# Patient Record
Sex: FEMALE | Race: BLACK OR AFRICAN AMERICAN | NOT HISPANIC OR LATINO | Employment: OTHER | URBAN - METROPOLITAN AREA
[De-identification: names, ages, dates, MRNs, and addresses within clinical notes are randomized per-mention and may not be internally consistent; named-entity substitution may affect disease eponyms.]

---

## 2022-04-10 ENCOUNTER — HISTORICAL (OUTPATIENT)
Dept: ADMINISTRATIVE | Facility: HOSPITAL | Age: 72
End: 2022-04-10
Payer: MEDICARE

## 2022-04-28 VITALS
DIASTOLIC BLOOD PRESSURE: 74 MMHG | SYSTOLIC BLOOD PRESSURE: 118 MMHG | HEIGHT: 63 IN | WEIGHT: 163.13 LBS | BODY MASS INDEX: 28.9 KG/M2

## 2022-05-20 ENCOUNTER — HOSPITAL ENCOUNTER (INPATIENT)
Facility: HOSPITAL | Age: 72
LOS: 3 days | Discharge: NURSING, REHAB OR LONG-TERM CARE | DRG: 982 | End: 2022-05-26
Attending: INTERNAL MEDICINE | Admitting: INTERNAL MEDICINE
Payer: MEDICARE

## 2022-05-20 DIAGNOSIS — I25.10 CAD (CORONARY ARTERY DISEASE), NATIVE CORONARY ARTERY: ICD-10-CM

## 2022-05-20 DIAGNOSIS — I25.10 CAD (CORONARY ARTERY DISEASE): ICD-10-CM

## 2022-05-20 DIAGNOSIS — R94.8 NONSPECIFIC ABNORMAL RESULTS OF BASAL METABOLISM FUNCTION STUDY: ICD-10-CM

## 2022-05-20 DIAGNOSIS — I25.10 ATHEROSCLEROSIS OF CORONARY ARTERY, UNSPECIFIED VESSEL OR LESION TYPE, UNSPECIFIED WHETHER ANGINA PRESENT, UNSPECIFIED WHETHER NATIVE OR TRANSPLANTED HEART: Primary | ICD-10-CM

## 2022-05-20 LAB
CATH EF ESTIMATED: 45 %
CATH EF QUANTITATIVE: 45 %
POCT GLUCOSE: 204 MG/DL (ref 70–110)
POCT GLUCOSE: 212 MG/DL (ref 70–110)
POCT GLUCOSE: 215 MG/DL (ref 70–110)

## 2022-05-20 PROCEDURE — 63600175 PHARM REV CODE 636 W HCPCS

## 2022-05-20 PROCEDURE — 63600175 PHARM REV CODE 636 W HCPCS: Performed by: INTERNAL MEDICINE

## 2022-05-20 PROCEDURE — 96360 HYDRATION IV INFUSION INIT: CPT

## 2022-05-20 PROCEDURE — 99152 MOD SED SAME PHYS/QHP 5/>YRS: CPT | Performed by: INTERNAL MEDICINE

## 2022-05-20 PROCEDURE — 99153 MOD SED SAME PHYS/QHP EA: CPT | Performed by: INTERNAL MEDICINE

## 2022-05-20 PROCEDURE — 25000003 PHARM REV CODE 250: Performed by: INTERNAL MEDICINE

## 2022-05-20 PROCEDURE — C9600 PERC DRUG-EL COR STENT SING: HCPCS | Mod: LC | Performed by: INTERNAL MEDICINE

## 2022-05-20 PROCEDURE — 27201423 OPTIME MED/SURG SUP & DEVICES STERILE SUPPLY: Performed by: INTERNAL MEDICINE

## 2022-05-20 PROCEDURE — G0378 HOSPITAL OBSERVATION PER HR: HCPCS

## 2022-05-20 PROCEDURE — 93010 ELECTROCARDIOGRAM REPORT: CPT | Mod: ,,, | Performed by: INTERNAL MEDICINE

## 2022-05-20 PROCEDURE — C1769 GUIDE WIRE: HCPCS | Performed by: INTERNAL MEDICINE

## 2022-05-20 PROCEDURE — 93005 ELECTROCARDIOGRAM TRACING: CPT

## 2022-05-20 PROCEDURE — C1725 CATH, TRANSLUMIN NON-LASER: HCPCS | Performed by: INTERNAL MEDICINE

## 2022-05-20 PROCEDURE — C1894 INTRO/SHEATH, NON-LASER: HCPCS | Performed by: INTERNAL MEDICINE

## 2022-05-20 PROCEDURE — 93010 ELECTROCARDIOGRAM REPORT: CPT | Mod: 59,,, | Performed by: INTERNAL MEDICINE

## 2022-05-20 PROCEDURE — C1760 CLOSURE DEV, VASC: HCPCS | Performed by: INTERNAL MEDICINE

## 2022-05-20 PROCEDURE — 96361 HYDRATE IV INFUSION ADD-ON: CPT

## 2022-05-20 PROCEDURE — 93458 L HRT ARTERY/VENTRICLE ANGIO: CPT | Mod: 59 | Performed by: INTERNAL MEDICINE

## 2022-05-20 PROCEDURE — C1874 STENT, COATED/COV W/DEL SYS: HCPCS | Performed by: INTERNAL MEDICINE

## 2022-05-20 PROCEDURE — C1887 CATHETER, GUIDING: HCPCS | Performed by: INTERNAL MEDICINE

## 2022-05-20 PROCEDURE — 93010 EKG 12-LEAD: ICD-10-PCS | Mod: 59,,, | Performed by: INTERNAL MEDICINE

## 2022-05-20 PROCEDURE — 93010 EKG 12-LEAD: ICD-10-PCS | Mod: ,,, | Performed by: INTERNAL MEDICINE

## 2022-05-20 DEVICE — STENT RONYX22538UX RESOLUTE ONYX 2.25X38
Type: IMPLANTABLE DEVICE | Site: CHEST | Status: FUNCTIONAL
Brand: RESOLUTE ONYX™

## 2022-05-20 DEVICE — STENT RONYX25022UX RESOLUTE ONYX 2.50X22
Type: IMPLANTABLE DEVICE | Site: CHEST | Status: FUNCTIONAL
Brand: RESOLUTE ONYX™

## 2022-05-20 DEVICE — KIT ANGIO SEAL 6FR VIP: Type: IMPLANTABLE DEVICE | Site: CHEST | Status: FUNCTIONAL

## 2022-05-20 RX ORDER — SODIUM CHLORIDE 9 MG/ML
INJECTION, SOLUTION INTRAVENOUS ONCE
Status: COMPLETED | OUTPATIENT
Start: 2022-05-20 | End: 2022-05-20

## 2022-05-20 RX ORDER — DULOXETIN HYDROCHLORIDE 60 MG/1
60 CAPSULE, DELAYED RELEASE ORAL DAILY
Status: ON HOLD | COMMUNITY
End: 2022-06-06 | Stop reason: SDUPTHER

## 2022-05-20 RX ORDER — SODIUM CHLORIDE 9 MG/ML
INJECTION, SOLUTION INTRAVENOUS CONTINUOUS
Status: DISCONTINUED | OUTPATIENT
Start: 2022-05-20 | End: 2022-05-21

## 2022-05-20 RX ORDER — DICLOFENAC SODIUM 50 MG/1
50 TABLET, DELAYED RELEASE ORAL 2 TIMES DAILY
Status: ON HOLD | COMMUNITY
End: 2022-06-06

## 2022-05-20 RX ORDER — MIDAZOLAM HYDROCHLORIDE 1 MG/ML
INJECTION, SOLUTION INTRAMUSCULAR; INTRAVENOUS
Status: DISCONTINUED | OUTPATIENT
Start: 2022-05-20 | End: 2022-05-22

## 2022-05-20 RX ORDER — MIDAZOLAM HYDROCHLORIDE 2 MG/2ML
INJECTION, SOLUTION INTRAMUSCULAR; INTRAVENOUS
Status: DISCONTINUED | OUTPATIENT
Start: 2022-05-20 | End: 2022-05-22

## 2022-05-20 RX ORDER — FENTANYL CITRATE 50 UG/ML
INJECTION, SOLUTION INTRAMUSCULAR; INTRAVENOUS
Status: DISCONTINUED | OUTPATIENT
Start: 2022-05-20 | End: 2022-05-22

## 2022-05-20 RX ORDER — ATORVASTATIN CALCIUM 40 MG/1
40 TABLET, FILM COATED ORAL NIGHTLY
COMMUNITY

## 2022-05-20 RX ORDER — DIPHENHYDRAMINE HCL 25 MG
50 CAPSULE ORAL ONCE
Status: COMPLETED | OUTPATIENT
Start: 2022-05-20 | End: 2022-05-20

## 2022-05-20 RX ORDER — LOSARTAN POTASSIUM 50 MG/1
50 TABLET ORAL DAILY
Status: ON HOLD | COMMUNITY
End: 2022-06-06 | Stop reason: HOSPADM

## 2022-05-20 RX ORDER — CEFAZOLIN SODIUM 1 G/3ML
INJECTION, POWDER, FOR SOLUTION INTRAMUSCULAR; INTRAVENOUS
Status: DISCONTINUED | OUTPATIENT
Start: 2022-05-20 | End: 2022-05-22

## 2022-05-20 RX ORDER — CLOPIDOGREL 300 MG/1
TABLET, FILM COATED ORAL
Status: DISCONTINUED | OUTPATIENT
Start: 2022-05-20 | End: 2022-05-22

## 2022-05-20 RX ORDER — MAG HYDROX/ALUMINUM HYD/SIMETH 200-200-20
SUSPENSION, ORAL (FINAL DOSE FORM) ORAL
Status: DISCONTINUED | OUTPATIENT
Start: 2022-05-20 | End: 2022-05-22

## 2022-05-20 RX ORDER — DONEPEZIL HYDROCHLORIDE 5 MG/1
5 TABLET, FILM COATED ORAL NIGHTLY
Status: ON HOLD | COMMUNITY
End: 2022-06-06 | Stop reason: SDUPTHER

## 2022-05-20 RX ORDER — METFORMIN HYDROCHLORIDE 1000 MG/1
1000 TABLET ORAL 2 TIMES DAILY
Status: ON HOLD | COMMUNITY
End: 2022-05-21 | Stop reason: SDUPTHER

## 2022-05-20 RX ORDER — ASPIRIN 325 MG
TABLET ORAL
Status: DISCONTINUED | OUTPATIENT
Start: 2022-05-20 | End: 2022-05-22

## 2022-05-20 RX ORDER — DIAZEPAM 5 MG/1
10 TABLET ORAL ONCE
Status: COMPLETED | OUTPATIENT
Start: 2022-05-20 | End: 2022-05-20

## 2022-05-20 RX ORDER — LIDOCAINE HYDROCHLORIDE AND EPINEPHRINE 20; 10 MG/ML; UG/ML
INJECTION, SOLUTION INFILTRATION; PERINEURAL
Status: DISCONTINUED | OUTPATIENT
Start: 2022-05-20 | End: 2022-05-22

## 2022-05-20 RX ORDER — HYDROCODONE BITARTRATE AND ACETAMINOPHEN 7.5; 325 MG/1; MG/1
1 TABLET ORAL ONCE
Status: COMPLETED | OUTPATIENT
Start: 2022-05-20 | End: 2022-05-20

## 2022-05-20 RX ORDER — ASPIRIN 81 MG/1
81 TABLET ORAL DAILY
COMMUNITY

## 2022-05-20 RX ORDER — HYDRALAZINE HYDROCHLORIDE 20 MG/ML
INJECTION INTRAMUSCULAR; INTRAVENOUS
Status: DISCONTINUED | OUTPATIENT
Start: 2022-05-20 | End: 2022-05-22

## 2022-05-20 RX ORDER — SODIUM CHLORIDE 0.9 % (FLUSH) 0.9 %
10 SYRINGE (ML) INJECTION
Status: DISCONTINUED | OUTPATIENT
Start: 2022-05-20 | End: 2022-05-22

## 2022-05-20 RX ORDER — PREGABALIN 75 MG/1
75 CAPSULE ORAL 2 TIMES DAILY
Status: ON HOLD | COMMUNITY
End: 2022-05-25 | Stop reason: HOSPADM

## 2022-05-20 RX ORDER — PANTOPRAZOLE SODIUM 40 MG/1
40 TABLET, DELAYED RELEASE ORAL DAILY
COMMUNITY

## 2022-05-20 RX ADMIN — SODIUM CHLORIDE: 9 INJECTION, SOLUTION INTRAVENOUS at 08:05

## 2022-05-20 RX ADMIN — DIPHENHYDRAMINE HYDROCHLORIDE 50 MG: 25 CAPSULE ORAL at 08:05

## 2022-05-20 RX ADMIN — DIAZEPAM 10 MG: 5 TABLET ORAL at 08:05

## 2022-05-20 RX ADMIN — SODIUM CHLORIDE: 9 INJECTION, SOLUTION INTRAVENOUS at 04:05

## 2022-05-20 RX ADMIN — HYDROCODONE BITARTRATE AND ACETAMINOPHEN 1 TABLET: 7.5; 325 TABLET ORAL at 12:05

## 2022-05-20 NOTE — Clinical Note
The catheter was repositioned into the ostium   right coronary artery. Hemodynamics were performed.  An angiography was performed of the right coronary arteries. Multiple views were taken. The angiography was performed via power injection.

## 2022-05-20 NOTE — Clinical Note
The catheter was inserted into the ostial  left coronary artery. An angiography was performed of the left coronary arteries. The angiography was performed via power injection.

## 2022-05-20 NOTE — Clinical Note
The left ventricle was injected. The injected rate was 10 mL/sec. The total injected volume was 30 mL. EF 45%

## 2022-05-20 NOTE — Clinical Note
The catheter was inserted into the ostial  left coronary artery. Hemodynamics were performed.  An angiography was performed of the left coronary arteries. Multiple views were taken. The angiography was performed via power injection.

## 2022-05-20 NOTE — H&P
Patient name: Mayra Parker  MRN: [id number]  : 1950  Cath Lab Procedure H&P Update    Pre-Procedure Assessment:    I saw and examined the patient face to face. The patient has been re-evaluated and her condition is unchanged. The reason for admission, procedure and care is still present.  Based on the patients H&P, pre-procedure physical exam, relevant diagnostic studies, NPO status and information obtained from the patient, I determine the patient is an appropriate candidate for the proposed procedure and anesthesia planned. I further certify the anesthesia risks, benefits and options have been explained to the patient to which she agrees as documented on the procedural consent.    See scanned updated H&P and additional records from media tab.      Jone Estrella

## 2022-05-20 NOTE — Clinical Note
The catheter was inserted into the ostium   right coronary artery. Hemodynamics were performed.  An angiography was performed of the right coronary arteries. The angiography was performed via power injection.

## 2022-05-21 PROBLEM — I25.10 CORONARY ATHEROSCLEROSIS: Status: ACTIVE | Noted: 2022-05-21

## 2022-05-21 LAB
ALBUMIN SERPL-MCNC: 3.4 GM/DL (ref 3.4–4.8)
ALBUMIN/GLOB SERPL: 1 RATIO (ref 1.1–2)
ALP SERPL-CCNC: 59 UNIT/L (ref 40–150)
ALT SERPL-CCNC: 106 UNIT/L (ref 0–55)
ANION GAP SERPL CALC-SCNC: 12 MEQ/L
ANION GAP SERPL CALC-SCNC: 13 MEQ/L
AST SERPL-CCNC: 88 UNIT/L (ref 5–34)
BASOPHILS # BLD AUTO: 0.02 X10(3)/MCL (ref 0–0.2)
BASOPHILS NFR BLD AUTO: 0.5 %
BILIRUBIN DIRECT+TOT PNL SERPL-MCNC: 0.8 MG/DL
BUN SERPL-MCNC: 28.3 MG/DL (ref 9.8–20.1)
BUN SERPL-MCNC: 30.5 MG/DL (ref 9.8–20.1)
BUN SERPL-MCNC: 33 MG/DL (ref 9.8–20.1)
CALCIUM SERPL-MCNC: 7.5 MG/DL (ref 8.4–10.2)
CALCIUM SERPL-MCNC: 7.8 MG/DL (ref 8.4–10.2)
CALCIUM SERPL-MCNC: 8.1 MG/DL (ref 8.4–10.2)
CHLORIDE SERPL-SCNC: 101 MMOL/L (ref 98–107)
CHLORIDE SERPL-SCNC: 98 MMOL/L (ref 98–107)
CHLORIDE SERPL-SCNC: 99 MMOL/L (ref 98–107)
CO2 SERPL-SCNC: 20 MMOL/L (ref 23–31)
CO2 SERPL-SCNC: 21 MMOL/L (ref 23–31)
CO2 SERPL-SCNC: 23 MMOL/L (ref 23–31)
CREAT SERPL-MCNC: 3.69 MG/DL (ref 0.55–1.02)
CREAT SERPL-MCNC: 3.81 MG/DL (ref 0.55–1.02)
CREAT SERPL-MCNC: 4.21 MG/DL (ref 0.55–1.02)
CREAT UR-MCNC: 43.6 MG/DL (ref 47–110)
CREAT/UREA NIT SERPL: 8
CREAT/UREA NIT SERPL: 8
EOSINOPHIL # BLD AUTO: 0.17 X10(3)/MCL (ref 0–0.9)
EOSINOPHIL NFR BLD AUTO: 4.5 %
ERYTHROCYTE [DISTWIDTH] IN BLOOD BY AUTOMATED COUNT: 12.6 % (ref 11.5–17)
FERRITIN SERPL-MCNC: 313.01 NG/ML (ref 4.63–204)
FOLATE SERPL-MCNC: 7 NG/ML (ref 7–31.4)
GLOBULIN SER-MCNC: 3.4 GM/DL (ref 2.4–3.5)
GLUCOSE SERPL-MCNC: 132 MG/DL (ref 82–115)
GLUCOSE SERPL-MCNC: 176 MG/DL (ref 82–115)
GLUCOSE SERPL-MCNC: 256 MG/DL (ref 82–115)
HCT VFR BLD AUTO: 34.4 % (ref 37–47)
HGB BLD-MCNC: 10.9 GM/DL (ref 12–16)
IMM GRANULOCYTES # BLD AUTO: 0.01 X10(3)/MCL (ref 0–0.02)
IMM GRANULOCYTES NFR BLD AUTO: 0.3 % (ref 0–0.43)
IRON SATN MFR SERPL: 43 % (ref 20–50)
IRON SERPL-MCNC: 107 UG/DL (ref 50–170)
LYMPHOCYTES # BLD AUTO: 1.32 X10(3)/MCL (ref 0.6–4.6)
LYMPHOCYTES NFR BLD AUTO: 35.1 %
MCH RBC QN AUTO: 29.7 PG (ref 27–31)
MCHC RBC AUTO-ENTMCNC: 31.7 MG/DL (ref 33–36)
MCV RBC AUTO: 93.7 FL (ref 80–94)
MONOCYTES # BLD AUTO: 0.52 X10(3)/MCL (ref 0.1–1.3)
MONOCYTES NFR BLD AUTO: 13.8 %
NEUTROPHILS # BLD AUTO: 1.7 X10(3)/MCL (ref 2.1–9.2)
NEUTROPHILS NFR BLD AUTO: 45.8 %
NRBC BLD AUTO-RTO: 0 %
PLATELET # BLD AUTO: 236 X10(3)/MCL (ref 130–400)
PMV BLD AUTO: 10.1 FL (ref 9.4–12.4)
POCT GLUCOSE: 178 MG/DL (ref 70–110)
POTASSIUM SERPL-SCNC: 5.2 MMOL/L (ref 3.5–5.1)
POTASSIUM SERPL-SCNC: 5.6 MMOL/L (ref 3.5–5.1)
POTASSIUM SERPL-SCNC: 5.6 MMOL/L (ref 3.5–5.1)
PROT SERPL-MCNC: 6.8 GM/DL (ref 5.8–7.6)
PROT UR STRIP-MCNC: 13.4 MG/DL
PROT/CREAT UR-RTO: 307.3 MG/GM CR
RBC # BLD AUTO: 3.67 X10(6)/MCL (ref 4.2–5.4)
SODIUM SERPL-SCNC: 131 MMOL/L (ref 136–145)
SODIUM SERPL-SCNC: 132 MMOL/L (ref 136–145)
SODIUM SERPL-SCNC: 133 MMOL/L (ref 136–145)
SODIUM UR-SCNC: 26 MMOL/L
TIBC SERPL-MCNC: 143 UG/DL (ref 70–310)
TIBC SERPL-MCNC: 250 UG/DL (ref 250–450)
TRANSFERRIN SERPL-MCNC: 215 MG/DL (ref 173–360)
WBC # SPEC AUTO: 3.8 X10(3)/MCL (ref 4.5–11.5)

## 2022-05-21 PROCEDURE — G0378 HOSPITAL OBSERVATION PER HR: HCPCS

## 2022-05-21 PROCEDURE — 85025 COMPLETE CBC W/AUTO DIFF WBC: CPT | Performed by: NURSE PRACTITIONER

## 2022-05-21 PROCEDURE — 25000003 PHARM REV CODE 250: Performed by: NURSE PRACTITIONER

## 2022-05-21 PROCEDURE — 82728 ASSAY OF FERRITIN: CPT | Performed by: INTERNAL MEDICINE

## 2022-05-21 PROCEDURE — 25000003 PHARM REV CODE 250: Performed by: INTERNAL MEDICINE

## 2022-05-21 PROCEDURE — 80053 COMPREHEN METABOLIC PANEL: CPT | Performed by: INTERNAL MEDICINE

## 2022-05-21 PROCEDURE — 84300 ASSAY OF URINE SODIUM: CPT | Performed by: INTERNAL MEDICINE

## 2022-05-21 PROCEDURE — 36415 COLL VENOUS BLD VENIPUNCTURE: CPT | Performed by: INTERNAL MEDICINE

## 2022-05-21 PROCEDURE — 82310 ASSAY OF CALCIUM: CPT | Performed by: NURSE PRACTITIONER

## 2022-05-21 PROCEDURE — 96361 HYDRATE IV INFUSION ADD-ON: CPT

## 2022-05-21 PROCEDURE — 82746 ASSAY OF FOLIC ACID SERUM: CPT | Performed by: INTERNAL MEDICINE

## 2022-05-21 PROCEDURE — 82570 ASSAY OF URINE CREATININE: CPT | Performed by: INTERNAL MEDICINE

## 2022-05-21 PROCEDURE — 83540 ASSAY OF IRON: CPT | Performed by: INTERNAL MEDICINE

## 2022-05-21 PROCEDURE — 36415 COLL VENOUS BLD VENIPUNCTURE: CPT | Performed by: NURSE PRACTITIONER

## 2022-05-21 RX ORDER — CLOPIDOGREL BISULFATE 75 MG/1
75 TABLET ORAL DAILY
Status: DISCONTINUED | OUTPATIENT
Start: 2022-05-21 | End: 2022-05-26 | Stop reason: HOSPADM

## 2022-05-21 RX ORDER — CLOPIDOGREL BISULFATE 75 MG/1
75 TABLET ORAL DAILY
Qty: 30 TABLET | Refills: 11 | Status: ON HOLD | OUTPATIENT
Start: 2022-05-21 | End: 2022-06-06 | Stop reason: SDUPTHER

## 2022-05-21 RX ORDER — METFORMIN HYDROCHLORIDE 1000 MG/1
1000 TABLET ORAL 2 TIMES DAILY
Qty: 60 TABLET | Refills: 0 | Status: ON HOLD | OUTPATIENT
Start: 2022-05-21 | End: 2022-06-06

## 2022-05-21 RX ORDER — SODIUM CHLORIDE 9 MG/ML
INJECTION, SOLUTION INTRAVENOUS CONTINUOUS
Status: DISCONTINUED | OUTPATIENT
Start: 2022-05-21 | End: 2022-05-26 | Stop reason: HOSPADM

## 2022-05-21 RX ADMIN — CLOPIDOGREL 75 MG: 75 TABLET, FILM COATED ORAL at 08:05

## 2022-05-21 RX ADMIN — SODIUM ZIRCONIUM CYCLOSILICATE 10 G: 10 POWDER, FOR SUSPENSION ORAL at 07:05

## 2022-05-21 RX ADMIN — SODIUM CHLORIDE: 9 INJECTION, SOLUTION INTRAVENOUS at 07:05

## 2022-05-21 RX ADMIN — SODIUM ZIRCONIUM CYCLOSILICATE 10 G: 10 POWDER, FOR SUSPENSION ORAL at 05:05

## 2022-05-21 RX ADMIN — SODIUM ZIRCONIUM CYCLOSILICATE 10 G: 10 POWDER, FOR SUSPENSION ORAL at 10:05

## 2022-05-21 RX ADMIN — SODIUM CHLORIDE: 9 INJECTION, SOLUTION INTRAVENOUS at 08:05

## 2022-05-21 RX ADMIN — SODIUM ZIRCONIUM CYCLOSILICATE 10 G: 10 POWDER, FOR SUSPENSION ORAL at 02:05

## 2022-05-21 NOTE — HOSPITAL COURSE
Ms. Parker presented for coronary evaluation due to Complaints of Sob/CARTAGENA with abnormal PET scan.

## 2022-05-21 NOTE — CONSULTS
[facility]                       [address] [address]    PATIENT NAME:       LAYTON BAINS  YOB: 1950  CSN:                [id number]   MRN:                [id number]  ADMIT DATE:         05/20/2022 07:19:00  PHYSICIAN:          John Flores MD                            CONSULTATION    DATE OF CONSULT:      HISTORY OF PRESENT ILLNESS:  This is a pleasant 72-year-old -American   female, whom I was consulted to see this morning for a creatinine of 4.2 and a   potassium of 5.6.  The patient had a coronary aortography yesterday and did   undergo PTA and stent placements x2 into her right coronary artery for what   appeared to be unstable-type angina symptoms.  She does have a longstanding   history of diabetes mellitus as well as chronic kidney disease, and had a   creatinine of 1.5 back in December of 2021, suggestive of at least stage 3 CKD.    The patient herself denied any history of recurrent urinary tract infections,   although she did have an admission in December of 2021 at [facility] for UTI.  She   denied any dysuria, urgency, or hesitation but she has been diabetic for well   over 20 years.  She denies any nephrolithiasis.  Unfortunately, she has also had   some degenerative joint disease and bilateral lower extremity pains consistent   with some neuropathy.  She is on gabapentin but more importantly, she has been   on diclofenac, which is Voltaren, 50 mg b.i.d. for the least year.    PAST MEDICAL HISTORY:  Significant for bilateral carotid endarterectomies, the   patient has had hypertension, diabetes mellitus, previous stroke in 2017,   history of depression and some dementia.  Patient has also had a history of back   surgery in the remote past.    ALLERGIES:  REPORTEDLY GLIPIZIDE.     HOME MEDICATIONS:  Included:  1. Cozaar.  2. Lyrica.  3. Januvia.  4. Aricept.  5. Cymbalta.  6.  Lipitor.  7. Ecotrin.  8. As mentioned earlier, diclofenac 50 mg b.i.d.    REVIEW OF SYSTEMS:  Otherwise is significant for gradual loss of her dentition with gingival   disease.  She has a slight decline in visual acuity as well as hearing ability.    She has no difficulty swallowing.  She denies any bleeding ulcers or rectal   bleeding.  She denied any history of seizure activity.  There is no further   chest pain since her stent placements yesterday.  She has had no rectal   bleeding.  She denied any rashes or joint swelling.  The rest of the review is   unremarkable.    PHYSICAL EXAMINATION:  GENERAL:  Reveals a well developed and nourished female.  She is awake, alert,   oriented, and appropriate.  Her daughter is in attendance with her in her room.   VITAL SIGNS:  Her current blood pressure this morning is 114/66, heart rate is   79, respiratory rate 18, temperature 36.7.  HEENT:  No scleral icterus.  Extraocular movements appear normal.  Conjunctivae   were slightly pale.  Mucous membranes were dry.  NECK:  No venous distention.  Evidence of bilateral carotid endarterectomies   noted.  LUNGS:  Clear to auscultation.  HEART:  Regular, distant, without rub or gallop.  ABDOMEN:  Soft and nontender.  EXTREMITIES:  Lower extremities reveal no pitting edema, good peripheral pulses   noted.    LABORATORY DATA:  Her BUN and creatinine this morning are 33 and 4.2,   respectively.  Sugar was 132, albumin 3.4, potassium is 5.6.    IMPRESSION:    1. Acute kidney injury, superimposed on at least stage 3 chronic kidney disease   with a creatinine of 1.5 in December of 2021.  2. Longstanding diabetes.  3. Status post 2 right coronary artery stents on May 20th.  4. Long-term use of nonsteroidal anti-inflammatory drugs.  5. Incomplete database.  6. Type 2 diabetes.  7. Hypertension.    PLANS:  We will proceed with cautious hydration today with her normal saline at   125 an hour.  Renal ultrasound will be done as well as a  urine protein,   creatinine, and sodium.  I suspect the patient's renal functions have been   impaired prior to her left heart cath; although certainly, contrast could have   potentiated this issue.  We will use potassium binders today to bring her   potassium down.  I did mention the possibility that dialysis may have to be   entertained.  We will follow with you with interest.        ______________________________  MD SONIA Harry/VIVIEN  DD:  05/21/2022  Time:  08:55AM  DT:  05/21/2022  Time:  12:09PM  Job #:  [id number]      CONSULTATION

## 2022-05-21 NOTE — H&P
[facility]  Cardiology  History and Physical     Patient Name: Mayra Parker  MRN: [id number]  Admission Date: 5/20/2022  Code Status: No Order   Attending Provider: Jone Estrella MD   Primary Care Physician: Rashaun Weiss MD  Principal Problem:Coronary atherosclerosis    Patient information was obtained from patient and ER records.     Subjective:     Chief Complaint:      HPI:   Ms. Parker is a 73y/o AAF, followed by Dr. Estrella, with PMHx significant for CVA, T2DM, HTN, HLD, and dementia who presented for outpatient coronary evaluation due to abnormal PET with c/o SOB/CARTAGENA. She underwent LHC with 90% stenosis noted to proximal RCA to distal RCA which was ballooned and stented x 2 and reduced to 0%. Om1 with noted 90% stenosis reduced to 0% post PTA/stent. EF 40-50% by visual estimate. Normal LVEDP. She was admitted to observation overnight. Labs collected and revealed ARF with BUN/creatinine 33/4. No known hx of CKD. No uOP documented. Family at bedside reports occasional urination overnight.     PMH: CVA, HLD, depression, dementia, T2DM, HTN  PSH: Right and left CEA, back surgery  Social History: current smoker  Family History: Father- CAD native, Mother- CAD native, Son- CAD, MI- reason for death    Previous Cardiac Diagnostics:   TTE 05/03/2022:  LV normal in size. Global LVSF. LVEF 55%. LV diastolic function is impaired Grade I with normal LA pressure. Noted mild concentric LVH. Mild calcification of aortic valve noted with adequate cuspal excursion. 1+ TR. PASP 31mmHg. Mobility of anterior and posterior mitral leaflet is normal. Mild MAC with Trace MR.     PET 04/19/2022:  Abnormal perfusion study consistent with large area of moderate to severe inferior and inferoapical ischemia. Perfusion imaging suggestive of single vessel disease in distribution of RCA. LV cavity normal on stress studies. Stress LVEF 42% and LV global function is mildly reduced. Rest LV  cavity normal. Rest LVEF 56% and rest LV global function is normal.     ROS  Objective:     Vital Signs (Most Recent):  Temp: 97.7 °F (36.5 °C) (05/21/22 0425)  Pulse: 84 (05/21/22 0425)  Resp: 20 (05/21/22 0425)  BP: 106/71 (05/21/22 0425)  SpO2: 96 % (05/21/22 0425) Vital Signs (24h Range):  Temp:  [97.6 °F (36.4 °C)-98 °F (36.7 °C)] 97.7 °F (36.5 °C)  Pulse:  [75-97] 84  Resp:  [12-23] 20  SpO2:  [94 %-98 %] 96 %  BP: (106-170)/(47-86) 106/71     Weight: 67.9 kg (149 lb 11.1 oz)  Body mass index is 25.69 kg/m².    SpO2: 96 %       No intake or output data in the 24 hours ending 05/21/22 0706    Lines/Drains/Airways     Drain  Duration                Sheath 05/20/22 1018 Right <1 day          Peripheral Intravenous Line  Duration                Peripheral IV - Single Lumen 05/20/22 0805 20 G Left Antecubital <1 day                Physical Exam  Constitutional:       Appearance: Normal appearance.   HENT:      Head: Normocephalic and atraumatic.      Mouth/Throat:      Mouth: Mucous membranes are moist.   Eyes:      Pupils: Pupils are equal, round, and reactive to light.   Cardiovascular:      Rate and Rhythm: Normal rate.      Heart sounds: Normal heart sounds.   Pulmonary:      Effort: Pulmonary effort is normal.      Breath sounds: Normal breath sounds.   Abdominal:      General: Abdomen is flat.      Palpations: Abdomen is soft.   Musculoskeletal:      Cervical back: Neck supple.      Left lower leg: No edema.      Comments: R groin soft, minimally tender   Skin:     General: Skin is warm.   Neurological:      General: No focal deficit present.      Mental Status: She is alert and oriented to person, place, and time.   Psychiatric:         Mood and Affect: Mood normal.         Significant Labs:   BMP:   Recent Labs   Lab 05/21/22  0400   *   K 5.6*   CO2 23   BUN 33.0*   CREATININE 4.21*   CALCIUM 8.1*       Significant Imaging:  Left heart cath  · There was mild left ventricular systolic  dysfunction.  · The left ventricular end diastolic pressure was normal.  · The pre-procedure left ventricular end diastolic pressure was 12.  · The ejection fraction was calculated to be 45%.  · The ejection fraction was 40-50% by visual estimate.  · There was no mitral valve regurgitation.  · There was no aortic valve stenosis.  · The Prox RCA to Dist RCA lesion was 90% stenosed with 0% stenosis post-intervention. The stents were placed. The distal stent to the RCA was a 2.25 by 38 mm drug-eluting stent. The mid and proximal stent was a 2.5 x 22 mm drug-eluting stent.  · A stent was successfully placed.  · A stent was successfully placed.  · The estimated blood loss was none.  · There was two vessel coronary artery disease.  · The PCI was successful.  · The 1st Mrg lesion was 90% stenosed with 0% stenosis post-intervention. This marginal stent was a 2.25 x 38 mm drug-eluting stent post dilated to 2.5 mm  · A stent was successfully placed.       Assessment and Plan:   Native CAD  --s/p PTCA/stent x 2 proximal to distal RCA  --s/p PTCA/stent to OM1  ICMO  --EF 40-50%  ARF  --BUN/creatinine 33/4.21  --Creat was 1.3 on 5/10/22  --This seems too early for NILES (? Renal flow issue, ? Dissection possible)  Transaminitis  --AST/ALT 88/106  HLD  HTN  T2DM  Hyperkalemia    Plan:  Continue aspirin, plavix, and statin.   New ARF. Consult nephrology. Insert baez catheter for accurate UOP. Start NS at 125ml/hr.   Give lokelma x 1 dose. Hold losartan.             VTE Risk Mitigation (From admission, onward)         Ordered     heparin (porcine) 6,000 Units in sodium chloride 0.9% 6 mL  As needed (PRN)         05/20/22 1032                    Betty Ruffin, ALICIA  Cardiology  [facility]  05/21/2022 7:06 AM

## 2022-05-22 LAB
ALBUMIN SERPL-MCNC: 3.3 GM/DL (ref 3.4–4.8)
ALBUMIN/GLOB SERPL: 1.1 RATIO (ref 1.1–2)
ALP SERPL-CCNC: 66 UNIT/L (ref 40–150)
ALT SERPL-CCNC: 98 UNIT/L (ref 0–55)
AST SERPL-CCNC: 114 UNIT/L (ref 5–34)
BASOPHILS # BLD AUTO: 0.04 X10(3)/MCL (ref 0–0.2)
BASOPHILS NFR BLD AUTO: 0.9 %
BILIRUBIN DIRECT+TOT PNL SERPL-MCNC: 0.9 MG/DL
BUN SERPL-MCNC: 21.3 MG/DL (ref 9.8–20.1)
CALCIUM SERPL-MCNC: 8 MG/DL (ref 8.4–10.2)
CHLORIDE SERPL-SCNC: 110 MMOL/L (ref 98–107)
CO2 SERPL-SCNC: 25 MMOL/L (ref 23–31)
CREAT SERPL-MCNC: 2.57 MG/DL (ref 0.55–1.02)
EOSINOPHIL # BLD AUTO: 0.25 X10(3)/MCL (ref 0–0.9)
EOSINOPHIL NFR BLD AUTO: 5.3 %
ERYTHROCYTE [DISTWIDTH] IN BLOOD BY AUTOMATED COUNT: 12.4 % (ref 11.5–17)
GLOBULIN SER-MCNC: 2.9 GM/DL (ref 2.4–3.5)
GLUCOSE SERPL-MCNC: 115 MG/DL (ref 82–115)
GLUCOSE SERPL-MCNC: 155 MG/DL (ref 70–110)
HCT VFR BLD AUTO: 32.4 % (ref 37–47)
HGB BLD-MCNC: 10.1 GM/DL (ref 12–16)
IMM GRANULOCYTES # BLD AUTO: 0.01 X10(3)/MCL (ref 0–0.02)
IMM GRANULOCYTES NFR BLD AUTO: 0.2 % (ref 0–0.43)
LYMPHOCYTES # BLD AUTO: 1.88 X10(3)/MCL (ref 0.6–4.6)
LYMPHOCYTES NFR BLD AUTO: 40.1 %
MCH RBC QN AUTO: 29.4 PG (ref 27–31)
MCHC RBC AUTO-ENTMCNC: 31.2 MG/DL (ref 33–36)
MCV RBC AUTO: 94.5 FL (ref 80–94)
MONOCYTES # BLD AUTO: 0.64 X10(3)/MCL (ref 0.1–1.3)
MONOCYTES NFR BLD AUTO: 13.6 %
NEUTROPHILS # BLD AUTO: 1.9 X10(3)/MCL (ref 2.1–9.2)
NEUTROPHILS NFR BLD AUTO: 39.9 %
NRBC BLD AUTO-RTO: 0 %
PLATELET # BLD AUTO: 210 X10(3)/MCL (ref 130–400)
PMV BLD AUTO: 10.2 FL (ref 9.4–12.4)
POCT GLUCOSE: 150 MG/DL (ref 70–110)
POCT GLUCOSE: 164 MG/DL (ref 70–110)
POCT GLUCOSE: 201 MG/DL (ref 70–110)
POTASSIUM SERPL-SCNC: 5 MMOL/L (ref 3.5–5.1)
PROT SERPL-MCNC: 6.2 GM/DL (ref 5.8–7.6)
RBC # BLD AUTO: 3.43 X10(6)/MCL (ref 4.2–5.4)
SODIUM SERPL-SCNC: 143 MMOL/L (ref 136–145)
WBC # SPEC AUTO: 4.7 X10(3)/MCL (ref 4.5–11.5)

## 2022-05-22 PROCEDURE — 25000003 PHARM REV CODE 250: Performed by: INTERNAL MEDICINE

## 2022-05-22 PROCEDURE — 25000003 PHARM REV CODE 250: Performed by: NURSE PRACTITIONER

## 2022-05-22 PROCEDURE — 80053 COMPREHEN METABOLIC PANEL: CPT | Performed by: INTERNAL MEDICINE

## 2022-05-22 PROCEDURE — 36415 COLL VENOUS BLD VENIPUNCTURE: CPT | Performed by: INTERNAL MEDICINE

## 2022-05-22 PROCEDURE — G0378 HOSPITAL OBSERVATION PER HR: HCPCS

## 2022-05-22 PROCEDURE — 85025 COMPLETE CBC W/AUTO DIFF WBC: CPT | Performed by: INTERNAL MEDICINE

## 2022-05-22 RX ORDER — LOSARTAN POTASSIUM 50 MG/1
50 TABLET ORAL DAILY
Status: DISCONTINUED | OUTPATIENT
Start: 2022-05-22 | End: 2022-05-22

## 2022-05-22 RX ORDER — ASPIRIN 81 MG/1
81 TABLET ORAL DAILY
Status: DISCONTINUED | OUTPATIENT
Start: 2022-05-22 | End: 2022-05-26 | Stop reason: HOSPADM

## 2022-05-22 RX ORDER — CARVEDILOL 3.12 MG/1
6.25 TABLET ORAL 2 TIMES DAILY
Status: DISCONTINUED | OUTPATIENT
Start: 2022-05-22 | End: 2022-05-23

## 2022-05-22 RX ORDER — DONEPEZIL HYDROCHLORIDE 5 MG/1
5 TABLET, FILM COATED ORAL NIGHTLY
Status: DISCONTINUED | OUTPATIENT
Start: 2022-05-22 | End: 2022-05-26 | Stop reason: HOSPADM

## 2022-05-22 RX ORDER — ATORVASTATIN CALCIUM 40 MG/1
40 TABLET, FILM COATED ORAL NIGHTLY
Status: DISCONTINUED | OUTPATIENT
Start: 2022-05-22 | End: 2022-05-26 | Stop reason: HOSPADM

## 2022-05-22 RX ORDER — PANTOPRAZOLE SODIUM 40 MG/1
40 TABLET, DELAYED RELEASE ORAL DAILY
Status: DISCONTINUED | OUTPATIENT
Start: 2022-05-22 | End: 2022-05-26 | Stop reason: HOSPADM

## 2022-05-22 RX ORDER — DULOXETIN HYDROCHLORIDE 30 MG/1
60 CAPSULE, DELAYED RELEASE ORAL DAILY
Status: DISCONTINUED | OUTPATIENT
Start: 2022-05-22 | End: 2022-05-26 | Stop reason: HOSPADM

## 2022-05-22 RX ORDER — AMLODIPINE BESYLATE 5 MG/1
5 TABLET ORAL DAILY
Status: DISCONTINUED | OUTPATIENT
Start: 2022-05-22 | End: 2022-05-22

## 2022-05-22 RX ORDER — AMLODIPINE BESYLATE 5 MG/1
10 TABLET ORAL DAILY
Status: DISCONTINUED | OUTPATIENT
Start: 2022-05-23 | End: 2022-05-24

## 2022-05-22 RX ADMIN — ASPIRIN 81 MG: 81 TABLET, COATED ORAL at 11:05

## 2022-05-22 RX ADMIN — PANTOPRAZOLE SODIUM 40 MG: 40 TABLET, DELAYED RELEASE ORAL at 11:05

## 2022-05-22 RX ADMIN — DULOXETINE 60 MG: 30 CAPSULE, DELAYED RELEASE ORAL at 11:05

## 2022-05-22 RX ADMIN — CLOPIDOGREL 75 MG: 75 TABLET, FILM COATED ORAL at 08:05

## 2022-05-22 RX ADMIN — PREGABALIN 75 MG: 50 CAPSULE ORAL at 11:05

## 2022-05-22 RX ADMIN — AMLODIPINE BESYLATE 5 MG: 5 TABLET ORAL at 11:05

## 2022-05-22 RX ADMIN — PREGABALIN 75 MG: 50 CAPSULE ORAL at 08:05

## 2022-05-22 RX ADMIN — ATORVASTATIN CALCIUM 40 MG: 40 TABLET, FILM COATED ORAL at 08:05

## 2022-05-22 RX ADMIN — CARVEDILOL 6.25 MG: 3.12 TABLET, FILM COATED ORAL at 04:05

## 2022-05-22 RX ADMIN — DONEPEZIL HYDROCHLORIDE 5 MG: 5 TABLET, FILM COATED ORAL at 08:05

## 2022-05-22 RX ADMIN — SITAGLIPTIN 100 MG: 100 TABLET, FILM COATED ORAL at 11:05

## 2022-05-22 NOTE — PROGRESS NOTES
[facility]  Cardiology  History and Physical     Patient Name: Mayra Parker  MRN: [id number]  Admission Date: 5/20/2022  Code Status: No Order   Attending Provider: Jone Estrella MD   Primary Care Physician: Rashaun Weiss MD  Principal Problem:Coronary atherosclerosis    Patient information was obtained from patient and ER records.     Subjective:     Chief Complaint:      HPI:   Ms. Parker is a 71y/o AAF, followed by Dr. Estrella, with PMHx significant for CVA, T2DM, HTN, HLD, and dementia who presented for outpatient coronary evaluation due to abnormal PET with c/o SOB/CARTAGENA. She underwent LHC with 90% stenosis noted to proximal RCA to distal RCA which was ballooned and stented x 2 and reduced to 0%. Om1 with noted 90% stenosis reduced to 0% post PTA/stent. EF 40-50% by visual estimate. Normal LVEDP. She was admitted to observation overnight. Labs collected and revealed ARF with BUN/creatinine 33/4. No known hx of CKD. No UOP documented. Family at bedside reports occasional urination overnight.       Hospital Course:  5.22.22: Renal indices improving with hydration. K+ 5.0. Hypertensive at times.     PMH: CVA, HLD, depression, dementia, T2DM, HTN  PSH: Right and left CEA, back surgery  Social History: current smoker  Family History: Father- CAD native, Mother- CAD native, Son- CAD, MI- reason for death    Previous Cardiac Diagnostics:   TTE 05/03/2022:  LV normal in size. Global LVSF. LVEF 55%. LV diastolic function is impaired Grade I with normal LA pressure. Noted mild concentric LVH. Mild calcification of aortic valve noted with adequate cuspal excursion. 1+ TR. PASP 31mmHg. Mobility of anterior and posterior mitral leaflet is normal. Mild MAC with Trace MR.     PET 04/19/2022:  Abnormal perfusion study consistent with large area of moderate to severe inferior and inferoapical ischemia. Perfusion imaging suggestive of single vessel disease in distribution of  RCA. LV cavity normal on stress studies. Stress LVEF 42% and LV global function is mildly reduced. Rest LV cavity normal. Rest LVEF 56% and rest LV global function is normal.     Review of Systems   Cardiovascular: Negative.    Respiratory: Negative.    Musculoskeletal: Negative.    Gastrointestinal: Negative.    Neurological: Negative.      Objective:     Vital Signs (Most Recent):  Temp: 98.4 °F (36.9 °C) (05/22/22 0421)  Pulse: 89 (05/22/22 0745)  Resp: 18 (05/22/22 0745)  BP: (!) 163/83 (05/22/22 0745)  SpO2: 100 % (05/22/22 0745) Vital Signs (24h Range):  Temp:  [97.8 °F (36.6 °C)-98.5 °F (36.9 °C)] 98.4 °F (36.9 °C)  Pulse:  [80-94] 89  Resp:  [14-20] 18  SpO2:  [96 %-100 %] 100 %  BP: (122-166)/(65-83) 163/83     Weight: 79 kg (174 lb 2.6 oz)  Body mass index is 29.9 kg/m².    SpO2: 100 %         Intake/Output Summary (Last 24 hours) at 5/22/2022 0906  Last data filed at 5/22/2022 0600  Gross per 24 hour   Intake 720 ml   Output 6700 ml   Net -5980 ml       Lines/Drains/Airways     Drain  Duration                Sheath 05/20/22 1018 Right 1 day          Peripheral Intravenous Line  Duration                Peripheral IV - Single Lumen 05/20/22 0500 22 G Anterior;Left Forearm 2 days                Physical Exam  Constitutional:       Appearance: Normal appearance.   HENT:      Head: Normocephalic and atraumatic.      Mouth/Throat:      Mouth: Mucous membranes are moist.   Eyes:      Pupils: Pupils are equal, round, and reactive to light.   Cardiovascular:      Rate and Rhythm: Normal rate.      Heart sounds: Normal heart sounds.   Pulmonary:      Effort: Pulmonary effort is normal.      Breath sounds: Normal breath sounds.   Abdominal:      General: Abdomen is flat.      Palpations: Abdomen is soft.   Musculoskeletal:      Cervical back: Neck supple.      Left lower leg: No edema.      Comments: R groin soft, minimally tender   Skin:     General: Skin is warm.   Neurological:      General: No focal deficit  present.      Mental Status: She is alert and oriented to person, place, and time.   Psychiatric:         Mood and Affect: Mood normal.         Significant Labs:   BMP:   Recent Labs   Lab 05/21/22  0908 05/21/22  1317 05/22/22  0516   * 132* 143   K 5.6* 5.2* 5.0   CO2 20* 21* 25   BUN 30.5* 28.3* 21.3*   CREATININE 3.81* 3.69* 2.57*   CALCIUM 7.8* 7.5* 8.0*       Significant Imaging:  Left heart cath  · There was mild left ventricular systolic dysfunction.  · The left ventricular end diastolic pressure was normal.  · The pre-procedure left ventricular end diastolic pressure was 12.  · The ejection fraction was calculated to be 45%.  · The ejection fraction was 40-50% by visual estimate.  · There was no mitral valve regurgitation.  · There was no aortic valve stenosis.  · The Prox RCA to Dist RCA lesion was 90% stenosed with 0% stenosis post-intervention. The stents were placed. The distal stent to the RCA was a 2.25 by 38 mm drug-eluting stent. The mid and proximal stent was a 2.5 x 22 mm drug-eluting stent.  · A stent was successfully placed.  · A stent was successfully placed.  · The estimated blood loss was none.  · There was two vessel coronary artery disease.  · The PCI was successful.  · The 1st Mrg lesion was 90% stenosed with 0% stenosis post-intervention. This marginal stent was a 2.25 x 38 mm drug-eluting stent post dilated to 2.5 mm  · A stent was successfully placed.       Assessment and Plan:   Native CAD  --s/p PTCA/stent x 2 proximal to distal RCA  --s/p PTCA/stent to OM1  ICMO  --EF 40-50%  ARF/CKD stage 3  --BUN/creatinine 33/4.21  --Creat was 1.3 on 5/10/22  --This seems too early for NILES (? Renal flow issue, ? Dissection possible)  Transaminitis  --AST/ALT 88/106  HLD  HTN  T2DM  Hyperkalemia    Plan:  Continue aspirin, plavix, and statin.   Renal function improving with hydration.   Continue IVF's/accurate I&O's. DC losartan.   Patient is hypertensive. Start Norvasc 5 mg daily.          VTE Risk Mitigation (From admission, onward)         Ordered     heparin (porcine) 6,000 Units in sodium chloride 0.9% 6 mL  As needed (PRN)         05/20/22 6005              Mariaa Zheng MD  Cardiology

## 2022-05-22 NOTE — PROGRESS NOTES
NEPHROLOGY: Progress    Hospital Course:      72-year-old -American   female, whom I was consulted to see  for a creatinine of 4.2 and a   potassium of 5.6. The patient had a [facility] 5/20/22 with PTA and stent placements x2 into her RCA. She does have a longstanding   history of diabetes mellitus as well as chronic kidney disease, and had a   creatinine of 1.5 back in December of 2021, consistent with stage 3 CKD.   She denied any prior urological or nephrological history except for a UTI in December of 2021 he patient herself denied any history of recurrent urinary tract infections,   although she did have an admission in December of 2021 at [facility] for UTI. She     She has had some degenerative joint disease pains as well as peripheral neuropathy treated with gabapentin but unfortunately has also been on Voltaren 50 mg b.i.d. for the last year.  We will consult for creatinine in excess of 4.           Current Facility-Administered Medications:     0.9%  NaCl infusion, , Intravenous, Continuous, VAN Castellanos, Last Rate: 125 mL/hr at 05/21/22 0745, New Bag at 05/21/22 0745    aluminum-magnesium hydroxide-simethicone 200-200-20 mg/5 mL suspension, , , PRN, Jone Estrella MD, 30 mL at 05/20/22 1121    aspirin tablet, , , PRN, Jone Estrella MD, 325 mg at 05/20/22 1121    ceFAZolin injection, , , PRN, Jone Estrella MD, 1 g at 05/20/22 1117    clopidogreL tablet 75 mg, 75 mg, Oral, Daily, VAN Castellanos, 75 mg at 05/22/22 0839    clopidogreL tablet, , , PRN, Jone Estrella MD, 600 mg at 05/20/22 1121    fentaNYL 50 mcg/mL injection, , , PRN, Jone Estrella MD, 50 mcg at 05/20/22 1014    fentaNYL 50 mcg/mL injection, , , PRN, Jone Estrella MD, 50 mcg at 05/20/22 1042    heparin (porcine) 6,000 Units in sodium chloride 0.9% 6 mL, , , PRN, Jone Estrella MD, Given at 05/20/22 1032    hydrALAZINE injection, , , PRN, Jone Estrella MD, 10 mg at 05/20/22 1028    LIDOcaine  "2%/EPINEPHrine 1:100,000 injection, , , PRN, Jone Estrella MD, 10 mL at 05/20/22 1015    midazolam (PF) injection, , , PRN, Jone Estrella MD, 1 mg at 05/20/22 1041    midazolam injection, , , PRN, Jone Estrella MD, 1 mg at 05/20/22 1014    sodium chloride 0.9% flush 10 mL, 10 mL, Intravenous, PRN, Jone Estrella MD        BP (!) 163/83   Pulse 89   Temp 98.4 °F (36.9 °C) (Oral)   Resp 18   Ht 5' 4" (1.626 m)   Wt 79 kg (174 lb 2.6 oz)   SpO2 100%   Breastfeeding No   BMI 29.90 kg/m²     Physical Exam:    GEN: Awake and appropriate. Chronically ill appearance  HEENT: Atraumatic. EOMI, no icterus  NECK : No JVD. No adenopathy  CARD : RRR s rub or gallop  LUNGS : Clear to auscultation  ABD : Soft,non-tender. BS active  EXT : No pitting edema . No cyanosis  NEURO : No obvious focal deficits        Intake/Output Summary (Last 24 hours) at 5/22/2022 1022  Last data filed at 5/22/2022 0600  Gross per 24 hour   Intake 720 ml   Output 6700 ml   Net -5980 ml         Laboratory:  Recent Results (from the past 24 hour(s))   Protein/Creatinine Ratio, Urine    Collection Time: 05/21/22 11:00 AM   Result Value Ref Range    Urine Protein Level 13.4 mg/dL    Urine Creatinine 43.6 (L) 47.0 - 110.0 mg/dL    Urine Protein/Creatinine Ratio 307.3 (H) <=200.0 mg/gm Cr   Sodium, Random Urine    Collection Time: 05/21/22 11:00 AM   Result Value Ref Range    Urine Sodium 26.0 mmol/L   Basic Metabolic Panel    Collection Time: 05/21/22  1:17 PM   Result Value Ref Range    Sodium Level 132 (L) 136 - 145 mmol/L    Potassium Level 5.2 (H) 3.5 - 5.1 mmol/L    Chloride 99 98 - 107 mmol/L    Carbon Dioxide 21 (L) 23 - 31 mmol/L    Glucose Level 176 (H) 82 - 115 mg/dL    Blood Urea Nitrogen 28.3 (H) 9.8 - 20.1 mg/dL    Creatinine 3.69 (H) 0.55 - 1.02 mg/dL    BUN/Creatinine Ratio 8     Calcium Level Total 7.5 (L) 8.4 - 10.2 mg/dL    Estimated GFR- 16 mls/min/1.73/m2    Anion Gap 12.0 mEq/L   POCT glucose    " Collection Time: 05/21/22  4:14 PM   Result Value Ref Range    POCT Glucose 178 (H) 70 - 110 mg/dL   POCT glucose    Collection Time: 05/21/22  9:49 PM   Result Value Ref Range    POCT Glucose 150 (H) 70 - 110 mg/dL   Comprehensive Metabolic Panel    Collection Time: 05/22/22  5:16 AM   Result Value Ref Range    Sodium Level 143 136 - 145 mmol/L    Potassium Level 5.0 3.5 - 5.1 mmol/L    Chloride 110 (H) 98 - 107 mmol/L    Carbon Dioxide 25 23 - 31 mmol/L    Glucose Level 115 82 - 115 mg/dL    Blood Urea Nitrogen 21.3 (H) 9.8 - 20.1 mg/dL    Creatinine 2.57 (H) 0.55 - 1.02 mg/dL    Calcium Level Total 8.0 (L) 8.4 - 10.2 mg/dL    Protein Total 6.2 5.8 - 7.6 gm/dL    Albumin Level 3.3 (L) 3.4 - 4.8 gm/dL    Globulin 2.9 2.4 - 3.5 gm/dL    Albumin/Globulin Ratio 1.1 1.1 - 2.0 ratio    Bilirubin Total 0.9 <=1.5 mg/dL    Alkaline Phosphatase 66 40 - 150 unit/L    Alanine Aminotransferase 98 (H) 0 - 55 unit/L    Aspartate Aminotransferase 114 (H) 5 - 34 unit/L    Estimated GFR- 24 mls/min/1.73/m2   CBC with Differential    Collection Time: 05/22/22  5:16 AM   Result Value Ref Range    WBC 4.7 4.5 - 11.5 x10(3)/mcL    RBC 3.43 (L) 4.20 - 5.40 x10(6)/mcL    Hgb 10.1 (L) 12.0 - 16.0 gm/dL    Hct 32.4 (L) 37.0 - 47.0 %    MCV 94.5 (H) 80.0 - 94.0 fL    MCH 29.4 27.0 - 31.0 pg    MCHC 31.2 (L) 33.0 - 36.0 mg/dL    RDW 12.4 11.5 - 17.0 %    Platelet 210 130 - 400 x10(3)/mcL    MPV 10.2 9.4 - 12.4 fL    Neut % 39.9 %    Lymph % 40.1 %    Mono % 13.6 %    Eos % 5.3 %    Basophil % 0.9 %    Lymph # 1.88 0.6 - 4.6 x10(3)/mcL    Neut # 1.9 (L) 2.1 - 9.2 x10(3)/mcL    Mono # 0.64 0.1 - 1.3 x10(3)/mcL    Eos # 0.25 0 - 0.9 x10(3)/mcL    Baso # 0.04 0 - 0.2 x10(3)/mcL    IG# 0.01 0 - 0.0155 x10(3)/mcL    IG% 0.2 0 - 0.43 %    NRBC% 0.0 %   POCT Glucose, Hand-Held Device    Collection Time: 05/22/22  5:22 AM   Result Value Ref Range    POC Glucose 155 (A) 70 - 110 MG/DL         Assessment/Plan:   ANIYA-slowly  resolving  Type 2 diabetes  CKD stage 3  Chronic NSAID use  CAD-s/p successful PTCA and stent x2 right RCA      Patient's renal functions appear to be improving.  Once again I have cautioned her to avoid NSAIDs.  I will be happy to continue to follow her in the office after discharge.  Do not feel comfortable letting her go home just yet but would observe for another 24 hours to ensure creatinine continues on the down swing.    John Flores MD, FASN

## 2022-05-23 LAB
ANION GAP SERPL CALC-SCNC: 7 MEQ/L
BUN SERPL-MCNC: 14.5 MG/DL (ref 9.8–20.1)
CALCIUM SERPL-MCNC: 8.4 MG/DL (ref 8.4–10.2)
CHLORIDE SERPL-SCNC: 109 MMOL/L (ref 98–107)
CO2 SERPL-SCNC: 23 MMOL/L (ref 23–31)
CREAT SERPL-MCNC: 1.56 MG/DL (ref 0.55–1.02)
CREAT/UREA NIT SERPL: 9
GLUCOSE SERPL-MCNC: 258 MG/DL (ref 82–115)
POCT GLUCOSE: 151 MG/DL (ref 70–110)
POCT GLUCOSE: 187 MG/DL (ref 70–110)
POCT GLUCOSE: 201 MG/DL (ref 70–110)
POCT GLUCOSE: 277 MG/DL (ref 70–110)
POCT GLUCOSE: 313 MG/DL (ref 70–110)
POTASSIUM SERPL-SCNC: 4.3 MMOL/L (ref 3.5–5.1)
SODIUM SERPL-SCNC: 139 MMOL/L (ref 136–145)

## 2022-05-23 PROCEDURE — 36415 COLL VENOUS BLD VENIPUNCTURE: CPT | Performed by: NURSE PRACTITIONER

## 2022-05-23 PROCEDURE — 25000003 PHARM REV CODE 250: Performed by: NURSE PRACTITIONER

## 2022-05-23 PROCEDURE — 63600175 PHARM REV CODE 636 W HCPCS: Performed by: INTERNAL MEDICINE

## 2022-05-23 PROCEDURE — 25000003 PHARM REV CODE 250: Performed by: INTERNAL MEDICINE

## 2022-05-23 PROCEDURE — 11000001 HC ACUTE MED/SURG PRIVATE ROOM

## 2022-05-23 PROCEDURE — 80048 BASIC METABOLIC PNL TOTAL CA: CPT | Performed by: NURSE PRACTITIONER

## 2022-05-23 RX ORDER — GLUCAGON 1 MG
1 KIT INJECTION
Status: DISCONTINUED | OUTPATIENT
Start: 2022-05-23 | End: 2022-05-26 | Stop reason: HOSPADM

## 2022-05-23 RX ORDER — CARVEDILOL 12.5 MG/1
12.5 TABLET ORAL 2 TIMES DAILY
Status: DISCONTINUED | OUTPATIENT
Start: 2022-05-23 | End: 2022-05-24

## 2022-05-23 RX ORDER — IBUPROFEN 200 MG
16 TABLET ORAL
Status: DISCONTINUED | OUTPATIENT
Start: 2022-05-23 | End: 2022-05-26 | Stop reason: HOSPADM

## 2022-05-23 RX ORDER — INSULIN ASPART 100 [IU]/ML
1-10 INJECTION, SOLUTION INTRAVENOUS; SUBCUTANEOUS
Status: DISCONTINUED | OUTPATIENT
Start: 2022-05-23 | End: 2022-05-26 | Stop reason: HOSPADM

## 2022-05-23 RX ORDER — IBUPROFEN 200 MG
24 TABLET ORAL
Status: DISCONTINUED | OUTPATIENT
Start: 2022-05-23 | End: 2022-05-26 | Stop reason: HOSPADM

## 2022-05-23 RX ORDER — METFORMIN HYDROCHLORIDE 1000 MG/1
1000 TABLET ORAL 2 TIMES DAILY WITH MEALS
Status: ON HOLD | COMMUNITY
End: 2022-05-25 | Stop reason: HOSPADM

## 2022-05-23 RX ADMIN — ASPIRIN 81 MG: 81 TABLET, COATED ORAL at 08:05

## 2022-05-23 RX ADMIN — CLOPIDOGREL 75 MG: 75 TABLET, FILM COATED ORAL at 08:05

## 2022-05-23 RX ADMIN — PREGABALIN 75 MG: 50 CAPSULE ORAL at 08:05

## 2022-05-23 RX ADMIN — CARVEDILOL 6.25 MG: 3.12 TABLET, FILM COATED ORAL at 08:05

## 2022-05-23 RX ADMIN — INSULIN ASPART 4 UNITS: 100 INJECTION, SOLUTION INTRAVENOUS; SUBCUTANEOUS at 05:05

## 2022-05-23 RX ADMIN — AMLODIPINE BESYLATE 10 MG: 5 TABLET ORAL at 08:05

## 2022-05-23 RX ADMIN — DULOXETINE 60 MG: 30 CAPSULE, DELAYED RELEASE ORAL at 08:05

## 2022-05-23 RX ADMIN — ATORVASTATIN CALCIUM 40 MG: 40 TABLET, FILM COATED ORAL at 08:05

## 2022-05-23 RX ADMIN — CARVEDILOL 12.5 MG: 12.5 TABLET, FILM COATED ORAL at 08:05

## 2022-05-23 RX ADMIN — SITAGLIPTIN 100 MG: 100 TABLET, FILM COATED ORAL at 08:05

## 2022-05-23 RX ADMIN — DONEPEZIL HYDROCHLORIDE 5 MG: 5 TABLET, FILM COATED ORAL at 08:05

## 2022-05-23 RX ADMIN — PANTOPRAZOLE SODIUM 40 MG: 40 TABLET, DELAYED RELEASE ORAL at 08:05

## 2022-05-23 NOTE — PLAN OF CARE
Pt lives home alone. Pcp is Dr Rashaun Gandhi and pharmacy is  in [place]. Daughter Cynthia ([phone]) is poc and is pts caretaker. Pt active with [facility]. Pt has a walker, rollator, wc,shower chair, glucometer and bp cuff. Updates sent to [facility].

## 2022-05-23 NOTE — PROGRESS NOTES
NEPHROLOGY: Progress    Hospital Course:      72-year-old -American   female, whom I was consulted to see  for a creatinine of 4.2 and a   potassium of 5.6. The patient had a [facility] 5/20/22 with PTA and stent placements x2 into her RCA. She does have a longstanding   history of diabetes mellitus as well as chronic kidney disease, and had a   creatinine of 1.5 back in December of 2021, consistent with stage 3 CKD.   She denied any prior urological or nephrological history except for a UTI in December of 2021 he patient herself denied any history of recurrent urinary tract infections,   although she did have an admission in December of 2021 at [facility] for UTI. She     She has had some degenerative joint disease pains as well as peripheral neuropathy treated with gabapentin but unfortunately has also been on Voltaren 50 mg b.i.d. for the last year.  We will consult for creatinine in excess of 4.           Current Facility-Administered Medications:     0.9%  NaCl infusion, , Intravenous, Continuous, VAN Castellanos, Last Rate: 125 mL/hr at 05/21/22 0745, New Bag at 05/21/22 0745    amLODIPine tablet 10 mg, 10 mg, Oral, Daily, VAN Castellanos, 10 mg at 05/23/22 0824    aspirin EC tablet 81 mg, 81 mg, Oral, Daily, Jone Estrella MD, 81 mg at 05/23/22 0818    atorvastatin tablet 40 mg, 40 mg, Oral, QHS, Jone Estrella MD, 40 mg at 05/22/22 2059    carvediloL tablet 12.5 mg, 12.5 mg, Oral, BID, Tony Zheng MD    clopidogreL tablet 75 mg, 75 mg, Oral, Daily, VAN Castellanos, 75 mg at 05/23/22 0817    donepeziL tablet 5 mg, 5 mg, Oral, QHS, Jone Estrella MD, 5 mg at 05/22/22 2058    DULoxetine DR capsule 60 mg, 60 mg, Oral, Daily, Jone Estrella MD, 60 mg at 05/23/22 0818    pantoprazole EC tablet 40 mg, 40 mg, Oral, Daily, Jone Estrella MD, 40 mg at 05/23/22 0818    pregabalin capsule 75 mg, 75 mg, Oral, BID, Jone Estrella MD, 75 mg at 05/23/22 0818     "SITagliptin tablet 100 mg, 100 mg, Oral, Daily, Jone Estrella MD, 100 mg at 05/23/22 0818        BP (!) 96/56   Pulse 84   Temp 97.5 °F (36.4 °C) (Oral)   Resp 18   Ht 5' 4" (1.626 m)   Wt 81.6 kg (180 lb)   SpO2 98%   Breastfeeding No   BMI 30.90 kg/m²     Physical Exam:    GEN: Awake and appropriate.   HEENT: Atraumatic. EOMI, no icterus  NECK : No JVD. No adenopathy  CARD : RRR s rub or gallop  LUNGS : Clear to auscultation  ABD : Soft,non-tender. BS active  EXT : No pitting edema . No cyanosis  NEURO : No obvious focal deficits        Intake/Output Summary (Last 24 hours) at 5/23/2022 1055  Last data filed at 5/23/2022 0700  Gross per 24 hour   Intake --   Output 4000 ml   Net -4000 ml         Laboratory:  Recent Results (from the past 24 hour(s))   POCT glucose    Collection Time: 05/22/22 11:31 AM   Result Value Ref Range    POCT Glucose 164 (H) 70 - 110 mg/dL   POCT glucose    Collection Time: 05/22/22  4:14 PM   Result Value Ref Range    POCT Glucose 201 (H) 70 - 110 mg/dL   POCT glucose    Collection Time: 05/22/22  9:03 PM   Result Value Ref Range    POCT Glucose 277 (H) 70 - 110 mg/dL   POCT glucose    Collection Time: 05/23/22  6:00 AM   Result Value Ref Range    POCT Glucose 151 (H) 70 - 110 mg/dL         Assessment/Plan:   ANIYA-slowly resolving  Type 2 diabetes  CKD stage 3  Chronic NSAID use  CAD-s/p successful PTCA and stent x2 right RCA  Hypertension-labile      Patient's renal functions appear to be improving but this morning's laboratory data is just been drawn and still pending.  She was quite hypertensive earlier this morning but after she got her medications her pressure now is in low 90s.  I am going to bolus the rest of her IV fluids which is approximately 200 mL and then saline lock her fluids.  She is eating well and renal functions are expected to continue to improve.  We will continue to follow.    John Flores MD, MARILY    "

## 2022-05-23 NOTE — PROGRESS NOTES
[facility] - [facility] Medical Telemetry  Cardiology  History and Physical     Patient Name: Mayra Parker  MRN: [id number]  Admission Date: 5/20/2022  Code Status: No Order   Attending Provider: Jone Estrella MD   Primary Care Physician: Rashaun Weiss MD  Principal Problem:Coronary atherosclerosis    Patient information was obtained from patient and ER records.     Subjective:     Chief Complaint:      HPI:   Ms. Parker is a 73y/o AAF, followed by Dr. Estrella, with PMHx significant for CVA, T2DM, HTN, HLD, and dementia who presented for outpatient coronary evaluation due to abnormal PET with c/o SOB/CARTAGENA. She underwent LHC with 90% stenosis noted to proximal RCA to distal RCA which was ballooned and stented x 2 and reduced to 0%. Om1 with noted 90% stenosis reduced to 0% post PTA/stent. EF 40-50% by visual estimate. Normal LVEDP. She was admitted to observation overnight. Labs collected and revealed ARF with BUN/creatinine 33/4. No known hx of CKD. No UOP documented. Family at bedside reports occasional urination overnight.       Hospital Course:  5.22.22: Renal indices improving with hydration. K+ 5.0. Hypertensive at times.   5.23.22: Patient resting in bed. NAD. BP remains above goal. Patient requesting PT due to generalized weakness.       PMH: CVA, HLD, depression, dementia, T2DM, HTN  PSH: Right and left CEA, back surgery  Social History: current smoker  Family History: Father- CAD native, Mother- CAD native, Son- CAD, MI- reason for death    Previous Cardiac Diagnostics:   TTE 05/03/2022:  LV normal in size. Global LVSF. LVEF 55%. LV diastolic function is impaired Grade I with normal LA pressure. Noted mild concentric LVH. Mild calcification of aortic valve noted with adequate cuspal excursion. 1+ TR. PASP 31mmHg. Mobility of anterior and posterior mitral leaflet is normal. Mild MAC with Trace MR.     PET 04/19/2022:  Abnormal perfusion study consistent with large area of moderate to  severe inferior and inferoapical ischemia. Perfusion imaging suggestive of single vessel disease in distribution of RCA. LV cavity normal on stress studies. Stress LVEF 42% and LV global function is mildly reduced. Rest LV cavity normal. Rest LVEF 56% and rest LV global function is normal.     Review of Systems   Cardiovascular: Negative.    Respiratory: Negative.    Musculoskeletal: Negative.    Gastrointestinal: Negative.    Neurological: Negative.      Objective:     Vital Signs (Most Recent):  Temp: 97.5 °F (36.4 °C) (05/23/22 0712)  Pulse: 79 (05/23/22 0712)  Resp: 18 (05/23/22 0712)  BP: (!) 170/71 (05/23/22 0712)  SpO2: 98 % (05/23/22 0712) Vital Signs (24h Range):  Temp:  [97.3 °F (36.3 °C)-98 °F (36.7 °C)] 97.5 °F (36.4 °C)  Pulse:  [74-90] 79  Resp:  [18] 18  SpO2:  [97 %-100 %] 98 %  BP: (156-181)/(46-97) 170/71     Weight: 81.6 kg (180 lb)  Body mass index is 30.9 kg/m².    SpO2: 98 %       No intake or output data in the 24 hours ending 05/23/22 0727    Lines/Drains/Airways     Drain  Duration                Sheath 05/20/22 1018 Right 2 days          Peripheral Intravenous Line  Duration                Peripheral IV - Single Lumen 05/23/22 0030 22 G Anterior;Right Forearm <1 day                Physical Exam  Constitutional:       Appearance: Normal appearance.   HENT:      Head: Normocephalic and atraumatic.      Mouth/Throat:      Mouth: Mucous membranes are moist.   Eyes:      Pupils: Pupils are equal, round, and reactive to light.   Cardiovascular:      Rate and Rhythm: Normal rate.      Heart sounds: Normal heart sounds.   Pulmonary:      Effort: Pulmonary effort is normal.      Breath sounds: Normal breath sounds.   Abdominal:      General: Abdomen is flat.      Palpations: Abdomen is soft.   Musculoskeletal:      Cervical back: Neck supple.      Left lower leg: No edema.      Comments: R groin soft, minimally tender   Skin:     General: Skin is warm.   Neurological:      General: No focal deficit  present.      Mental Status: She is alert and oriented to person, place, and time.   Psychiatric:         Mood and Affect: Mood normal.         Significant Labs:   BMP:   Recent Labs   Lab 05/21/22  0908 05/21/22  1317 05/22/22  0516   * 132* 143   K 5.6* 5.2* 5.0   CO2 20* 21* 25   BUN 30.5* 28.3* 21.3*   CREATININE 3.81* 3.69* 2.57*   CALCIUM 7.8* 7.5* 8.0*       Significant Imaging:  Left heart cath  · There was mild left ventricular systolic dysfunction.  · The left ventricular end diastolic pressure was normal.  · The pre-procedure left ventricular end diastolic pressure was 12.  · The ejection fraction was calculated to be 45%.  · The ejection fraction was 40-50% by visual estimate.  · There was no mitral valve regurgitation.  · There was no aortic valve stenosis.  · The Prox RCA to Dist RCA lesion was 90% stenosed with 0% stenosis post-intervention. The stents were placed. The distal stent to the RCA was a 2.25 by 38 mm drug-eluting stent. The mid and proximal stent was a 2.5 x 22 mm drug-eluting stent.  · A stent was successfully placed.  · A stent was successfully placed.  · The estimated blood loss was none.  · There was two vessel coronary artery disease.  · The PCI was successful.  · The 1st Mrg lesion was 90% stenosed with 0% stenosis post-intervention. This marginal stent was a 2.25 x 38 mm drug-eluting stent post dilated to 2.5 mm  · A stent was successfully placed.       Assessment and Plan:   Native CAD  --s/p PTCA/stent x 2 proximal to distal RCA  --s/p PTCA/stent to OM1  ICMO  --EF 40-50%  ARF/CKD stage 3  --BUN/creatinine 33/4.21  --Creat was 1.3 on 5/10/22  --This seems too early for NILES (? Renal flow issue, ? Dissection possible)  Transaminitis  --AST/ALT 88/106  HLD  HTN  T2DM  Hyperkalemia    Plan:  Continue aspirin, plavix, and statin.   Renal function improving with hydration. Lab pending.   Continue IVF's/accurate I&O's. DC losartan.   Patient remains hypertensive. Increase  Amlodipine to 10 mg daily and Coreg to 12.5mg bid.     VTE Risk Mitigation (From admission, onward)    None        Betty Ruffin, RAHATP-C  Cardiology

## 2022-05-24 LAB
ALBUMIN SERPL-MCNC: 2.9 GM/DL (ref 3.4–4.8)
ALBUMIN/GLOB SERPL: 1.1 RATIO (ref 1.1–2)
ALP SERPL-CCNC: 60 UNIT/L (ref 40–150)
ALT SERPL-CCNC: 113 UNIT/L (ref 0–55)
AST SERPL-CCNC: 116 UNIT/L (ref 5–34)
BILIRUBIN DIRECT+TOT PNL SERPL-MCNC: 0.8 MG/DL
BUN SERPL-MCNC: 15.4 MG/DL (ref 9.8–20.1)
CALCIUM SERPL-MCNC: 8.1 MG/DL (ref 8.4–10.2)
CHLORIDE SERPL-SCNC: 109 MMOL/L (ref 98–107)
CO2 SERPL-SCNC: 26 MMOL/L (ref 23–31)
CREAT SERPL-MCNC: 1.55 MG/DL (ref 0.55–1.02)
GLOBULIN SER-MCNC: 2.6 GM/DL (ref 2.4–3.5)
GLUCOSE SERPL-MCNC: 192 MG/DL (ref 82–115)
POCT GLUCOSE: 117 MG/DL (ref 70–110)
POCT GLUCOSE: 188 MG/DL (ref 70–110)
POCT GLUCOSE: 249 MG/DL (ref 70–110)
POTASSIUM SERPL-SCNC: 4.6 MMOL/L (ref 3.5–5.1)
PROT SERPL-MCNC: 5.5 GM/DL (ref 5.8–7.6)
SODIUM SERPL-SCNC: 139 MMOL/L (ref 136–145)

## 2022-05-24 PROCEDURE — 36415 COLL VENOUS BLD VENIPUNCTURE: CPT | Performed by: INTERNAL MEDICINE

## 2022-05-24 PROCEDURE — 94761 N-INVAS EAR/PLS OXIMETRY MLT: CPT

## 2022-05-24 PROCEDURE — 25000003 PHARM REV CODE 250: Performed by: INTERNAL MEDICINE

## 2022-05-24 PROCEDURE — 11000001 HC ACUTE MED/SURG PRIVATE ROOM

## 2022-05-24 PROCEDURE — 80053 COMPREHEN METABOLIC PANEL: CPT | Performed by: INTERNAL MEDICINE

## 2022-05-24 PROCEDURE — 25000003 PHARM REV CODE 250: Performed by: NURSE PRACTITIONER

## 2022-05-24 PROCEDURE — 63600175 PHARM REV CODE 636 W HCPCS: Performed by: INTERNAL MEDICINE

## 2022-05-24 PROCEDURE — 97162 PT EVAL MOD COMPLEX 30 MIN: CPT

## 2022-05-24 PROCEDURE — 97166 OT EVAL MOD COMPLEX 45 MIN: CPT

## 2022-05-24 RX ORDER — CARVEDILOL 3.12 MG/1
6.25 TABLET ORAL 2 TIMES DAILY
Status: DISCONTINUED | OUTPATIENT
Start: 2022-05-24 | End: 2022-05-25

## 2022-05-24 RX ORDER — METFORMIN HYDROCHLORIDE 500 MG/1
1000 TABLET ORAL 2 TIMES DAILY WITH MEALS
Status: DISCONTINUED | OUTPATIENT
Start: 2022-05-24 | End: 2022-05-26 | Stop reason: HOSPADM

## 2022-05-24 RX ORDER — LOSARTAN POTASSIUM 50 MG/1
50 TABLET ORAL DAILY
Status: DISCONTINUED | OUTPATIENT
Start: 2022-05-25 | End: 2022-05-26 | Stop reason: HOSPADM

## 2022-05-24 RX ADMIN — SITAGLIPTIN 100 MG: 100 TABLET, FILM COATED ORAL at 09:05

## 2022-05-24 RX ADMIN — DONEPEZIL HYDROCHLORIDE 5 MG: 5 TABLET, FILM COATED ORAL at 09:05

## 2022-05-24 RX ADMIN — CARVEDILOL 6.25 MG: 3.12 TABLET, FILM COATED ORAL at 09:05

## 2022-05-24 RX ADMIN — DULOXETINE 60 MG: 30 CAPSULE, DELAYED RELEASE ORAL at 09:05

## 2022-05-24 RX ADMIN — ATORVASTATIN CALCIUM 40 MG: 40 TABLET, FILM COATED ORAL at 09:05

## 2022-05-24 RX ADMIN — CARVEDILOL 12.5 MG: 12.5 TABLET, FILM COATED ORAL at 09:05

## 2022-05-24 RX ADMIN — METFORMIN HYDROCHLORIDE 1000 MG: 500 TABLET, FILM COATED ORAL at 09:05

## 2022-05-24 RX ADMIN — ASPIRIN 81 MG: 81 TABLET, COATED ORAL at 09:05

## 2022-05-24 RX ADMIN — PANTOPRAZOLE SODIUM 40 MG: 40 TABLET, DELAYED RELEASE ORAL at 09:05

## 2022-05-24 RX ADMIN — CLOPIDOGREL 75 MG: 75 TABLET, FILM COATED ORAL at 09:05

## 2022-05-24 RX ADMIN — INSULIN ASPART 4 UNITS: 100 INJECTION, SOLUTION INTRAVENOUS; SUBCUTANEOUS at 12:05

## 2022-05-24 NOTE — PT/OT/SLP EVAL
Physical Therapy Evaluation    Patient Name:  Mayra Parker   MRN:  [id number]    Recommendations:     Discharge Recommendations:  TCU  Discharge Equipment Recommendations:     Barriers to discharge: weakness, impaired balance, impaired endurance    Assessment:     Mayra Parker is a 72 y.o. female admitted with a medical diagnosis of Coronary atherosclerosis.  She presents with the following impairments/functional limitations:  weakness, impaired endurance, impaired functional mobilty, gait instability, impaired balance.    Rehab Prognosis: Good; patient would benefit from acute skilled PT services to address these deficits and reach maximum level of function.    Recent Surgery: Procedure(s) (LRB):  CATHETERIZATION, HEART, LEFT (Left) 4 Days Post-Op    Plan:     During this hospitalization, patient to be seen daily to address the identified rehab impairments via gait training, therapeutic activities, therapeutic exercises and progress toward the following goals:    · Plan of Care Expires:  05/31/22    Subjective     Chief Complaint: weakness  Patient/Family Comments/goals: improve strength  Pain/Comfort:  · none    Patients cultural, spiritual, Latter day conflicts given the current situation: none    Living Environment:   Prior to admit, pt lived at home w/ grandchild. Pt uses a RW to walk short distances and a WC for long distance mobility. Pt's daughter comes to home to assist pt w/ ADL's.    Equipment used at home: walker, rolling, wheelchair.  Upon discharge, patient will have assistance from family.    Objective:     Communicated with RN prior to session.  Patient found HOB elevated  upon PT entry to room.    General Precautions: Standard,     Respiratory Status: Room air    Exams:  · RLE ROM: WFL  · RLE Strength: 3+/5  · LLE ROM: WFL  · LLE Strength: 3+/5    Functional Mobility:  · Bed Mobility:     · Supine to Sit: contact guard assistance  · Transfers:  · Pt performed sit<>std w/ RW, min A for  safety. Pt jim'juma forward flexed posture in standing. Pt reports this is her baseline 2/2 chronic back issues.  · Gait: Pt jim'd forward flexed posture; tremors (pt states this is baseline); slow gelacio, ambulated 60', 40', 20' w/ seated rest break in between. Pt returned to room sitting up in chair w/ all needs in reach.      AM-PAC 6 CLICK MOBILITY  Total Score:17     Patient left up in chair with all lines intact.    GOALS:   Multidisciplinary Problems     Physical Therapy Goals        Problem: Physical Therapy    Goal Priority Disciplines Outcome Goal Variances Interventions   Physical Therapy Goal     PT, PT/OT Ongoing, Progressing     Description: Goals to be met by: 22     Patient will increase functional independence with mobility by performin. Sit to stand transfer with Stand-by Assistance  2. Bed to chair transfer with Stand-by Assistance using Rolling Walker  3. Gait  x 200 feet with Stand-by Assistance using Rolling Walker.                      History:     Past Medical History:   Diagnosis Date    CVA (cerebral vascular accident)     affected right side of body    Depression     Diabetes mellitus     Hyperlipidemia     Hypertension     Unspecified dementia without behavioral disturbance        Past Surgical History:   Procedure Laterality Date    BACK SURGERY      CAROTID ENDARTERECTOMY Left     CAROTID ENDARTERECTOMY Right     LEFT HEART CATHETERIZATION  2022    Dr. Estrella; Stent placement    LEFT HEART CATHETERIZATION Left 2022    Procedure: CATHETERIZATION, HEART, LEFT;  Surgeon: Jone Estrella MD;  Location: [facility] CATH LAB;  Service: Cardiology;  Laterality: Left;  [facility] VIA R GROIN       Time Tracking:     PT Received On: 22  PT Start Time: 1030     PT Stop Time: 1100  PT Total Time (min): 30 min     Billable Minutes: Evaluation 30 min      2022

## 2022-05-24 NOTE — PT/OT/SLP EVAL
Occupational Therapy   Evaluation    Name: Mayra Parker  MRN: [id number]  Admitting Diagnosis:  Coronary atherosclerosis  Recent Surgery: Procedure(s) (LRB):  CATHETERIZATION, HEART, LEFT (Left) 4 Days Post-Op    Recommendations:     Discharge Recommendations: nursing facility, skilled, rehabilitation facility  Discharge Equipment Recommendations:  none  Barriers to discharge:  Decreased caregiver support    Assessment:     Mayra Parker is a 72 y.o. female with a medical diagnosis of Coronary atherosclerosis s/p [facility] with stents. Performance deficits affecting function: weakness, impaired endurance, impaired self care skills, impaired functional mobilty, gait instability, impaired cognition, decreased safety awareness.      Rehab Prognosis: Good; patient would benefit from acute skilled OT services to address these deficits and reach maximum level of function.       Plan:     Patient to be seen 5 x/week, 4 x/week to address the above listed problems via self-care/home management, therapeutic activities, therapeutic exercises  · Plan of Care Expires:    · Plan of Care Reviewed with: patient    Subjective     Chief Complaint: Weak  Patient/Family Comments/goals: Be indep at home     Occupational Profile:  Living Environment: With granddaughter and her daughter comes every day to assist   Previous level of function: Assist for some ADLs, rollator for FM   Equipment Used at Home:  rollator, shower chair  Assistance upon Discharge: Pt's daughter     Pain/Comfort:  · Pain Rating 1: 0/10    Patients cultural, spiritual, Alevism conflicts given the current situation:      Objective:     Communicated with: RN prior to session.  Patient found up in chair upon OT entry to room.    General Precautions: Standard,     Respiratory Status: Room air    Occupational Performance:    Functional Mobility/Transfers:  · Patient completed Sit <> Stand Transfer with minimum assistance  with  rolling walker   · Functional  Mobility: Attempted functional mobility, pt repeatedly falling back into chair. No functional mobility performed for safety reasons     Activities of Daily Living:  · Grooming: stand by assistance seated in chair   · Lower Body Dressing: moderate assistance don/doff socks     Physical Exam:  Upper Extremity Range of Motion:     -       Right Upper Extremity: WFL  -       Left Upper Extremity: WFL  Upper Extremity Strength:    -       Right Upper Extremity: 3+/5   -       Left Upper Extremity: 3+/5     AMPAC 6 Click ADL:  AMPAC Total Score: 15    Education:    Patient left up in chair with call button in reach    GOALS:   Multidisciplinary Problems     Occupational Therapy Goals        Problem: Occupational Therapy    Goal Priority Disciplines Outcome Interventions   Occupational Therapy Goal     OT, PT/OT Ongoing, Progressing    Description: MI for grooming routine standing at sink   SBA for LB dressing  SBA for toilet t/f with RW                    History:     Past Medical History:   Diagnosis Date    CVA (cerebral vascular accident)     affected right side of body    Depression     Diabetes mellitus     Hyperlipidemia     Hypertension     Unspecified dementia without behavioral disturbance        Past Surgical History:   Procedure Laterality Date    BACK SURGERY      CAROTID ENDARTERECTOMY Left     CAROTID ENDARTERECTOMY Right     LEFT HEART CATHETERIZATION  05/20/2022    Dr. Estrella; Stent placement    LEFT HEART CATHETERIZATION Left 5/20/2022    Procedure: CATHETERIZATION, HEART, LEFT;  Surgeon: Jone Estrella MD;  Location: [facility] CATH LAB;  Service: Cardiology;  Laterality: Left;  LHC VIA R GROIN       Time Tracking:     OT Date of Treatment: 05/24/22  OT Start Time: 1429  OT Stop Time: 1445  OT Total Time (min): 16 min    Billable Minutes:Evaluation 16 minutes     5/24/2022

## 2022-05-24 NOTE — PROGRESS NOTES
[facility] - [facility] Medical Telemetry  Cardiology  Progress Note    Patient Name: Mayra Parker  MRN: [id number]  Admission Date: 5/20/2022  Code Status: No Order   Attending Provider: Jone Estrella MD   Primary Care Physician: Rashaun Weiss MD  Principal Problem:Coronary atherosclerosis    Patient information was obtained from patient and ER records.     Subjective:     Chief Complaint:      HPI:   Ms. Parker is a 73y/o AAF, followed by Dr. Estrella, with PMHx significant for CVA, T2DM, HTN, HLD, and dementia who presented for outpatient coronary evaluation due to abnormal PET with c/o SOB/CARTAGENA. She underwent LHC with 90% stenosis noted to proximal RCA to distal RCA which was ballooned and stented x 2 and reduced to 0%. Om1 with noted 90% stenosis reduced to 0% post PTA/stent. EF 40-50% by visual estimate. Normal LVEDP. She was admitted to observation overnight. Labs collected and revealed ARF with BUN/creatinine 33/4. No known hx of CKD. No UOP documented. Family at bedside reports occasional urination overnight.       Hospital Course:  5.22.22: Renal indices improving with hydration. K+ 5.0. Hypertensive at times.   5.23.22: Patient resting in bed. NAD. BP remains above goal. Patient requesting PT due to generalized weakness.   5.24.22: Patient resting in bed. NAD. PT has not evaluated patient yet. She reports difficulty getting around at home and is inquiring about inpatient PT.  has been notified. Bp marginal yesterday post adjustment of BP medications      PMH: CVA, HLD, depression, dementia, T2DM, HTN  PSH: Right and left CEA, back surgery  Social History: current smoker  Family History: Father- CAD native, Mother- CAD native, Son- CAD, MI- reason for death    Previous Cardiac Diagnostics:   TTE 05/03/2022:  LV normal in size. Global LVSF. LVEF 55%. LV diastolic function is impaired Grade I with normal LA pressure. Noted mild concentric LVH. Mild calcification of aortic  valve noted with adequate cuspal excursion. 1+ TR. PASP 31mmHg. Mobility of anterior and posterior mitral leaflet is normal. Mild MAC with Trace MR.     PET 04/19/2022:  Abnormal perfusion study consistent with large area of moderate to severe inferior and inferoapical ischemia. Perfusion imaging suggestive of single vessel disease in distribution of RCA. LV cavity normal on stress studies. Stress LVEF 42% and LV global function is mildly reduced. Rest LV cavity normal. Rest LVEF 56% and rest LV global function is normal.     Review of Systems   Cardiovascular: Negative.    Respiratory: Negative.    Musculoskeletal: Negative.    Gastrointestinal: Negative.    Neurological: Negative.      Objective:     Vital Signs (Most Recent):  Temp: 98.2 °F (36.8 °C) (05/24/22 0734)  Pulse: 75 (05/24/22 0904)  Resp: 18 (05/24/22 0734)  BP: 113/69 (05/24/22 0904)  SpO2: 96 % (05/24/22 0734) Vital Signs (24h Range):  Temp:  [97.4 °F (36.3 °C)-98.3 °F (36.8 °C)] 98.2 °F (36.8 °C)  Pulse:  [64-84] 75  Resp:  [18-20] 18  SpO2:  [93 %-100 %] 96 %  BP: ()/(54-85) 113/69     Weight: 81.6 kg (180 lb)  Body mass index is 30.9 kg/m².    SpO2: 96 %  O2 Device (Oxygen Therapy): room air      Physical Exam  Constitutional:       Appearance: Normal appearance.   HENT:      Head: Normocephalic and atraumatic.      Mouth/Throat:      Mouth: Mucous membranes are moist.   Eyes:      Pupils: Pupils are equal, round, and reactive to light.   Cardiovascular:      Rate and Rhythm: Normal rate.      Heart sounds: Normal heart sounds.   Pulmonary:      Effort: Pulmonary effort is normal.      Breath sounds: Normal breath sounds.   Abdominal:      General: Abdomen is flat.      Palpations: Abdomen is soft.   Musculoskeletal:      Cervical back: Neck supple.      Left lower leg: No edema.   Skin:     General: Skin is warm.   Neurological:      General: No focal deficit present.      Mental Status: She is alert and oriented to person, place, and time.  "  Psychiatric:         Mood and Affect: Mood normal.         Significant Labs:   BMP:   Recent Labs   Lab 05/23/22  1024 05/24/22  0433    139   K 4.3 4.6   CO2 23 26   BUN 14.5 15.4   CREATININE 1.56* 1.55*   CALCIUM 8.4 8.1*       Significant Imaging:  Left heart cath  · There was mild left ventricular systolic dysfunction.  · The left ventricular end diastolic pressure was normal.  · The pre-procedure left ventricular end diastolic pressure was 12.  · The ejection fraction was calculated to be 45%.  · The ejection fraction was 40-50% by visual estimate.  · There was no mitral valve regurgitation.  · There was no aortic valve stenosis.  · The Prox RCA to Dist RCA lesion was 90% stenosed with 0% stenosis post-intervention. The stents were placed. The distal stent to the RCA was a 2.25 by 38 mm drug-eluting stent. The mid and proximal stent was a 2.5 x 22 mm drug-eluting stent.  · A stent was successfully placed.  · A stent was successfully placed.  · The estimated blood loss was none.  · There was two vessel coronary artery disease.  · The PCI was successful.  · The 1st Mrg lesion was 90% stenosed with 0% stenosis post-intervention. This marginal stent was a 2.25 x 38 mm drug-eluting stent post dilated to 2.5 mm  · A stent was successfully placed.       Assessment and Plan:   Native CAD  --s/p PTCA/stent x 2 proximal to distal RCA  --s/p PTCA/stent to OM1  ICMO  --EF 40-50%  ARF/CKD stage 3  --BUN/creatinine improved 15.4/1.55  HLD  HTN  T2DM  --on Januvia and metformin  Hyperkalemia  --resolved    Plan:  Denies CP. Continue aspirin, plavix, and statin.   Renal function improved with hydration. IVF discontinued  DC losartan.   Marginal Bps since medication changes. DC amlodidine. Decrease Coreg to 6.25mg bid. Add back losartan 50mg daily in am.   Lyrica discontinued per patient request "makes her hallucinate"        Betty Ruffin, FNP-C  Cardiology    "

## 2022-05-24 NOTE — PLAN OF CARE
Problem: Physical Therapy  Goal: Physical Therapy Goal  Description: Goals to be met by: 22     Patient will increase functional independence with mobility by performin. Sit to stand transfer with Stand-by Assistance  2. Bed to chair transfer with Stand-by Assistance using Rolling Walker  3. Gait  x 200 feet with Stand-by Assistance using Rolling Walker.     Outcome: Ongoing, Progressing

## 2022-05-24 NOTE — PLAN OF CARE
Therapy worked with pt today and feel pt not appropriate for rehab , but appropriate for snf/tcu. I spoke to pts dghtr and she is in agreement with TCU. Ref sent through Zighra and notified Dayanna.

## 2022-05-24 NOTE — PLAN OF CARE
Problem: Occupational Therapy  Goal: Occupational Therapy Goal  Description: MI for grooming routine standing at sink   SBA for LB dressing  SBA for toilet t/f with RW   Outcome: Ongoing, Progressing

## 2022-05-24 NOTE — PROGRESS NOTES
NEPHROLOGY: Progress    Hospital Course:      72-year-old -American   female, whom I was consulted to see  for a creatinine of 4.2 and a   potassium of 5.6. The patient had a [facility] 5/20/22 with PTA and stent placements x2 into her RCA. She does have a longstanding   history of diabetes mellitus as well as chronic kidney disease, and had a   creatinine of 1.5 back in December of 2021, consistent with stage 3 CKD.   She denied any prior urological or nephrological history except for a UTI in December of 2021 he patient herself denied any history of recurrent urinary tract infections,   although she did have an admission in December of 2021 at [facility] for UTI. She     She has had some degenerative joint disease pains as well as peripheral neuropathy treated with gabapentin but unfortunately has also been on Voltaren 50 mg b.i.d. for the last year.  We will consult for creatinine in excess of 4.           Current Facility-Administered Medications:     0.9%  NaCl infusion, , Intravenous, Continuous, VAN Castellanos, Last Rate: 125 mL/hr at 05/21/22 0745, New Bag at 05/21/22 0745    amLODIPine tablet 10 mg, 10 mg, Oral, Daily, VAN Castellanos, 10 mg at 05/23/22 0824    aspirin EC tablet 81 mg, 81 mg, Oral, Daily, Jone Estrella MD, 81 mg at 05/24/22 0904    atorvastatin tablet 40 mg, 40 mg, Oral, QHS, Jone Estrella MD, 40 mg at 05/23/22 2023    carvediloL tablet 12.5 mg, 12.5 mg, Oral, BID, Tony Zheng MD, 12.5 mg at 05/24/22 0904    clopidogreL tablet 75 mg, 75 mg, Oral, Daily, VAN Castellanos, 75 mg at 05/24/22 0904    dextrose 10% bolus 125 mL, 12.5 g, Intravenous, PRN, Tony Zheng MD    dextrose 10% bolus 250 mL, 25 g, Intravenous, PRN, Tony Zheng MD    donepeziL tablet 5 mg, 5 mg, Oral, QHS, Jone Estrella MD, 5 mg at 05/23/22 2022    DULoxetine DR capsule 60 mg, 60 mg, Oral, Daily, Jone Estrella MD, 60 mg at 05/24/22 0904     "glucagon (human recombinant) injection 1 mg, 1 mg, Intramuscular, PRN, Tony Zheng MD    glucose chewable tablet 16 g, 16 g, Oral, PRN, Tony Zheng MD    glucose chewable tablet 24 g, 24 g, Oral, PRN, Tony Zheng MD    insulin aspart U-100 injection 1-10 Units, 1-10 Units, Subcutaneous, QID (AC + HS) PRN, Tony Zheng MD, 4 Units at 05/23/22 1707    metFORMIN tablet 1,000 mg, 1,000 mg, Oral, BID WM, Betty Ruffin, FNP, 1,000 mg at 05/24/22 0903    pantoprazole EC tablet 40 mg, 40 mg, Oral, Daily, Jone Estrella MD, 40 mg at 05/24/22 0905    pregabalin capsule 75 mg, 75 mg, Oral, BID, Jone Estrella MD, 75 mg at 05/23/22 2022    SITagliptin tablet 100 mg, 100 mg, Oral, Daily, Jone Estrella MD, 100 mg at 05/24/22 0904        /69   Pulse 75   Temp 98.2 °F (36.8 °C) (Oral)   Resp 18   Ht 5' 4" (1.626 m)   Wt 81.6 kg (180 lb)   SpO2 96%   Breastfeeding No   BMI 30.90 kg/m²     Physical Exam:    GEN: Awake and appropriate.   HEENT: Atraumatic. EOMI, no icterus  NECK : No JVD. No adenopathy  CARD : RRR s rub or gallop  LUNGS : Clear to auscultation  ABD : Soft,non-tender. BS active  EXT : No pitting edema . No cyanosis  NEURO : No obvious focal deficits      No intake or output data in the 24 hours ending 05/24/22 1026      Laboratory:  Recent Results (from the past 24 hour(s))   POCT glucose    Collection Time: 05/23/22 11:16 AM   Result Value Ref Range    POCT Glucose 313 (H) 70 - 110 mg/dL   POCT glucose    Collection Time: 05/23/22  4:33 PM   Result Value Ref Range    POCT Glucose 201 (H) 70 - 110 mg/dL   POCT glucose    Collection Time: 05/23/22 10:44 PM   Result Value Ref Range    POCT Glucose 187 (H) 70 - 110 mg/dL   Comprehensive Metabolic Panel    Collection Time: 05/24/22  4:33 AM   Result Value Ref Range    Sodium Level 139 136 - 145 mmol/L    Potassium Level 4.6 3.5 - 5.1 mmol/L    Chloride 109 (H) 98 - 107 mmol/L    Carbon " Dioxide 26 23 - 31 mmol/L    Glucose Level 192 (H) 82 - 115 mg/dL    Blood Urea Nitrogen 15.4 9.8 - 20.1 mg/dL    Creatinine 1.55 (H) 0.55 - 1.02 mg/dL    Calcium Level Total 8.1 (L) 8.4 - 10.2 mg/dL    Protein Total 5.5 (L) 5.8 - 7.6 gm/dL    Albumin Level 2.9 (L) 3.4 - 4.8 gm/dL    Globulin 2.6 2.4 - 3.5 gm/dL    Albumin/Globulin Ratio 1.1 1.1 - 2.0 ratio    Bilirubin Total 0.8 <=1.5 mg/dL    Alkaline Phosphatase 60 40 - 150 unit/L    Alanine Aminotransferase 113 (H) 0 - 55 unit/L    Aspartate Aminotransferase 116 (H) 5 - 34 unit/L    Estimated GFR- 42 mls/min/1.73/m2   POCT glucose    Collection Time: 05/24/22  5:08 AM   Result Value Ref Range    POCT Glucose 188 (H) 70 - 110 mg/dL         Assessment/Plan:   ANIYA-slowly resolving  Type 2 diabetes  CKD stage 3  Chronic NSAID use  CAD-s/p successful PTCA and stent x2 right RCA  Hypertension-labile      Patient's renal functions appear to be improving and are near her baseline.  Her blood pressure seems well controlled.  I will be able to follow her in the office in about 6 weeks time.  Please let us know we may be of any further assistance while she is hospitalized.    John Flores MD, MARILY

## 2022-05-25 LAB
POCT GLUCOSE: 137 MG/DL (ref 70–110)
POCT GLUCOSE: 209 MG/DL (ref 70–110)
POCT GLUCOSE: 242 MG/DL (ref 70–110)

## 2022-05-25 PROCEDURE — 94761 N-INVAS EAR/PLS OXIMETRY MLT: CPT

## 2022-05-25 PROCEDURE — 97535 SELF CARE MNGMENT TRAINING: CPT | Mod: CO

## 2022-05-25 PROCEDURE — 25000003 PHARM REV CODE 250: Performed by: NURSE PRACTITIONER

## 2022-05-25 PROCEDURE — 63600175 PHARM REV CODE 636 W HCPCS: Performed by: INTERNAL MEDICINE

## 2022-05-25 PROCEDURE — 25000003 PHARM REV CODE 250: Performed by: INTERNAL MEDICINE

## 2022-05-25 PROCEDURE — 11000001 HC ACUTE MED/SURG PRIVATE ROOM

## 2022-05-25 RX ORDER — CLOPIDOGREL BISULFATE 75 MG/1
75 TABLET ORAL DAILY
Status: CANCELLED | OUTPATIENT
Start: 2022-05-26

## 2022-05-25 RX ORDER — METFORMIN HYDROCHLORIDE 500 MG/1
1000 TABLET ORAL 2 TIMES DAILY WITH MEALS
Status: CANCELLED | OUTPATIENT
Start: 2022-05-25

## 2022-05-25 RX ORDER — ATORVASTATIN CALCIUM 40 MG/1
40 TABLET, FILM COATED ORAL NIGHTLY
Status: CANCELLED | OUTPATIENT
Start: 2022-05-25

## 2022-05-25 RX ORDER — DONEPEZIL HYDROCHLORIDE 5 MG/1
5 TABLET, FILM COATED ORAL NIGHTLY
Status: CANCELLED | OUTPATIENT
Start: 2022-05-25

## 2022-05-25 RX ORDER — IBUPROFEN 200 MG
16 TABLET ORAL
Status: CANCELLED | OUTPATIENT
Start: 2022-05-25

## 2022-05-25 RX ORDER — GLUCAGON 1 MG
1 KIT INJECTION
Status: CANCELLED | OUTPATIENT
Start: 2022-05-25

## 2022-05-25 RX ORDER — PANTOPRAZOLE SODIUM 40 MG/1
40 TABLET, DELAYED RELEASE ORAL DAILY
Status: CANCELLED | OUTPATIENT
Start: 2022-05-26

## 2022-05-25 RX ORDER — CARVEDILOL 3.12 MG/1
3.12 TABLET ORAL 2 TIMES DAILY
Status: DISCONTINUED | OUTPATIENT
Start: 2022-05-25 | End: 2022-05-26 | Stop reason: HOSPADM

## 2022-05-25 RX ORDER — CARVEDILOL 3.12 MG/1
3.12 TABLET ORAL 2 TIMES DAILY
Status: CANCELLED | OUTPATIENT
Start: 2022-05-25

## 2022-05-25 RX ORDER — DULOXETIN HYDROCHLORIDE 30 MG/1
60 CAPSULE, DELAYED RELEASE ORAL DAILY
Status: CANCELLED | OUTPATIENT
Start: 2022-05-26

## 2022-05-25 RX ORDER — SODIUM CHLORIDE 9 MG/ML
INJECTION, SOLUTION INTRAVENOUS CONTINUOUS
Status: CANCELLED | OUTPATIENT
Start: 2022-05-25

## 2022-05-25 RX ORDER — INSULIN ASPART 100 [IU]/ML
1-10 INJECTION, SOLUTION INTRAVENOUS; SUBCUTANEOUS
Status: CANCELLED | OUTPATIENT
Start: 2022-05-25

## 2022-05-25 RX ORDER — ASPIRIN 81 MG/1
81 TABLET ORAL DAILY
Status: CANCELLED | OUTPATIENT
Start: 2022-05-26

## 2022-05-25 RX ORDER — IBUPROFEN 200 MG
24 TABLET ORAL
Status: CANCELLED | OUTPATIENT
Start: 2022-05-25

## 2022-05-25 RX ORDER — CARVEDILOL 3.12 MG/1
3.12 TABLET ORAL 2 TIMES DAILY
Qty: 60 TABLET | Refills: 11 | Status: ON HOLD | OUTPATIENT
Start: 2022-05-25 | End: 2022-06-06 | Stop reason: SDUPTHER

## 2022-05-25 RX ORDER — LOSARTAN POTASSIUM 50 MG/1
50 TABLET ORAL DAILY
Status: CANCELLED | OUTPATIENT
Start: 2022-05-26

## 2022-05-25 RX ORDER — CARVEDILOL 6.25 MG/1
6.25 TABLET ORAL 2 TIMES DAILY
Qty: 60 TABLET | Refills: 11 | Status: SHIPPED | OUTPATIENT
Start: 2022-05-25 | End: 2022-05-25 | Stop reason: HOSPADM

## 2022-05-25 RX ADMIN — DULOXETINE 60 MG: 30 CAPSULE, DELAYED RELEASE ORAL at 08:05

## 2022-05-25 RX ADMIN — METFORMIN HYDROCHLORIDE 1000 MG: 500 TABLET, FILM COATED ORAL at 08:05

## 2022-05-25 RX ADMIN — CARVEDILOL 3.12 MG: 3.12 TABLET, FILM COATED ORAL at 08:05

## 2022-05-25 RX ADMIN — DONEPEZIL HYDROCHLORIDE 5 MG: 5 TABLET, FILM COATED ORAL at 10:05

## 2022-05-25 RX ADMIN — INSULIN ASPART 2 UNITS: 100 INJECTION, SOLUTION INTRAVENOUS; SUBCUTANEOUS at 10:05

## 2022-05-25 RX ADMIN — ATORVASTATIN CALCIUM 40 MG: 40 TABLET, FILM COATED ORAL at 10:05

## 2022-05-25 RX ADMIN — INSULIN ASPART 4 UNITS: 100 INJECTION, SOLUTION INTRAVENOUS; SUBCUTANEOUS at 12:05

## 2022-05-25 RX ADMIN — ASPIRIN 81 MG: 81 TABLET, COATED ORAL at 08:05

## 2022-05-25 RX ADMIN — SITAGLIPTIN 100 MG: 100 TABLET, FILM COATED ORAL at 08:05

## 2022-05-25 RX ADMIN — METFORMIN HYDROCHLORIDE 1000 MG: 500 TABLET, FILM COATED ORAL at 04:05

## 2022-05-25 RX ADMIN — PANTOPRAZOLE SODIUM 40 MG: 40 TABLET, DELAYED RELEASE ORAL at 08:05

## 2022-05-25 RX ADMIN — CLOPIDOGREL 75 MG: 75 TABLET, FILM COATED ORAL at 08:05

## 2022-05-25 RX ADMIN — LOSARTAN POTASSIUM 50 MG: 50 TABLET, FILM COATED ORAL at 08:05

## 2022-05-25 RX ADMIN — CARVEDILOL 3.12 MG: 3.12 TABLET, FILM COATED ORAL at 10:05

## 2022-05-25 NOTE — PT/OT/SLP PROGRESS
Occupational Therapy  Treatment    Mayra Parker   MRN: [id number]   Admitting Diagnosis: Coronary atherosclerosis    OT Date of Treatment: 05/25/22   OT Start Time: 1325  OT Stop Time: 1335  OT Total Time (min): 10 min     Billable Minutes:  Self Care/Home Management 10  Total Minutes: 10     OT/DERIAN: DERIAN     DERIAN Visit Number: 1    General Precautions: Standard,    Orthopedic Precautions:    Braces:           Subjective:  Communicated with RN prior to session.         Objective:     Functional Mobility:  Bed Mobility:   Supine to sit: Contact Guard Assistance   Sit to supine: Contact Guard Assistance    Transfer Training:   Sit to stand:Minimal Assistance with Rolling Walker from bed  Toilet Transfer:  Pt Step Transfer with Minimal Assistance with Rolling Walker EOB to BR commode      LE Dressing:  Patient don/doffed socks with Contact Guard Assistance    Toilet Training:  Pt performed toileting with Stand-by Assistance with no assist at Commode.    Patient left HOB elevated with all lines intact and call button in reach    ASSESSMENT:  Mayra Parker is a 72 y.o. female with a medical diagnosis of Coronary atherosclerosis.    Rehab potential is good    Activity tolerance: Good    Discharge recommendations:       Equipment recommendations:       GOALS:   Multidisciplinary Problems     Occupational Therapy Goals        Problem: Occupational Therapy    Goal Priority Disciplines Outcome Interventions   Occupational Therapy Goal     OT, PT/OT Ongoing, Progressing    Description: MI for grooming routine standing at sink   SBA for LB dressing  SBA for toilet t/f with RW                    Plan:  Patient to be seen 5 x/week, 4 x/week to address the above listed problems via self-care/home management, therapeutic activities, therapeutic exercises  Plan of Care expires:    Plan of Care reviewed with: patient         05/25/2022

## 2022-05-25 NOTE — DISCHARGE SUMMARY
[facility] - [facility] Telemetry  Cardiology  Discharge Summary      Patient Name: Mayra Parker  MRN: [id number]  Admission Date: 5/20/2022  Hospital Length of Stay: 2 days  Discharge Date and Time:  05/25/2022 8:15 AM  Attending Physician: Jone Estrella MD    Discharging Provider: VAN Castellanos  Primary Care Physician: Rashaun Weiss MD    HPI:   Ms. Parker is a 71y/o AAF, followed by Dr. Estrella, with PMHx significant for CVA, T2DM, HTN, HLD, and dementia who presented for outpatient coronary evaluation due to abnormal PET with c/o SOB/CARTAGENA. She underwent LHC with 90% stenosis noted to proximal RCA to distal RCA which was ballooned and stented x 2 and reduced to 0%. Om1 with noted 90% stenosis reduced to 0% post PTA/stent. EF 40-50% by visual estimate. Normal LVEDP. She was admitted to observation overnight. Labs collected and revealed ARF with BUN/creatinine 33/4. No known hx of CKD. No UOP documented. Family at bedside reports occasional urination overnight.      Procedure(s) (LRB):  CATHETERIZATION, HEART, LEFT (Left)     Indwelling Lines/Drains at time of discharge:  Lines/Drains/Airways     Drain  Duration                Sheath 05/20/22 1018 Right 4 days                Hospital Course:  5.22.22: Renal indices improving with hydration. K+ 5.0. Hypertensive at times.   5.23.22: Patient resting in bed. NAD. BP remains above goal. Patient requesting PT due to generalized weakness.   5.24.22: Patient resting in bed. NAD. PT has not evaluated patient yet. She reports difficulty getting around at home and is inquiring about inpatient PT.  has been notified. Bp marginal yesterday post adjustment of BP medications       Significant Diagnostic Studies:   Left heart cath  · There was mild left ventricular systolic dysfunction.  · The left ventricular end diastolic pressure was normal.  · The pre-procedure left ventricular end diastolic pressure was 12.  · The ejection  "fraction was calculated to be 45%.  · The ejection fraction was 40-50% by visual estimate.  · There was no mitral valve regurgitation.  · There was no aortic valve stenosis.  · The Prox RCA to Dist RCA lesion was 90% stenosed with 0% stenosis post-intervention. The stents were placed. The distal stent to the RCA was a 2.25 by 38 mm drug-eluting stent. The mid and proximal stent was a 2.5 x 22 mm drug-eluting stent.  · A stent was successfully placed.  · A stent was successfully placed.  · The estimated blood loss was none.  · There was two vessel coronary artery disease.  · The PCI was successful.  · The 1st Mrg lesion was 90% stenosed with 0% stenosis post-intervention. This marginal stent was a 2.25 x 38 mm drug-eluting stent post dilated to 2.5 mm  · A stent was successfully placed.      Final Active Diagnoses:    Diagnosis Date Noted POA    PRINCIPAL PROBLEM:  Coronary atherosclerosis [I25.10] 05/21/2022 Unknown    Nonspecific abnormal results of basal metabolism function study [R94.8] 05/20/2022 Yes      Problems Resolved During this Admission:   Native CAD  --s/p PTCA/stent x 2 proximal to distal RCA  --s/p PTCA/stent to OM1  ICMO  --EF 40-50%  ARF/CKD stage 3  --BUN/creatinine improved 15.4/1.55  HLD  HTN  T2DM  --on Januvia and metformin  Hyperkalemia  --resolved     Plan:  Denies CP. Continue aspirin, plavix, and statin.   Renal function improved with hydration. IVF discontinued  Bp stable. Continue Coreg  6.25mg bid and losartan 50mg daily.  Lyrica discontinued per patient request "makes her hallucinate"          Discharged Condition: stable    Disposition: Home or Self Care    Follow Up:   Follow-up Information     Jone Estrella MD Follow up on 5/26/2022.    Specialty: Cardiology  Why: 11:10  Contact information:  [address]il Rojas  [place] [id number]  [phone]             Jone Estrella MD Follow up.    Specialty: Cardiology  Why: 7-10 days post discharge  Contact information:  Anil Olivares " [facility].  [facility] [address]  [phone]                       Patient Instructions:      Activity as tolerated     Medications:  Reconciled Home Medications:      Medication List      START taking these medications    carvediloL 3.125 MG tablet  Commonly known as: COREG  Take 1 tablet (3.125 mg total) by mouth 2 (two) times daily.     clopidogreL 75 mg tablet  Commonly known as: PLAVIX  Take 1 tablet (75 mg total) by mouth once daily.        CHANGE how you take these medications    metFORMIN 1000 MG tablet  Commonly known as: GLUCOPHAGE  Take 1 tablet (1,000 mg total) by mouth 2 (two) times a day.  What changed: Another medication with the same name was removed. Continue taking this medication, and follow the directions you see here.        CONTINUE taking these medications    aspirin 81 MG EC tablet  Commonly known as: ECOTRIN  Take 81 mg by mouth once daily.     atorvastatin 40 MG tablet  Commonly known as: LIPITOR  Take 40 mg by mouth every evening.     diclofenac 50 MG EC tablet  Commonly known as: VOLTAREN  Take 50 mg by mouth 2 (two) times daily.     donepeziL 5 MG tablet  Commonly known as: ARICEPT  Take 5 mg by mouth every evening.     DULoxetine 60 MG capsule  Commonly known as: CYMBALTA  Take 60 mg by mouth once daily.     losartan 50 MG tablet  Commonly known as: COZAAR  Take 50 mg by mouth once daily.     pantoprazole 40 MG tablet  Commonly known as: PROTONIX  Take 40 mg by mouth once daily.     SITagliptin 100 MG Tab  Commonly known as: JANUVIA  Take 100 mg by mouth once daily.        STOP taking these medications    pregabalin 75 MG capsule  Commonly known as: LYRICA            Time spent on the discharge of patient: 30 minutes    VAN Castellanos  Cardiology  [facility] - [facility] Tele[facility]

## 2022-05-26 VITALS
DIASTOLIC BLOOD PRESSURE: 61 MMHG | OXYGEN SATURATION: 100 % | RESPIRATION RATE: 18 BRPM | HEART RATE: 73 BPM | HEIGHT: 64 IN | TEMPERATURE: 98 F | WEIGHT: 180 LBS | BODY MASS INDEX: 30.73 KG/M2 | SYSTOLIC BLOOD PRESSURE: 113 MMHG

## 2022-05-26 LAB
POCT GLUCOSE: 156 MG/DL (ref 70–110)
POCT GLUCOSE: 161 MG/DL (ref 70–110)

## 2022-05-26 PROCEDURE — 25000003 PHARM REV CODE 250: Performed by: INTERNAL MEDICINE

## 2022-05-26 PROCEDURE — 97116 GAIT TRAINING THERAPY: CPT | Mod: CQ

## 2022-05-26 PROCEDURE — 97530 THERAPEUTIC ACTIVITIES: CPT | Mod: CQ

## 2022-05-26 PROCEDURE — 25000003 PHARM REV CODE 250: Performed by: NURSE PRACTITIONER

## 2022-05-26 RX ORDER — PANTOPRAZOLE SODIUM 40 MG/1
40 TABLET, DELAYED RELEASE ORAL DAILY
Status: CANCELLED | OUTPATIENT
Start: 2022-05-26

## 2022-05-26 RX ORDER — ASPIRIN 81 MG/1
81 TABLET ORAL DAILY
Status: CANCELLED | OUTPATIENT
Start: 2022-05-26

## 2022-05-26 RX ORDER — CARVEDILOL 3.12 MG/1
3.12 TABLET ORAL 2 TIMES DAILY
Status: CANCELLED | OUTPATIENT
Start: 2022-05-26

## 2022-05-26 RX ORDER — DULOXETIN HYDROCHLORIDE 30 MG/1
60 CAPSULE, DELAYED RELEASE ORAL DAILY
Status: CANCELLED | OUTPATIENT
Start: 2022-05-26

## 2022-05-26 RX ORDER — LOSARTAN POTASSIUM 50 MG/1
50 TABLET ORAL DAILY
Status: CANCELLED | OUTPATIENT
Start: 2022-05-26

## 2022-05-26 RX ORDER — METFORMIN HYDROCHLORIDE 500 MG/1
1000 TABLET ORAL 2 TIMES DAILY WITH MEALS
Status: CANCELLED | OUTPATIENT
Start: 2022-05-26

## 2022-05-26 RX ORDER — ATORVASTATIN CALCIUM 40 MG/1
40 TABLET, FILM COATED ORAL NIGHTLY
Status: CANCELLED | OUTPATIENT
Start: 2022-05-26

## 2022-05-26 RX ORDER — DONEPEZIL HYDROCHLORIDE 5 MG/1
5 TABLET, FILM COATED ORAL NIGHTLY
Status: CANCELLED | OUTPATIENT
Start: 2022-05-26

## 2022-05-26 RX ORDER — CLOPIDOGREL BISULFATE 75 MG/1
75 TABLET ORAL DAILY
Status: CANCELLED | OUTPATIENT
Start: 2022-05-27

## 2022-05-26 RX ADMIN — ASPIRIN 81 MG: 81 TABLET, COATED ORAL at 08:05

## 2022-05-26 RX ADMIN — DULOXETINE 60 MG: 30 CAPSULE, DELAYED RELEASE ORAL at 08:05

## 2022-05-26 RX ADMIN — PANTOPRAZOLE SODIUM 40 MG: 40 TABLET, DELAYED RELEASE ORAL at 08:05

## 2022-05-26 RX ADMIN — CARVEDILOL 3.12 MG: 3.12 TABLET, FILM COATED ORAL at 08:05

## 2022-05-26 RX ADMIN — SITAGLIPTIN 100 MG: 100 TABLET, FILM COATED ORAL at 08:05

## 2022-05-26 RX ADMIN — METFORMIN HYDROCHLORIDE 1000 MG: 500 TABLET, FILM COATED ORAL at 08:05

## 2022-05-26 RX ADMIN — CLOPIDOGREL 75 MG: 75 TABLET, FILM COATED ORAL at 08:05

## 2022-05-26 RX ADMIN — LOSARTAN POTASSIUM 50 MG: 50 TABLET, FILM COATED ORAL at 08:05

## 2022-05-26 NOTE — PLAN OF CARE
TCU received auth for pt . Will transfer today.cis notified and will place dc order. Nurse and daughter informed. Transport set up for 3:30 today.

## 2022-05-26 NOTE — PROGRESS NOTES
[facility] - [facility] Medical Telemetry  Cardiology  Progress Note    Patient Name: Mayra Parker  MRN: [id number]  Admission Date: 5/20/2022  Code Status: No Order   Attending Provider: Jone Estrella MD   Primary Care Physician: Rashaun Weiss MD  Principal Problem:Coronary atherosclerosis       Subjective:     Chief Complaint:      HPI:   Ms. Parker is a 71y/o AAF, followed by Dr. Estrella, with PMHx significant for CVA, T2DM, HTN, HLD, and dementia who presented for outpatient coronary evaluation due to abnormal PET with c/o SOB/CARTAGENA. She underwent LHC with 90% stenosis noted to proximal RCA to distal RCA which was ballooned and stented x 2 and reduced to 0%. Om1 with noted 90% stenosis reduced to 0% post PTA/stent. EF 40-50% by visual estimate. Normal LVEDP. She was admitted to observation overnight. Labs collected and revealed ARF with BUN/creatinine 33/4. No known hx of CKD. No UOP documented. Family at bedside reports occasional urination overnight.       Hospital Course:  5.22.22: Renal indices improving with hydration. K+ 5.0. Hypertensive at times.   5.23.22: Patient resting in bed. NAD. BP remains above goal. Patient requesting PT due to generalized weakness.   5.24.22: Patient resting in bed. NAD. PT has not evaluated patient yet. She reports difficulty getting around at home and is inquiring about inpatient PT.  has been notified. Bp marginal yesterday post adjustment of BP medications  5.26.22: NAD noted. VSS. SR on tele. Denies CP/SOB/Palps.      PMH: CVA, HLD, depression, dementia, T2DM, HTN  PSH: Right and left CEA, back surgery  Social History: current smoker  Family History: Father- CAD native, Mother- CAD native, Son- CAD, MI- reason for death    Previous Cardiac Diagnostics:   TTE 05/03/2022:  LV normal in size. Global LVSF. LVEF 55%. LV diastolic function is impaired Grade I with normal LA pressure. Noted mild concentric LVH. Mild calcification of aortic valve  noted with adequate cuspal excursion. 1+ TR. PASP 31mmHg. Mobility of anterior and posterior mitral leaflet is normal. Mild MAC with Trace MR.     PET 04/19/2022:  Abnormal perfusion study consistent with large area of moderate to severe inferior and inferoapical ischemia. Perfusion imaging suggestive of single vessel disease in distribution of RCA. LV cavity normal on stress studies. Stress LVEF 42% and LV global function is mildly reduced. Rest LV cavity normal. Rest LVEF 56% and rest LV global function is normal.     Review of Systems   Constitutional: Negative for chills and fever.   Cardiovascular: Negative for chest pain and palpitations.   Respiratory: Negative for shortness of breath.    Musculoskeletal: Negative.    Psychiatric/Behavioral: Negative for altered mental status.     Objective:     Vital Signs (Most Recent):  Temp: 98.4 °F (36.9 °C) (05/26/22 0734)  Pulse: 65 (05/26/22 0734)  Resp: 18 (05/26/22 0734)  BP: (!) 144/64 (05/26/22 0734)  SpO2: 96 % (05/26/22 0734) Vital Signs (24h Range):  Temp:  [98.1 °F (36.7 °C)-99 °F (37.2 °C)] 98.4 °F (36.9 °C)  Pulse:  [64-78] 65  Resp:  [18-20] 18  SpO2:  [95 %-100 %] 96 %  BP: (102-144)/(44-64) 144/64     Weight: 81.6 kg (180 lb)  Body mass index is 30.9 kg/m².    SpO2: 96 %  O2 Device (Oxygen Therapy): room air      Physical Exam  Constitutional:       Appearance: Normal appearance.   HENT:      Head: Normocephalic and atraumatic.      Mouth/Throat:      Mouth: Mucous membranes are moist.   Eyes:      Pupils: Pupils are equal, round, and reactive to light.   Cardiovascular:      Rate and Rhythm: Normal rate.      Heart sounds: Normal heart sounds.   Pulmonary:      Effort: Pulmonary effort is normal.      Breath sounds: Normal breath sounds.   Abdominal:      General: Abdomen is flat.      Palpations: Abdomen is soft.   Musculoskeletal:      Cervical back: Neck supple.      Left lower leg: No edema.   Skin:     General: Skin is warm.   Neurological:       General: No focal deficit present.      Mental Status: She is alert and oriented to person, place, and time.   Psychiatric:         Mood and Affect: Mood normal.         Significant Labs:   BMP:   No results for input(s): GLU, NA, K, CL, CO2, BUN, CREATININE, CALCIUM, MG in the last 48 hours.    Significant Imaging:  Left heart cath  · There was mild left ventricular systolic dysfunction.  · The left ventricular end diastolic pressure was normal.  · The pre-procedure left ventricular end diastolic pressure was 12.  · The ejection fraction was calculated to be 45%.  · The ejection fraction was 40-50% by visual estimate.  · There was no mitral valve regurgitation.  · There was no aortic valve stenosis.  · The Prox RCA to Dist RCA lesion was 90% stenosed with 0% stenosis post-intervention. The stents were placed. The distal stent to the RCA was a 2.25 by 38 mm drug-eluting stent. The mid and proximal stent was a 2.5 x 22 mm drug-eluting stent.  · A stent was successfully placed.  · A stent was successfully placed.  · The estimated blood loss was none.  · There was two vessel coronary artery disease.  · The PCI was successful.  · The 1st Mrg lesion was 90% stenosed with 0% stenosis post-intervention. This marginal stent was a 2.25 x 38 mm drug-eluting stent post dilated to 2.5 mm  · A stent was successfully placed.       Assessment and Plan:   Native CAD  --s/p PTCA/stent x 2 proximal to distal RCA  --s/p PTCA/stent to OM1  ICMO  --EF 40-50%  ARF/CKD stage 3  --BUN/creatinine improved 15.4/1.55  HLD  HTN  T2DM  --on Januvia and metformin  Hyperkalemia  --resolved    Plan:  Continue aspirin, plavix, and statin.   Continue Coreg to 6.25mg bid and losartan 50mg daily in am.   SNF placement pending, DC once accepted.

## 2022-06-25 ENCOUNTER — HOSPITAL ENCOUNTER (OUTPATIENT)
Facility: HOSPITAL | Age: 72
Discharge: HOME HEALTH | End: 2022-06-27
Attending: EMERGENCY MEDICINE | Admitting: INTERNAL MEDICINE
Payer: MEDICARE

## 2022-06-25 DIAGNOSIS — R42 DIZZINESS: ICD-10-CM

## 2022-06-25 DIAGNOSIS — N17.9 AKI (ACUTE KIDNEY INJURY): ICD-10-CM

## 2022-06-25 DIAGNOSIS — E87.5 ACUTE HYPERKALEMIA: ICD-10-CM

## 2022-06-25 DIAGNOSIS — I95.9 HYPOTENSION, UNSPECIFIED HYPOTENSION TYPE: Primary | ICD-10-CM

## 2022-06-25 DIAGNOSIS — I10 HYPERTENSION: ICD-10-CM

## 2022-06-25 LAB
ALBUMIN SERPL-MCNC: 3.6 GM/DL (ref 3.4–4.8)
ALBUMIN/GLOB SERPL: 1 RATIO (ref 1.1–2)
ALP SERPL-CCNC: 67 UNIT/L (ref 40–150)
ALT SERPL-CCNC: 85 UNIT/L (ref 0–55)
APPEARANCE UR: ABNORMAL
AST SERPL-CCNC: 72 UNIT/L (ref 5–34)
BACTERIA #/AREA URNS AUTO: ABNORMAL /HPF
BASOPHILS # BLD AUTO: 0.06 X10(3)/MCL (ref 0–0.2)
BASOPHILS NFR BLD AUTO: 1 %
BILIRUB UR QL STRIP.AUTO: ABNORMAL MG/DL
BILIRUBIN DIRECT+TOT PNL SERPL-MCNC: 0.9 MG/DL
BUN SERPL-MCNC: 20.3 MG/DL (ref 9.8–20.1)
CALCIUM SERPL-MCNC: 9.6 MG/DL (ref 8.4–10.2)
CHLORIDE SERPL-SCNC: 104 MMOL/L (ref 98–107)
CO2 SERPL-SCNC: 17 MMOL/L (ref 23–31)
COLOR UR AUTO: ABNORMAL
CREAT SERPL-MCNC: 1.76 MG/DL (ref 0.55–1.02)
EOSINOPHIL # BLD AUTO: 0.14 X10(3)/MCL (ref 0–0.9)
EOSINOPHIL NFR BLD AUTO: 2.3 %
ERYTHROCYTE [DISTWIDTH] IN BLOOD BY AUTOMATED COUNT: 12.4 % (ref 11.5–17)
GLOBULIN SER-MCNC: 3.7 GM/DL (ref 2.4–3.5)
GLUCOSE SERPL-MCNC: 270 MG/DL (ref 82–115)
GLUCOSE UR QL STRIP.AUTO: ABNORMAL MG/DL
HCT VFR BLD AUTO: 36.5 % (ref 37–47)
HGB BLD-MCNC: 11.5 GM/DL (ref 12–16)
IMM GRANULOCYTES # BLD AUTO: 0.01 X10(3)/MCL (ref 0–0.02)
IMM GRANULOCYTES NFR BLD AUTO: 0.2 % (ref 0–0.43)
KETONES UR QL STRIP.AUTO: ABNORMAL MG/DL
LEUKOCYTE ESTERASE UR QL STRIP.AUTO: ABNORMAL UNIT/L
LYMPHOCYTES # BLD AUTO: 1.92 X10(3)/MCL (ref 0.6–4.6)
LYMPHOCYTES NFR BLD AUTO: 31.6 %
MCH RBC QN AUTO: 29.6 PG (ref 27–31)
MCHC RBC AUTO-ENTMCNC: 31.5 MG/DL (ref 33–36)
MCV RBC AUTO: 94.1 FL (ref 80–94)
MONOCYTES # BLD AUTO: 0.61 X10(3)/MCL (ref 0.1–1.3)
MONOCYTES NFR BLD AUTO: 10 %
NEUTROPHILS # BLD AUTO: 3.3 X10(3)/MCL (ref 2.1–9.2)
NEUTROPHILS NFR BLD AUTO: 54.9 %
NITRITE UR QL STRIP.AUTO: NEGATIVE
NRBC BLD AUTO-RTO: 0 %
PH UR STRIP.AUTO: 5 [PH]
PLATELET # BLD AUTO: 268 X10(3)/MCL (ref 130–400)
PMV BLD AUTO: 10.1 FL (ref 9.4–12.4)
POCT GLUCOSE: 225 MG/DL (ref 70–110)
POTASSIUM SERPL-SCNC: 6 MMOL/L (ref 3.5–5.1)
PROT SERPL-MCNC: 7.3 GM/DL (ref 5.8–7.6)
PROT UR QL STRIP.AUTO: ABNORMAL MG/DL
RBC # BLD AUTO: 3.88 X10(6)/MCL (ref 4.2–5.4)
RBC #/AREA URNS AUTO: <5 /HPF
RBC UR QL AUTO: NEGATIVE UNIT/L
SODIUM SERPL-SCNC: 134 MMOL/L (ref 136–145)
SP GR UR STRIP.AUTO: 1.02 (ref 1–1.03)
SQUAMOUS #/AREA URNS AUTO: >36 /HPF
TROPONIN I SERPL-MCNC: <0.01 NG/ML (ref 0–0.04)
UROBILINOGEN UR STRIP-ACNC: 1 MG/DL
WBC # SPEC AUTO: 6.1 X10(3)/MCL (ref 4.5–11.5)
WBC #/AREA URNS AUTO: 119 /HPF
YEAST URNS QL MICRO: ABNORMAL /HPF

## 2022-06-25 PROCEDURE — G0378 HOSPITAL OBSERVATION PER HR: HCPCS

## 2022-06-25 PROCEDURE — 80053 COMPREHEN METABOLIC PANEL: CPT | Performed by: PHYSICIAN ASSISTANT

## 2022-06-25 PROCEDURE — 83735 ASSAY OF MAGNESIUM: CPT | Performed by: EMERGENCY MEDICINE

## 2022-06-25 PROCEDURE — 85025 COMPLETE CBC W/AUTO DIFF WBC: CPT | Performed by: PHYSICIAN ASSISTANT

## 2022-06-25 PROCEDURE — 96375 TX/PRO/DX INJ NEW DRUG ADDON: CPT

## 2022-06-25 PROCEDURE — 96361 HYDRATE IV INFUSION ADD-ON: CPT

## 2022-06-25 PROCEDURE — 63600175 PHARM REV CODE 636 W HCPCS: Performed by: EMERGENCY MEDICINE

## 2022-06-25 PROCEDURE — 99291 CRITICAL CARE FIRST HOUR: CPT | Mod: 25

## 2022-06-25 PROCEDURE — 84484 ASSAY OF TROPONIN QUANT: CPT | Performed by: PHYSICIAN ASSISTANT

## 2022-06-25 PROCEDURE — 25000003 PHARM REV CODE 250: Performed by: EMERGENCY MEDICINE

## 2022-06-25 PROCEDURE — 81001 URINALYSIS AUTO W/SCOPE: CPT | Performed by: EMERGENCY MEDICINE

## 2022-06-25 PROCEDURE — 93005 ELECTROCARDIOGRAM TRACING: CPT

## 2022-06-25 PROCEDURE — 87077 CULTURE AEROBIC IDENTIFY: CPT | Mod: 59 | Performed by: EMERGENCY MEDICINE

## 2022-06-25 PROCEDURE — 94640 AIRWAY INHALATION TREATMENT: CPT

## 2022-06-25 PROCEDURE — 25000003 PHARM REV CODE 250: Performed by: PHYSICIAN ASSISTANT

## 2022-06-25 PROCEDURE — 36415 COLL VENOUS BLD VENIPUNCTURE: CPT | Performed by: PHYSICIAN ASSISTANT

## 2022-06-25 PROCEDURE — 82962 GLUCOSE BLOOD TEST: CPT

## 2022-06-25 PROCEDURE — 25000242 PHARM REV CODE 250 ALT 637 W/ HCPCS: Performed by: EMERGENCY MEDICINE

## 2022-06-25 RX ORDER — CALCIUM GLUCONATE 20 MG/ML
1 INJECTION, SOLUTION INTRAVENOUS EVERY 10 MIN PRN
Status: DISCONTINUED | OUTPATIENT
Start: 2022-06-25 | End: 2022-06-27 | Stop reason: HOSPADM

## 2022-06-25 RX ORDER — CALCIUM GLUCONATE 20 MG/ML
1 INJECTION, SOLUTION INTRAVENOUS
Status: COMPLETED | OUTPATIENT
Start: 2022-06-25 | End: 2022-06-25

## 2022-06-25 RX ORDER — ALBUTEROL SULFATE 0.83 MG/ML
10 SOLUTION RESPIRATORY (INHALATION)
Status: COMPLETED | OUTPATIENT
Start: 2022-06-25 | End: 2022-06-25

## 2022-06-25 RX ORDER — SODIUM CHLORIDE 9 MG/ML
1000 INJECTION, SOLUTION INTRAVENOUS
Status: COMPLETED | OUTPATIENT
Start: 2022-06-25 | End: 2022-06-26

## 2022-06-25 RX ADMIN — SODIUM ZIRCONIUM CYCLOSILICATE 10 G: 10 POWDER, FOR SUSPENSION ORAL at 11:06

## 2022-06-25 RX ADMIN — ALBUTEROL SULFATE 10 MG: 2.5 SOLUTION RESPIRATORY (INHALATION) at 11:06

## 2022-06-25 RX ADMIN — SODIUM CHLORIDE 1000 ML: 9 INJECTION, SOLUTION INTRAVENOUS at 07:06

## 2022-06-25 RX ADMIN — INSULIN HUMAN 5 UNITS: 100 INJECTION, SOLUTION PARENTERAL at 11:06

## 2022-06-25 RX ADMIN — CALCIUM GLUCONATE 1 G: 20 INJECTION, SOLUTION INTRAVENOUS at 11:06

## 2022-06-26 PROBLEM — I95.9 HYPOTENSION: Status: ACTIVE | Noted: 2022-06-26

## 2022-06-26 LAB
ANION GAP SERPL CALC-SCNC: 7 MEQ/L
BASOPHILS # BLD AUTO: 0.03 X10(3)/MCL (ref 0–0.2)
BASOPHILS NFR BLD AUTO: 0.6 %
BUN SERPL-MCNC: 20.1 MG/DL (ref 9.8–20.1)
CALCIUM SERPL-MCNC: 9.3 MG/DL (ref 8.4–10.2)
CHLORIDE SERPL-SCNC: 107 MMOL/L (ref 98–107)
CO2 SERPL-SCNC: 21 MMOL/L (ref 23–31)
CREAT SERPL-MCNC: 1.48 MG/DL (ref 0.55–1.02)
CREAT/UREA NIT SERPL: 14
EOSINOPHIL # BLD AUTO: 0.23 X10(3)/MCL (ref 0–0.9)
EOSINOPHIL NFR BLD AUTO: 4.5 %
ERYTHROCYTE [DISTWIDTH] IN BLOOD BY AUTOMATED COUNT: 12.1 % (ref 11.5–17)
GLUCOSE SERPL-MCNC: 125 MG/DL (ref 82–115)
HCT VFR BLD AUTO: 33.8 % (ref 37–47)
HGB BLD-MCNC: 10.6 GM/DL (ref 12–16)
IMM GRANULOCYTES # BLD AUTO: 0.02 X10(3)/MCL (ref 0–0.02)
IMM GRANULOCYTES NFR BLD AUTO: 0.4 % (ref 0–0.43)
LYMPHOCYTES # BLD AUTO: 1.9 X10(3)/MCL (ref 0.6–4.6)
LYMPHOCYTES NFR BLD AUTO: 37 %
MAGNESIUM SERPL-MCNC: 1.5 MG/DL (ref 1.6–2.6)
MCH RBC QN AUTO: 29.4 PG (ref 27–31)
MCHC RBC AUTO-ENTMCNC: 31.4 MG/DL (ref 33–36)
MCV RBC AUTO: 93.6 FL (ref 80–94)
MONOCYTES # BLD AUTO: 0.54 X10(3)/MCL (ref 0.1–1.3)
MONOCYTES NFR BLD AUTO: 10.5 %
NEUTROPHILS # BLD AUTO: 2.4 X10(3)/MCL (ref 2.1–9.2)
NEUTROPHILS NFR BLD AUTO: 47 %
NRBC BLD AUTO-RTO: 0 %
PLATELET # BLD AUTO: 215 X10(3)/MCL (ref 130–400)
PMV BLD AUTO: 9.4 FL (ref 9.4–12.4)
POCT GLUCOSE: 122 MG/DL (ref 70–110)
POCT GLUCOSE: 163 MG/DL (ref 70–110)
POCT GLUCOSE: 207 MG/DL (ref 70–110)
POCT GLUCOSE: 333 MG/DL (ref 70–110)
POTASSIUM SERPL-SCNC: 4.4 MMOL/L (ref 3.5–5.1)
RBC # BLD AUTO: 3.61 X10(6)/MCL (ref 4.2–5.4)
SODIUM SERPL-SCNC: 135 MMOL/L (ref 136–145)
WBC # SPEC AUTO: 5.1 X10(3)/MCL (ref 4.5–11.5)

## 2022-06-26 PROCEDURE — 96372 THER/PROPH/DIAG INJ SC/IM: CPT | Performed by: INTERNAL MEDICINE

## 2022-06-26 PROCEDURE — 96366 THER/PROPH/DIAG IV INF ADDON: CPT

## 2022-06-26 PROCEDURE — G0378 HOSPITAL OBSERVATION PER HR: HCPCS

## 2022-06-26 PROCEDURE — 63600175 PHARM REV CODE 636 W HCPCS: Performed by: INTERNAL MEDICINE

## 2022-06-26 PROCEDURE — 96367 TX/PROPH/DG ADDL SEQ IV INF: CPT

## 2022-06-26 PROCEDURE — 25000003 PHARM REV CODE 250: Performed by: INTERNAL MEDICINE

## 2022-06-26 PROCEDURE — 85025 COMPLETE CBC W/AUTO DIFF WBC: CPT | Performed by: INTERNAL MEDICINE

## 2022-06-26 PROCEDURE — 96365 THER/PROPH/DIAG IV INF INIT: CPT

## 2022-06-26 PROCEDURE — 84484 ASSAY OF TROPONIN QUANT: CPT | Performed by: INTERNAL MEDICINE

## 2022-06-26 PROCEDURE — 80048 BASIC METABOLIC PNL TOTAL CA: CPT | Performed by: INTERNAL MEDICINE

## 2022-06-26 PROCEDURE — 36415 COLL VENOUS BLD VENIPUNCTURE: CPT | Performed by: INTERNAL MEDICINE

## 2022-06-26 PROCEDURE — 96361 HYDRATE IV INFUSION ADD-ON: CPT

## 2022-06-26 PROCEDURE — 82962 GLUCOSE BLOOD TEST: CPT

## 2022-06-26 RX ORDER — CARVEDILOL 3.12 MG/1
3.12 TABLET ORAL 2 TIMES DAILY
Status: DISCONTINUED | OUTPATIENT
Start: 2022-06-26 | End: 2022-06-27 | Stop reason: HOSPADM

## 2022-06-26 RX ORDER — ENOXAPARIN SODIUM 100 MG/ML
30 INJECTION SUBCUTANEOUS EVERY 24 HOURS
Status: DISCONTINUED | OUTPATIENT
Start: 2022-06-26 | End: 2022-06-27 | Stop reason: HOSPADM

## 2022-06-26 RX ORDER — IBUPROFEN 200 MG
24 TABLET ORAL
Status: DISCONTINUED | OUTPATIENT
Start: 2022-06-26 | End: 2022-06-27 | Stop reason: HOSPADM

## 2022-06-26 RX ORDER — CLOPIDOGREL BISULFATE 75 MG/1
75 TABLET ORAL DAILY
Status: DISCONTINUED | OUTPATIENT
Start: 2022-06-26 | End: 2022-06-27 | Stop reason: HOSPADM

## 2022-06-26 RX ORDER — METFORMIN HYDROCHLORIDE 500 MG/1
1000 TABLET ORAL 2 TIMES DAILY WITH MEALS
Status: DISCONTINUED | OUTPATIENT
Start: 2022-06-26 | End: 2022-06-27 | Stop reason: HOSPADM

## 2022-06-26 RX ORDER — SODIUM CHLORIDE 0.9 % (FLUSH) 0.9 %
10 SYRINGE (ML) INJECTION
Status: DISCONTINUED | OUTPATIENT
Start: 2022-06-26 | End: 2022-06-27 | Stop reason: HOSPADM

## 2022-06-26 RX ORDER — LANOLIN ALCOHOL/MO/W.PET/CERES
400 CREAM (GRAM) TOPICAL 2 TIMES DAILY
Status: DISCONTINUED | OUTPATIENT
Start: 2022-06-26 | End: 2022-06-27 | Stop reason: HOSPADM

## 2022-06-26 RX ORDER — TALC
6 POWDER (GRAM) TOPICAL NIGHTLY PRN
Status: DISCONTINUED | OUTPATIENT
Start: 2022-06-26 | End: 2022-06-27 | Stop reason: HOSPADM

## 2022-06-26 RX ORDER — IBUPROFEN 200 MG
16 TABLET ORAL
Status: DISCONTINUED | OUTPATIENT
Start: 2022-06-26 | End: 2022-06-27 | Stop reason: HOSPADM

## 2022-06-26 RX ORDER — INSULIN ASPART 100 [IU]/ML
1-10 INJECTION, SOLUTION INTRAVENOUS; SUBCUTANEOUS
Status: DISCONTINUED | OUTPATIENT
Start: 2022-06-26 | End: 2022-06-27 | Stop reason: HOSPADM

## 2022-06-26 RX ORDER — PANTOPRAZOLE SODIUM 40 MG/1
40 TABLET, DELAYED RELEASE ORAL DAILY
Status: DISCONTINUED | OUTPATIENT
Start: 2022-06-26 | End: 2022-06-27 | Stop reason: HOSPADM

## 2022-06-26 RX ORDER — ATORVASTATIN CALCIUM 40 MG/1
40 TABLET, FILM COATED ORAL NIGHTLY
Status: DISCONTINUED | OUTPATIENT
Start: 2022-06-26 | End: 2022-06-27 | Stop reason: HOSPADM

## 2022-06-26 RX ORDER — DONEPEZIL HYDROCHLORIDE 5 MG/1
5 TABLET, FILM COATED ORAL NIGHTLY
Status: DISCONTINUED | OUTPATIENT
Start: 2022-06-26 | End: 2022-06-27 | Stop reason: HOSPADM

## 2022-06-26 RX ORDER — GLUCAGON 1 MG
1 KIT INJECTION
Status: DISCONTINUED | OUTPATIENT
Start: 2022-06-26 | End: 2022-06-27 | Stop reason: HOSPADM

## 2022-06-26 RX ORDER — ASPIRIN 81 MG/1
81 TABLET ORAL DAILY
Status: DISCONTINUED | OUTPATIENT
Start: 2022-06-26 | End: 2022-06-27 | Stop reason: HOSPADM

## 2022-06-26 RX ORDER — DULOXETIN HYDROCHLORIDE 30 MG/1
60 CAPSULE, DELAYED RELEASE ORAL DAILY
Status: DISCONTINUED | OUTPATIENT
Start: 2022-06-26 | End: 2022-06-27 | Stop reason: HOSPADM

## 2022-06-26 RX ORDER — MAGNESIUM SULFATE HEPTAHYDRATE 40 MG/ML
2 INJECTION, SOLUTION INTRAVENOUS ONCE
Status: COMPLETED | OUTPATIENT
Start: 2022-06-26 | End: 2022-06-26

## 2022-06-26 RX ORDER — SODIUM CHLORIDE 9 MG/ML
INJECTION, SOLUTION INTRAVENOUS CONTINUOUS
Status: DISCONTINUED | OUTPATIENT
Start: 2022-06-26 | End: 2022-06-27 | Stop reason: HOSPADM

## 2022-06-26 RX ADMIN — CLOPIDOGREL 75 MG: 75 TABLET, FILM COATED ORAL at 09:06

## 2022-06-26 RX ADMIN — CARVEDILOL 3.12 MG: 3.12 TABLET, FILM COATED ORAL at 08:06

## 2022-06-26 RX ADMIN — ASPIRIN 81 MG: 81 TABLET, COATED ORAL at 09:06

## 2022-06-26 RX ADMIN — DULOXETINE 60 MG: 30 CAPSULE, DELAYED RELEASE ORAL at 09:06

## 2022-06-26 RX ADMIN — METFORMIN HYDROCHLORIDE 1000 MG: 500 TABLET, FILM COATED ORAL at 03:06

## 2022-06-26 RX ADMIN — MAGNESIUM SULFATE IN WATER 2 G: 40 INJECTION, SOLUTION INTRAVENOUS at 06:06

## 2022-06-26 RX ADMIN — Medication 400 MG: at 09:06

## 2022-06-26 RX ADMIN — ATORVASTATIN CALCIUM 40 MG: 40 TABLET, FILM COATED ORAL at 08:06

## 2022-06-26 RX ADMIN — ENOXAPARIN SODIUM 30 MG: 40 INJECTION SUBCUTANEOUS at 07:06

## 2022-06-26 RX ADMIN — INSULIN ASPART 2 UNITS: 100 INJECTION, SOLUTION INTRAVENOUS; SUBCUTANEOUS at 09:06

## 2022-06-26 RX ADMIN — DONEPEZIL HYDROCHLORIDE 5 MG: 5 TABLET, FILM COATED ORAL at 08:06

## 2022-06-26 RX ADMIN — SITAGLIPTIN 100 MG: 100 TABLET, FILM COATED ORAL at 09:06

## 2022-06-26 RX ADMIN — CEFTRIAXONE SODIUM 1 G: 1 INJECTION, POWDER, FOR SOLUTION INTRAMUSCULAR; INTRAVENOUS at 04:06

## 2022-06-26 RX ADMIN — Medication 400 MG: at 08:06

## 2022-06-26 RX ADMIN — SODIUM CHLORIDE: 9 INJECTION, SOLUTION INTRAVENOUS at 02:06

## 2022-06-26 RX ADMIN — CARVEDILOL 3.12 MG: 3.12 TABLET, FILM COATED ORAL at 09:06

## 2022-06-26 RX ADMIN — PANTOPRAZOLE SODIUM 40 MG: 40 TABLET, DELAYED RELEASE ORAL at 09:06

## 2022-06-26 NOTE — H&P
[facility] Hospital Medicine History & Physical Examination       Patient Name: Mayra Parker  MRN: [id number]  Patient Class: OP- Observation   Admission Date: 6/25/2022  6:56 PM  Length of Stay: 0  Admitting Service: Hospital Medicine   Attending Physician: Fransico Daniels MD   Primary Care Provider: Rashaun Weiss MD  History source: EMR, patient and/or patient's family    CHIEF COMPLAINT   Dizziness and hypertension    HISTORY OF PRESENT ILLNESS:   Patient is a 72-year-old female with a past medical history of recent admission in May for coronary artery disease and underwent PCI with placement of multiple cardiac stents, ischemic cardiomyopathy with an LVEF of 40-50% on TTE 05/2022.  Chronic kidney disease stage 3, diabetes, hypertension, hyperlipidemia, and prior CVA who presents to the ER complaining of episodes of dizziness as well as a high home blood pressure reading of 180s over 90s.  However in triage she was found to be hypotensive with a systolic blood pressure in the 90s and daughter at bedside denies that she took any medications prior to arrival.  She was otherwise afebrile maintain normal sats on room air.  Her blood pressure did respond to a small IV fluid bolus.  Laboratory work was significant for acute kidney injury with a baseline creatinine around 1-1.3 current Lea at 1.7, with associated non gap metabolic acidosis and mild hyperkalemia.  Magnesium was low at 1.5.  She has persistent but mild transaminitis, undetectable troponin, and urinalysis significant for urinary tract infection.  Medications were were given to shift her potassium.  She was started on gentle IV fluids in his measured the hospitalist service for management.    PAST MEDICAL HISTORY:   Ischemic cardiomyopathy with LVEF 40 to 50% on TTE 5/2022  CAD s/p Stents 05/2022  Chronic kidney disease stage II/3a  CVA (cerebral vascular accident)    Depression    Diabetes mellitus     Hyperlipidemia    Hypertension    Unspecified dementia without behavioral disturbance        PAST SURGICAL HISTORY:      BACK SURGERY      CAROTID ENDARTERECTOMY Left     CAROTID ENDARTERECTOMY Right     LEFT HEART CATHETERIZATION  05/20/2022    Dr. Estrella; Stent placement    LEFT HEART CATHETERIZATION Left 5/20/2022    Procedure: CATHETERIZATION, HEART, LEFT;  Surgeon: Jone Estrella MD;  Location: [facility] CATH LAB;  Service: Cardiology;  Laterality: Left;  LHC VIA R GROIN       ALLERGIES:   Glipizide, Influenza vaccine tr-s 11 (pf), and Pregabalin    FAMILY HISTORY:   Reviewed and non-contributory     SOCIAL HISTORY:   Smokes tobacco  No significant alcohol or drug use    HOME MEDICATIONS:     amLODIPine (NORVASC) 10 MG tablet Take 1 tablet (10 mg total) by mouth once daily.   aspirin (ECOTRIN) 81 MG EC tablet Take 81 mg by mouth once daily.   atorvastatin (LIPITOR) 40 MG tablet Take 40 mg by mouth every evening.   carvediloL (COREG) 3.125 MG tablet Take 1 tablet (3.125 mg total) by mouth 2 (two) times daily.   clopidogreL (PLAVIX) 75 mg tablet Take 1 tablet (75 mg total) by mouth once daily.   donepeziL (ARICEPT) 5 MG tablet Take 1 tablet (5 mg total) by mouth every evening.   DULoxetine (CYMBALTA) 60 MG capsule Take 1 capsule (60 mg total) by mouth once daily.   losartan (COZAAR) 25 MG tablet Take 1 tablet (25 mg total) by mouth once daily.   magnesium oxide (MAG-OX) 400 mg (241.3 mg magnesium) tablet Take 1 tablet (400 mg total) by mouth 2 (two) times daily.   metFORMIN (GLUCOPHAGE) 1000 MG tablet Take 1 tablet (1,000 mg total) by mouth 2 (two) times daily with meals.   pantoprazole (PROTONIX) 40 MG tablet Take 40 mg by mouth once daily.   SITagliptin (JANUVIA) 100 MG Tab Take 1 tablet (100 mg total) by mouth once daily.   traMADoL (ULTRAM) 50 mg tablet Take 1 tablet (50 mg total) by mouth every 4 (four) hours as needed (for pain).   pregabalin (LYRICA) 75 MG capsule Take 75 mg by mouth 2 (two) times daily.        REVIEW OF SYSTEMS:   Except as documented, all other systems reviewed and negative     PHYSICAL EXAM:   T 97.2 °F (36.2 °C)   BP (!) 129/59   P 75   RR (!) 21   O2 98 %  GENERAL: awake, alert, oriented and in no acute distress, non-toxic appearing   HEENT: normocephalic atraumatic   NECK: supple   LUNGS: Clear bilaterally, no wheezing or rales, no accessory muscle use   CVS: Regular rate and rhythm, normal peripheral perfusion  ABD: Soft, non-tender, non-distended, bowel sounds present  EXTREMITIES: no clubbing or cyanosis  SKIN: Warm, dry.   NEURO: alert and oriented, grossly without focal deficits   PSYCHIATRIC: Cooperative    LABS AND IMAGING:     Recent Labs     06/25/22  2115   WBC 6.1   RBC 3.88*   HGB 11.5*   HCT 36.5*   MCV 94.1*   MCH 29.6   MCHC 31.5*   RDW 12.4        No results for input(s): LACTIC in the last 72 hours.  No results for input(s): INR, APTT, D-DIMER in the last 72 hours.  No results for input(s): HGBA1C, CHOL, TRIG, LDL, VLDL, HDL in the last 72 hours.   Recent Labs     06/25/22  1908   *   K 6.0*   CHLORIDE 104   CO2 17*   BUN 20.3*   CREATININE 1.76*   GLUCOSE 270*   CALCIUM 9.6   MG 1.50*   ALBUMIN 3.6   GLOBULIN 3.7*   ALKPHOS 67   ALT 85*   AST 72*   BILITOT 0.9     Recent Labs     06/25/22  1908   TROPONINI <0.010          CT Head Without Contrast  Impression: No acute intracranial abnormality.      ASSESSMENT & PLAN:   Hypotension, responsive to IV fluids  Presyncope secondary to above  Pre-renal azotemia/ANIYA on CKD with hyperkalemia/NAGMA  Recent cardiac stents placed 05/2022  History ICMO 45%, dm 2, HTN, HLD, dementia    - repeating laboratory work now  - start ceftriaxone and send urine for culture  - close hemodynamic monitoring, careful re-initiation of home antihypertensive  - resume home medications as appropriate    DVT prophylaxis: lovenox  Code status: full     If patient was admitted under observational status it is with my approval/permission.     At  least 55 min was spent on this history and physical.  Time seen: 3 AM   Fransico Daniels MD

## 2022-06-26 NOTE — ED PROVIDER NOTES
Encounter Date: 6/25/2022    SCRIBE #1 NOTE: I, Giorgi Ovalle, am scribing for, and in the presence of,  Sam Villela. I have scribed the following portions of the note - Other sections scribed: HPI, ROS, PE, MDM.       History     Chief Complaint   Patient presents with    Hypertension     Reports htn at home; pt's BP 93/58 in triage. Also reports hyperglycemia, cbg 225. States she has been compliant with her medication.     Nausea    Dizziness     I, Sam Villela, assumed care at 2155    71 y/o F with hx of HTN and DM presents to ED with c/o fluctuating blood pressure. Pt was at her granddaughters dinner and was reported to be there falling asleep, so she was taken home and they checked her CBG which was 289 and her BP was 189/93; pt is known to have a normal CBG and blood pressure. When pt came to triage her BP was down to 93/58. They also report on last Friday the pt BP was low (80/50) and she did not want to walk around. Denies headache or syncope, but does report feeling very weak and has not being eating well. Pt is on Lodopin and metformin (2 x day). Pt has hx of getting stents put in place on May 20 th, 2022 and after she stayed in the hospital for one week and then had to do rehab for another week after that. Pt sees Dr. Estrella and Dr. Weiss.     The history is provided by the patient. No  was used.   Hypertension   This is a new problem. The current episode started today. The problem has been gradually improving. Pertinent negatives include no chest pain, no headaches, no neck pain and no shortness of breath. Risk factors include diabetes mellitus.     Review of patient's allergies indicates:   Allergen Reactions    Glipizide Swelling     Swelling of the lips      Influenza vaccine tr-s 11 (pf) Hives    Pregabalin Other (See Comments)     nightmares     Past Medical History:   Diagnosis Date    CVA (cerebral vascular accident)     affected right side of body    Depression      Diabetes mellitus     Hyperlipidemia     Hypertension     Unspecified dementia without behavioral disturbance      Past Surgical History:   Procedure Laterality Date    BACK SURGERY      CAROTID ENDARTERECTOMY Left     CAROTID ENDARTERECTOMY Right     LEFT HEART CATHETERIZATION  05/20/2022    Dr. Estrella; Stent placement    LEFT HEART CATHETERIZATION Left 5/20/2022    Procedure: CATHETERIZATION, HEART, LEFT;  Surgeon: Jone Estrella MD;  Location: [facility] CATH LAB;  Service: Cardiology;  Laterality: Left;  LHC VIA R GROIN     Family History   Problem Relation Age of Onset    Heart attack Son      Social History     Tobacco Use    Smoking status: Current Every Day Smoker     Packs/day: 0.50     Types: Cigarettes    Smokeless tobacco: Never Used    Tobacco comment: Started smoking in late 20s   Substance Use Topics    Alcohol use: Never    Drug use: Never     Review of Systems   Constitutional: Positive for appetite change. Negative for chills and fever.   Respiratory: Negative for cough, chest tightness and shortness of breath.    Cardiovascular: Negative for chest pain.        Fluctuating blood pressure   Gastrointestinal: Negative for abdominal pain, nausea and vomiting.   Musculoskeletal: Negative for myalgias and neck pain.   Neurological: Positive for weakness. Negative for syncope and headaches.   All other systems reviewed and are negative.      Physical Exam     Initial Vitals [06/25/22 1845]   BP Pulse Resp Temp SpO2   (!) 93/58 83 20 97.2 °F (36.2 °C) 99 %      MAP       --         Physical Exam    Nursing note and vitals reviewed.  Constitutional: She appears well-developed and well-nourished. No distress.   HENT:   Head: Normocephalic and atraumatic.   Eyes: Conjunctivae are normal.   Cardiovascular: Normal rate and regular rhythm.   Pulmonary/Chest: Breath sounds normal. No respiratory distress.   Abdominal: Abdomen is soft. Bowel sounds are normal. There is no abdominal tenderness. There  is no rebound and no guarding.   Musculoskeletal:      Lumbar back: Normal range of motion.     Neurological: She is alert and oriented to person, place, and time. No cranial nerve deficit.   Cranial nerves intact, no pronator drift    Skin: Skin is warm and dry.   Psychiatric: She has a normal mood and affect.         ED Course   Critical Care    Date/Time: 6/25/2022 11:32 PM  Performed by: Sam Villela MD  Authorized by: Fransico Daniels MD   Total critical care time (exclusive of procedural time) : 30 minutes  Critical care was necessary to treat or prevent imminent or life-threatening deterioration of the following conditions: dehydration, metabolic crisis and renal failure.  Critical care was time spent personally by me on the following activities: discussions with consultants, evaluation of patient's response to treatment, ordering and review of laboratory studies, obtaining history from patient or surrogate and re-evaluation of patient's condition.        Labs Reviewed   COMPREHENSIVE METABOLIC PANEL - Abnormal; Notable for the following components:       Result Value    Sodium Level 134 (*)     Potassium Level 6.0 (*)     Carbon Dioxide 17 (*)     Glucose Level 270 (*)     Blood Urea Nitrogen 20.3 (*)     Creatinine 1.76 (*)     Globulin 3.7 (*)     Albumin/Globulin Ratio 1.0 (*)     Alanine Aminotransferase 85 (*)     Aspartate Aminotransferase 72 (*)     All other components within normal limits   CBC WITH DIFFERENTIAL - Abnormal; Notable for the following components:    RBC 3.88 (*)     Hgb 11.5 (*)     Hct 36.5 (*)     MCV 94.1 (*)     MCHC 31.5 (*)     All other components within normal limits   URINALYSIS, REFLEX TO URINE CULTURE - Abnormal; Notable for the following components:    Color, UA Dark Yellow (*)     Appearance, UA Cloudy (*)     Protein, UA 1+ (*)     Glucose, UA Trace (*)     Ketones, UA 1+ (*)     Bilirubin, UA 2+ (*)     Leukocyte Esterase, UA 3+ (*)     All other components  within normal limits   POCT GLUCOSE - Abnormal; Notable for the following components:    POCT Glucose 225 (*)     All other components within normal limits   TROPONIN I - Normal   CULTURE, URINE   CBC W/ AUTO DIFFERENTIAL    Narrative:     The following orders were created for panel order CBC auto differential.  Procedure                               Abnormality         Status                     ---------                               -----------         ------                     CBC with Differential[[id number]]        Abnormal            Final result                 Please view results for these tests on the individual orders.   MAGNESIUM   URINALYSIS, MICROSCOPIC   POCT GLUCOSE MONITORING CONTINUOUS   POCT GLUCOSE MONITORING CONTINUOUS     EKG Readings: (Independently Interpreted)   Initial Reading: No STEMI. Rhythm: Normal Sinus Rhythm. Heart Rate: 82. Ectopy: No Ectopy. Conduction: Normal. ST Segments: Normal ST Segments. T Waves: Normal. Axis: Normal. Other Impression: poor r wave progression    Performed at 1848       Imaging Results          CT Head Without Contrast (Final result)  Result time 06/25/22 23:14:55    Final result by Oscar Torres MD (06/25/22 23:14:55)                 Impression:      No acute intracranial abnormality.      Electronically signed by: Oscar Torres MD  Date:    06/25/2022  Time:    23:14             Narrative:    EXAMINATION:  CT HEAD WITHOUT CONTRAST    CLINICAL HISTORY:  Syncope, simple, normal neuro exam;    TECHNIQUE:  Axial images of the head were obtained without IV contrast administration.  Coronal and sagittal reconstructions were provided.  Three dimensional and MIP images were obtained and evaluated.  Total DLP was 1157 mGy-cm. Dose lowering technique and automated exposure control were utilized for this exam.    COMPARISON:  MRI of the brain 01/18/2017.    FINDINGS:  There is normal brain formation. There is normal gray-white matter differentiation. There is no  hemorrhage, hydrocephalus, or midline shift. There is no cytotoxic or vasogenic edema. There is no intra or extra-axial fluid collection. There is no herniation.    The calvarium is intact. There is no fracture. The bilateral orbits are normal. The paranasal sinuses and mastoid air cells are normally developed and free of disease.                                 Medications   calcium gluconate 1 g in NS IVPB (premixed) (1 g Intravenous New Bag 6/25/22 2321)     And   calcium gluconate 1 g in NS IVPB (premixed) (has no administration in time range)   albuterol nebulizer solution 10 mg (has no administration in time range)   dextrose 10% bolus 125 mL (has no administration in time range)   dextrose 10% bolus 250 mL (has no administration in time range)   dextrose 10% bolus 125 mL (has no administration in time range)   dextrose 10% bolus 250 mL (has no administration in time range)   0.9%  NaCl infusion (1,000 mLs Intravenous New Bag 6/25/22 1918)   sodium zirconium cyclosilicate packet 10 g (10 g Oral Given 6/25/22 2321)   insulin regular injection 5 Units 0.05 mL (5 Units Intravenous Given 6/25/22 2300)     Medical Decision Making:   Clinical Tests:   Lab Tests: Ordered and Reviewed  Elderly patient pleasant no acute distress.  Blood pressure was low when she checked in here for states it did that at home recently as well.  She was concerned pressure was actually high because she found her lethargic slumped over a chair today.  Low when she checked in here.  Give IV fluids and has been fine since that time.  Creatinine is increased from 1-1.7 the potassium is 6 with a decreased CO2.  Concerned about hypoperfusion.  All of her blood pressure medications have been stopped except for amlodipine.  Will likely need to stop the amlodipine.  I have asked the hospitalist to admit her make sure blood pressure stays appropriate without the medications.  Follow-up urinalysis.  Hyperkalemia meds given in the ED          Scribe  Attestation:   Scribe #1: I performed the above scribed service and the documentation accurately describes the services I performed. I attest to the accuracy of the note.    Attending Attestation:           Physician Attestation for Scribe:  Physician Attestation Statement for Scribe #1: I, Sam Villela, reviewed documentation, as scribed by Giorgi Ovalle in my presence, and it is both accurate and complete.                      Clinical Impression:   Final diagnoses:  [R42] Dizziness  [I10] Hypertension  [I95.9] Hypotension, unspecified hypotension type (Primary)  [E87.5] Acute hyperkalemia  [N17.9] ANIYA (acute kidney injury)          ED Disposition Condition    Observation               Sam Villela MD  06/25/22 2544

## 2022-06-27 VITALS
OXYGEN SATURATION: 96 % | DIASTOLIC BLOOD PRESSURE: 66 MMHG | TEMPERATURE: 99 F | BODY MASS INDEX: 27.67 KG/M2 | WEIGHT: 162.06 LBS | HEART RATE: 79 BPM | RESPIRATION RATE: 22 BRPM | SYSTOLIC BLOOD PRESSURE: 116 MMHG | HEIGHT: 64 IN

## 2022-06-27 LAB
ANION GAP SERPL CALC-SCNC: 7 MEQ/L
BUN SERPL-MCNC: 9.6 MG/DL (ref 9.8–20.1)
CALCIUM SERPL-MCNC: 9.7 MG/DL (ref 8.4–10.2)
CHLORIDE SERPL-SCNC: 106 MMOL/L (ref 98–107)
CO2 SERPL-SCNC: 23 MMOL/L (ref 23–31)
CREAT SERPL-MCNC: 0.85 MG/DL (ref 0.55–1.02)
CREAT/UREA NIT SERPL: 11
GLUCOSE SERPL-MCNC: 153 MG/DL (ref 82–115)
POCT GLUCOSE: 118 MG/DL (ref 70–110)
POCT GLUCOSE: 187 MG/DL (ref 70–110)
POTASSIUM SERPL-SCNC: 4.9 MMOL/L (ref 3.5–5.1)
SODIUM SERPL-SCNC: 136 MMOL/L (ref 136–145)
TROPONIN I SERPL-MCNC: 0 NG/ML (ref 0–0.04)

## 2022-06-27 PROCEDURE — 36415 COLL VENOUS BLD VENIPUNCTURE: CPT | Performed by: INTERNAL MEDICINE

## 2022-06-27 PROCEDURE — 63600175 PHARM REV CODE 636 W HCPCS: Performed by: INTERNAL MEDICINE

## 2022-06-27 PROCEDURE — 25000003 PHARM REV CODE 250: Performed by: INTERNAL MEDICINE

## 2022-06-27 PROCEDURE — G0378 HOSPITAL OBSERVATION PER HR: HCPCS

## 2022-06-27 PROCEDURE — 80048 BASIC METABOLIC PNL TOTAL CA: CPT | Performed by: INTERNAL MEDICINE

## 2022-06-27 PROCEDURE — 96372 THER/PROPH/DIAG INJ SC/IM: CPT | Performed by: INTERNAL MEDICINE

## 2022-06-27 RX ORDER — CEFDINIR 300 MG/1
300 CAPSULE ORAL 2 TIMES DAILY
Qty: 10 CAPSULE | Refills: 0 | Status: ON HOLD | OUTPATIENT
Start: 2022-06-27 | End: 2022-07-07 | Stop reason: HOSPADM

## 2022-06-27 RX ADMIN — CARVEDILOL 3.12 MG: 3.12 TABLET, FILM COATED ORAL at 09:06

## 2022-06-27 RX ADMIN — INSULIN ASPART 2 UNITS: 100 INJECTION, SOLUTION INTRAVENOUS; SUBCUTANEOUS at 12:06

## 2022-06-27 RX ADMIN — SITAGLIPTIN 100 MG: 100 TABLET, FILM COATED ORAL at 09:06

## 2022-06-27 RX ADMIN — ASPIRIN 81 MG: 81 TABLET, COATED ORAL at 09:06

## 2022-06-27 RX ADMIN — DULOXETINE 60 MG: 30 CAPSULE, DELAYED RELEASE ORAL at 09:06

## 2022-06-27 RX ADMIN — Medication 400 MG: at 09:06

## 2022-06-27 RX ADMIN — PANTOPRAZOLE SODIUM 40 MG: 40 TABLET, DELAYED RELEASE ORAL at 09:06

## 2022-06-27 RX ADMIN — METFORMIN HYDROCHLORIDE 1000 MG: 500 TABLET, FILM COATED ORAL at 09:06

## 2022-06-27 RX ADMIN — CLOPIDOGREL 75 MG: 75 TABLET, FILM COATED ORAL at 09:06

## 2022-06-27 NOTE — DISCHARGE SUMMARY
[facility] Hospital Medicine Discharge Summary    Admit Date: 6/25/2022  Discharge Date and Time: 6/27/2022 3:00 PM  Admitting Physician:  Fransico Daniels MD  Discharging Physician: Brandyn Remy MD.  Primary Care Physician: Rashaun Weiss MD  Consults: None.    Discharge Diagnoses:  Hypotension, responsive to IV fluids  Hyperkalemia  Acute bacterial cystitis, Gram-negative rods  Ischemic cardiomyopathy with LVEF of 40 to 50% on TTE 5/2022  CAD status post stent placement 05/2022  Chronic kidney disease stage 3A  History of stroke  Type 2 non-insulin-dependent diabetes mellitus  Hyperlipidemia  Essential hypertension  Alzheimer's dementia without behavioral disturbance  Anxiety and depression      Hospital Course:   Patient is a 72-year-old  female with a past medical history of recent admission in May for coronary artery disease and underwent PCI with placement of multiple cardiac stents, ischemic cardiomyopathy with an LVEF of 40-50% on TTE 05/2022.  Chronic kidney disease stage 3, diabetes, hypertension, hyperlipidemia, and prior CVA who presents to the ER complaining of episodes of dizziness as well as a high home blood pressure reading of 180s over 90s.  However in triage she was found to be hypotensive with a systolic blood pressure in the 90s and daughter at bedside denies that she took any medications prior to arrival.  She was otherwise afebrile and maintaining margaret saturations on room air.  Her blood pressure responded to a small IV fluid bolus.  Laboratory work was significant for acute kidney injury with a baseline creatinine around 1-1.3 currently at 1.7, with associated non anion gap metabolic acidosis and mild hyperkalemia.  Magnesium was low at 1.5.  She has persistent but mild transaminitis, undetectable troponin, and urinalysis suggestive of urinary tract infection.  Medications were were given to shift her potassium.  She was started  on IV fluids and admitted to the hospitalist service for management.  Patient's electrolyte abnormalities have resolved.  Her urine culture is positive for Gram-negative rods.  She is afebrile, on room air, and hemodynamically stable for discharge to home with home health.  Patient was seen and examined on the day of discharge.      Vitals:  VITAL SIGNS: 24 HRS MIN & MAX LAST   Temp  Min: 98.1 °F (36.7 °C)  Max: 98.6 °F (37 °C) 98.1 °F (36.7 °C)   BP  Min: 115/68  Max: 187/75 125/72   Pulse  Min: 68  Max: 79  77   Resp  Min: 16  Max: 22 (!) 22   SpO2  Min: 96 %  Max: 100 % 100 %       Physical Exam:  General: in no acute distress  HENT: normocephalic, atraumatic  Eye: PERRL, EOMI, clear conjunctiva  Neck: full ROM, no thyromegaly, no JVD  Respiratory: clear to auscultation bilaterally  Cardiovascular: regular rate and rhythm  Gastrointestinal: non-distended, positive bowel sounds, non-tender  Musculoskeletal: no gross deformity  Integumentary: warm, dry, intact, no rashes  Neurological: cranial nerves grossly intact, no focal neurological deficit  Psychiatric: cooperative    Procedures Performed: No admission procedures for hospital encounter.     Significant Diagnostic Studies: See Full reports for all details    Recent Labs   Lab 06/25/22 2115 06/26/22  0452   WBC 6.1 5.1   RBC 3.88* 3.61*   HGB 11.5* 10.6*   HCT 36.5* 33.8*   MCV 94.1* 93.6   MCH 29.6 29.4   MCHC 31.5* 31.4*   RDW 12.4 12.1    215   MPV 10.1 9.4       Recent Labs   Lab 06/25/22  1908 06/26/22  0450 06/27/22  0431   * 135* 136   K 6.0* 4.4 4.9   CO2 17* 21* 23   BUN 20.3* 20.1 9.6*   CREATININE 1.76* 1.48* 0.85   CALCIUM 9.6 9.3 9.7   MG 1.50*  --   --    ALBUMIN 3.6  --   --    ALKPHOS 67  --   --    ALT 85*  --   --    AST 72*  --   --    BILITOT 0.9  --   --         Microbiology Results (last 7 days)     Procedure Component Value Units Date/Time    Urine culture [[id number]]  (Abnormal) Collected: 06/25/22 2225    Order Status:  Completed Specimen: Urine Updated: 06/27/22 0631     Urine Culture >/= 100,000 colonies/ml Gram-negative Rods           CT Head Without Contrast  Narrative: EXAMINATION:  CT HEAD WITHOUT CONTRAST    CLINICAL HISTORY:  Syncope, simple, normal neuro exam;    TECHNIQUE:  Axial images of the head were obtained without IV contrast administration.  Coronal and sagittal reconstructions were provided.  Three dimensional and MIP images were obtained and evaluated.  Total DLP was 1157 mGy-cm. Dose lowering technique and automated exposure control were utilized for this exam.    COMPARISON:  MRI of the brain 01/18/2017.    FINDINGS:  There is normal brain formation. There is normal gray-white matter differentiation. There is no hemorrhage, hydrocephalus, or midline shift. There is no cytotoxic or vasogenic edema. There is no intra or extra-axial fluid collection. There is no herniation.    The calvarium is intact. There is no fracture. The bilateral orbits are normal. The paranasal sinuses and mastoid air cells are normally developed and free of disease.  Impression: No acute intracranial abnormality.    Electronically signed by: Oscar Torres MD  Date:    06/25/2022  Time:    23:14         Medication List      START taking these medications    cefdinir 300 MG capsule  Commonly known as: OMNICEF  Take 1 capsule (300 mg total) by mouth 2 (two) times daily. for 5 days        CONTINUE taking these medications    amLODIPine 10 MG tablet  Commonly known as: NORVASC  Take 1 tablet (10 mg total) by mouth once daily.     aspirin 81 MG EC tablet  Commonly known as: ECOTRIN     atorvastatin 40 MG tablet  Commonly known as: LIPITOR     carvediloL 3.125 MG tablet  Commonly known as: COREG  Take 1 tablet (3.125 mg total) by mouth 2 (two) times daily.     clopidogreL 75 mg tablet  Commonly known as: PLAVIX  Take 1 tablet (75 mg total) by mouth once daily.     donepeziL 5 MG tablet  Commonly known as: ARICEPT  Take 1 tablet (5 mg total) by  mouth every evening.     DULoxetine 60 MG capsule  Commonly known as: CYMBALTA  Take 1 capsule (60 mg total) by mouth once daily.     magnesium oxide 400 mg (241.3 mg magnesium) tablet  Commonly known as: MAG-OX  Take 1 tablet (400 mg total) by mouth 2 (two) times daily.     metFORMIN 1000 MG tablet  Commonly known as: GLUCOPHAGE  Take 1 tablet (1,000 mg total) by mouth 2 (two) times daily with meals.     pantoprazole 40 MG tablet  Commonly known as: PROTONIX     SITagliptin 100 MG Tab  Commonly known as: JANUVIA  Take 1 tablet (100 mg total) by mouth once daily.     traMADoL 50 mg tablet  Commonly known as: ULTRAM  Take 1 tablet (50 mg total) by mouth every 4 (four) hours as needed (for pain).        STOP taking these medications    losartan 25 MG tablet  Commonly known as: COZAAR     pregabalin 75 MG capsule  Commonly known as: LYRICA           Where to Get Your Medications      These medications were sent to [facility] - [place] [facility] - [address]  [address] [address]    Phone: [phone]   · cefdinir 300 MG capsule          Explained in detail to the patient about the discharge plan, medications, and follow-up visits. Patient understands and agrees with the treatment plan  [place] [facility] [place] Condition: stable  Diet-   Dietary Orders (From admission, onward)     Start     Ordered    06/26/22 0500  Diet diabetic  Diet effective now         06/26/22 0501               Medications per discharge medication reconciliation list  Activity as tolerated   Follow-up Information     Rashaun Weiss MD Follow up on 7/4/2022.    Specialty: Family Medicine  Why: office will call you to make appointment  Contact information:  [address]  [address]  [place] [id number] [phone]             [facility] Follow up.    Specialties: Home Health Services, Home Therapy Services, Home Living Aide Services  Contact  information:  [address] [facility] [place] [address]  [phone]                       Discharge time 35 minutes    For worsening symptoms, chest pain, shortness of breath, increased abdominal pain, high grade fever, stroke or stroke like symptoms, immediately go to the nearest Emergency Room or call 911 as soon as possible.      Brandyn Presley M.D, on 6/27/2022. at 3:00 PM.

## 2022-06-27 NOTE — PLAN OF CARE
Problem: Adult Inpatient Plan of Care  Goal: Plan of Care Review  Outcome: Ongoing, Progressing  Goal: Patient-Specific Goal (Individualized)  Outcome: Ongoing, Progressing  Goal: Absence of Hospital-Acquired Illness or Injury  Outcome: Ongoing, Progressing  Goal: Optimal Comfort and Wellbeing  Outcome: Ongoing, Progressing  Goal: Readiness for Transition of Care  Outcome: Ongoing, Progressing     Problem: Tissue Perfusion Altered  Goal: Improved Tissue Perfusion  Outcome: Ongoing, Progressing

## 2022-06-27 NOTE — PLAN OF CARE
06/27/22 1456   Medicare Message   Important Message from Medicare regarding Discharge Appeal Rights Explained to patient/caregiver

## 2022-06-27 NOTE — PLAN OF CARE
06/27/22 1454   Final Note   Assessment Type Final Discharge Note   Anticipated Discharge Disposition Home-Health   Hospital Resources/Appts/Education Provided Post-Acute resouces added to AVS   Post-Acute Status   Post-Acute Authorization Home Health   Home Health Status Set-up Complete/Auth obtained  ([facility])

## 2022-06-29 LAB
BACTERIA UR CULT: ABNORMAL
BACTERIA UR CULT: ABNORMAL

## 2022-06-30 ENCOUNTER — HOSPITAL ENCOUNTER (OUTPATIENT)
Facility: HOSPITAL | Age: 72
Discharge: HOME HEALTH | End: 2022-07-07
Attending: EMERGENCY MEDICINE | Admitting: INTERNAL MEDICINE
Payer: MEDICARE

## 2022-06-30 DIAGNOSIS — I73.9 PVD (PERIPHERAL VASCULAR DISEASE): ICD-10-CM

## 2022-06-30 DIAGNOSIS — R07.9 CHEST PAIN: ICD-10-CM

## 2022-06-30 DIAGNOSIS — E87.5 HYPERKALEMIA: ICD-10-CM

## 2022-06-30 DIAGNOSIS — I95.9 HYPOTENSION, UNSPECIFIED HYPOTENSION TYPE: Primary | ICD-10-CM

## 2022-06-30 DIAGNOSIS — I73.9 PAD (PERIPHERAL ARTERY DISEASE): ICD-10-CM

## 2022-06-30 DIAGNOSIS — R06.02 SOB (SHORTNESS OF BREATH): ICD-10-CM

## 2022-06-30 PROBLEM — E87.20 LACTIC ACIDEMIA: Status: ACTIVE | Noted: 2022-06-30

## 2022-06-30 LAB
ALBUMIN SERPL-MCNC: 3.2 GM/DL (ref 3.4–4.8)
ALBUMIN/GLOB SERPL: 1.1 RATIO (ref 1.1–2)
ALP SERPL-CCNC: 58 UNIT/L (ref 40–150)
ALT SERPL-CCNC: 72 UNIT/L (ref 0–55)
AST SERPL-CCNC: 62 UNIT/L (ref 5–34)
BASOPHILS # BLD AUTO: 0.01 X10(3)/MCL (ref 0–0.2)
BASOPHILS NFR BLD AUTO: 0.2 %
BILIRUBIN DIRECT+TOT PNL SERPL-MCNC: 0.7 MG/DL
BUN SERPL-MCNC: 14.8 MG/DL (ref 9.8–20.1)
CALCIUM SERPL-MCNC: 9.2 MG/DL (ref 8.4–10.2)
CHLORIDE SERPL-SCNC: 105 MMOL/L (ref 98–107)
CK SERPL-CCNC: 31 U/L (ref 29–168)
CO2 SERPL-SCNC: 20 MMOL/L (ref 23–31)
CREAT SERPL-MCNC: 1.22 MG/DL (ref 0.55–1.02)
EOSINOPHIL # BLD AUTO: 0.4 X10(3)/MCL (ref 0–0.9)
EOSINOPHIL NFR BLD AUTO: 7.1 %
ERYTHROCYTE [DISTWIDTH] IN BLOOD BY AUTOMATED COUNT: 12.2 % (ref 11.5–17)
GLOBULIN SER-MCNC: 2.8 GM/DL (ref 2.4–3.5)
GLUCOSE SERPL-MCNC: 156 MG/DL (ref 82–115)
HCT VFR BLD AUTO: 35.6 % (ref 37–47)
HGB BLD-MCNC: 11.3 GM/DL (ref 12–16)
IMM GRANULOCYTES # BLD AUTO: 0.02 X10(3)/MCL (ref 0–0.04)
IMM GRANULOCYTES NFR BLD AUTO: 0.4 %
LACTATE SERPL-SCNC: 3.7 MMOL/L (ref 0.5–2.2)
LACTATE SERPL-SCNC: 4.5 MMOL/L (ref 0.5–2.2)
LYMPHOCYTES # BLD AUTO: 1.68 X10(3)/MCL (ref 0.6–4.6)
LYMPHOCYTES NFR BLD AUTO: 29.9 %
MCH RBC QN AUTO: 29.5 PG (ref 27–31)
MCHC RBC AUTO-ENTMCNC: 31.7 MG/DL (ref 33–36)
MCV RBC AUTO: 93 FL (ref 80–94)
MONOCYTES # BLD AUTO: 0.49 X10(3)/MCL (ref 0.1–1.3)
MONOCYTES NFR BLD AUTO: 8.7 %
NEUTROPHILS # BLD AUTO: 3 X10(3)/MCL (ref 2.1–9.2)
NEUTROPHILS NFR BLD AUTO: 53.7 %
NRBC BLD AUTO-RTO: 0 %
PLATELET # BLD AUTO: 256 X10(3)/MCL (ref 130–400)
PMV BLD AUTO: 9.5 FL (ref 7.4–10.4)
POCT GLUCOSE: 200 MG/DL (ref 70–110)
POTASSIUM SERPL-SCNC: 5.6 MMOL/L (ref 3.5–5.1)
PROT SERPL-MCNC: 6 GM/DL (ref 5.8–7.6)
RBC # BLD AUTO: 3.83 X10(6)/MCL (ref 4.2–5.4)
SODIUM SERPL-SCNC: 135 MMOL/L (ref 136–145)
TROPONIN I SERPL-MCNC: <0.01 NG/ML (ref 0–0.04)
WBC # SPEC AUTO: 5.6 X10(3)/MCL (ref 4.5–11.5)

## 2022-06-30 PROCEDURE — G0378 HOSPITAL OBSERVATION PER HR: HCPCS

## 2022-06-30 PROCEDURE — 25500020 PHARM REV CODE 255: Performed by: EMERGENCY MEDICINE

## 2022-06-30 PROCEDURE — 96361 HYDRATE IV INFUSION ADD-ON: CPT

## 2022-06-30 PROCEDURE — 36415 COLL VENOUS BLD VENIPUNCTURE: CPT | Performed by: INTERNAL MEDICINE

## 2022-06-30 PROCEDURE — 96360 HYDRATION IV INFUSION INIT: CPT | Mod: 59

## 2022-06-30 PROCEDURE — 25000003 PHARM REV CODE 250: Performed by: EMERGENCY MEDICINE

## 2022-06-30 PROCEDURE — 25000003 PHARM REV CODE 250: Performed by: INTERNAL MEDICINE

## 2022-06-30 PROCEDURE — 85025 COMPLETE CBC W/AUTO DIFF WBC: CPT | Performed by: NURSE PRACTITIONER

## 2022-06-30 PROCEDURE — 82962 GLUCOSE BLOOD TEST: CPT

## 2022-06-30 PROCEDURE — 36415 COLL VENOUS BLD VENIPUNCTURE: CPT | Performed by: NURSE PRACTITIONER

## 2022-06-30 PROCEDURE — 87040 BLOOD CULTURE FOR BACTERIA: CPT | Performed by: NURSE PRACTITIONER

## 2022-06-30 PROCEDURE — 99285 EMERGENCY DEPT VISIT HI MDM: CPT | Mod: 25

## 2022-06-30 PROCEDURE — 82550 ASSAY OF CK (CPK): CPT | Performed by: INTERNAL MEDICINE

## 2022-06-30 PROCEDURE — 83605 ASSAY OF LACTIC ACID: CPT | Mod: 91 | Performed by: NURSE PRACTITIONER

## 2022-06-30 PROCEDURE — 84484 ASSAY OF TROPONIN QUANT: CPT | Performed by: NURSE PRACTITIONER

## 2022-06-30 PROCEDURE — 63600175 PHARM REV CODE 636 W HCPCS: Performed by: NURSE PRACTITIONER

## 2022-06-30 PROCEDURE — 80053 COMPREHEN METABOLIC PANEL: CPT | Performed by: NURSE PRACTITIONER

## 2022-06-30 PROCEDURE — 83605 ASSAY OF LACTIC ACID: CPT | Performed by: INTERNAL MEDICINE

## 2022-06-30 RX ORDER — PANTOPRAZOLE SODIUM 40 MG/1
40 TABLET, DELAYED RELEASE ORAL DAILY
Status: DISCONTINUED | OUTPATIENT
Start: 2022-07-01 | End: 2022-07-07 | Stop reason: HOSPADM

## 2022-06-30 RX ORDER — ATORVASTATIN CALCIUM 40 MG/1
40 TABLET, FILM COATED ORAL NIGHTLY
Status: DISCONTINUED | OUTPATIENT
Start: 2022-06-30 | End: 2022-07-07 | Stop reason: HOSPADM

## 2022-06-30 RX ORDER — SODIUM CHLORIDE 0.9 % (FLUSH) 0.9 %
10 SYRINGE (ML) INJECTION
Status: DISCONTINUED | OUTPATIENT
Start: 2022-06-30 | End: 2022-07-07 | Stop reason: HOSPADM

## 2022-06-30 RX ORDER — DULOXETIN HYDROCHLORIDE 30 MG/1
60 CAPSULE, DELAYED RELEASE ORAL DAILY
Status: DISCONTINUED | OUTPATIENT
Start: 2022-07-01 | End: 2022-07-07 | Stop reason: HOSPADM

## 2022-06-30 RX ORDER — ONDANSETRON 2 MG/ML
4 INJECTION INTRAMUSCULAR; INTRAVENOUS EVERY 4 HOURS PRN
Status: DISCONTINUED | OUTPATIENT
Start: 2022-06-30 | End: 2022-07-07 | Stop reason: HOSPADM

## 2022-06-30 RX ORDER — POLYETHYLENE GLYCOL 3350 17 G/17G
17 POWDER, FOR SOLUTION ORAL 2 TIMES DAILY PRN
Status: DISCONTINUED | OUTPATIENT
Start: 2022-06-30 | End: 2022-07-07 | Stop reason: HOSPADM

## 2022-06-30 RX ORDER — SODIUM CHLORIDE 9 MG/ML
INJECTION, SOLUTION INTRAVENOUS CONTINUOUS
Status: DISCONTINUED | OUTPATIENT
Start: 2022-06-30 | End: 2022-07-02

## 2022-06-30 RX ORDER — MAG HYDROX/ALUMINUM HYD/SIMETH 200-200-20
30 SUSPENSION, ORAL (FINAL DOSE FORM) ORAL 4 TIMES DAILY PRN
Status: DISCONTINUED | OUTPATIENT
Start: 2022-06-30 | End: 2022-07-07 | Stop reason: HOSPADM

## 2022-06-30 RX ORDER — PROCHLORPERAZINE EDISYLATE 5 MG/ML
5 INJECTION INTRAMUSCULAR; INTRAVENOUS EVERY 6 HOURS PRN
Status: DISCONTINUED | OUTPATIENT
Start: 2022-06-30 | End: 2022-07-07 | Stop reason: HOSPADM

## 2022-06-30 RX ORDER — INSULIN ASPART 100 [IU]/ML
1-10 INJECTION, SOLUTION INTRAVENOUS; SUBCUTANEOUS
Status: DISCONTINUED | OUTPATIENT
Start: 2022-06-30 | End: 2022-07-07 | Stop reason: HOSPADM

## 2022-06-30 RX ORDER — ASPIRIN 81 MG/1
81 TABLET ORAL DAILY
Status: DISCONTINUED | OUTPATIENT
Start: 2022-07-01 | End: 2022-07-07 | Stop reason: HOSPADM

## 2022-06-30 RX ORDER — DONEPEZIL HYDROCHLORIDE 5 MG/1
5 TABLET, FILM COATED ORAL NIGHTLY
Status: DISCONTINUED | OUTPATIENT
Start: 2022-06-30 | End: 2022-07-07 | Stop reason: HOSPADM

## 2022-06-30 RX ORDER — ACETAMINOPHEN 325 MG/1
650 TABLET ORAL EVERY 4 HOURS PRN
Status: DISCONTINUED | OUTPATIENT
Start: 2022-06-30 | End: 2022-07-07 | Stop reason: HOSPADM

## 2022-06-30 RX ORDER — GLUCAGON 1 MG
1 KIT INJECTION
Status: DISCONTINUED | OUTPATIENT
Start: 2022-06-30 | End: 2022-07-07 | Stop reason: HOSPADM

## 2022-06-30 RX ORDER — TALC
6 POWDER (GRAM) TOPICAL NIGHTLY PRN
Status: DISCONTINUED | OUTPATIENT
Start: 2022-06-30 | End: 2022-07-07 | Stop reason: HOSPADM

## 2022-06-30 RX ORDER — CEFDINIR 300 MG/1
300 CAPSULE ORAL 2 TIMES DAILY
Status: COMPLETED | OUTPATIENT
Start: 2022-06-30 | End: 2022-07-02

## 2022-06-30 RX ORDER — SIMETHICONE 80 MG
1 TABLET,CHEWABLE ORAL 4 TIMES DAILY PRN
Status: DISCONTINUED | OUTPATIENT
Start: 2022-06-30 | End: 2022-07-07 | Stop reason: HOSPADM

## 2022-06-30 RX ORDER — IBUPROFEN 200 MG
24 TABLET ORAL
Status: DISCONTINUED | OUTPATIENT
Start: 2022-06-30 | End: 2022-07-07 | Stop reason: HOSPADM

## 2022-06-30 RX ORDER — AMOXICILLIN 250 MG
1 CAPSULE ORAL 2 TIMES DAILY PRN
Status: DISCONTINUED | OUTPATIENT
Start: 2022-06-30 | End: 2022-07-07 | Stop reason: HOSPADM

## 2022-06-30 RX ORDER — CLOPIDOGREL BISULFATE 75 MG/1
75 TABLET ORAL DAILY
Status: DISCONTINUED | OUTPATIENT
Start: 2022-07-01 | End: 2022-07-07 | Stop reason: HOSPADM

## 2022-06-30 RX ORDER — IBUPROFEN 200 MG
16 TABLET ORAL
Status: DISCONTINUED | OUTPATIENT
Start: 2022-06-30 | End: 2022-07-07 | Stop reason: HOSPADM

## 2022-06-30 RX ORDER — ACETAMINOPHEN 500 MG
1000 TABLET ORAL EVERY 6 HOURS PRN
Status: DISCONTINUED | OUTPATIENT
Start: 2022-06-30 | End: 2022-07-07 | Stop reason: HOSPADM

## 2022-06-30 RX ADMIN — SODIUM CHLORIDE, POTASSIUM CHLORIDE, SODIUM LACTATE AND CALCIUM CHLORIDE 1000 ML: 600; 310; 30; 20 INJECTION, SOLUTION INTRAVENOUS at 05:06

## 2022-06-30 RX ADMIN — SODIUM ZIRCONIUM CYCLOSILICATE 10 G: 10 POWDER, FOR SUSPENSION ORAL at 10:06

## 2022-06-30 RX ADMIN — IOPAMIDOL 100 ML: 755 INJECTION, SOLUTION INTRAVENOUS at 07:06

## 2022-06-30 RX ADMIN — SODIUM CHLORIDE: 9 INJECTION, SOLUTION INTRAVENOUS at 08:06

## 2022-06-30 RX ADMIN — DONEPEZIL HYDROCHLORIDE 5 MG: 5 TABLET, FILM COATED ORAL at 10:06

## 2022-06-30 RX ADMIN — ATORVASTATIN CALCIUM 40 MG: 40 TABLET, FILM COATED ORAL at 10:06

## 2022-06-30 RX ADMIN — CEFDINIR 300 MG: 300 CAPSULE ORAL at 10:06

## 2022-06-30 NOTE — FIRST PROVIDER EVALUATION
"Medical screening exam completed.  I have conducted a focused provider triage encounter, findings are as follows:    Brief history of present illness:  Patient states weakness. States that she was recently admitted for hypotension.     Vitals:    06/30/22 1553   BP: (!) 84/56   Pulse: 84   Resp: 16   Temp: 97.7 °F (36.5 °C)   TempSrc: Oral   SpO2: 95%   Weight: 73.5 kg (162 lb)   Height: 5' 4" (1.626 m)       Pertinent physical exam:  Awake, alert    Brief workup plan:  Labs, EKG    Preliminary workup initiated; this workup will be continued and followed by the physician or advanced practice provider that is assigned to the patient when roomed.  "

## 2022-06-30 NOTE — ED TRIAGE NOTES
Patient identifiers verified and correct for Mrs. Mayra Parker. Pt arrives to ED with reports of hypotension and hyperglycemia starting today. Pt reports being in hospital last weekend and discharged Monday. Reports going for followup today and getting repeated low blood pressures at appointment. Denies any dizziness, but reports some weakness, left sided abdominal pain. NAD at this time.  LOC: The patient is awake, alert and aware of environment with an appropriate affect, the patient is oriented x 4 and speaking appropriately.   APPEARANCE: Patient appears comfortable and in no acute distress, patient is clean and well groomed.  SKIN: The skin is warm and dry, color consistent with ethnicity, patient has normal skin turgor and moist mucus membranes, skin intact, no breakdown or bruising noted.   MUSCULOSKELETAL: Patient moving all extremities spontaneously, no swelling noted.  RESPIRATORY: Airway is open and patent, respirations are spontaneous, patient has a normal effort and rate, no accessory muscle use noted, pt placed on continuous pulse ox with O2 sats noted at 97% on room air.  CARDIAC: Pt placed on cardiac monitor. Patient has a normal rate and regular rhythm, no edema noted, capillary refill < 3 seconds.   GASTRO: Soft and tender to palpation on left side, no distention noted, normoactive bowel sounds present in all four quadrants. Pt states bowel movements have been regular.  : Pt denies any pain or frequency with urination.  NEURO: Pt opens eyes spontaneously, behavior appropriate to situation, follows commands, facial expression symmetrical, bilateral hand grasp equal and even, purposeful motor response noted, normal sensation in all extremities when touched with a finger.

## 2022-06-30 NOTE — ED PROVIDER NOTES
Encounter Date: 6/30/2022    SCRIBE #1 NOTE: I, Catherine Jackson, am scribing for, and in the presence of,  Zhen Gore III, MD . I have scribed the following portions of the note - the EKG reading. Other sections scribed: HPI, ROS, PE.       History     Chief Complaint   Patient presents with    Hypotension     Pt reports she was recently admitted for hypotension and hyperglycemia, d/c Monday. Seen by PCP for f/u and was sent for hypotension. Pt only c/o fatigue. AAOx4.      72 year old female with CVA, DM, and HTN presents to ED for hypotension.  Her daughter, at bedside, says that she has been sleeping all day, which is not her normal.  She was diagnosed with a UTI and began antibiotics 6 days ago.  Pt is complaining of abdominal pain. She denies any CP.     The history is provided by the patient and a relative. No  was used.   General Illness   The current episode started today. The problem has been unchanged. Associated symptoms include abdominal pain. Pertinent negatives include no fever, no constipation, no diarrhea, no nausea, no vomiting, no congestion, no rhinorrhea, no neck pain, no shortness of breath, no wheezing, no rash and no pain. Services received include one or more referrals. Recently, medical care has been given by the PCP.     Review of patient's allergies indicates:   Allergen Reactions    Glipizide Swelling     Swelling of the lips      Influenza vaccine tr-s 11 (pf) Hives    Pregabalin Other (See Comments)     nightmares     Past Medical History:   Diagnosis Date    CVA (cerebral vascular accident)     affected right side of body    Depression     Diabetes mellitus     Hyperlipidemia     Hypertension     Unspecified dementia without behavioral disturbance      Past Surgical History:   Procedure Laterality Date    BACK SURGERY      CAROTID ENDARTERECTOMY Left     CAROTID ENDARTERECTOMY Right     LEFT HEART CATHETERIZATION  05/20/2022    Dr. Estrella; Stent  placement    LEFT HEART CATHETERIZATION Left 5/20/2022    Procedure: CATHETERIZATION, HEART, LEFT;  Surgeon: Jone Estrella MD;  Location: [facility] CATH LAB;  Service: Cardiology;  Laterality: Left;  LHC VIA R GROIN     Family History   Problem Relation Age of Onset    Heart attack Son      Social History     Tobacco Use    Smoking status: Current Every Day Smoker     Packs/day: 0.50     Types: Cigarettes    Smokeless tobacco: Never Used    Tobacco comment: Started smoking in late 20s   Substance Use Topics    Alcohol use: Never    Drug use: Never     Review of Systems   Constitutional: Negative for fatigue, fever and unexpected weight change.   HENT: Negative for congestion and rhinorrhea.    Eyes: Negative for pain.   Respiratory: Negative for chest tightness, shortness of breath and wheezing.    Cardiovascular: Negative for chest pain.        Hypotension   Gastrointestinal: Positive for abdominal pain. Negative for constipation, diarrhea, nausea and vomiting.   Genitourinary: Negative for dysuria.   Musculoskeletal: Negative for back pain and neck pain.   Skin: Negative for rash.   Allergic/Immunologic: Negative for environmental allergies, food allergies and immunocompromised state.   Neurological: Negative for dizziness and speech difficulty.   Hematological: Does not bruise/bleed easily.   Psychiatric/Behavioral: Negative for sleep disturbance and suicidal ideas.   All other systems reviewed and are negative.      Physical Exam     Initial Vitals [06/30/22 1553]   BP Pulse Resp Temp SpO2   (!) 84/56 84 16 97.7 °F (36.5 °C) 95 %      MAP       --         Physical Exam    Nursing note and vitals reviewed.  Constitutional: No distress.   Globally weak and frail   HENT:   Head: Normocephalic and atraumatic.   Mouth/Throat: Mucous membranes are dry.   Neck: Trachea normal.   Cardiovascular: Normal rate and regular rhythm.   No murmur heard.  Pulmonary/Chest: Breath sounds normal. No respiratory distress.    Abdominal: Abdomen is soft. Bowel sounds are normal. She exhibits no distension. There is no abdominal tenderness.   Musculoskeletal:         General: Normal range of motion.      Lumbar back: Normal range of motion.     Neurological: She is alert and oriented to person, place, and time. She has normal strength. No cranial nerve deficit.   Skin: Skin is warm and dry. No rash noted.   Psychiatric: She has a normal mood and affect. Judgment normal.         ED Course   Procedures  Labs Reviewed   COMPREHENSIVE METABOLIC PANEL - Abnormal; Notable for the following components:       Result Value    Sodium Level 135 (*)     Potassium Level 5.6 (*)     Carbon Dioxide 20 (*)     Glucose Level 156 (*)     Creatinine 1.22 (*)     Albumin Level 3.2 (*)     Alanine Aminotransferase 72 (*)     Aspartate Aminotransferase 62 (*)     All other components within normal limits   CBC WITH DIFFERENTIAL - Abnormal; Notable for the following components:    RBC 3.83 (*)     Hgb 11.3 (*)     Hct 35.6 (*)     MCHC 31.7 (*)     All other components within normal limits   LACTIC ACID, PLASMA - Abnormal; Notable for the following components:    Lactic Acid Level 4.5 (*)     All other components within normal limits   POCT GLUCOSE - Abnormal; Notable for the following components:    POCT Glucose 200 (*)     All other components within normal limits   TROPONIN I - Normal   BLOOD CULTURE [facility]   BLOOD CULTURE [facility]   CBC W/ AUTO DIFFERENTIAL    Narrative:     The following orders were created for panel order CBC Auto Differential.  Procedure                               Abnormality         Status                     ---------                               -----------         ------                     CBC with Differential[[id number]]        Abnormal            Final result                 Please view results for these tests on the individual orders.   URINALYSIS, REFLEX TO URINE CULTURE   LACTIC ACID, PLASMA   ISTAT CHEM8     EKG Readings:  (Independently Interpreted)   Rhythm: Normal Sinus Rhythm. Heart Rate: 80. Ectopy: No Ectopy. Conduction: Normal. ST Segments: Normal ST Segments. T Waves: Normal. Clinical Impression: Normal Sinus Rhythm   Time 1605       Imaging Results          CT Abdomen Pelvis With Contrast (Final result)  Result time 06/30/22 19:20:16    Final result by Oscar Hobbs MD (06/30/22 19:20:16)                 Impression:      No acute process of the abdomen or pelvis.  Other secondary findings as noted.      Electronically signed by: Oscar Hobbs MD  Date:    06/30/2022  Time:    19:20             Narrative:    EXAMINATION:  CT ABDOMEN PELVIS WITH CONTRAST    CLINICAL HISTORY:  Abdominal pain, acute, nonlocalized;    TECHNIQUE:  Multiple cross-sectional images of the abdomen and pelvis were obtained after the intravenous administration of contrast.  Coronal and sagittal reformatted images were obtained.  An automated dose exposure technique was utilized.  This limits radiation does the patient.    COMPARISON:  07/04/2014    FINDINGS:  Dependent atelectatic changes on the with bilateral Bochdalek hernias greater on the right than left.  Heart size within normal limits with coronary artery calcifications.    The liver, spleen, kidneys, and pancreas are normal.  The adrenal glands are nodular.  The gallbladder is surgically absent.  Physiologic dilatation of the common duct.    Small hiatal hernia.  Small bowel is of normal caliber.  Colon is of normal caliber with scattered colonic diverticula.  No adjacent inflammatory changes.  Normal appendix.    The uterus is anteverted without evidence for adnexal mass.  The bladder is under distended.  No free fluid in the abdomen pelvis.  The course and caliber of the abdominal aorta is normal with diffuse calcified atheromatous disease of the aorta and iliac vasculature as well as bilateral renal arteries.  No evidence for adenopathy.  No free fluid in the abdomen pelvis.    No suspicious  osseous lesions.  Degenerative changes of the bilateral hips with postsurgical changes of the lumbar spine identified.  Osteopenia is noted.  Soft tissues are grossly normal.                               X-Ray Chest 1 View (Final result)  Result time 06/30/22 16:26:40    Final result by Akbar Mcneill MD (06/30/22 16:26:40)                 Impression:      No acute findings.      Electronically signed by: Akbar Mcneill  Date:    06/30/2022  Time:    16:26             Narrative:    EXAMINATION:  XR CHEST 1 VIEW    CLINICAL HISTORY:  Shortness of breath    COMPARISON:  12 October 2019    FINDINGS:  Portable frontal view of the chest was obtained. Heart is not significantly enlarged.  There is aortic atherosclerosis.  The lungs are grossly clear.  There is no pneumothorax or significant effusion.                                 Medications   0.9%  NaCl infusion ( Intravenous New Bag 6/30/22 2045)   lactated ringers bolus 1,000 mL (1,000 mLs Intravenous New Bag 6/30/22 1700)   iopamidoL (ISOVUE-370) injection 100 mL (100 mLs Intravenous Given 6/30/22 1909)     Medical Decision Making:   Differential Diagnosis:   Anemia UTI sepsis intra-abdominal pathology.  Clinical Tests:   Lab Tests: Ordered and Reviewed  Radiological Study: Ordered and Reviewed  Medical Tests: Ordered and Reviewed  ED Management:  With IV fluids with quick normalization of blood pressure patient CBC chemistry without abnormality other than a potassium of 5.6 patient complained of belly pain CT abdomen also normal EKG without any hyperkalemic changes will admit as observation repeat lactic acid            Attending Attestation:           Physician Attestation for Scribe:  Physician Attestation Statement for Scribe #1: I, Zhen Gore III, MD , reviewed documentation, as scribed by Catherine Jackson in my presence, and it is both accurate and complete.                      Clinical Impression:   Final diagnoses:  [R07.9] Chest pain  [R06.02] SOB  (shortness of breath)  [I95.9] Hypotension, unspecified hypotension type (Primary)  [E87.5] Hyperkalemia          ED Disposition Condition    Observation               Zhen Gore III, MD  06/30/22 1956

## 2022-06-30 NOTE — Clinical Note
Diagnosis: Hypotension, unspecified hypotension type [3658784]   Future Attending Provider: FERNY HUTCHISON [[id number]]   Admitting Provider:: FERNY HUTCHISON [[id number]]

## 2022-07-01 LAB
ALBUMIN SERPL-MCNC: 3.2 GM/DL (ref 3.4–4.8)
ALBUMIN/GLOB SERPL: 1 RATIO (ref 1.1–2)
ALP SERPL-CCNC: 61 UNIT/L (ref 40–150)
ALT SERPL-CCNC: 67 UNIT/L (ref 0–55)
ANION GAP SERPL CALC-SCNC: 19 MMOL/L (ref 8–16)
APPEARANCE UR: CLEAR
AST SERPL-CCNC: 53 UNIT/L (ref 5–34)
BACTERIA #/AREA URNS AUTO: NORMAL /HPF
BASOPHILS # BLD AUTO: 0.02 X10(3)/MCL (ref 0–0.2)
BASOPHILS NFR BLD AUTO: 0.4 %
BILIRUB UR QL STRIP.AUTO: NEGATIVE MG/DL
BILIRUBIN DIRECT+TOT PNL SERPL-MCNC: 0.7 MG/DL
BUN SERPL-MCNC: 14 MG/DL (ref 9.8–20.1)
BUN SERPL-MCNC: 17 MG/DL (ref 6–30)
CALCIUM SERPL-MCNC: 9.2 MG/DL (ref 8.4–10.2)
CHLORIDE SERPL-SCNC: 101 MMOL/L (ref 95–110)
CHLORIDE SERPL-SCNC: 106 MMOL/L (ref 98–107)
CO2 SERPL-SCNC: 24 MMOL/L (ref 23–31)
COLOR UR AUTO: YELLOW
CREAT SERPL-MCNC: 1.16 MG/DL (ref 0.55–1.02)
CREAT SERPL-MCNC: 1.4 MG/DL (ref 0.5–1.4)
EOSINOPHIL # BLD AUTO: 0.41 X10(3)/MCL (ref 0–0.9)
EOSINOPHIL NFR BLD AUTO: 7.7 %
ERYTHROCYTE [DISTWIDTH] IN BLOOD BY AUTOMATED COUNT: 12.2 % (ref 11.5–17)
GLOBULIN SER-MCNC: 3.1 GM/DL (ref 2.4–3.5)
GLUCOSE SERPL-MCNC: 201 MG/DL (ref 70–110)
GLUCOSE SERPL-MCNC: 233 MG/DL (ref 82–115)
GLUCOSE UR QL STRIP.AUTO: NEGATIVE MG/DL
HCT VFR BLD AUTO: 30.2 % (ref 37–47)
HCT VFR BLD CALC: 40 %PCV (ref 36–54)
HGB BLD-MCNC: 14 G/DL
HGB BLD-MCNC: 9.7 GM/DL (ref 12–16)
IMM GRANULOCYTES # BLD AUTO: 0.01 X10(3)/MCL (ref 0–0.04)
IMM GRANULOCYTES NFR BLD AUTO: 0.2 %
KETONES UR QL STRIP.AUTO: NEGATIVE MG/DL
LACTATE SERPL-SCNC: 1.7 MMOL/L (ref 0.5–2.2)
LEUKOCYTE ESTERASE UR QL STRIP.AUTO: NEGATIVE UNIT/L
LYMPHOCYTES # BLD AUTO: 1.72 X10(3)/MCL (ref 0.6–4.6)
LYMPHOCYTES NFR BLD AUTO: 32.3 %
MAGNESIUM SERPL-MCNC: 1.5 MG/DL (ref 1.6–2.6)
MCH RBC QN AUTO: 29.8 PG (ref 27–31)
MCHC RBC AUTO-ENTMCNC: 32.1 MG/DL (ref 33–36)
MCV RBC AUTO: 92.9 FL (ref 80–94)
MONOCYTES # BLD AUTO: 0.65 X10(3)/MCL (ref 0.1–1.3)
MONOCYTES NFR BLD AUTO: 12.2 %
NEUTROPHILS # BLD AUTO: 2.5 X10(3)/MCL (ref 2.1–9.2)
NEUTROPHILS NFR BLD AUTO: 47.2 %
NITRITE UR QL STRIP.AUTO: NEGATIVE
NRBC BLD AUTO-RTO: 0 %
PH UR STRIP.AUTO: 6.5 [PH]
PHOSPHATE SERPL-MCNC: 3.2 MG/DL (ref 2.3–4.7)
PLATELET # BLD AUTO: 219 X10(3)/MCL (ref 130–400)
PMV BLD AUTO: 9.1 FL (ref 7.4–10.4)
POC IONIZED CALCIUM: 1.34 MMOL/L (ref 1.06–1.42)
POC TCO2 (MEASURED): 24 MMOL/L (ref 23–29)
POCT GLUCOSE: 160 MG/DL (ref 70–110)
POCT GLUCOSE: 161 MG/DL (ref 70–110)
POCT GLUCOSE: 232 MG/DL (ref 70–110)
POTASSIUM BLD-SCNC: 5.6 MMOL/L (ref 3.5–5.1)
POTASSIUM SERPL-SCNC: 4.5 MMOL/L (ref 3.5–5.1)
PROT SERPL-MCNC: 6.3 GM/DL (ref 5.8–7.6)
PROT UR QL STRIP.AUTO: NEGATIVE MG/DL
RBC # BLD AUTO: 3.25 X10(6)/MCL (ref 4.2–5.4)
RBC #/AREA URNS AUTO: <5 /HPF
RBC UR QL AUTO: NEGATIVE UNIT/L
SAMPLE: ABNORMAL
SODIUM BLD-SCNC: 137 MMOL/L (ref 136–145)
SODIUM SERPL-SCNC: 137 MMOL/L (ref 136–145)
SP GR UR STRIP.AUTO: 1.01 (ref 1–1.03)
SQUAMOUS #/AREA URNS AUTO: <5 /HPF
UROBILINOGEN UR STRIP-ACNC: 0.2 MG/DL
WBC # SPEC AUTO: 5.3 X10(3)/MCL (ref 4.5–11.5)
WBC #/AREA URNS AUTO: <5 /HPF

## 2022-07-01 PROCEDURE — 83735 ASSAY OF MAGNESIUM: CPT | Performed by: INTERNAL MEDICINE

## 2022-07-01 PROCEDURE — 96361 HYDRATE IV INFUSION ADD-ON: CPT

## 2022-07-01 PROCEDURE — 36415 COLL VENOUS BLD VENIPUNCTURE: CPT | Performed by: INTERNAL MEDICINE

## 2022-07-01 PROCEDURE — 96372 THER/PROPH/DIAG INJ SC/IM: CPT | Performed by: INTERNAL MEDICINE

## 2022-07-01 PROCEDURE — 83605 ASSAY OF LACTIC ACID: CPT | Performed by: INTERNAL MEDICINE

## 2022-07-01 PROCEDURE — 63600175 PHARM REV CODE 636 W HCPCS: Performed by: INTERNAL MEDICINE

## 2022-07-01 PROCEDURE — 84100 ASSAY OF PHOSPHORUS: CPT | Performed by: INTERNAL MEDICINE

## 2022-07-01 PROCEDURE — 25000003 PHARM REV CODE 250: Performed by: INTERNAL MEDICINE

## 2022-07-01 PROCEDURE — 97162 PT EVAL MOD COMPLEX 30 MIN: CPT

## 2022-07-01 PROCEDURE — 85025 COMPLETE CBC W/AUTO DIFF WBC: CPT | Performed by: INTERNAL MEDICINE

## 2022-07-01 PROCEDURE — 96366 THER/PROPH/DIAG IV INF ADDON: CPT

## 2022-07-01 PROCEDURE — G0378 HOSPITAL OBSERVATION PER HR: HCPCS

## 2022-07-01 PROCEDURE — 81001 URINALYSIS AUTO W/SCOPE: CPT | Performed by: INTERNAL MEDICINE

## 2022-07-01 PROCEDURE — 96365 THER/PROPH/DIAG IV INF INIT: CPT | Mod: 59

## 2022-07-01 PROCEDURE — 80053 COMPREHEN METABOLIC PANEL: CPT | Performed by: INTERNAL MEDICINE

## 2022-07-01 RX ORDER — ENOXAPARIN SODIUM 100 MG/ML
30 INJECTION SUBCUTANEOUS EVERY 24 HOURS
Status: DISCONTINUED | OUTPATIENT
Start: 2022-07-01 | End: 2022-07-02

## 2022-07-01 RX ORDER — MAGNESIUM SULFATE HEPTAHYDRATE 40 MG/ML
2 INJECTION, SOLUTION INTRAVENOUS ONCE
Status: COMPLETED | OUTPATIENT
Start: 2022-07-01 | End: 2022-07-01

## 2022-07-01 RX ADMIN — INSULIN ASPART 2 UNITS: 100 INJECTION, SOLUTION INTRAVENOUS; SUBCUTANEOUS at 09:07

## 2022-07-01 RX ADMIN — PANTOPRAZOLE SODIUM 40 MG: 40 TABLET, DELAYED RELEASE ORAL at 08:07

## 2022-07-01 RX ADMIN — ATORVASTATIN CALCIUM 40 MG: 40 TABLET, FILM COATED ORAL at 09:07

## 2022-07-01 RX ADMIN — MAGNESIUM SULFATE IN WATER 2 G: 40 INJECTION, SOLUTION INTRAVENOUS at 04:07

## 2022-07-01 RX ADMIN — ASPIRIN 81 MG: 81 TABLET, COATED ORAL at 08:07

## 2022-07-01 RX ADMIN — DULOXETINE 60 MG: 30 CAPSULE, DELAYED RELEASE ORAL at 08:07

## 2022-07-01 RX ADMIN — CLOPIDOGREL 75 MG: 75 TABLET, FILM COATED ORAL at 08:07

## 2022-07-01 RX ADMIN — ENOXAPARIN SODIUM 30 MG: 30 INJECTION SUBCUTANEOUS at 05:07

## 2022-07-01 RX ADMIN — CEFDINIR 300 MG: 300 CAPSULE ORAL at 08:07

## 2022-07-01 RX ADMIN — CEFDINIR 300 MG: 300 CAPSULE ORAL at 09:07

## 2022-07-01 RX ADMIN — ENOXAPARIN SODIUM 30 MG: 30 INJECTION SUBCUTANEOUS at 04:07

## 2022-07-01 RX ADMIN — DONEPEZIL HYDROCHLORIDE 5 MG: 5 TABLET, FILM COATED ORAL at 09:07

## 2022-07-01 NOTE — PLAN OF CARE
Problem: Adult Inpatient Plan of Care  Goal: Plan of Care Review  Outcome: Ongoing, Progressing     Problem: Adult Inpatient Plan of Care  Goal: Patient-Specific Goal (Individualized)  Outcome: Ongoing, Not Progressing  Goal: Absence of Hospital-Acquired Illness or Injury  Outcome: Ongoing, Not Progressing  Goal: Optimal Comfort and Wellbeing  Outcome: Ongoing, Not Progressing  Goal: Readiness for Transition of Care  Outcome: Ongoing, Not Progressing

## 2022-07-01 NOTE — PLAN OF CARE
7/1- 0953- room phone busy.  0954- Called pt's cell which is also listed as her daughter/ emergency contact, Cynthia's, #. Observation letter (MOON) reviewed by phone w /Ms. Cynthia. No questions. Letter/ attachment loaded to . Emailed to Lakisha SHIRLEY, to drop copy to room when able. ROSEANN Gardner

## 2022-07-01 NOTE — PLAN OF CARE
07/01/22 1227   Discharge Assessment   Assessment Type Discharge Planning Assessment   Confirmed/corrected address, phone number and insurance Yes   Confirmed Demographics Correct on Facesheet   Source of Information patient   When was your last doctors appointment?   (last month)   Communicated GIAN with patient/caregiver Date not available/Unable to determine   Reason For Admission hypotension   Lives With alone   Do you expect to return to your current living situation? Yes   Do you have help at home or someone to help you manage your care at home? Yes   Who are your caregiver(s) and their phone number(s)? Cynthia Pratt (daughter) [phone]   Prior to hospitilization cognitive status: Alert/Oriented   Current cognitive status: Alert/Oriented   Walking or Climbing Stairs Difficulty ambulation difficulty, requires equipment   Mobility Management rolling walker   Dressing/Bathing Difficulty bathing difficulty, assistance 1 person;dressing difficulty, assistance 1 person   Dressing/Bathing Management daughter assist her   Do you have any problems with:   (daughter performs for her)   Home Accessibility stairs to enter home   Number of Stairs, Main Entrance three   Stair Railings, Main Entrance railings on both sides of stairs   Home Layout Able to live on 1st floor   Equipment Currently Used at Home walker, rolling   Readmission within 30 days? Yes   Patient currently being followed by outpatient case management? No   Do you currently have service(s) that help you manage your care at home? Yes   Name and Contact number of agency Has  does not remember the company   Is the pt/caregiver preference to resume services with current agency Yes   Do you take prescription medications? Yes   Do you have prescription coverage? Yes   Coverage Medicare   Do you have any problems affording any of your prescribed medications? No   Is the patient taking medications as prescribed? yes   Who is going to help you get home at  discharge? Cynthia Pratt (daughter) [phone]   How do you get to doctors appointments? family or friend will provide   Are you on dialysis? No   Do you take coumadin? No   Discharge Plan A Home Health   Discharge Plan B Home with family   DME Needed Upon Discharge  none   Discharge Plan discussed with: Patient   Discharge Barriers Identified None

## 2022-07-01 NOTE — H&P
[facility] Hospital Medicine   History & Physical Note      Patient Name: Mayra Parker  : 1950  MRN: [id number]  Patient Class: OP- Observation   Admission Date: 2022   Length of Stay: 0  Admitting Service:  Service  Attending Physician: Maine Riggs MD  PCP: Rashaun Weiss MD  History Source: Patient, patient's family, and EMR.   Face-to-Face encounter date: 2022  Code status: Full    Chief Complaint   Hypotension    History of Present Illness   This is a 72-year-old female with medical history of ischemic cardiomyopathy EF 45- 50%, CAD with recent stent placement in May 2022, and recent hospitalization for hypotension, UTI and ANIYA on 2022, her blood pressure improved with IV hydration and was discharged home on cefdinir on 2022.  Since then she has not been eating or drinking much and sleeping most of the day.  Today had a PCP office visit and was noted to be hypotensive and was sent to the ED.  Patient herself denies any complaint aside feeling generally weak.  Denies chest pain, shortness breath, cough, nausea vomiting or diarrhea, fever or chills.    On arrival to ED she was afebrile, BP 84/56 and saturating 95% on room air. Labs notable for lactic acid 4.5, WBC 5.6, Hgb 11.3, K 5.6, CO2 20, Cr 1.22.  CT abdomen pelvis show no acute abnormalities.  Chest x-ray unremarkable.    She was given 1 L of LR and her blood pressure improved as well as lactic acid.  Referred to hospital medicine service for further evaluation and management.    ROS   Except as documented, all other systems reviewed and negative     Past Medical History   · Ischemic cardiomyopathy with LVEF 40 to 50% on TTE 2022  · CAD s/p Stents 2022  · CKD2  · T2DM  · HTN  · HLD  · CVA  · Dementia  · Depression    Past Surgical History     Procedure Laterality Date    BACK SURGERY      CAROTID ENDARTERECTOMY Left     CAROTID ENDARTERECTOMY Right     LEFT HEART  CATHETERIZATION  05/20/2022    Dr. Estrella; Stent placement     Social History     Tobacco Use    Smoking status: Current Every Day Smoker     Packs/day: 0.50     Types: Cigarettes    Smokeless tobacco: Never Used    Tobacco comment: Started smoking in late 20s   Substance Use Topics    Alcohol use: Never      Family History   Reviewed and negative    Allergies   Glipizide, Influenza vaccine tr-s 11 (pf), and Pregabalin    Home Medications     Prior to Admission medications    Medication Sig Start Date End Date Taking? Authorizing Provider   amLODIPine (NORVASC) 10 MG tablet Take 1 tablet (10 mg total) by mouth once daily. 6/6/22 6/6/23  VAN Hall   aspirin (ECOTRIN) 81 MG EC tablet Take 81 mg by mouth once daily.    Historical Provider   atorvastatin (LIPITOR) 40 MG tablet Take 40 mg by mouth every evening.    Historical Provider   carvediloL (COREG) 3.125 MG tablet Take 1 tablet (3.125 mg total) by mouth 2 (two) times daily. 6/6/22 6/6/23  VAN Hall   cefdinir (OMNICEF) 300 MG capsule Take 1 capsule (300 mg total) by mouth 2 (two) times daily. for 5 days 6/27/22 7/2/22  Brandyn Remy MD   clopidogreL (PLAVIX) 75 mg tablet Take 1 tablet (75 mg total) by mouth once daily. 6/6/22 6/6/23  VAN Hall   donepeziL (ARICEPT) 5 MG tablet Take 1 tablet (5 mg total) by mouth every evening. 6/6/22   VAN Hall   DULoxetine (CYMBALTA) 60 MG capsule Take 1 capsule (60 mg total) by mouth once daily. 6/6/22   VAN Hall   magnesium oxide (MAG-OX) 400 mg (241.3 mg magnesium) tablet Take 1 tablet (400 mg total) by mouth 2 (two) times daily. 6/6/22   VAN Hall   metFORMIN (GLUCOPHAGE) 1000 MG tablet Take 1 tablet (1,000 mg total) by mouth 2 (two) times daily with meals. 6/6/22 6/6/23  VAN Hall   pantoprazole (PROTONIX) 40 MG tablet Take 40 mg by mouth once daily.    Historical Provider   SITagliptin (JANUVIA) 100 MG Tab Take 1 tablet (100 mg  total) by mouth once daily. 6/6/22   VAN Hall   traMADoL (ULTRAM) 50 mg tablet Take 1 tablet (50 mg total) by mouth every 4 (four) hours as needed (for pain). 6/6/22   VAN Hall   losartan (COZAAR) 25 MG tablet Take 1 tablet (25 mg total) by mouth once daily. 6/6/22 6/27/22  VAN Hall   pregabalin (LYRICA) 75 MG capsule Take 75 mg by mouth 2 (two) times daily.  5/25/22  Historical Provider        Inpatient Medications   Scheduled Meds   aspirin  81 mg Oral Daily    atorvastatin  40 mg Oral QHS    cefdinir  300 mg Oral BID    clopidogreL  75 mg Oral Daily    donepeziL  5 mg Oral QHS    DULoxetine  60 mg Oral Daily    pantoprazole  40 mg Oral Daily     Continuous Infusions   sodium chloride 0.9% 100 mL/hr at 06/30/22 2100     PRN Meds  acetaminophen, acetaminophen, aluminum-magnesium hydroxide-simethicone, dextrose 10%, dextrose 10%, glucagon (human recombinant), glucose, glucose, insulin aspart U-100, melatonin, ondansetron, polyethylene glycol, prochlorperazine, senna-docusate 8.6-50 mg, simethicone, sodium chloride 0.9%    Physical Exam   Vital Signs  Temp:  [97.7 °F (36.5 °C)]   Pulse:  [67-84]   Resp:  [14-26]   BP: ()/(56-84)   SpO2:  [95 %-100 %]    General: Appears comfortable  HEENT: NC/AT  Neck:  No JVD  Chest: CTABL  CVS: Regular rhythm. Normal S1/S2.  Abdomen: nondistended, normoactive BS, soft and non-tender.  MSK: No obvious deformity or joint swelling  Skin: Warm and dry  Neuro: AAOx3, no focal neurological deficit  Psych: Cooperative    Labs     Recent Labs     06/30/22  1745   WBC 5.6   RBC 3.83*   HGB 11.3*   HCT 35.6*   MCV 93.0   MCH 29.5   MCHC 31.7*   RDW 12.2        Recent Labs     06/30/22  1744   LACTIC 4.5*        Recent Labs     06/30/22  1745   *   K 5.6*   CHLORIDE 105   CO2 20*   BUN 14.8   CREATININE 1.22*   GLUCOSE 156*   CALCIUM 9.2   ALBUMIN 3.2*   GLOBULIN 2.8   ALKPHOS 58   ALT 72*   AST 62*   BILITOT 0.7     Recent  Labs     06/30/22 1745   TROPONINI <0.010          Microbiology Results (last 7 days)     Procedure Component Value Units Date/Time    Blood Culture [[id number]] Collected: 06/30/22 1744    Order Status: Resulted Specimen: Blood Updated: 06/30/22 1750    Blood Culture [[id number]] Collected: 06/30/22 1744    Order Status: Resulted Specimen: Blood Updated: 06/30/22 1750         Imaging     CT Abdomen Pelvis With Contrast   Final Result      No acute process of the abdomen or pelvis.  Other secondary findings as noted.         Electronically signed by: Oscar Hobbs MD   Date:    06/30/2022   Time:    19:20      X-Ray Chest 1 View   Final Result      No acute findings.         Electronically signed by: Akbar Mcneill   Date:    06/30/2022   Time:    16:26        Assessment & Plan   1. Hypotension due to hypovolemia/dehydration  2. Lactic acidosis- secondary to above  3. Acute kidney injury  4. Hyperkalemia  5. UTI (POA) -Klebsiella and Citrobacter pan-sensitive 6/26/2022  6. Chronic HFrEF-45%- compensated  7. Normocytic anemia - stable    HX CAD/PCI, T2DM, HTN, HLD, CVA, dementia, depression      Plan:    NS at 100 ml/hr  Continue cefdinir for 2 more days  Continue aspirin/plavix/statin, donepezil, duloxetine and PPI  Hold antiohypertensives  SSI ACHS  VTE Prophylaxis: enoxaparin    Critical care time:  32 minutes  Critical care diagnosis: Hypotension, lactic acidosis    Maine Riggs MD  [facility] Medicine

## 2022-07-01 NOTE — PLAN OF CARE
Problem: Physical Therapy  Goal: Physical Therapy Goal  Description: Goals to be met by: 22     Patient will increase functional independence with mobility by performin. Supine to sit with [place]  2. Sit to supine with [place]  3. Sit to stand transfer with Stand-by Assistance  4. Gait  x 100 feet with Stand-by Assistance using Rolling Walker.     Outcome: Ongoing, Progressing

## 2022-07-01 NOTE — PT/OT/SLP EVAL
Physical Therapy Evaluation    Patient Name:  Mayra Parker   MRN:  [id number]    Recommendations:     Discharge Recommendations:  rehabilitation facility, nursing facility, skilled, home health PT (pending progress (difficult to determine on eval 2/2 limited mobility))   Discharge Equipment Recommendations: none   Barriers to discharge: medical diagnosis; decreased tolerance to activity; decreased functional mobility    Assessment:     Mayra Parker is a 72 y.o. female admitted with a medical diagnosis of Hypotension due to hypovolemia/dehydration; Lactic acidosis.  She presents with the following impairments/functional limitations:  weakness, impaired endurance, impaired balance, decreased lower extremity function, impaired functional mobilty, impaired self care skills.    Rehab Prognosis: Good; patient would benefit from acute skilled PT services to address these deficits and reach maximum level of function.    Recent Surgery: * No surgery found *      Plan:     During this hospitalization, patient to be seen daily to address the identified rehab impairments via gait training, therapeutic activities, therapeutic exercises and progress toward the following goals:    · Plan of Care Expires:  09/01/22    Subjective     Chief Complaint: back pain; dizzy; weak  Patient/Family Comments/goals: to get stronger and go home  Pain/Comfort:  · Pain Rating 1: 5/10  · Location - Orientation 1:  (chronic)  · Location 1: back    Patients cultural, spiritual, Zoroastrian conflicts given the current situation: no    Living Environment:  Pt reports living alone in a SLH that has 3 steps to enter/exit with a railing on the L going up.  Prior to admission, patients level of function was independent with mobility but required assistance for ADLS. Of note, pt was also receiving  PT prior to admission.    Equipment used at home: walker, rolling.  DME owned (not currently used): rolling walker.    Upon discharge, patient  "will have assistance from daughter (but not ) however would possibly benefit from placement beyond this stay in order to maximize functional independence and reduce burden of care depending on how much progress she makes.     Objective:     Communicated with NSG prior to session.  Patient found HOB elevated with telemetry  upon PT entry to room.    General Precautions: Standard, fall   Orthopedic Precautions:    Braces: N/A  Respiratory Status: Room air    Exams:  · Cognitive Exam:  Patient is oriented to Person, Place and Time  · RLE Strength: grossly 4/5  · LLE Strength: grossly 4/5    Functional Mobility:  · Bed Mobility:     · Supine to Sit: stand by assistance  · Sit to Supine: minimum assistance  · Transfers:     · Sit to Stand:  moderate assistance with rolling walker; VCs for hand placement  · Pt only able to stand for a brief amount of time before returning herself to a seated position "i'm weak"    Functional mobility limited at the time of this eval; pt not appropriate to to mobilize further 2/2 drop in BP     *Orthostatic vitals monitored- demonstrated positive orthostatic hypotension while being symptomatic (dizzy)  Semi-supine: 126/62  Sitting EOB: 114/66  Standin/63     Patient left HOB elevated with all lines intact, call button in reach and BP assessed at end of session- 133/65.    GOALS:   Multidisciplinary Problems     Physical Therapy Goals        Problem: Physical Therapy    Goal Priority Disciplines Outcome Goal Variances Interventions   Physical Therapy Goal     PT, PT/OT Ongoing, Progressing     Description: Goals to be met by: 22     Patient will increase functional independence with mobility by performin. Supine to sit with [place]  2. Sit to supine with [place]  3. Sit to stand transfer with Stand-by Assistance  4. Gait  x 100 feet with Stand-by Assistance using Rolling Walker.                      History:     Past Medical History:   Diagnosis Date    CVA " (cerebral vascular accident)     affected right side of body    Depression     Diabetes mellitus     Hyperlipidemia     Hypertension     Unspecified dementia without behavioral disturbance        Past Surgical History:   Procedure Laterality Date    BACK SURGERY      CAROTID ENDARTERECTOMY Left     CAROTID ENDARTERECTOMY Right     LEFT HEART CATHETERIZATION  05/20/2022    Dr. Estrella; Stent placement    LEFT HEART CATHETERIZATION Left 5/20/2022    Procedure: CATHETERIZATION, HEART, LEFT;  Surgeon: Jone Estrella MD;  Location: [facility] CATH LAB;  Service: Cardiology;  Laterality: Left;  LHC VIA R GROIN       Time Tracking:     PT Received On: 07/01/22  PT Start Time: 1322     PT Stop Time: 1341  PT Total Time (min): 19 min     Billable Minutes: Evaluation 19 07/01/2022

## 2022-07-01 NOTE — PROGRESS NOTES
"[facility] MEDICINE   PROGRESS NOTE      CHIEF COMPLAINT  Hospital follow up    HOSPITAL COURSE  72-year-old female with medical history of ischemic cardiomyopathy EF 45- 50%, CAD with recent stent placement in May 2022, and recent hospitalization for hypotension, UTI and ANIYA on 6/26/2022, her blood pressure improved with IV hydration and was discharged home on cefdinir on 6/27/2022.  Since then she has not been eating or drinking much and sleeping most of the day.  Today had a PCP office visit and was noted to be hypotensive and was sent to the ED.  Patient herself denies any complaint aside feeling generally weak.  Denies chest pain, shortness breath, cough, nausea vomiting or diarrhea, fever or chills.  On arrival to ED she was afebrile, BP 84/56 and saturating 95% on room air. Labs notable for lactic acid 4.5, WBC 5.6, Hgb 11.3, K 5.6, CO2 20, Cr 1.22.  CT abdomen pelvis show no acute abnormalities.  Chest x-ray unremarkable.  She was given 1 L of LR and her blood pressure improved as well as lactic acid.  Referred to hospital medicine service for further evaluation and management.    Today  Seen examined this morning.  Looks and feels better.  Still getting lightheaded and dizzy when she stands up however.  Discussed encouraged increased oral intake.  She had a breakfast tray with only about 10 to 20% 8.          OBJECTIVE/PHYSICAL EXAM  /71   Pulse 80   Temp 98.6 °F (37 °C) (Oral)   Resp 18   Ht 5' 4" (1.626 m)   Wt 73.5 kg (162 lb)   SpO2 95%   Breastfeeding No   BMI 27.81 kg/m²   General: In no acute distress, afebrile  Chest: Clear to auscultation bilaterally  Heart: S1, S2, no appreciable murmur  Abdomen: Soft, nontender, BS +  MSK: Warm, no lower extremity edema, no clubbing or cyanosis  Neurologic: Alert and oriented x4, moving all extremities with good strength         ASSESSMENT/PLAN  1. Hypotension due to hypovolemia/dehydration  2. Lactic " acidosis- secondary to above  3. Acute kidney injury  4. Hyperkalemia  5. UTI (POA) -Klebsiella and Citrobacter pan-sensitive 6/26/2022  6. Chronic HFrEF-45%- compensated  7. Normocytic anemia - stable     HX CAD/PCI, T2DM, HTN, HLD, CVA, dementia, depression      Overall she is feeling much better and looking better.  Still remains a little dizzy.  Will check orthostatic vitals.  Continue IV fluids reduce rate to 75 mL/hour.  PT evaluation  Can possibly plan for discharge tomorrow if otherwise relatively stable  Finished off of treatment for UTI as already started.    Anticipated discharge and disposition:   __________________________________________________________________________    LABS/MICROBIOLOGY/MEDICATIONS/DIAGNOSTICS    LABS  Recent Labs     07/01/22  0136      K 4.5   CHLORIDE 106   CO2 24   BUN 14.0   CREATININE 1.16*   GLUCOSE 233*   CALCIUM 9.2   ALKPHOS 61   AST 53*   ALT 67*   ALBUMIN 3.2*     Recent Labs     07/01/22  0136   WBC 5.3   RBC 3.25*   HCT 30.2*   MCV 92.9          MICROBIOLOGY  Microbiology Results (last 7 days)     Procedure Component Value Units Date/Time    Blood Culture [[id number]] Collected: 06/30/22 1744    Order Status: Resulted Specimen: Blood Updated: 06/30/22 1750    Blood Culture [[id number]] Collected: 06/30/22 1744    Order Status: Resulted Specimen: Blood Updated: 06/30/22 1750          MEDICATIONS   aspirin  81 mg Oral Daily    atorvastatin  40 mg Oral QHS    cefdinir  300 mg Oral BID    clopidogreL  75 mg Oral Daily    donepeziL  5 mg Oral QHS    DULoxetine  60 mg Oral Daily    enoxaparin  30 mg Subcutaneous Daily    pantoprazole  40 mg Oral Daily      INFUSIONS   sodium chloride 0.9% 100 mL/hr at 06/30/22 2100       DIAGNOSTIC TESTS  CT Abdomen Pelvis With Contrast   Final Result      No acute process of the abdomen or pelvis.  Other secondary findings as noted.         Electronically signed by: Oscar Hobbs MD   Date:    06/30/2022   Time:    19:20       X-Ray Chest 1 View   Final Result      No acute findings.         Electronically signed by: Akbar Mcneill   Date:    06/30/2022   Time:    16:26               All diagnosis and differential diagnosis have been reviewed; assessment and plan has been documented. I have personally reviewed the labs and test results that are presently available; I have reviewed the patients medication list. I have reviewed the consulting providers response and recommendations. I have reviewed or attempted to review medical records based upon their availability.  All of the patient's questions have been addressed and answered. Patient's is agreeable to the above stated plan. I will continue to monitor closely and make adjustments to medical management as needed.  This document was created using M*Modal Fluency Direct.  Transcription errors may have been made.  Please contact me if any questions may rise regarding documentation to clarify verbiage.        Benji Anderson MD   07/01/2022   Internal Medicine

## 2022-07-02 LAB
ANION GAP SERPL CALC-SCNC: 7 MEQ/L
BASOPHILS # BLD AUTO: 0.04 X10(3)/MCL (ref 0–0.2)
BASOPHILS NFR BLD AUTO: 0.8 %
BUN SERPL-MCNC: 8.8 MG/DL (ref 9.8–20.1)
CALCIUM SERPL-MCNC: 9.4 MG/DL (ref 8.4–10.2)
CHLORIDE SERPL-SCNC: 108 MMOL/L (ref 98–107)
CO2 SERPL-SCNC: 24 MMOL/L (ref 23–31)
CREAT SERPL-MCNC: 0.95 MG/DL (ref 0.55–1.02)
CREAT/UREA NIT SERPL: 9
EOSINOPHIL # BLD AUTO: 0.4 X10(3)/MCL (ref 0–0.9)
EOSINOPHIL NFR BLD AUTO: 8.2 %
ERYTHROCYTE [DISTWIDTH] IN BLOOD BY AUTOMATED COUNT: 12.2 % (ref 11.5–17)
GLUCOSE SERPL-MCNC: 169 MG/DL (ref 82–115)
HCT VFR BLD AUTO: 30.9 % (ref 37–47)
HGB BLD-MCNC: 10.3 GM/DL (ref 12–16)
IMM GRANULOCYTES # BLD AUTO: 0.01 X10(3)/MCL (ref 0–0.04)
IMM GRANULOCYTES NFR BLD AUTO: 0.2 %
LYMPHOCYTES # BLD AUTO: 1.84 X10(3)/MCL (ref 0.6–4.6)
LYMPHOCYTES NFR BLD AUTO: 37.6 %
MAGNESIUM SERPL-MCNC: 1.8 MG/DL (ref 1.6–2.6)
MCH RBC QN AUTO: 29.5 PG (ref 27–31)
MCHC RBC AUTO-ENTMCNC: 33.3 MG/DL (ref 33–36)
MCV RBC AUTO: 88.5 FL (ref 80–94)
MONOCYTES # BLD AUTO: 0.52 X10(3)/MCL (ref 0.1–1.3)
MONOCYTES NFR BLD AUTO: 10.6 %
NEUTROPHILS # BLD AUTO: 2.1 X10(3)/MCL (ref 2.1–9.2)
NEUTROPHILS NFR BLD AUTO: 42.6 %
NRBC BLD AUTO-RTO: 0 %
PHOSPHATE SERPL-MCNC: 2.9 MG/DL (ref 2.3–4.7)
PLATELET # BLD AUTO: 249 X10(3)/MCL (ref 130–400)
PMV BLD AUTO: 9.5 FL (ref 7.4–10.4)
POCT GLUCOSE: 170 MG/DL (ref 70–110)
POCT GLUCOSE: 216 MG/DL (ref 70–110)
POCT GLUCOSE: 277 MG/DL (ref 70–110)
POTASSIUM SERPL-SCNC: 4.4 MMOL/L (ref 3.5–5.1)
RBC # BLD AUTO: 3.49 X10(6)/MCL (ref 4.2–5.4)
SODIUM SERPL-SCNC: 139 MMOL/L (ref 136–145)
WBC # SPEC AUTO: 4.9 X10(3)/MCL (ref 4.5–11.5)

## 2022-07-02 PROCEDURE — 80048 BASIC METABOLIC PNL TOTAL CA: CPT | Performed by: INTERNAL MEDICINE

## 2022-07-02 PROCEDURE — 85025 COMPLETE CBC W/AUTO DIFF WBC: CPT | Performed by: INTERNAL MEDICINE

## 2022-07-02 PROCEDURE — 63600175 PHARM REV CODE 636 W HCPCS: Performed by: INTERNAL MEDICINE

## 2022-07-02 PROCEDURE — 36415 COLL VENOUS BLD VENIPUNCTURE: CPT | Performed by: INTERNAL MEDICINE

## 2022-07-02 PROCEDURE — 83735 ASSAY OF MAGNESIUM: CPT | Performed by: INTERNAL MEDICINE

## 2022-07-02 PROCEDURE — G0378 HOSPITAL OBSERVATION PER HR: HCPCS

## 2022-07-02 PROCEDURE — 96372 THER/PROPH/DIAG INJ SC/IM: CPT | Performed by: INTERNAL MEDICINE

## 2022-07-02 PROCEDURE — 84100 ASSAY OF PHOSPHORUS: CPT | Performed by: INTERNAL MEDICINE

## 2022-07-02 PROCEDURE — 96361 HYDRATE IV INFUSION ADD-ON: CPT

## 2022-07-02 PROCEDURE — 25000003 PHARM REV CODE 250: Performed by: INTERNAL MEDICINE

## 2022-07-02 RX ORDER — ENOXAPARIN SODIUM 100 MG/ML
40 INJECTION SUBCUTANEOUS EVERY 24 HOURS
Status: DISCONTINUED | OUTPATIENT
Start: 2022-07-02 | End: 2022-07-07 | Stop reason: HOSPADM

## 2022-07-02 RX ADMIN — ACETAMINOPHEN 1000 MG: 500 TABLET, FILM COATED ORAL at 05:07

## 2022-07-02 RX ADMIN — DULOXETINE 60 MG: 30 CAPSULE, DELAYED RELEASE ORAL at 10:07

## 2022-07-02 RX ADMIN — ASPIRIN 81 MG: 81 TABLET, COATED ORAL at 10:07

## 2022-07-02 RX ADMIN — CEFDINIR 300 MG: 300 CAPSULE ORAL at 10:07

## 2022-07-02 RX ADMIN — ATORVASTATIN CALCIUM 40 MG: 40 TABLET, FILM COATED ORAL at 08:07

## 2022-07-02 RX ADMIN — PANTOPRAZOLE SODIUM 40 MG: 40 TABLET, DELAYED RELEASE ORAL at 10:07

## 2022-07-02 RX ADMIN — INSULIN ASPART 6 UNITS: 100 INJECTION, SOLUTION INTRAVENOUS; SUBCUTANEOUS at 12:07

## 2022-07-02 RX ADMIN — CLOPIDOGREL 75 MG: 75 TABLET, FILM COATED ORAL at 10:07

## 2022-07-02 RX ADMIN — ENOXAPARIN SODIUM 40 MG: 40 INJECTION SUBCUTANEOUS at 05:07

## 2022-07-02 RX ADMIN — MELATONIN TAB 3 MG 6 MG: 3 TAB at 08:07

## 2022-07-02 RX ADMIN — DONEPEZIL HYDROCHLORIDE 5 MG: 5 TABLET, FILM COATED ORAL at 08:07

## 2022-07-02 RX ADMIN — CEFDINIR 300 MG: 300 CAPSULE ORAL at 08:07

## 2022-07-02 NOTE — PLAN OF CARE
No acute events over night. VSS. NAD noted. Patient safety maintained.       Problem: Adult Inpatient Plan of Care  Goal: Absence of Hospital-Acquired Illness or Injury  Outcome: Ongoing, Progressing     Problem: Adult Inpatient Plan of Care  Goal: Optimal Comfort and Wellbeing  Outcome: Ongoing, Progressing     Problem: Skin Injury Risk Increased  Goal: Skin Health and Integrity  Outcome: Ongoing, Progressing

## 2022-07-02 NOTE — PROGRESS NOTES
[facility] Hospital Medicine Progress Note        Chief Complaint: Inpatient Follow-up for     HPI:   72-year-old female with medical history of ischemic cardiomyopathy EF 45- 50%, CAD with recent stent placement in May 2022, and recent hospitalization for hypotension, UTI and ANIYA on 6/26/2022, her blood pressure improved with IV hydration and was discharged home on cefdinir on 6/27/2022.  Since then she has not been eating or drinking much and sleeping most of the day.  Today had a PCP office visit and was noted to be hypotensive and was sent to the ED.  Patient herself denies any complaint aside feeling generally weak.  Denies chest pain, shortness breath, cough, nausea vomiting or diarrhea, fever or chills.    On arrival to ED she was afebrile, BP 84/56 and saturating 95% on room air. Labs notable for lactic acid 4.5, WBC 5.6, Hgb 11.3, K 5.6, CO2 20, Cr 1.22.  CT abdomen pelvis show no acute abnormalities.  Chest x-ray unremarkable. She was given 1 L of LR and her blood pressure improved as well as lactic acid.  Referred to hospital medicine service for further evaluation and management.    Interval Hx:   She was afebrile overnight.  Blood pressure accelerated.  When seen and examined at bedside he was alert, comfortably resting in the bed.  She has no new complaints or concerns.  P.o. intake adequate.  Does admit to feeling weak and lethargic overall.  Denies any nausea or vomiting.  Morning labs revealed stable hemoglobin and platelets and electrolytes.  Hyperglycemic    Objective/physical exam:  Vitals:    07/01/22 2326 07/02/22 0319 07/02/22 0734 07/02/22 1129   BP: (!) 143/63 (!) 157/66 (!) 171/84 (!) 162/71   BP Location:   Right arm    Patient Position:   Lying    Pulse: 68 73 76 79   Resp: 18 18 18 18   Temp: 97.9 °F (36.6 °C) 97.3 °F (36.3 °C) 98.4 °F (36.9 °C) 98.2 °F (36.8 °C)   TempSrc: Oral  Oral Oral   SpO2: 95% 100% 96% 95%   Weight:       Height:         General: In  no acute distress, afebrile  Respiratory: Clear to auscultation bilaterally  Cardiovascular: S1, S2, no appreciable murmur  Abdomen: Soft, nontender, BS +  MSK: Warm, no lower extremity edema, no clubbing or cyanosis  Neurologic: Alert and oriented x4, moving all extremities with good strength     Lab Results   Component Value Date     07/02/2022    K 4.4 07/02/2022    CO2 24 07/02/2022    BUN 8.8 (L) 07/02/2022    CREATININE 0.95 07/02/2022    CALCIUM 9.4 07/02/2022    EGFRNONAA >60 10/28/2019      Lab Results   Component Value Date    ALT 67 (H) 07/01/2022    AST 53 (H) 07/01/2022    ALKPHOS 61 07/01/2022    BILITOT 0.7 07/01/2022      Lab Results   Component Value Date    WBC 4.9 07/02/2022    HGB 10.3 (L) 07/02/2022    HCT 30.9 (L) 07/02/2022    MCV 88.5 07/02/2022     07/02/2022           Medications:   aspirin  81 mg Oral Daily    atorvastatin  40 mg Oral QHS    cefdinir  300 mg Oral BID    clopidogreL  75 mg Oral Daily    donepeziL  5 mg Oral QHS    DULoxetine  60 mg Oral Daily    enoxaparin  40 mg Subcutaneous Daily    pantoprazole  40 mg Oral Daily      acetaminophen, acetaminophen, aluminum-magnesium hydroxide-simethicone, dextrose 10%, dextrose 10%, glucagon (human recombinant), glucose, glucose, insulin aspart U-100, melatonin, ondansetron, polyethylene glycol, prochlorperazine, senna-docusate 8.6-50 mg, simethicone, sodium chloride 0.9%     Assessment/Plan:    1. Hypotension due to hypovolemia/dehydration- resolving  2. Lactic acidosis- secondary to above, improved  3. Acute kidney injury, improving  4. Hyperkalemia - resolved  5. UTI (POA) -Klebsiella and Citrobacter pan-sensitive 6/26/2022   6. Chronic HFrEF-45%- compensated  7. Normocytic anemia - stable  8. Deconditioning, gen weakness     HX CAD/PCI, T2DM, HTN, HLD, CVA, dementia, depression    Plan:  - Discontinue IV fluids.  Monitor blood pressure.  Low-salt diet  - Complete treatment for UTI  - Did not do great with PT.  May  benefit from placement.  We will continue to reassess.  At baseline she lives by herself and daughter helps with chores  - Other appropriate home medications were reviewed and renewed.    PT  labs        Sixto Washington MD

## 2022-07-03 LAB
ALBUMIN SERPL-MCNC: 3.3 GM/DL (ref 3.4–4.8)
ALBUMIN/GLOB SERPL: 1 RATIO (ref 1.1–2)
ALP SERPL-CCNC: 64 UNIT/L (ref 40–150)
ALT SERPL-CCNC: 69 UNIT/L (ref 0–55)
AST SERPL-CCNC: 59 UNIT/L (ref 5–34)
BASOPHILS # BLD AUTO: 0.03 X10(3)/MCL (ref 0–0.2)
BASOPHILS NFR BLD AUTO: 0.7 %
BILIRUBIN DIRECT+TOT PNL SERPL-MCNC: 0.9 MG/DL
BUN SERPL-MCNC: 7 MG/DL (ref 9.8–20.1)
CALCIUM SERPL-MCNC: 9.4 MG/DL (ref 8.4–10.2)
CHLORIDE SERPL-SCNC: 106 MMOL/L (ref 98–107)
CO2 SERPL-SCNC: 23 MMOL/L (ref 23–31)
CREAT SERPL-MCNC: 0.99 MG/DL (ref 0.55–1.02)
DEPRECATED CALCIDIOL+CALCIFEROL SERPL-MC: 10.2 NG/ML (ref 30–80)
EOSINOPHIL # BLD AUTO: 0.33 X10(3)/MCL (ref 0–0.9)
EOSINOPHIL NFR BLD AUTO: 8 %
ERYTHROCYTE [DISTWIDTH] IN BLOOD BY AUTOMATED COUNT: 12.1 % (ref 11.5–17)
GLOBULIN SER-MCNC: 3.3 GM/DL (ref 2.4–3.5)
GLUCOSE SERPL-MCNC: 208 MG/DL (ref 82–115)
HCT VFR BLD AUTO: 34.1 % (ref 37–47)
HGB BLD-MCNC: 10.6 GM/DL (ref 12–16)
IMM GRANULOCYTES # BLD AUTO: 0.01 X10(3)/MCL (ref 0–0.04)
IMM GRANULOCYTES NFR BLD AUTO: 0.2 %
LYMPHOCYTES # BLD AUTO: 1.65 X10(3)/MCL (ref 0.6–4.6)
LYMPHOCYTES NFR BLD AUTO: 40.1 %
MAGNESIUM SERPL-MCNC: 1.4 MG/DL (ref 1.6–2.6)
MCH RBC QN AUTO: 29.3 PG (ref 27–31)
MCHC RBC AUTO-ENTMCNC: 31.1 MG/DL (ref 33–36)
MCV RBC AUTO: 94.2 FL (ref 80–94)
MONOCYTES # BLD AUTO: 0.44 X10(3)/MCL (ref 0.1–1.3)
MONOCYTES NFR BLD AUTO: 10.7 %
NEUTROPHILS # BLD AUTO: 1.7 X10(3)/MCL (ref 2.1–9.2)
NEUTROPHILS NFR BLD AUTO: 40.3 %
NRBC BLD AUTO-RTO: 0 %
PLATELET # BLD AUTO: 271 X10(3)/MCL (ref 130–400)
PMV BLD AUTO: 9.5 FL (ref 7.4–10.4)
POCT GLUCOSE: 196 MG/DL (ref 70–110)
POCT GLUCOSE: 255 MG/DL (ref 70–110)
POCT GLUCOSE: 288 MG/DL (ref 70–110)
POCT GLUCOSE: 334 MG/DL (ref 70–110)
POTASSIUM SERPL-SCNC: 4.2 MMOL/L (ref 3.5–5.1)
PROT SERPL-MCNC: 6.6 GM/DL (ref 5.8–7.6)
RBC # BLD AUTO: 3.62 X10(6)/MCL (ref 4.2–5.4)
SODIUM SERPL-SCNC: 138 MMOL/L (ref 136–145)
WBC # SPEC AUTO: 4.1 X10(3)/MCL (ref 4.5–11.5)

## 2022-07-03 PROCEDURE — 96372 THER/PROPH/DIAG INJ SC/IM: CPT | Performed by: INTERNAL MEDICINE

## 2022-07-03 PROCEDURE — 63600175 PHARM REV CODE 636 W HCPCS: Performed by: INTERNAL MEDICINE

## 2022-07-03 PROCEDURE — 83735 ASSAY OF MAGNESIUM: CPT | Performed by: INTERNAL MEDICINE

## 2022-07-03 PROCEDURE — 82306 VITAMIN D 25 HYDROXY: CPT | Performed by: INTERNAL MEDICINE

## 2022-07-03 PROCEDURE — G0378 HOSPITAL OBSERVATION PER HR: HCPCS

## 2022-07-03 PROCEDURE — 85025 COMPLETE CBC W/AUTO DIFF WBC: CPT | Performed by: INTERNAL MEDICINE

## 2022-07-03 PROCEDURE — 25000003 PHARM REV CODE 250: Performed by: INTERNAL MEDICINE

## 2022-07-03 PROCEDURE — 80053 COMPREHEN METABOLIC PANEL: CPT | Performed by: INTERNAL MEDICINE

## 2022-07-03 PROCEDURE — 36415 COLL VENOUS BLD VENIPUNCTURE: CPT | Performed by: INTERNAL MEDICINE

## 2022-07-03 PROCEDURE — 96366 THER/PROPH/DIAG IV INF ADDON: CPT

## 2022-07-03 RX ORDER — MAGNESIUM SULFATE HEPTAHYDRATE 40 MG/ML
2 INJECTION, SOLUTION INTRAVENOUS ONCE
Status: COMPLETED | OUTPATIENT
Start: 2022-07-03 | End: 2022-07-03

## 2022-07-03 RX ORDER — ERGOCALCIFEROL 1.25 MG/1
50000 CAPSULE ORAL
Status: DISCONTINUED | OUTPATIENT
Start: 2022-07-03 | End: 2022-07-07 | Stop reason: HOSPADM

## 2022-07-03 RX ADMIN — INSULIN ASPART 6 UNITS: 100 INJECTION, SOLUTION INTRAVENOUS; SUBCUTANEOUS at 01:07

## 2022-07-03 RX ADMIN — CLOPIDOGREL 75 MG: 75 TABLET, FILM COATED ORAL at 08:07

## 2022-07-03 RX ADMIN — DONEPEZIL HYDROCHLORIDE 5 MG: 5 TABLET, FILM COATED ORAL at 08:07

## 2022-07-03 RX ADMIN — INSULIN ASPART 8 UNITS: 100 INJECTION, SOLUTION INTRAVENOUS; SUBCUTANEOUS at 04:07

## 2022-07-03 RX ADMIN — PANTOPRAZOLE SODIUM 40 MG: 40 TABLET, DELAYED RELEASE ORAL at 08:07

## 2022-07-03 RX ADMIN — INSULIN ASPART 3 UNITS: 100 INJECTION, SOLUTION INTRAVENOUS; SUBCUTANEOUS at 08:07

## 2022-07-03 RX ADMIN — ASPIRIN 81 MG: 81 TABLET, COATED ORAL at 08:07

## 2022-07-03 RX ADMIN — ENOXAPARIN SODIUM 40 MG: 40 INJECTION SUBCUTANEOUS at 04:07

## 2022-07-03 RX ADMIN — DULOXETINE 60 MG: 30 CAPSULE, DELAYED RELEASE ORAL at 08:07

## 2022-07-03 RX ADMIN — ERGOCALCIFEROL 50000 UNITS: 1.25 CAPSULE ORAL at 08:07

## 2022-07-03 RX ADMIN — MAGNESIUM SULFATE HEPTAHYDRATE 2 G: 40 INJECTION, SOLUTION INTRAVENOUS at 08:07

## 2022-07-03 RX ADMIN — ATORVASTATIN CALCIUM 40 MG: 40 TABLET, FILM COATED ORAL at 08:07

## 2022-07-03 NOTE — PROGRESS NOTES
[facility] Hospital Medicine Progress Note        Chief Complaint: Inpatient Follow-up for Hypotension    HPI:   72-year-old female with medical history of ischemic cardiomyopathy EF 45- 50%, CAD with recent stent placement in May 2022, and recent hospitalization for hypotension, UTI and ANIYA on 6/26/2022, her blood pressure improved with IV hydration and was discharged home on cefdinir on 6/27/2022.  Since then she has not been eating or drinking much and sleeping most of the day.  Today had a PCP office visit and was noted to be hypotensive and was sent to the ED.  Patient herself denies any complaint aside feeling generally weak.  Denies chest pain, shortness breath, cough, nausea vomiting or diarrhea, fever or chills.    On arrival to ED she was afebrile, BP 84/56 and saturating 95% on room air. Labs notable for lactic acid 4.5, WBC 5.6, Hgb 11.3, K 5.6, CO2 20, Cr 1.22.  CT abdomen pelvis show no acute abnormalities.  Chest x-ray unremarkable. She was given 1 L of LR and her blood pressure improved as well as lactic acid.  Referred to hospital medicine service for further evaluation and management.    Interval Hx:     Afebrile overnight.  Blood pressure looking better this morning.  When seen examined bedside she was alert, comfortably sleeping in the bed.  Upon waking up she reports doing okay, denies any worsening shortness of breath, chest pain, fever or chills.  Continues to report fatigue and generalized weakness.  Tolerating diet and moving bowels.  Routine follow-up blood work revealing stable hemoglobin, mild hypomagnesemia and low vitamin D      Objective/physical exam:  Vitals:    07/02/22 1738 07/02/22 1918 07/03/22 0000 07/03/22 0400   BP:  (!) 179/80 138/65 103/70   BP Location:       Patient Position:       Pulse:  77 74 74   Resp:  18 18 18   Temp: 98.1 °F (36.7 °C) 98.4 °F (36.9 °C) 98.4 °F (36.9 °C) 97.8 °F (36.6 °C)   TempSrc:  Oral     SpO2:  (!) 92% 95% 98%    Weight:       Height:         General: In no acute distress, afebrile  Respiratory: Clear to auscultation bilaterally  Cardiovascular: S1, S2, no appreciable murmur  Abdomen: Soft, nontender, BS +  MSK: Warm, no lower extremity edema, no clubbing or cyanosis  Neurologic: Alert and oriented x4, moving all extremities with good strength     Lab Results   Component Value Date     07/03/2022    K 4.2 07/03/2022    CO2 23 07/03/2022    BUN 7.0 (L) 07/03/2022    CREATININE 0.99 07/03/2022    CALCIUM 9.4 07/03/2022    EGFRNONAA >60 10/28/2019      Lab Results   Component Value Date    ALT 69 (H) 07/03/2022    AST 59 (H) 07/03/2022    ALKPHOS 64 07/03/2022    BILITOT 0.9 07/03/2022      Lab Results   Component Value Date    WBC 4.1 (L) 07/03/2022    HGB 10.6 (L) 07/03/2022    HCT 34.1 (L) 07/03/2022    MCV 94.2 (H) 07/03/2022     07/03/2022           Medications:   aspirin  81 mg Oral Daily    atorvastatin  40 mg Oral QHS    clopidogreL  75 mg Oral Daily    donepeziL  5 mg Oral QHS    DULoxetine  60 mg Oral Daily    enoxaparin  40 mg Subcutaneous Daily    ergocalciferol  50,000 Units Oral Q7 Days    magnesium sulfate IVPB  2 g Intravenous Once    pantoprazole  40 mg Oral Daily      acetaminophen, acetaminophen, aluminum-magnesium hydroxide-simethicone, dextrose 10%, dextrose 10%, glucagon (human recombinant), glucose, glucose, insulin aspart U-100, melatonin, ondansetron, polyethylene glycol, prochlorperazine, senna-docusate 8.6-50 mg, simethicone, sodium chloride 0.9%     Assessment/Plan:    1. Hypotension due to hypovolemia/dehydration- resolving  2. Lactic acidosis- secondary to above, improved  3. Acute kidney injury, improving  4. Hyperkalemia - resolved  5. UTI (POA) -Klebsiella and Citrobacter pan-sensitive 6/26/2022   6. Chronic HFrEF-45%- compensated  7. Normocytic anemia - stable  8. Deconditioning, gen weakness     HX CAD/PCI, T2DM, HTN, HLD, CVA, dementia, depression    Plan:  - Encourage  oral hydration nutrition.  Discontinued IV fluids.  Continue current antihypertensive regimen with low-salt diet  - Complete treatment for UTI  - Did not do great with PT.  May benefit from placement.  We will continue to reassess.  At baseline she lives by herself and daughter helps with chores. She is agreeable  - Other appropriate home medications were reviewed and renewed. Dd vit D     PT          Sixto Washington MD

## 2022-07-04 LAB
POCT GLUCOSE: 201 MG/DL (ref 70–110)
POCT GLUCOSE: 271 MG/DL (ref 70–110)
POCT GLUCOSE: 286 MG/DL (ref 70–110)
POCT GLUCOSE: 286 MG/DL (ref 70–110)

## 2022-07-04 PROCEDURE — 96372 THER/PROPH/DIAG INJ SC/IM: CPT | Performed by: INTERNAL MEDICINE

## 2022-07-04 PROCEDURE — G0378 HOSPITAL OBSERVATION PER HR: HCPCS

## 2022-07-04 PROCEDURE — 25000003 PHARM REV CODE 250: Performed by: INTERNAL MEDICINE

## 2022-07-04 PROCEDURE — 97116 GAIT TRAINING THERAPY: CPT

## 2022-07-04 PROCEDURE — 63600175 PHARM REV CODE 636 W HCPCS: Performed by: INTERNAL MEDICINE

## 2022-07-04 RX ADMIN — ATORVASTATIN CALCIUM 40 MG: 40 TABLET, FILM COATED ORAL at 08:07

## 2022-07-04 RX ADMIN — DULOXETINE 60 MG: 30 CAPSULE, DELAYED RELEASE ORAL at 09:07

## 2022-07-04 RX ADMIN — PANTOPRAZOLE SODIUM 40 MG: 40 TABLET, DELAYED RELEASE ORAL at 09:07

## 2022-07-04 RX ADMIN — INSULIN ASPART 6 UNITS: 100 INJECTION, SOLUTION INTRAVENOUS; SUBCUTANEOUS at 04:07

## 2022-07-04 RX ADMIN — ENOXAPARIN SODIUM 40 MG: 40 INJECTION SUBCUTANEOUS at 04:07

## 2022-07-04 RX ADMIN — DONEPEZIL HYDROCHLORIDE 5 MG: 5 TABLET, FILM COATED ORAL at 08:07

## 2022-07-04 RX ADMIN — INSULIN ASPART 6 UNITS: 100 INJECTION, SOLUTION INTRAVENOUS; SUBCUTANEOUS at 06:07

## 2022-07-04 RX ADMIN — CLOPIDOGREL 75 MG: 75 TABLET, FILM COATED ORAL at 09:07

## 2022-07-04 RX ADMIN — ASPIRIN 81 MG: 81 TABLET, COATED ORAL at 09:07

## 2022-07-04 RX ADMIN — INSULIN ASPART 6 UNITS: 100 INJECTION, SOLUTION INTRAVENOUS; SUBCUTANEOUS at 11:07

## 2022-07-04 NOTE — PROGRESS NOTES
[facility]  Hospital Medicine Progress Note        Chief Complaint: Inpatient Follow-up for Hypotension    HPI:   72-year-old female with medical history of ischemic cardiomyopathy EF 45- 50%, CAD with recent stent placement in May 2022, and recent hospitalization for hypotension, UTI and ANIYA on 6/26/2022, her blood pressure improved with IV hydration and was discharged home on cefdinir on 6/27/2022.  Since then she has not been eating or drinking much and sleeping most of the day.  Today had a PCP office visit and was noted to be hypotensive and was sent to the ED.  Patient herself denies any complaint aside feeling generally weak.  Denies chest pain, shortness breath, cough, nausea vomiting or diarrhea, fever or chills.    On arrival to ED she was afebrile, BP 84/56 and saturating 95% on room air. Labs notable for lactic acid 4.5, WBC 5.6, Hgb 11.3, K 5.6, CO2 20, Cr 1.22.  CT abdomen pelvis show no acute abnormalities.  Chest x-ray unremarkable. She was given 1 L of LR and her blood pressure improved as well as lactic acid.  Referred to hospital medicine service for further evaluation and management.    Interval Hx:   Afebrile.  Hemodynamically stable this morning.  Comfortable resting in the bed.  Daughter was at bedside today.  Daughter is agreeable if we recommend placement.  Patient was comfortably resting in the bed.  She has no new complaints or concerns.  Ambulating to restroom with help of a walker.  Denies any fever, chills, nausea vomiting or diarrhea.      Objective/physical exam:  Vitals:    07/03/22 1141 07/03/22 1912 07/04/22 0000 07/04/22 0300   BP: (!) 166/74 136/76 (!) 152/74 (!) 140/76   BP Location:       Patient Position:       Pulse: 85 83 78 72   Resp: 18 18 18 18   Temp: 98.6 °F (37 °C) 97.7 °F (36.5 °C) 98.3 °F (36.8 °C) 98.1 °F (36.7 °C)   TempSrc: Oral Oral Oral    SpO2: 97% 97% 97% 96%   Weight:       Height:         General: In no acute distress,  afebrile  Respiratory: Clear to auscultation bilaterally  Cardiovascular: S1, S2, no appreciable murmur  Abdomen: Soft, nontender, BS +  MSK: Warm, no lower extremity edema, no clubbing or cyanosis  Neurologic: Alert and oriented x4, moving all extremities with good strength     Lab Results   Component Value Date     07/03/2022    K 4.2 07/03/2022    CO2 23 07/03/2022    BUN 7.0 (L) 07/03/2022    CREATININE 0.99 07/03/2022    CALCIUM 9.4 07/03/2022    EGFRNONAA >60 10/28/2019      Lab Results   Component Value Date    ALT 69 (H) 07/03/2022    AST 59 (H) 07/03/2022    ALKPHOS 64 07/03/2022    BILITOT 0.9 07/03/2022      Lab Results   Component Value Date    WBC 4.1 (L) 07/03/2022    HGB 10.6 (L) 07/03/2022    HCT 34.1 (L) 07/03/2022    MCV 94.2 (H) 07/03/2022     07/03/2022           Medications:   aspirin  81 mg Oral Daily    atorvastatin  40 mg Oral QHS    clopidogreL  75 mg Oral Daily    donepeziL  5 mg Oral QHS    DULoxetine  60 mg Oral Daily    enoxaparin  40 mg Subcutaneous Daily    ergocalciferol  50,000 Units Oral Q7 Days    pantoprazole  40 mg Oral Daily      acetaminophen, acetaminophen, aluminum-magnesium hydroxide-simethicone, dextrose 10%, dextrose 10%, glucagon (human recombinant), glucose, glucose, insulin aspart U-100, melatonin, ondansetron, polyethylene glycol, prochlorperazine, senna-docusate 8.6-50 mg, simethicone, sodium chloride 0.9%     Assessment/Plan:    1. Hypotension due to hypovolemia/dehydration- resolving  2. Lactic acidosis- secondary to above, improved  3. Acute kidney injury, improving  4. Hyperkalemia - resolved  5. UTI (POA) -Klebsiella and Citrobacter pan-sensitive 6/26/2022   6. Chronic HFrEF-45%- compensated  7. Normocytic anemia - stable  8. Deconditioning, gen weakness     HX CAD/PCI, T2DM, HTN, HLD, CVA, dementia, depression    Plan:  - Encourage PO hydration nutrition.  Discontinued IV fluids.  Continue current antihypertensive regimen with low-salt  diet  - Complete treatment for UTI  - Did not do great with PT.  May benefit from placement.  We will continue to reassess.  At baseline she lives by herself and daughter helps with chores. She is agreeable  - Other appropriate home medications were reviewed and renewed. Added vit D     PT  Discharge to home with home health versus placement      Sixto Washington MD

## 2022-07-04 NOTE — PT/OT/SLP PROGRESS
Physical Therapy         Treatment        Mayra Parker   MRN: [id number]     PT Received On: 22  PT Start Time: 1154     PT Stop Time: 1210    PT Total Time (min): 16 min       Billable Minutes:  Gait Training 16 mins  Total Minutes: 16 mins    Treatment Type: Treatment  PT/PTA: PT     PTA Visit Number: 1       General Precautions: Standard, fall  Orthopedic Precautions:     Braces:      Spiritual, Cultural Beliefs, Sikh Practices, Values that Affect Care: no    Subjective:  Communicated with NSG prior to session.       Objective:  Patient found with daughter in bathroom, with Patient found with: telemetry    Functional Mobility:    Transfer Training:  Sit to stand:Minimal Assistance with Rolling Walker      Gait Training:  Patient gait trained   18  feet on level tile with Rolling Walker with Minimal Assistance.  Pt with demonstarting a  swing-through gait with increased gelacio.Impairments contributing to gait deviations include impaired balance.    Activity Tolerance:  Patient tolerated treatment well    Patient left up in chair with all lines intact, call button in reach and daughter present.    Assessment:  Mayra Parker is a 72 y.o. female with a medical diagnosis of Hypotension. She presents with impaired functional mobility, poor endurance, and generalized weakness.    Rehab potential is good.    Activity tolerance: Good    Discharge recommendations: Discharge Facility/Level of Care Needs: rehabilitation facility, nursing facility, skilled     Equipment recommendations: Equipment Needed After Discharge: none     GOALS:   Multidisciplinary Problems     Physical Therapy Goals        Problem: Physical Therapy    Goal Priority Disciplines Outcome Goal Variances Interventions   Physical Therapy Goal     PT, PT/OT Ongoing, Progressing     Description: Goals to be met by: 22     Patient will increase functional independence with mobility by performin. Supine to sit with  [place]  2. Sit to supine with [place]  3. Sit to stand transfer with Stand-by Assistance  4. Gait  x 100 feet with Stand-by Assistance using Rolling Walker.                      PLAN:    Patient to be seen daily  to address the above listed problems via gait training, therapeutic activities, therapeutic exercises  Plan of Care expires: 08/01/22  Plan of Care reviewed with: patient         7/4/2022

## 2022-07-05 LAB
BACTERIA BLD CULT: NORMAL
BACTERIA BLD CULT: NORMAL
POCT GLUCOSE: 193 MG/DL (ref 70–110)
POCT GLUCOSE: 237 MG/DL (ref 70–110)
POCT GLUCOSE: 274 MG/DL (ref 70–110)
POCT GLUCOSE: 361 MG/DL (ref 70–110)

## 2022-07-05 PROCEDURE — 25000003 PHARM REV CODE 250: Performed by: INTERNAL MEDICINE

## 2022-07-05 PROCEDURE — G0378 HOSPITAL OBSERVATION PER HR: HCPCS

## 2022-07-05 PROCEDURE — 63600175 PHARM REV CODE 636 W HCPCS: Performed by: INTERNAL MEDICINE

## 2022-07-05 PROCEDURE — 96372 THER/PROPH/DIAG INJ SC/IM: CPT | Performed by: INTERNAL MEDICINE

## 2022-07-05 RX ORDER — AMLODIPINE BESYLATE 5 MG/1
10 TABLET ORAL DAILY
Status: DISCONTINUED | OUTPATIENT
Start: 2022-07-05 | End: 2022-07-07 | Stop reason: HOSPADM

## 2022-07-05 RX ORDER — CARVEDILOL 3.12 MG/1
3.12 TABLET ORAL 2 TIMES DAILY
Status: DISCONTINUED | OUTPATIENT
Start: 2022-07-05 | End: 2022-07-07 | Stop reason: HOSPADM

## 2022-07-05 RX ADMIN — ASPIRIN 81 MG: 81 TABLET, COATED ORAL at 09:07

## 2022-07-05 RX ADMIN — CLOPIDOGREL 75 MG: 75 TABLET, FILM COATED ORAL at 09:07

## 2022-07-05 RX ADMIN — ATORVASTATIN CALCIUM 40 MG: 40 TABLET, FILM COATED ORAL at 08:07

## 2022-07-05 RX ADMIN — INSULIN ASPART 3 UNITS: 100 INJECTION, SOLUTION INTRAVENOUS; SUBCUTANEOUS at 09:07

## 2022-07-05 RX ADMIN — SITAGLIPTIN 100 MG: 100 TABLET, FILM COATED ORAL at 09:07

## 2022-07-05 RX ADMIN — CARVEDILOL 3.12 MG: 3.12 TABLET, FILM COATED ORAL at 08:07

## 2022-07-05 RX ADMIN — DONEPEZIL HYDROCHLORIDE 5 MG: 5 TABLET, FILM COATED ORAL at 09:07

## 2022-07-05 RX ADMIN — INSULIN ASPART 2 UNITS: 100 INJECTION, SOLUTION INTRAVENOUS; SUBCUTANEOUS at 04:07

## 2022-07-05 RX ADMIN — DULOXETINE 60 MG: 30 CAPSULE, DELAYED RELEASE ORAL at 09:07

## 2022-07-05 RX ADMIN — AMLODIPINE BESYLATE 10 MG: 5 TABLET ORAL at 09:07

## 2022-07-05 RX ADMIN — INSULIN ASPART 10 UNITS: 100 INJECTION, SOLUTION INTRAVENOUS; SUBCUTANEOUS at 11:07

## 2022-07-05 RX ADMIN — ENOXAPARIN SODIUM 40 MG: 40 INJECTION SUBCUTANEOUS at 04:07

## 2022-07-05 RX ADMIN — CARVEDILOL 3.12 MG: 3.12 TABLET, FILM COATED ORAL at 09:07

## 2022-07-05 RX ADMIN — PANTOPRAZOLE SODIUM 40 MG: 40 TABLET, DELAYED RELEASE ORAL at 09:07

## 2022-07-05 NOTE — PROGRESS NOTES
[facility] Hospital Medicine Progress Note        Chief Complaint: Inpatient Follow-up for Hypotension    HPI:   72-year-old female with medical history of ischemic cardiomyopathy EF 45- 50%, CAD with recent stent placement in May 2022, and recent hospitalization for hypotension, UTI and ANIYA on 6/26/2022, her blood pressure improved with IV hydration and was discharged home on cefdinir on 6/27/2022.  Since then she has not been eating or drinking much and sleeping most of the day.  Today had a PCP office visit and was noted to be hypotensive and was sent to the ED.  Patient herself denies any complaint aside feeling generally weak.  Denies chest pain, shortness breath, cough, nausea vomiting or diarrhea, fever or chills.    On arrival to ED she was afebrile, BP 84/56 and saturating 95% on room air. Labs notable for lactic acid 4.5, WBC 5.6, Hgb 11.3, K 5.6, CO2 20, Cr 1.22.  CT abdomen pelvis show no acute abnormalities.  Chest x-ray unremarkable. She was given 1 L of LR and her blood pressure improved as well as lactic acid.  Referred to hospital medicine service for further evaluation and management.    Interval Hx:     No acute events overnight.  Comfortably resting in the bed.  Alert and oriented.  Ambulating with help of a walker.  Tolerating diet.      Objective/physical exam:  Vitals:    07/04/22 1527 07/04/22 2014 07/04/22 2319 07/05/22 0332   BP: 135/77 127/79 (!) 156/73 (!) 184/82   Pulse: 86 88 77 77   Resp: 18 10 18 18   Temp: 98.6 °F (37 °C) 98.4 °F (36.9 °C) 98.7 °F (37.1 °C)    TempSrc: Oral Oral Oral    SpO2: 97% 97% 95% 98%   Weight:       Height:         General: In no acute distress, afebrile  Respiratory: Clear to auscultation bilaterally  Cardiovascular: S1, S2, no appreciable murmur  Abdomen: Soft, nontender, BS +  MSK: Warm, no lower extremity edema, no clubbing or cyanosis  Neurologic: Alert and oriented x4, moving all extremities with good strength     Lab  Results   Component Value Date     07/03/2022    K 4.2 07/03/2022    CO2 23 07/03/2022    BUN 7.0 (L) 07/03/2022    CREATININE 0.99 07/03/2022    CALCIUM 9.4 07/03/2022    EGFRNONAA >60 10/28/2019      Lab Results   Component Value Date    ALT 69 (H) 07/03/2022    AST 59 (H) 07/03/2022    ALKPHOS 64 07/03/2022    BILITOT 0.9 07/03/2022      Lab Results   Component Value Date    WBC 4.1 (L) 07/03/2022    HGB 10.6 (L) 07/03/2022    HCT 34.1 (L) 07/03/2022    MCV 94.2 (H) 07/03/2022     07/03/2022           Medications:   amLODIPine  10 mg Oral Daily    aspirin  81 mg Oral Daily    atorvastatin  40 mg Oral QHS    carvediloL  3.125 mg Oral BID    clopidogreL  75 mg Oral Daily    donepeziL  5 mg Oral QHS    DULoxetine  60 mg Oral Daily    enoxaparin  40 mg Subcutaneous Daily    ergocalciferol  50,000 Units Oral Q7 Days    pantoprazole  40 mg Oral Daily    SITagliptin  100 mg Oral Daily      acetaminophen, acetaminophen, aluminum-magnesium hydroxide-simethicone, dextrose 10%, dextrose 10%, glucagon (human recombinant), glucose, glucose, insulin aspart U-100, melatonin, ondansetron, polyethylene glycol, prochlorperazine, senna-docusate 8.6-50 mg, simethicone, sodium chloride 0.9%     Assessment/Plan:    1. Hypotension due to hypovolemia/dehydration- resolving  2. Lactic acidosis- secondary to above, improved  3. Acute kidney injury, improving  4. Hyperkalemia - resolved  5. UTI (POA) -Klebsiella and Citrobacter pan-sensitive 6/26/2022   6. Chronic HFrEF-45%- compensated  7. Normocytic anemia - stable  8. Deconditioning, gen weakness     HX CAD/PCI, T2DM, HTN, HLD, CVA, dementia, depression    Plan:  - Resume amlodipine and coreg. ACEi yet to be resumed. low-salt diet  - Complete treatment for UTI  - Other appropriate home medications were reviewed and renewed. Added vit D     PT  placement      Sixto Washington MD

## 2022-07-05 NOTE — PLAN OF CARE
Spoke to patient's daughter about therapy recommendation she was at [facility] in the past for rehab and is open to her going there again. She has a test scheduled at [facility] tomorrow. She will call them and see if they still want to her or if t can be done at rehab and get back to me

## 2022-07-06 LAB
LEFT ABI: 0.82
LEFT DORSALIS PEDIS: 90 MMHG
LEFT POSTERIOR TIBIAL: 90 MMHG
POCT GLUCOSE: 191 MG/DL (ref 70–110)
POCT GLUCOSE: 343 MG/DL (ref 70–110)
POCT GLUCOSE: 350 MG/DL (ref 70–110)
POCT GLUCOSE: 405 MG/DL (ref 70–110)
RIGHT ABI: 0.82
RIGHT ARM BP: 110 MMHG
RIGHT DORSALIS PEDIS: 90 MMHG
RIGHT POSTERIOR TIBIAL: 90 MMHG

## 2022-07-06 PROCEDURE — 63600175 PHARM REV CODE 636 W HCPCS: Performed by: INTERNAL MEDICINE

## 2022-07-06 PROCEDURE — G0378 HOSPITAL OBSERVATION PER HR: HCPCS

## 2022-07-06 PROCEDURE — 25000003 PHARM REV CODE 250: Performed by: INTERNAL MEDICINE

## 2022-07-06 PROCEDURE — 96372 THER/PROPH/DIAG INJ SC/IM: CPT | Performed by: INTERNAL MEDICINE

## 2022-07-06 RX ADMIN — ACETAMINOPHEN 1000 MG: 500 TABLET, FILM COATED ORAL at 11:07

## 2022-07-06 RX ADMIN — ENOXAPARIN SODIUM 40 MG: 40 INJECTION SUBCUTANEOUS at 05:07

## 2022-07-06 RX ADMIN — DULOXETINE 60 MG: 30 CAPSULE, DELAYED RELEASE ORAL at 08:07

## 2022-07-06 RX ADMIN — AMLODIPINE BESYLATE 10 MG: 5 TABLET ORAL at 08:07

## 2022-07-06 RX ADMIN — DONEPEZIL HYDROCHLORIDE 5 MG: 5 TABLET, FILM COATED ORAL at 08:07

## 2022-07-06 RX ADMIN — ASPIRIN 81 MG: 81 TABLET, COATED ORAL at 08:07

## 2022-07-06 RX ADMIN — INSULIN ASPART 5 UNITS: 100 INJECTION, SOLUTION INTRAVENOUS; SUBCUTANEOUS at 09:07

## 2022-07-06 RX ADMIN — CLOPIDOGREL 75 MG: 75 TABLET, FILM COATED ORAL at 08:07

## 2022-07-06 RX ADMIN — CARVEDILOL 3.12 MG: 3.12 TABLET, FILM COATED ORAL at 08:07

## 2022-07-06 RX ADMIN — PANTOPRAZOLE SODIUM 40 MG: 40 TABLET, DELAYED RELEASE ORAL at 08:07

## 2022-07-06 RX ADMIN — INSULIN ASPART 2 UNITS: 100 INJECTION, SOLUTION INTRAVENOUS; SUBCUTANEOUS at 06:07

## 2022-07-06 RX ADMIN — SITAGLIPTIN 100 MG: 100 TABLET, FILM COATED ORAL at 08:07

## 2022-07-06 RX ADMIN — ATORVASTATIN CALCIUM 40 MG: 40 TABLET, FILM COATED ORAL at 08:07

## 2022-07-06 NOTE — PROGRESS NOTES
Pt in restroom with CNA preparing to shower. Requested therapy return this afternoon. PT to follow up as schedule permits.

## 2022-07-06 NOTE — PLAN OF CARE
FERN followed up with patient and her daughter, Cynthia regarding denial from [facility] for rehab services. Cynthia noted that she will not accept any SNF services given that her brother passed away in 2019 after seeking treatment from SNF. She stated that her brother went into a SNF facility with a walker and ended in a wheelchair..passed away shortly after. Patient's daughter was tearful and voiced feelings of not being heard by physicians etc. Patient is expected to have an ultrasound of her legs at this time.

## 2022-07-06 NOTE — PLAN OF CARE
Amada spoke with patient's daughter, Cynthia Pratt regarding a decision to seek rehab treatment. She noted she would like to use [facility]. AMADA sent referral to selected facility via [facility].

## 2022-07-06 NOTE — PROGRESS NOTES
[facility] - 5th Floor Med Surg  Adult Nutrition  Progress Note    SUMMARY       Recommendations    Recommendation/Intervention:   1. Continue diabetic diet as ordered.   2. Medical management of hyperglycemia.   3. Add Boost Glucose Control to encourage intake. (250kcals, 14g protein per serving).      Assessment and Plan  Nutrition Problem  Inadequate Oral Intake    Related to (etiology):   dementia    Signs and Symptoms (as evidenced by):   <75% nutritional needs >/=1 month    Interventions(treatment strategy):  Modified composition of meals / snacks, Commercial beverage, Prescription Medication and Collaboration with other providers    Nutrition Diagnosis Status:   New      Goals: 1. Maintain wt throughout hospitalization. 2. Meet >75% nutritional needs by follow-up.  Nutrition Goal Status: new    Malnutrition Assessment    Malnutrition in the context of chronic illness    Degree of Malnutrition:  Unable to Complete  Energy Intake:  < 75% of estimated energy requirement for >/= 1 month  Interpretation of Weight Loss:  does not meet criteria  Body Fat: unable to obtain  Area of Body Fat Loss:  unable to obtain  Muscle Mass Loss:  unable to obtain  Area of Muscle Mass Loss: unable to obtain  Fluid Accumulation:  does not meet criteria  Edema: not applicable  Reduced  Strength:  unable to obtain    A minimum of two characteristics is recommended for diagnosis of either severe or non-severe malnutrition.      Reason for Assessment    Reason For Assessment: length of stay  Diagnosis:    1. Hypotension due to hypovolemia/dehydration- resolving    2. Lactic acidosis- secondary to above, improved    3. Acute kidney injury, improving     4. UTI (POA)   5. Chronic HFrEF-45%- compensated    6. Deconditioning, gen weakness  Relevant Medical History: CAD/PCI, T2DM, HTN, HLD, CVA, dementia, depression    General Information Comments:   7/6: Pt in shower, talked to daughter. States pt with fluctating  "appetite s/t dementia, some days with great appetite and other days does not want to eat. Agreeable to trialing oral supplement to encourage po intake on days with reduced intake. No GI complaints. NFPA on follow-up.     Nutrition Risk Screen    Nutrition Risk Screen: no indicators present    Nutrition/Diet History    Spiritual, Cultural Beliefs, Zoroastrian Practices, Values that Affect Care: no    Anthropometrics    Temp: 98.4 °F (36.9 °C)  Height Method: Stated  Height: 5' 4.02" (162.6 cm)  Height (inches): 64.02 in  Weight Method: Bed Scale  Weight: 73.5 kg (162 lb)  Weight (lb): 162 lb  Ideal Body Weight (IBW), Female: 120.1 lb  % Ideal Body Weight, Female (lb): 134.89 %  BMI (Calculated): 27.8  BMI Grade: 25 - 29.9 - overweight       Lab/Procedures/Meds    Pertinent Labs Reviewed: reviewed  Pertinent Labs Comments: 7/6: CBGs 191-361H  Pertinent Medications Reviewed: reviewed  Pertinent Medications Comments: vit D, protonix, sitagliptin. PRN: SSI      Estimated/Assessed Needs    Weight Used For Calorie Calculations: 73.5 kg (162 lb 0.6 oz)  Energy Calorie Requirements (kcal): 1837kcals/d (25kcals/kg)  Energy Need Method: Kcal/kg  Protein Requirements: 88g/d (1.2g/kg)  Weight Used For Protein Calculations: 73.5 kg (162 lb 0.6 oz)     Estimated Fluid Requirement Method: RDA Method  RDA Method (mL): 1837     Nutrition Prescription Ordered    Current Diet Order: diabetic    Evaluation of Received Nutrient/Fluid Intake    Tolerance: tolerating  % Intake of Estimated Energy Needs: 50 - 75 %  % Meal Intake: 50 - 75 %    Nutrition Risk    Level of Risk/Frequency of Follow-up: moderate     Monitor and Evaluation    Food and Nutrient Intake: food and beverage intake  Anthropometric Measurements: weight change  Biochemical Data, Medical Tests and Procedures: glucose/endocrine profile, electrolyte and renal panel     Nutrition Follow-Up    RD Follow-up?: Yes    "

## 2022-07-06 NOTE — PROGRESS NOTES
[facility] Hospital Medicine Progress Note        Chief Complaint: Inpatient Follow-up for Hypotension    HPI:   72-year-old female with medical history of ischemic cardiomyopathy EF 45- 50%, CAD with recent stent placement in May 2022, and recent hospitalization for hypotension, UTI and ANIYA on 6/26/2022, her blood pressure improved with IV hydration and was discharged home on cefdinir on 6/27/2022.  Since then she has not been eating or drinking much and sleeping most of the day.  Today had a PCP office visit and was noted to be hypotensive and was sent to the ED.  Patient herself denies any complaint aside feeling generally weak.  Denies chest pain, shortness breath, cough, nausea vomiting or diarrhea, fever or chills.    On arrival to ED she was afebrile, BP 84/56 and saturating 95% on room air. Labs notable for lactic acid 4.5, WBC 5.6, Hgb 11.3, K 5.6, CO2 20, Cr 1.22.  CT abdomen pelvis show no acute abnormalities.  Chest x-ray unremarkable. She was given 1 L of LR and her blood pressure improved as well as lactic acid.  Referred to hospital medicine service for further evaluation and management.    Urine cultures grew Klebsiella and Citrobacter for which antibiotics were continued.  Further work-up revealed EF 45%.  IV hydration was continued.  Antihypertensive regimen was adjusted and blood pressure getting better.  Hyperkalemia had resolved.  Patient was deconditioned and PT recommended placement.  She is currently awaiting placement      Interval Hx:     Hemodynamically stable.  Blood pressure better.  Comfortably ambulating in the room with support.  No new issues.  Tolerating p.o. diet.    Objective/physical exam:  Vitals:    07/05/22 1142 07/05/22 1539 07/05/22 2156 07/06/22 0417   BP: 119/76 125/77 (!) 152/74 130/70   BP Location: Right arm Left arm Left arm    Patient Position: Lying Lying Lying    Pulse: 87 80 79 70   Resp: 18 18 18 18   Temp: 97.9 °F (36.6 °C) 98.5 °F  (36.9 °C) 98.5 °F (36.9 °C) 98.4 °F (36.9 °C)   TempSrc: Oral Oral Oral    SpO2: 98% 97% 97% 96%   Weight:       Height:         General: In no acute distress, afebrile  Respiratory: Clear to auscultation bilaterally  Cardiovascular: S1, S2, no appreciable murmur  Abdomen: Soft, nontender, BS +  MSK: Warm, no lower extremity edema, no clubbing or cyanosis  Neurologic: Alert and oriented x4, moving all extremities with good strength     Lab Results   Component Value Date     07/03/2022    K 4.2 07/03/2022    CO2 23 07/03/2022    BUN 7.0 (L) 07/03/2022    CREATININE 0.99 07/03/2022    CALCIUM 9.4 07/03/2022    EGFRNONAA >60 10/28/2019      Lab Results   Component Value Date    ALT 69 (H) 07/03/2022    AST 59 (H) 07/03/2022    ALKPHOS 64 07/03/2022    BILITOT 0.9 07/03/2022      Lab Results   Component Value Date    WBC 4.1 (L) 07/03/2022    HGB 10.6 (L) 07/03/2022    HCT 34.1 (L) 07/03/2022    MCV 94.2 (H) 07/03/2022     07/03/2022           Medications:   amLODIPine  10 mg Oral Daily    aspirin  81 mg Oral Daily    atorvastatin  40 mg Oral QHS    carvediloL  3.125 mg Oral BID    clopidogreL  75 mg Oral Daily    donepeziL  5 mg Oral QHS    DULoxetine  60 mg Oral Daily    enoxaparin  40 mg Subcutaneous Daily    ergocalciferol  50,000 Units Oral Q7 Days    pantoprazole  40 mg Oral Daily    SITagliptin  100 mg Oral Daily      acetaminophen, acetaminophen, aluminum-magnesium hydroxide-simethicone, dextrose 10%, dextrose 10%, glucagon (human recombinant), glucose, glucose, insulin aspart U-100, melatonin, ondansetron, polyethylene glycol, prochlorperazine, senna-docusate 8.6-50 mg, simethicone, sodium chloride 0.9%     Assessment/Plan:    1. Hypotension due to hypovolemia/dehydration- resolving  2. Lactic acidosis- secondary to above, improved  3. Acute kidney injury, improving  4. Hyperkalemia - resolved  5. UTI (POA) -Klebsiella and Citrobacter pan-sensitive 6/26/2022   6. Chronic HFrEF-45%-  compensated  7. Normocytic anemia - stable  8. Deconditioning, gen weakness     HX CAD/PCI, T2DM, HTN, HLD, CVA, dementia, depression    - Continue amlodipine and coreg. ACEi yet to be resumed. low-salt diet  - Completed treatment for UTI  - Other appropriate home medications were reviewed and renewed. Added vit D     PT  placement    Plan:Sixto Washington MD

## 2022-07-07 VITALS
TEMPERATURE: 98 F | HEIGHT: 64 IN | BODY MASS INDEX: 27.66 KG/M2 | OXYGEN SATURATION: 99 % | RESPIRATION RATE: 18 BRPM | WEIGHT: 162 LBS | DIASTOLIC BLOOD PRESSURE: 75 MMHG | HEART RATE: 71 BPM | SYSTOLIC BLOOD PRESSURE: 125 MMHG

## 2022-07-07 PROBLEM — I95.9 HYPOTENSION: Status: RESOLVED | Noted: 2022-06-26 | Resolved: 2022-07-07

## 2022-07-07 PROBLEM — E87.20 LACTIC ACIDEMIA: Status: RESOLVED | Noted: 2022-06-30 | Resolved: 2022-07-07

## 2022-07-07 LAB
POCT GLUCOSE: 196 MG/DL (ref 70–110)
POCT GLUCOSE: 263 MG/DL (ref 70–110)

## 2022-07-07 PROCEDURE — 25000003 PHARM REV CODE 250: Performed by: INTERNAL MEDICINE

## 2022-07-07 PROCEDURE — G0378 HOSPITAL OBSERVATION PER HR: HCPCS

## 2022-07-07 PROCEDURE — 63600175 PHARM REV CODE 636 W HCPCS: Performed by: INTERNAL MEDICINE

## 2022-07-07 PROCEDURE — 96372 THER/PROPH/DIAG INJ SC/IM: CPT | Performed by: INTERNAL MEDICINE

## 2022-07-07 RX ORDER — POLYETHYLENE GLYCOL 3350 17 G/17G
17 POWDER, FOR SOLUTION ORAL 2 TIMES DAILY PRN
Qty: 12 PACKET | Refills: 0 | Status: SHIPPED | OUTPATIENT
Start: 2022-07-07 | End: 2022-08-06

## 2022-07-07 RX ORDER — AMOXICILLIN 250 MG
1 CAPSULE ORAL 2 TIMES DAILY PRN
Qty: 60 TABLET | Refills: 0 | Status: SHIPPED | OUTPATIENT
Start: 2022-07-07 | End: 2022-08-06

## 2022-07-07 RX ORDER — ERGOCALCIFEROL 1.25 MG/1
50000 CAPSULE ORAL
Qty: 4 CAPSULE | Refills: 2 | Status: ON HOLD | OUTPATIENT
Start: 2022-07-10 | End: 2022-10-12 | Stop reason: HOSPADM

## 2022-07-07 RX ADMIN — PANTOPRAZOLE SODIUM 40 MG: 40 TABLET, DELAYED RELEASE ORAL at 09:07

## 2022-07-07 RX ADMIN — CARVEDILOL 3.12 MG: 3.12 TABLET, FILM COATED ORAL at 09:07

## 2022-07-07 RX ADMIN — AMLODIPINE BESYLATE 10 MG: 5 TABLET ORAL at 09:07

## 2022-07-07 RX ADMIN — CLOPIDOGREL 75 MG: 75 TABLET, FILM COATED ORAL at 09:07

## 2022-07-07 RX ADMIN — SITAGLIPTIN 100 MG: 100 TABLET, FILM COATED ORAL at 09:07

## 2022-07-07 RX ADMIN — DULOXETINE 60 MG: 30 CAPSULE, DELAYED RELEASE ORAL at 09:07

## 2022-07-07 RX ADMIN — INSULIN ASPART 6 UNITS: 100 INJECTION, SOLUTION INTRAVENOUS; SUBCUTANEOUS at 06:07

## 2022-07-07 RX ADMIN — ASPIRIN 81 MG: 81 TABLET, COATED ORAL at 09:07

## 2022-07-07 NOTE — DISCHARGE SUMMARY
[facility] - 5th Floor [facility] Medicine  Discharge Summary      Patient Name: Mayra Parker  MRN: [id number]  Patient Class: OP- Observation  Admission Date: 6/30/2022  Hospital Length of Stay: 0 days  Discharge Date and Time:  07/07/2022 10:38 AM  Attending Physician: Maine Riggs MD   Discharging Provider: Sixto Washington MD  Primary Care Provider: Rashaun Weiss MD    72-year-old female with medical history of ischemic cardiomyopathy EF 45- 50%, CAD with recent stent placement in May 2022, and recent hospitalization for hypotension, UTI and ANIYA on 6/26/2022, her blood pressure improved with IV hydration and was discharged home on cefdinir on 6/27/2022.  Since then she has not been eating or drinking much and sleeping most of the day. She had a PCP office visit and was noted to be hypotensive and was sent to the ED.  Patient herself denied any complaint aside feeling generally weak.  Denied chest pain, shortness breath, cough, nausea vomiting or diarrhea, fever or chills.     On arrival to ED she was afebrile, BP 84/56 and saturating 95% on room air. Labs notable for lactic acid 4.5, WBC 5.6, Hgb 11.3, K 5.6, CO2 20, Cr 1.22.  CT abdomen pelvis show no acute abnormalities.  Chest x-ray unremarkable. She was given 1 L of LR and her blood pressure improved as well as lactic acid.  Referred to hospital medicine service for further evaluation and management.      IV hydration was continued. Urine cultures grew Klebsiella and Citrobacter for which antibiotics were continued.  Further work-up revealed EF 45%.   Antihypertensive regimen was adjusted and blood pressure improved. Hyperkalemia had resolved. She completed antibiotics for UTI.  Patient was deconditioned and PT recommended placement but family refused due to prior bad experience with other family members. She is medically stable for discharge to home with home health.  Prior to discharge all her medications were reconciled  and necessary prescriptions were provided.  Encourage patient to follow with PCP      1. Hypotension due to hypovolemia/dehydration- resolved  2. Lactic acidosis- secondary to above, improved  3. Acute kidney injury, improving  4. Hyperkalemia - resolved  5. UTI (POA) -Klebsiella and Citrobacter pan-sensitive 6/26/2022  S/p Rx  6. Chronic HFrEF-45%- compensated  7. Normocytic anemia - stable  8. Deconditioning, gen weakness     HX CAD/PCI, T2DM, HTN, HLD, CVA, dementia, depression       Goals of Care Treatment Preferences:  Code Status: Full Code      Consults:     No new Assessment & Plan notes have been filed under this hospital service since the last note was generated.  Service: Hospital Medicine    Final Active Diagnoses:      Problems Resolved During this Admission:    Diagnosis Date Noted Date Resolved POA    PRINCIPAL PROBLEM:  Hypotension [I95.9] 06/26/2022 07/07/2022 Yes    Lactic acidemia [E87.2] 06/30/2022 07/07/2022 Yes       Discharged Condition: good    Disposition: Home-Health Care Svc    Follow Up:   Follow-up Information     Rashaun Weiss MD Follow up in 1 week(s).    Specialty: Family Medicine  Contact information:  [address]  [facility] [id number] [phone]             [facility] Follow up.    Specialties: Home Health Services, Home Therapy Services, Home Living Aide Services  Why: Your home health company will see you the day after discharge  Contact information:  [address] [id number] [phone]                     Patient Instructions:      SUBSEQUENT HOME HEALTH ORDERS   Order Comments: Evaluate and treat for all services     Order Specific Question Answer Comments   What Home Health Agency is the patient currently using? Other/External        Significant Diagnostic Studies: Labs: [facility] No results for input(s): NA, K, CL, CO2, GLU, BUN, CREATININE, CALCIUM, PROT, ALBUMIN, BILITOT, ALKPHOS, AST, ALT, ANIONGAP, ESTGFRAFRICA,  EGFRNONAA in the last 48 hours.    Pending Diagnostic Studies:     Procedure Component Value Units Date/Time    Occult Blood, Stool 2nd Specimen [[id number]]     Order Status: Sent Lab Status: No result     Specimen: Stool     Occult Blood, Stool, Diagnostic (1-3) [[id number]]     Order Status: Sent Lab Status: No result     Specimen: Stool     Narrative:      The following orders were created for panel order Occult Blood, Stool, Diagnostic (1-3).  Procedure                               Abnormality         Status                     ---------                               -----------         ------                     Occult blood x 3, stool[[id number]]                                                     Occult Blood, Stool 2nd ...[[id number]]                                                   Please view results for these tests on the individual orders.    Occult blood x 3, stool [[id number]]     Order Status: Sent Lab Status: No result     Specimen: Stool          Medications:  Reconciled Home Medications:      Medication List      START taking these medications    ergocalciferol 50,000 unit Cap  Commonly known as: ERGOCALCIFEROL  Take 1 capsule (50,000 Units total) by mouth every 7 days. for 12 doses  Start taking on: July 10, 2022     polyethylene glycol 17 gram Pwpk  Commonly known as: GLYCOLAX  Take 17 g by mouth 2 (two) times daily as needed.     senna-docusate 8.6-50 mg 8.6-50 mg per tablet  Commonly known as: PERICOLACE  Take 1 tablet by mouth 2 (two) times daily as needed for Constipation.        CONTINUE taking these medications    amLODIPine 10 MG tablet  Commonly known as: NORVASC  Take 1 tablet (10 mg total) by mouth once daily.     aspirin 81 MG EC tablet  Commonly known as: ECOTRIN  Take 81 mg by mouth once daily.     atorvastatin 40 MG tablet  Commonly known as: LIPITOR  Take 40 mg by mouth every evening.     carvediloL 3.125 MG tablet  Commonly known as: COREG  Take 1 tablet (3.125 mg total) by mouth 2  (two) times daily.     clopidogreL 75 mg tablet  Commonly known as: PLAVIX  Take 1 tablet (75 mg total) by mouth once daily.     donepeziL 5 MG tablet  Commonly known as: ARICEPT  Take 1 tablet (5 mg total) by mouth every evening.     DULoxetine 60 MG capsule  Commonly known as: CYMBALTA  Take 1 capsule (60 mg total) by mouth once daily.     magnesium oxide 400 mg (241.3 mg magnesium) tablet  Commonly known as: MAG-OX  Take 1 tablet (400 mg total) by mouth 2 (two) times daily.     metFORMIN 1000 MG tablet  Commonly known as: GLUCOPHAGE  Take 1 tablet (1,000 mg total) by mouth 2 (two) times daily with meals.     pantoprazole 40 MG tablet  Commonly known as: PROTONIX  Take 40 mg by mouth once daily.     SITagliptin 100 MG Tab  Commonly known as: JANUVIA  Take 1 tablet (100 mg total) by mouth once daily.     traMADoL 50 mg tablet  Commonly known as: ULTRAM  Take 1 tablet (50 mg total) by mouth every 4 (four) hours as needed (for pain).        STOP taking these medications    cefdinir 300 MG capsule  Commonly known as: OMNICEF     losartan 25 MG tablet  Commonly known as: COZAAR     pregabalin 75 MG capsule  Commonly known as: LYRICA            Indwelling Lines/Drains at time of discharge:   Lines/Drains/Airways     Drain  Duration                Sheath 05/20/22 1018 Right 47 days                Time spent on the discharge of patient: 35 minutes         Sixto Washington MD  Department of Hospital Medicine  [facility] - 5th Floor Med Surg

## 2022-09-22 ENCOUNTER — LAB REQUISITION (OUTPATIENT)
Dept: LAB | Facility: HOSPITAL | Age: 72
End: 2022-09-22
Payer: MEDICARE

## 2022-09-22 DIAGNOSIS — I25.5 ISCHEMIC CARDIOMYOPATHY: ICD-10-CM

## 2022-09-22 DIAGNOSIS — M54.16 RADICULOPATHY, LUMBAR REGION: ICD-10-CM

## 2022-09-22 DIAGNOSIS — I95.9 HYPOTENSION, UNSPECIFIED: ICD-10-CM

## 2022-09-22 DIAGNOSIS — I50.22 CHRONIC SYSTOLIC (CONGESTIVE) HEART FAILURE: ICD-10-CM

## 2022-09-22 DIAGNOSIS — I70.213 ATHEROSCLEROSIS OF NATIVE ARTERIES OF EXTREMITIES WITH INTERMITTENT CLAUDICATION, BILATERAL LEGS: ICD-10-CM

## 2022-09-22 DIAGNOSIS — E11.51 TYPE 2 DIABETES MELLITUS WITH DIABETIC PERIPHERAL ANGIOPATHY WITHOUT GANGRENE: ICD-10-CM

## 2022-09-22 DIAGNOSIS — I13.0 HYPERTENSIVE HEART AND CHRONIC KIDNEY DISEASE WITH HEART FAILURE AND STAGE 1 THROUGH STAGE 4 CHRONIC KIDNEY DISEASE, OR UNSPECIFIED CHRONIC KIDNEY DISEASE: ICD-10-CM

## 2022-09-22 DIAGNOSIS — D63.1 ANEMIA IN CHRONIC KIDNEY DISEASE (CODE): ICD-10-CM

## 2022-09-22 DIAGNOSIS — I25.10 ATHEROSCLEROTIC HEART DISEASE OF NATIVE CORONARY ARTERY WITHOUT ANGINA PECTORIS: ICD-10-CM

## 2022-09-22 DIAGNOSIS — N18.31 CHRONIC KIDNEY DISEASE, STAGE 3A: ICD-10-CM

## 2022-09-22 LAB
ALBUMIN SERPL-MCNC: 3.4 GM/DL (ref 3.4–4.8)
ALBUMIN/GLOB SERPL: 0.9 RATIO (ref 1.1–2)
ALP SERPL-CCNC: 125 UNIT/L (ref 40–150)
ALT SERPL-CCNC: 60 UNIT/L (ref 0–55)
AST SERPL-CCNC: 96 UNIT/L (ref 5–34)
BASOPHILS # BLD AUTO: 0.02 X10(3)/MCL (ref 0–0.2)
BASOPHILS NFR BLD AUTO: 0.5 %
BILIRUBIN DIRECT+TOT PNL SERPL-MCNC: 1 MG/DL
BUN SERPL-MCNC: 17.7 MG/DL (ref 9.8–20.1)
CALCIUM SERPL-MCNC: 10 MG/DL (ref 8.4–10.2)
CHLORIDE SERPL-SCNC: 108 MMOL/L (ref 98–107)
CO2 SERPL-SCNC: 23 MMOL/L (ref 23–31)
CREAT SERPL-MCNC: 1.15 MG/DL (ref 0.55–1.02)
CREAT UR-MCNC: 40.3 MG/DL (ref 47–110)
DEPRECATED CALCIDIOL+CALCIFEROL SERPL-MC: 24.9 NG/ML (ref 30–80)
EOSINOPHIL # BLD AUTO: 0.12 X10(3)/MCL (ref 0–0.9)
EOSINOPHIL NFR BLD AUTO: 3 %
ERYTHROCYTE [DISTWIDTH] IN BLOOD BY AUTOMATED COUNT: 14.2 % (ref 11.5–17)
FERRITIN SERPL-MCNC: 186.35 NG/ML (ref 4.63–204)
GFR SERPLBLD CREATININE-BSD FMLA CKD-EPI: 51 MLS/MIN/1.73/M2
GLOBULIN SER-MCNC: 3.7 GM/DL (ref 2.4–3.5)
GLUCOSE SERPL-MCNC: 222 MG/DL (ref 82–115)
HCT VFR BLD AUTO: 40 % (ref 37–47)
HGB BLD-MCNC: 12.1 GM/DL (ref 12–16)
IMM GRANULOCYTES # BLD AUTO: 0.01 X10(3)/MCL (ref 0–0.04)
IMM GRANULOCYTES NFR BLD AUTO: 0.3 %
LYMPHOCYTES # BLD AUTO: 1.26 X10(3)/MCL (ref 0.6–4.6)
LYMPHOCYTES NFR BLD AUTO: 31.5 %
MAGNESIUM SERPL-MCNC: 1.7 MG/DL (ref 1.6–2.6)
MCH RBC QN AUTO: 29.1 PG (ref 27–31)
MCHC RBC AUTO-ENTMCNC: 30.3 MG/DL (ref 33–36)
MCV RBC AUTO: 96.2 FL (ref 80–94)
MICROALBUMIN UR-MCNC: <5 UG/ML
MICROALBUMIN/CREAT RATIO PNL UR: <12.4 MG/GM CR (ref 0–30)
MONOCYTES # BLD AUTO: 0.44 X10(3)/MCL (ref 0.1–1.3)
MONOCYTES NFR BLD AUTO: 11 %
NEUTROPHILS # BLD AUTO: 2.2 X10(3)/MCL (ref 2.1–9.2)
NEUTROPHILS NFR BLD AUTO: 53.7 %
NRBC BLD AUTO-RTO: 0 %
PLATELET # BLD AUTO: 263 X10(3)/MCL (ref 130–400)
PMV BLD AUTO: 10.1 FL (ref 7.4–10.4)
POTASSIUM SERPL-SCNC: 5.3 MMOL/L (ref 3.5–5.1)
PROT SERPL-MCNC: 7.1 GM/DL (ref 5.8–7.6)
RBC # BLD AUTO: 4.16 X10(6)/MCL (ref 4.2–5.4)
SODIUM SERPL-SCNC: 141 MMOL/L (ref 136–145)
WBC # SPEC AUTO: 4 X10(3)/MCL (ref 4.5–11.5)

## 2022-09-22 PROCEDURE — 82043 UR ALBUMIN QUANTITATIVE: CPT | Performed by: FAMILY MEDICINE

## 2022-09-22 PROCEDURE — 82306 VITAMIN D 25 HYDROXY: CPT | Performed by: FAMILY MEDICINE

## 2022-09-22 PROCEDURE — 82728 ASSAY OF FERRITIN: CPT | Performed by: FAMILY MEDICINE

## 2022-09-22 PROCEDURE — 83735 ASSAY OF MAGNESIUM: CPT | Performed by: FAMILY MEDICINE

## 2022-09-22 PROCEDURE — 85025 COMPLETE CBC W/AUTO DIFF WBC: CPT | Performed by: FAMILY MEDICINE

## 2022-09-22 PROCEDURE — 80053 COMPREHEN METABOLIC PANEL: CPT | Performed by: FAMILY MEDICINE

## 2022-09-26 ENCOUNTER — HOSPITAL ENCOUNTER (OUTPATIENT)
Facility: HOSPITAL | Age: 72
Discharge: NURSING, REHAB OR LONG-TERM CARE | End: 2022-10-12
Attending: EMERGENCY MEDICINE | Admitting: INTERNAL MEDICINE
Payer: MEDICARE
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
## 2022-09-26 DIAGNOSIS — R29.6 MULTIPLE FALLS: ICD-10-CM
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
## 2022-09-26 DIAGNOSIS — M19.90 OSTEOARTHRITIS, UNSPECIFIED OSTEOARTHRITIS TYPE, UNSPECIFIED SITE: Primary | ICD-10-CM

## 2022-09-26 DIAGNOSIS — R20.2 BILATERAL LEG PARESTHESIA: ICD-10-CM

## 2022-09-26 LAB
ALBUMIN SERPL-MCNC: 3.4 GM/DL (ref 3.4–4.8)
ALBUMIN/GLOB SERPL: 0.9 RATIO (ref 1.1–2)
ALP SERPL-CCNC: 130 UNIT/L (ref 40–150)
ALT SERPL-CCNC: 60 UNIT/L (ref 0–55)
APPEARANCE UR: CLEAR
AST SERPL-CCNC: 88 UNIT/L (ref 5–34)
BACTERIA #/AREA URNS AUTO: NORMAL /HPF
BASOPHILS # BLD AUTO: 0.05 X10(3)/MCL (ref 0–0.2)
BASOPHILS NFR BLD AUTO: 1 %
BILIRUB UR QL STRIP.AUTO: NEGATIVE MG/DL
BILIRUBIN DIRECT+TOT PNL SERPL-MCNC: 1.1 MG/DL
BUN SERPL-MCNC: 18.2 MG/DL (ref 9.8–20.1)
CALCIUM SERPL-MCNC: 8.6 MG/DL (ref 8.4–10.2)
CHLORIDE SERPL-SCNC: 100 MMOL/L (ref 98–107)
CO2 SERPL-SCNC: 25 MMOL/L (ref 23–31)
COLOR UR AUTO: YELLOW
CREAT SERPL-MCNC: 1.49 MG/DL (ref 0.55–1.02)
EOSINOPHIL # BLD AUTO: 0.15 X10(3)/MCL (ref 0–0.9)
EOSINOPHIL NFR BLD AUTO: 3.1 %
ERYTHROCYTE [DISTWIDTH] IN BLOOD BY AUTOMATED COUNT: 13.6 % (ref 11.5–17)
GFR SERPLBLD CREATININE-BSD FMLA CKD-EPI: 37 MLS/MIN/1.73/M2
GLOBULIN SER-MCNC: 3.6 GM/DL (ref 2.4–3.5)
GLUCOSE SERPL-MCNC: 243 MG/DL (ref 82–115)
GLUCOSE UR QL STRIP.AUTO: ABNORMAL MG/DL
HCT VFR BLD AUTO: 35.6 % (ref 37–47)
HGB BLD-MCNC: 11.2 GM/DL (ref 12–16)
IMM GRANULOCYTES # BLD AUTO: 0.01 X10(3)/MCL (ref 0–0.04)
IMM GRANULOCYTES NFR BLD AUTO: 0.2 %
KETONES UR QL STRIP.AUTO: NEGATIVE MG/DL
LEUKOCYTE ESTERASE UR QL STRIP.AUTO: NEGATIVE UNIT/L
LYMPHOCYTES # BLD AUTO: 1.22 X10(3)/MCL (ref 0.6–4.6)
LYMPHOCYTES NFR BLD AUTO: 25.5 %
MAGNESIUM SERPL-MCNC: 1.7 MG/DL (ref 1.6–2.6)
MCH RBC QN AUTO: 29 PG (ref 27–31)
MCHC RBC AUTO-ENTMCNC: 31.5 MG/DL (ref 33–36)
MCV RBC AUTO: 92.2 FL (ref 80–94)
MONOCYTES # BLD AUTO: 0.51 X10(3)/MCL (ref 0.1–1.3)
MONOCYTES NFR BLD AUTO: 10.6 %
NEUTROPHILS # BLD AUTO: 2.9 X10(3)/MCL (ref 2.1–9.2)
NEUTROPHILS NFR BLD AUTO: 59.6 %
NITRITE UR QL STRIP.AUTO: NEGATIVE
NRBC BLD AUTO-RTO: 0 %
PH UR STRIP.AUTO: 5.5 [PH]
PLATELET # BLD AUTO: 283 X10(3)/MCL (ref 130–400)
PMV BLD AUTO: 10.1 FL (ref 7.4–10.4)
POTASSIUM SERPL-SCNC: 5.2 MMOL/L (ref 3.5–5.1)
PROT SERPL-MCNC: 7 GM/DL (ref 5.8–7.6)
PROT UR QL STRIP.AUTO: NEGATIVE MG/DL
RBC # BLD AUTO: 3.86 X10(6)/MCL (ref 4.2–5.4)
RBC #/AREA URNS AUTO: <5 /HPF
RBC UR QL AUTO: NEGATIVE UNIT/L
SODIUM SERPL-SCNC: 133 MMOL/L (ref 136–145)
SP GR UR STRIP.AUTO: 1.01 (ref 1–1.03)
SQUAMOUS #/AREA URNS AUTO: <5 /HPF
TROPONIN I SERPL-MCNC: <0.01 NG/ML (ref 0–0.04)
UROBILINOGEN UR STRIP-ACNC: 0.2 MG/DL
WBC # SPEC AUTO: 4.8 X10(3)/MCL (ref 4.5–11.5)
WBC #/AREA URNS AUTO: <5 /HPF

## 2022-09-26 PROCEDURE — 81001 URINALYSIS AUTO W/SCOPE: CPT | Performed by: PHYSICIAN ASSISTANT

## 2022-09-26 PROCEDURE — 93005 ELECTROCARDIOGRAM TRACING: CPT

## 2022-09-26 PROCEDURE — 25000003 PHARM REV CODE 250: Performed by: EMERGENCY MEDICINE

## 2022-09-26 PROCEDURE — 96365 THER/PROPH/DIAG IV INF INIT: CPT

## 2022-09-26 PROCEDURE — 36415 COLL VENOUS BLD VENIPUNCTURE: CPT | Performed by: PHYSICIAN ASSISTANT

## 2022-09-26 PROCEDURE — 93010 ELECTROCARDIOGRAM REPORT: CPT | Mod: ,,, | Performed by: INTERNAL MEDICINE

## 2022-09-26 PROCEDURE — 80053 COMPREHEN METABOLIC PANEL: CPT | Performed by: PHYSICIAN ASSISTANT

## 2022-09-26 PROCEDURE — 84484 ASSAY OF TROPONIN QUANT: CPT | Performed by: PHYSICIAN ASSISTANT

## 2022-09-26 PROCEDURE — 93010 EKG 12-LEAD: ICD-10-PCS | Mod: ,,, | Performed by: INTERNAL MEDICINE

## 2022-09-26 PROCEDURE — 83735 ASSAY OF MAGNESIUM: CPT | Performed by: PHYSICIAN ASSISTANT

## 2022-09-26 PROCEDURE — 99285 EMERGENCY DEPT VISIT HI MDM: CPT | Mod: 25,CS

## 2022-09-26 PROCEDURE — 85025 COMPLETE CBC W/AUTO DIFF WBC: CPT | Performed by: PHYSICIAN ASSISTANT

## 2022-09-26 RX ORDER — EMPAGLIFLOZIN 10 MG/1
10 TABLET, FILM COATED ORAL DAILY
Status: ON HOLD | COMMUNITY
Start: 2022-08-21 | End: 2023-07-13 | Stop reason: HOSPADM

## 2022-09-26 RX ORDER — LIDOCAINE 50 MG/G
1 PATCH TOPICAL
Status: COMPLETED | OUTPATIENT
Start: 2022-09-26 | End: 2022-09-27

## 2022-09-26 RX ORDER — MIRTAZAPINE 7.5 MG/1
7.5 TABLET, FILM COATED ORAL NIGHTLY
Status: ON HOLD | COMMUNITY
Start: 2022-09-21 | End: 2022-10-12 | Stop reason: HOSPADM

## 2022-09-26 RX ORDER — CALCIUM GLUCONATE 20 MG/ML
1 INJECTION, SOLUTION INTRAVENOUS ONCE
Status: COMPLETED | OUTPATIENT
Start: 2022-09-26 | End: 2022-09-26

## 2022-09-26 RX ORDER — TRAMADOL HYDROCHLORIDE 50 MG/1
50 TABLET ORAL
Status: COMPLETED | OUTPATIENT
Start: 2022-09-26 | End: 2022-09-26

## 2022-09-26 RX ADMIN — CALCIUM GLUCONATE 1 G: 20 INJECTION, SOLUTION INTRAVENOUS at 08:09

## 2022-09-26 RX ADMIN — TRAMADOL HYDROCHLORIDE 50 MG: 50 TABLET, COATED ORAL at 09:09

## 2022-09-26 RX ADMIN — LIDOCAINE 1 PATCH: 50 PATCH TOPICAL at 09:09

## 2022-09-26 NOTE — Clinical Note
Diagnosis: Multiple falls [685256]   Future Attending Provider: FERNY HUTCHISON [[id number]]   Admitting Provider:: FERNY HUTCHISON [[id number]]

## 2022-09-27 PROBLEM — R29.6 MULTIPLE FALLS: Status: ACTIVE | Noted: 2022-09-27

## 2022-09-27 LAB
ALBUMIN SERPL-MCNC: 3.1 GM/DL (ref 3.4–4.8)
ALBUMIN/GLOB SERPL: 0.9 RATIO (ref 1.1–2)
ALP SERPL-CCNC: 114 UNIT/L (ref 40–150)
ALT SERPL-CCNC: 55 UNIT/L (ref 0–55)
AST SERPL-CCNC: 73 UNIT/L (ref 5–34)
BASOPHILS # BLD AUTO: 0.03 X10(3)/MCL (ref 0–0.2)
BASOPHILS NFR BLD AUTO: 0.7 %
BILIRUBIN DIRECT+TOT PNL SERPL-MCNC: 1.2 MG/DL
BUN SERPL-MCNC: 14.7 MG/DL (ref 9.8–20.1)
CALCIUM SERPL-MCNC: 8.7 MG/DL (ref 8.4–10.2)
CHLORIDE SERPL-SCNC: 108 MMOL/L (ref 98–107)
CO2 SERPL-SCNC: 24 MMOL/L (ref 23–31)
CREAT SERPL-MCNC: 1.24 MG/DL (ref 0.55–1.02)
EOSINOPHIL # BLD AUTO: 0.14 X10(3)/MCL (ref 0–0.9)
EOSINOPHIL NFR BLD AUTO: 3.2 %
ERYTHROCYTE [DISTWIDTH] IN BLOOD BY AUTOMATED COUNT: 13.7 % (ref 11.5–17)
GFR SERPLBLD CREATININE-BSD FMLA CKD-EPI: 47 MLS/MIN/1.73/M2
GLOBULIN SER-MCNC: 3.3 GM/DL (ref 2.4–3.5)
GLUCOSE SERPL-MCNC: 178 MG/DL (ref 82–115)
HCT VFR BLD AUTO: 35.1 % (ref 37–47)
HGB BLD-MCNC: 11.1 GM/DL (ref 12–16)
IMM GRANULOCYTES # BLD AUTO: 0.01 X10(3)/MCL (ref 0–0.04)
IMM GRANULOCYTES NFR BLD AUTO: 0.2 %
LYMPHOCYTES # BLD AUTO: 1.36 X10(3)/MCL (ref 0.6–4.6)
LYMPHOCYTES NFR BLD AUTO: 31.1 %
MCH RBC QN AUTO: 28.8 PG (ref 27–31)
MCHC RBC AUTO-ENTMCNC: 31.6 MG/DL (ref 33–36)
MCV RBC AUTO: 90.9 FL (ref 80–94)
MONOCYTES # BLD AUTO: 0.52 X10(3)/MCL (ref 0.1–1.3)
MONOCYTES NFR BLD AUTO: 11.9 %
NEUTROPHILS # BLD AUTO: 2.3 X10(3)/MCL (ref 2.1–9.2)
NEUTROPHILS NFR BLD AUTO: 52.9 %
NRBC BLD AUTO-RTO: 0 %
PLATELET # BLD AUTO: 260 X10(3)/MCL (ref 130–400)
PMV BLD AUTO: 9.8 FL (ref 7.4–10.4)
POCT GLUCOSE: 378 MG/DL (ref 70–110)
POCT GLUCOSE: >500 MG/DL (ref 70–110)
POTASSIUM SERPL-SCNC: 4.7 MMOL/L (ref 3.5–5.1)
PROT SERPL-MCNC: 6.4 GM/DL (ref 5.8–7.6)
RBC # BLD AUTO: 3.86 X10(6)/MCL (ref 4.2–5.4)
SODIUM SERPL-SCNC: 138 MMOL/L (ref 136–145)
WBC # SPEC AUTO: 4.4 X10(3)/MCL (ref 4.5–11.5)

## 2022-09-27 PROCEDURE — 96361 HYDRATE IV INFUSION ADD-ON: CPT

## 2022-09-27 PROCEDURE — G0378 HOSPITAL OBSERVATION PER HR: HCPCS

## 2022-09-27 PROCEDURE — 25000003 PHARM REV CODE 250: Performed by: NURSE PRACTITIONER

## 2022-09-27 PROCEDURE — 25000003 PHARM REV CODE 250: Performed by: INTERNAL MEDICINE

## 2022-09-27 PROCEDURE — 96376 TX/PRO/DX INJ SAME DRUG ADON: CPT | Mod: 59

## 2022-09-27 PROCEDURE — A9577 INJ MULTIHANCE: HCPCS | Performed by: INTERNAL MEDICINE

## 2022-09-27 PROCEDURE — 63600175 PHARM REV CODE 636 W HCPCS: Performed by: INTERNAL MEDICINE

## 2022-09-27 PROCEDURE — 36415 COLL VENOUS BLD VENIPUNCTURE: CPT | Performed by: NURSE PRACTITIONER

## 2022-09-27 PROCEDURE — 80053 COMPREHEN METABOLIC PANEL: CPT | Performed by: NURSE PRACTITIONER

## 2022-09-27 PROCEDURE — 85025 COMPLETE CBC W/AUTO DIFF WBC: CPT | Performed by: NURSE PRACTITIONER

## 2022-09-27 PROCEDURE — 25500020 PHARM REV CODE 255: Performed by: INTERNAL MEDICINE

## 2022-09-27 PROCEDURE — 25000003 PHARM REV CODE 250: Performed by: EMERGENCY MEDICINE

## 2022-09-27 PROCEDURE — 96375 TX/PRO/DX INJ NEW DRUG ADDON: CPT | Mod: 59

## 2022-09-27 PROCEDURE — 96372 THER/PROPH/DIAG INJ SC/IM: CPT | Performed by: INTERNAL MEDICINE

## 2022-09-27 RX ORDER — IBUPROFEN 200 MG
24 TABLET ORAL
Status: DISCONTINUED | OUTPATIENT
Start: 2022-09-27 | End: 2022-10-12 | Stop reason: HOSPADM

## 2022-09-27 RX ORDER — ACETAMINOPHEN 325 MG/1
650 TABLET ORAL EVERY 8 HOURS PRN
Status: DISCONTINUED | OUTPATIENT
Start: 2022-09-27 | End: 2022-10-12 | Stop reason: HOSPADM

## 2022-09-27 RX ORDER — CLOPIDOGREL BISULFATE 75 MG/1
75 TABLET ORAL DAILY
Status: DISCONTINUED | OUTPATIENT
Start: 2022-09-27 | End: 2022-10-12 | Stop reason: HOSPADM

## 2022-09-27 RX ORDER — INSULIN ASPART 100 [IU]/ML
1-10 INJECTION, SOLUTION INTRAVENOUS; SUBCUTANEOUS
Status: DISCONTINUED | OUTPATIENT
Start: 2022-09-27 | End: 2022-10-12 | Stop reason: HOSPADM

## 2022-09-27 RX ORDER — ONDANSETRON 2 MG/ML
4 INJECTION INTRAMUSCULAR; INTRAVENOUS EVERY 8 HOURS PRN
Status: DISCONTINUED | OUTPATIENT
Start: 2022-09-27 | End: 2022-10-12 | Stop reason: HOSPADM

## 2022-09-27 RX ORDER — AMLODIPINE BESYLATE 5 MG/1
10 TABLET ORAL DAILY
Status: DISCONTINUED | OUTPATIENT
Start: 2022-09-27 | End: 2022-09-28

## 2022-09-27 RX ORDER — SODIUM CHLORIDE 9 MG/ML
INJECTION, SOLUTION INTRAVENOUS CONTINUOUS
Status: DISCONTINUED | OUTPATIENT
Start: 2022-09-27 | End: 2022-09-28

## 2022-09-27 RX ORDER — ATORVASTATIN CALCIUM 40 MG/1
40 TABLET, FILM COATED ORAL NIGHTLY
Status: DISCONTINUED | OUTPATIENT
Start: 2022-09-27 | End: 2022-10-12 | Stop reason: HOSPADM

## 2022-09-27 RX ORDER — IBUPROFEN 200 MG
16 TABLET ORAL
Status: DISCONTINUED | OUTPATIENT
Start: 2022-09-27 | End: 2022-10-12 | Stop reason: HOSPADM

## 2022-09-27 RX ORDER — DONEPEZIL HYDROCHLORIDE 5 MG/1
5 TABLET, FILM COATED ORAL NIGHTLY
Status: DISCONTINUED | OUTPATIENT
Start: 2022-09-27 | End: 2022-09-28

## 2022-09-27 RX ORDER — LABETALOL HYDROCHLORIDE 5 MG/ML
10 INJECTION, SOLUTION INTRAVENOUS EVERY 4 HOURS PRN
Status: DISCONTINUED | OUTPATIENT
Start: 2022-09-27 | End: 2022-09-27

## 2022-09-27 RX ORDER — CARVEDILOL 12.5 MG/1
12.5 TABLET ORAL 2 TIMES DAILY
Status: DISCONTINUED | OUTPATIENT
Start: 2022-09-27 | End: 2022-10-12 | Stop reason: HOSPADM

## 2022-09-27 RX ORDER — ASPIRIN 81 MG/1
81 TABLET ORAL DAILY
Status: DISCONTINUED | OUTPATIENT
Start: 2022-09-27 | End: 2022-10-12 | Stop reason: HOSPADM

## 2022-09-27 RX ORDER — HYDRALAZINE HYDROCHLORIDE 20 MG/ML
20 INJECTION INTRAMUSCULAR; INTRAVENOUS EVERY 4 HOURS PRN
Status: DISCONTINUED | OUTPATIENT
Start: 2022-09-27 | End: 2022-10-10

## 2022-09-27 RX ORDER — PANTOPRAZOLE SODIUM 40 MG/1
40 TABLET, DELAYED RELEASE ORAL DAILY
Status: DISCONTINUED | OUTPATIENT
Start: 2022-09-27 | End: 2022-10-12 | Stop reason: HOSPADM

## 2022-09-27 RX ORDER — GLUCAGON 1 MG
1 KIT INJECTION
Status: DISCONTINUED | OUTPATIENT
Start: 2022-09-27 | End: 2022-10-12 | Stop reason: HOSPADM

## 2022-09-27 RX ORDER — CLONIDINE HYDROCHLORIDE 0.2 MG/1
0.2 TABLET ORAL 3 TIMES DAILY PRN
Status: DISCONTINUED | OUTPATIENT
Start: 2022-09-27 | End: 2022-10-10

## 2022-09-27 RX ORDER — LABETALOL HYDROCHLORIDE 5 MG/ML
20 INJECTION, SOLUTION INTRAVENOUS EVERY 4 HOURS PRN
Status: DISCONTINUED | OUTPATIENT
Start: 2022-09-27 | End: 2022-10-10

## 2022-09-27 RX ORDER — ACETAMINOPHEN 325 MG/1
650 TABLET ORAL EVERY 4 HOURS PRN
Status: DISCONTINUED | OUTPATIENT
Start: 2022-09-27 | End: 2022-10-12 | Stop reason: HOSPADM

## 2022-09-27 RX ADMIN — HYDRALAZINE HYDROCHLORIDE 20 MG: 20 INJECTION INTRAMUSCULAR; INTRAVENOUS at 02:09

## 2022-09-27 RX ADMIN — SODIUM CHLORIDE: 9 INJECTION, SOLUTION INTRAVENOUS at 11:09

## 2022-09-27 RX ADMIN — ASPIRIN 81 MG: 81 TABLET, COATED ORAL at 03:09

## 2022-09-27 RX ADMIN — GADOBENATE DIMEGLUMINE 15 ML: 529 INJECTION, SOLUTION INTRAVENOUS at 09:09

## 2022-09-27 RX ADMIN — ATORVASTATIN CALCIUM 40 MG: 40 TABLET, FILM COATED ORAL at 08:09

## 2022-09-27 RX ADMIN — SODIUM CHLORIDE 1000 ML: 9 INJECTION, SOLUTION INTRAVENOUS at 12:09

## 2022-09-27 RX ADMIN — PANTOPRAZOLE SODIUM 40 MG: 40 TABLET, DELAYED RELEASE ORAL at 03:09

## 2022-09-27 RX ADMIN — CARVEDILOL 12.5 MG: 12.5 TABLET, FILM COATED ORAL at 08:09

## 2022-09-27 RX ADMIN — DONEPEZIL HYDROCHLORIDE 5 MG: 5 TABLET, FILM COATED ORAL at 08:09

## 2022-09-27 RX ADMIN — HYDRALAZINE HYDROCHLORIDE 20 MG: 20 INJECTION INTRAMUSCULAR; INTRAVENOUS at 01:09

## 2022-09-27 RX ADMIN — INSULIN ASPART 5 UNITS: 100 INJECTION, SOLUTION INTRAVENOUS; SUBCUTANEOUS at 08:09

## 2022-09-27 RX ADMIN — CLOPIDOGREL 75 MG: 75 TABLET, FILM COATED ORAL at 03:09

## 2022-09-27 NOTE — H&P
[facility]  Hospital Medicine History & Physical Examination       Patient Name: Mayra Parker  MRN: [id number]  Patient Class: OP- Observation   Admission Date: 09/27/2022   Admitting Service: Hospital Medicine   Length of Stay: 0  Attending Physician: Dr. Hull  Primary Care Provider: Rashaun Weiss MD  Face-to-Face encounter date: 09/27/2022  Code Status: Full  Chief Complaint: Fall (Daughter told by PCP to bring to ER for multiple falls and AMS, she is taking new med, hx of Dementia, c/o back pain from the fall yesterday denies hitting her head)    Source of Information: Medical Records      HISTORY OF PRESENT ILLNESS:   Mayra Parker is a 72 y.o. female with a PMHx of  dementia, HTN, DM and CVA who presented to [facility] on 9/26/2022 with c/o BLE numbness and back pain following 3 falls over the past week. Denied head injury or LOC. Pt's daughter reported that the patient was also more confused over the past week with hallucinations after being started on Remeron. The majority of the history was obtained from review of the medical record.     Initial ED VS include /89, HR 89, RR 18, SpO2 100%, temp 97.9F. Labs notable for hgb 11.2, hct 35.6, sodium 133, potassium 5.2, creatinine 1.49, glucose 243, AST 88, ALT 60. UA not impressive. CXR negative for acute abnormality. CT L-spine negative for acute fracture; postoperative and degenerative changes of the lumbar spine noted. CT head with persistent area of rounded hypo attenuation in the posterior fossa on the left side. She received Lidocaine patch, Calcium gluconate IV, tramadol PO, and IV fluids in ED. She was admitted to hospital medicine services for further work-up and management of care.     PAST MEDICAL HISTORY:   Ischemic cardiomyopathy with LVEF 40 to 50% on TTE 5/2022  CAD s/p Stents 05/2022  CKD2  T2DM  HTN  HLD  CVA with right sided deficits  Dementia  Depression      PAST SURGICAL HISTORY:     Past  Surgical History:   Procedure Laterality Date    BACK SURGERY      CAROTID ENDARTERECTOMY Left     CAROTID ENDARTERECTOMY Right     LEFT HEART CATHETERIZATION  05/20/2022    Dr. Estrella; Stent placement    LEFT HEART CATHETERIZATION Left 5/20/2022    Procedure: CATHETERIZATION, HEART, LEFT;  Surgeon: Jone Estrella MD;  Location: [facility] CATH LAB;  Service: Cardiology;  Laterality: Left;  LHC VIA R GROIN       ALLERGIES:   Glipizide, Influenza vaccine tr-s 11 (pf), and Pregabalin    FAMILY HISTORY:   Reviewed and negative    SOCIAL HISTORY:   Denied alcohol, tobacco or illicit drug use    HOME MEDICATIONS:     Prior to Admission medications    Medication Sig Start Date End Date Taking? Authorizing Provider   aspirin (ECOTRIN) 81 MG EC tablet Take 81 mg by mouth once daily.   Yes Historical Provider   atorvastatin (LIPITOR) 40 MG tablet Take 40 mg by mouth every evening.   Yes Historical Provider   clopidogreL (PLAVIX) 75 mg tablet Take 1 tablet (75 mg total) by mouth once daily. 6/6/22 6/6/23 Yes VAN Hall   donepeziL (ARICEPT) 5 MG tablet Take 1 tablet (5 mg total) by mouth every evening. 6/6/22  Yes VAN Hall   ergocalciferol (ERGOCALCIFEROL) 50,000 unit Cap Take 1 capsule (50,000 Units total) by mouth every 7 days. for 12 doses 7/10/22 9/26/22 Yes Sixto Washington MD   JARDIANCE 10 mg tablet Take 10 mg by mouth once daily. 8/21/22  Yes Historical Provider   magnesium oxide (MAG-OX) 400 mg (241.3 mg magnesium) tablet Take 1 tablet (400 mg total) by mouth 2 (two) times daily. 6/6/22  Yes VAN Hall   metFORMIN (GLUCOPHAGE) 1000 MG tablet Take 1 tablet (1,000 mg total) by mouth 2 (two) times daily with meals. 6/6/22 6/6/23 Yes VAN Hall   mirtazapine (REMERON) 7.5 MG Tab Take 7.5 mg by mouth every evening. 9/21/22  Yes Historical Provider   pantoprazole (PROTONIX) 40 MG tablet Take 40 mg by mouth once daily.   Yes Historical Provider   SITagliptin (JANUVIA) 100 MG Tab  Take 1 tablet (100 mg total) by mouth once daily. 6/6/22  Yes VAN Hall   amLODIPine (NORVASC) 10 MG tablet Take 1 tablet (10 mg total) by mouth once daily.  Patient not taking: Reported on 9/26/2022 6/6/22 6/6/23  VAN Hall   carvediloL (COREG) 3.125 MG tablet Take 1 tablet (3.125 mg total) by mouth 2 (two) times daily.  Patient taking differently: Take 3.125 mg by mouth daily as needed. 6/6/22 6/6/23  VAN Hall   DULoxetine (CYMBALTA) 60 MG capsule Take 1 capsule (60 mg total) by mouth once daily.  Patient not taking: Reported on 9/26/2022 6/6/22   VAN Hall   traMADoL (ULTRAM) 50 mg tablet Take 1 tablet (50 mg total) by mouth every 4 (four) hours as needed (for pain).  Patient not taking: Reported on 9/26/2022 6/6/22   VAN Hall   losartan (COZAAR) 25 MG tablet Take 1 tablet (25 mg total) by mouth once daily. 6/6/22 6/27/22  VAN Hall   pregabalin (LYRICA) 75 MG capsule Take 75 mg by mouth 2 (two) times daily.  5/25/22  Historical Provider       __________________________________________________________________________  INPATIENT LIST OF MEDICATIONS     Current Facility-Administered Medications:     acetaminophen tablet 650 mg, 650 mg, Oral, Q8H PRN, SHERRY Kamara-BC    acetaminophen tablet 650 mg, 650 mg, Oral, Q4H PRN, SHERRY Kamara-BC    cloNIDine tablet 0.2 mg, 0.2 mg, Oral, TID PRN, Maine Riggs MD    glucagon (human recombinant) injection 1 mg, 1 mg, Intramuscular, PRN, Meron B Doumit, AGACNP-BC    glucose chewable tablet 16 g, 16 g, Oral, PRN, Meron Pritchard, AGACNP-BC    glucose chewable tablet 24 g, 24 g, Oral, PRN, Meron Loyat, AGACNP-BC    hydrALAZINE injection 20 mg, 20 mg, Intravenous, Q4H PRN, Maine Riggs MD, 20 mg at 09/27/22 0215    labetaloL injection 10 mg, 10 mg, Intravenous, Q4H PRN, Maine Riggs MD    LIDOcaine 5 % patch 1 patch, 1 patch, Transdermal, ED 1 Time, Britt Hernandez MD, 1 patch at  09/26/22 2130    ondansetron injection 4 mg, 4 mg, Intravenous, Q8H PRN, Meron Pritchard, [facility]    Current Outpatient Medications:     aspirin (ECOTRIN) 81 MG EC tablet, Take 81 mg by mouth once daily., Disp: , Rfl:     atorvastatin (LIPITOR) 40 MG tablet, Take 40 mg by mouth every evening., Disp: , Rfl:     clopidogreL (PLAVIX) 75 mg tablet, Take 1 tablet (75 mg total) by mouth once daily., Disp: 30 tablet, Rfl: 0    donepeziL (ARICEPT) 5 MG tablet, Take 1 tablet (5 mg total) by mouth every evening., Disp: 30 tablet, Rfl: 0    JARDIANCE 10 mg tablet, Take 10 mg by mouth once daily., Disp: , Rfl:     magnesium oxide (MAG-OX) 400 mg (241.3 mg magnesium) tablet, Take 1 tablet (400 mg total) by mouth 2 (two) times daily., Disp: 60 tablet, Rfl: 0    metFORMIN (GLUCOPHAGE) 1000 MG tablet, Take 1 tablet (1,000 mg total) by mouth 2 (two) times daily with meals., Disp: 180 tablet, Rfl: 3    mirtazapine (REMERON) 7.5 MG Tab, Take 7.5 mg by mouth every evening., Disp: , Rfl:     pantoprazole (PROTONIX) 40 MG tablet, Take 40 mg by mouth once daily., Disp: , Rfl:     SITagliptin (JANUVIA) 100 MG Tab, Take 1 tablet (100 mg total) by mouth once daily., Disp: 30 tablet, Rfl: 0    amLODIPine (NORVASC) 10 MG tablet, Take 1 tablet (10 mg total) by mouth once daily. (Patient not taking: Reported on 9/26/2022), Disp: 30 tablet, Rfl: 11    carvediloL (COREG) 3.125 MG tablet, Take 1 tablet (3.125 mg total) by mouth 2 (two) times daily. (Patient taking differently: Take 3.125 mg by mouth daily as needed.), Disp: 60 tablet, Rfl: 0    DULoxetine (CYMBALTA) 60 MG capsule, Take 1 capsule (60 mg total) by mouth once daily. (Patient not taking: Reported on 9/26/2022), Disp: 30 capsule, Rfl: 0    traMADoL (ULTRAM) 50 mg tablet, Take 1 tablet (50 mg total) by mouth every 4 (four) hours as needed (for pain). (Patient not taking: Reported on 9/26/2022), Disp: 12 tablet, Rfl: 0      Scheduled Meds:   LIDOcaine  1 patch Transdermal ED 1 Time      Continuous Infusions:  PRN Meds:.acetaminophen, acetaminophen, cloNIDine, glucagon (human recombinant), glucose, glucose, hydrALAZINE, labetalol, ondansetron      REVIEW OF SYSTEMS:   Limited due to pt's current clinical condition    PHYSICAL EXAM:     VITAL SIGNS: 24 HRS MIN & MAX LAST   Temp  Min: 97.9 °F (36.6 °C)  Max: 97.9 °F (36.6 °C) 97.9 °F (36.6 °C)   BP  Min: 115/85  Max: 212/109 134/75   Pulse  Min: 83  Max: 89  88   Resp  Min: 13  Max: 20 13   SpO2  Min: 96 %  Max: 100 % 96 %     Exam not done. Patient in MRI.    LABS AND IMAGING:     Recent Labs   Lab 09/22/22  1520 09/26/22 1914 09/27/22  0701   WBC 4.0* 4.8 4.4*   RBC 4.16* 3.86* 3.86*   HGB 12.1 11.2* 11.1*   HCT 40.0 35.6* 35.1*   MCV 96.2* 92.2 90.9   MCH 29.1 29.0 28.8   MCHC 30.3* 31.5* 31.6*   RDW 14.2 13.6 13.7    283 260   MPV 10.1 10.1 9.8       Recent Labs   Lab 09/22/22  1520 09/26/22 2028 09/27/22  0701    133* 138   K 5.3* 5.2* 4.7   CO2 23 25 24   BUN 17.7 18.2 14.7   CREATININE 1.15* 1.49* 1.24*   CALCIUM 10.0 8.6 8.7   MG 1.70 1.70  --    ALBUMIN 3.4 3.4 3.1*   ALKPHOS 125 130 114   ALT 60* 60* 55   AST 96* 88* 73*   BILITOT 1.0 1.1 1.2       Microbiology Results (last 7 days)       ** No results found for the last 168 hours. **             CT Lumbar Spine Without Contrast  Narrative: EXAMINATION:  CT LUMBAR SPINE WITHOUT CONTRAST    CLINICAL HISTORY:  Low back pain, increased fracture risk;    TECHNIQUE:  Noncontrast CT images of the lumbar spine. Axial, coronal, and sagittal reformatted images were obtained. Dose length product is 569 mGycm. Automatic exposure control, adjustment of mA/kV or iterative reconstruction technique was used to limit radiation dose.    COMPARISON:  X-rays dated 09/26/2022    FINDINGS:  There are 5 non-rib-bearing lumbar type vertebral bodies. There is posterior spinal fusion hardware at L4-S1 with laminectomies. The hardware is intact.  There is minimal anterolisthesis of L3 over L4.  Alignment is otherwise preserved. The remaining vertebral body heights and disc spaces are preserved.  There is no acute fracture identified. There are tiny multilevel marginal osteophytes. Mild facet arthropathy is noted. There is mild degenerative narrowing of the canal at L3-L4 with disc bulge and facet hypertrophy.  There is mild multilevel neural foraminal narrowing.  Postoperative changes are noted within the soft tissues.  Impression: 1. No acute fracture identified.  2. Postoperative and degenerative changes of the lumbar spine.  No significant change from the [facility] interpretation.    Electronically signed by: Jena Valderrama  Date:    09/27/2022  Time:    07:39  X-Ray Chest 1 View  Narrative: EXAMINATION:  XR CHEST 1 VIEW    CLINICAL HISTORY:  Unspecified fall, initial encounter    TECHNIQUE:  Single view of the chest    COMPARISON:  06/30/2022    FINDINGS:  No focal opacification, pleural effusion, or pneumothorax.    The cardiomediastinal silhouette is within normal limits.    No acute osseous abnormality.  Impression: No acute cardiopulmonary process.    Electronically signed by: Sacha Adhikari  Date:    09/27/2022  Time:    07:26        ASSESSMENT & PLAN:   Acute Encephalopathy  Multiple Frequent Falls  ANIYA  Hyperkalemia  Chronic HFrEF 45% - Compensated  Normocytic Anemia  HX CAD/PCI, T2DM, HTN, HLD, CVA, dementia, depression    Plan:  MRI Brain with and without contrast  Fall precautions  NS at 75 ml/hr  Hyperkalemia treated in ED with calcium gluconate  Cardiac Monitoring  Resume appropriate home medications  Labs in AM       VTE Prophylaxis: SCDS    Discharge Planning and Disposition: TBD    Meron CUADRA, NP have reviewed and discussed the case with Dr. Hull.  Please see the attending MD's addendum for further assessment and plan.    Meron Pritchard, AGACNP-BC  09/27/2022    _______________________________________________________________________________  MD Addendum:  Dr.iradat nadia CUADRA  , assumed care of this patient today at 1.36 pm  For the patient encounter, I performed the substantive portion of the visit, I reviewed the NP/PA documentation, treatment plan, and medical decision making.  I had face to face time with this patient     CC- BLE numbness and back pain following 3 falls over the past week    HPI  72 y.o. female with a PMHx of  dementia, HTN, DM and CVA who presented to [facility] on 9/26/2022 with c/o BLE numbness and back pain following 3 falls over the past week. Denied head injury or LOC. Pt's daughter reported that the patient was also more confused over the past week with hallucinations after being started on Remeron. The majority of the history was obtained from review of the medical record.     She was admitted to hospital medicine services for further work-up and management of care.     VITALS   /89, HR 89, RR 18, SpO2 100%, temp 97.9F.     Labs   hgb 11.2, hct 35.6, sodium 133, potassium 5.2, creatinine 1.49, glucose 243, AST 88, ALT 60. UA not impressive.     IMAGING  CXR negative for acute abnormality.   CT L-spine negative for acute fracture; postoperative and degenerative changes of the lumbar spine noted.   CT head with persistent area of rounded hypo attenuation in the posterior fossa on the left side.        Exam  Gen- age appropriate female, confused  Lungs- CTABL  CVS - RRR, S1S2 only  GI- Benign  Neuro- Alert, not oriented     Assessment/Plan  Acute Encephalopathy  - r/o cerebellar stroke vs medication induced   Multiple Frequent Falls  ANIYA  Hyperkalemia  Chronic HFrEF 45% - Compensated  Normocytic Anemia  HX CAD/PCI, T2DM, HTN, HLD, CVA, dementia, depression    Plan:  Admit  Telemetry monitoring   MRI Brain with and without contrast  Fall precautions  NS at 75 ml/hr  Avoid nephrotoxics   Hyperkalemia treated in ED with calcium gluconate  Resume appropriate home medications- hold remeron  Labs in AM       VTE Prophylaxis: SCDS      All diagnosis and differential  diagnosis have been reviewed; assessment and plan has been documented; I have personally reviewed the labs and test results that are presently available; I have reviewed the patients medication list; I have reviewed the consulting providers response and recommendations. I have reviewed or attempted to review medical records based upon their availability.    All of the patient and family questions have been addressed and answered. Patient's is agreeable to the above stated plan. I will continue to monitor closely and make adjustments to medical management as needed.      09/27/2022

## 2022-09-27 NOTE — ED NOTES
Assumed care for pt at this time. Pt presents to ed with family w/ AMS. Family reports hx of dementia. Family states pt has been more weak than normal w/ increasing falls, denies hitting head, w/ complaints of back pain from fall. Pt usually ambulates at home w/ walker or cane. Pt in NAD, AAOx4. Family at bedside. Pt resting comfortably in stretcher w/ no complaints. Cardiac-respiratory monitors in progress

## 2022-09-27 NOTE — CLINICAL REVIEW
72-year-old female admitted on 09/26/2022 with worsening encephalopathy superimposed on her dementia, back pain.  She was hemodynamically stable, afebrile.  Labs unremarkable with exception of mild hyperkalemia at 5.2, mild acute kidney injury with a creatinine of 1.49, including urinalysis.  Chest x-ray negative.  CT scan of the lumbar spine was negative.  MRI of the brain was unremarkable.  No new changes since previous review.  Pending placement    Based on the [facility] [place] General Recovery Care > Body System General Recovery Guidelines >Neurology GRG (MG-N), the patient did not require IP 2/2 lack of the following:    Encephalitis(1)(2)(3)(4)(5)(6)(7)(8)(9)(10)  Severe CNS infection  Altered mental status that is severe or persistent  Mental status change requiring inpatient care  Cerebral aneurysm requiring acute treatment  Neurologic finding requiring inpatient care  Ataxia  Guillain-[place] syndrome (example variants include: acute inflammatory demyelinating polyradiculoneuropathy (AIDP), acute motor axonal neuropathy (TONG), and Boyd Carl syndrome)(38)(42)(54)(55)(56)  Myasthenia gravis crisis or inpatient monitoring need  Multiple sclerosis or other demyelinating disease[A] exacerbation requiring inpatient care  Acute deterioration requiring inpatient treatment (eg, parenteral medication, plasmapheresis)  Severe pain requiring acute inpatient management  Respiratory insufficiency requiring intubation or inpatient monitoring  Inability to manage secretions (aspiration)  Weakness that is progressive or severe (eg, inpatient care needed due to functional disability, concern for safety)(41)  Intracranial hypertension (eg, pseudotumor cerebri) requiring inpatient care (eg, acute visual loss, inadequate oral intake)(65)(66)(67)(68)  Intracranial mass or process (eg, edema) causing acute neurologic signs or symptoms (eg, Altered mental status, focal neurologic finding)  Parkinson disease requiring  inpatient care  Amyotrophic lateral sclerosis with inpatient care needs  Severe neuropathy or neuromuscular disease  Cerebral venous thrombosis[B](85)(86)(87)(88)(89)(90)  Complications of congenital or degenerative disease (eg, infection, seizures, dehydration, injury) not responsive to observation care (as appropriate)[C](91)(92)(93)  Suspected or confirmed nerve toxin injury (eg, botulism)(38)(94)  Neurologic trauma requiring inpatient treatment (medical)  Complications of neurologic devices (eg, ventricular shunt, neurostimulator) requiring  Isolation indicated that cannot be performed outside hospital setting  Neurologic condition, symptom, or finding for which observation care has failed or is not considered appropriate.      Adebayo Camargo MD  Utilization Management  Physician Advisor

## 2022-09-27 NOTE — ED PROVIDER NOTES
Encounter Date: 9/26/2022    SCRIBE #1 NOTE: I, Yasmany Kingsley, am scribing for, and in the presence of,  Britt Hernandez MD. I have scribed the following portions of the note - Other sections scribed: HPI, ROS, PE.     History     Chief Complaint   Patient presents with    Fall     Daughter told by PCP to bring to ER for multiple falls and AMS, she is taking new med, hx of Dementia, c/o back pain from the fall yesterday denies hitting her head     72 year old female with hx of dementia, HTN, DM and CVA presents to ED with daughter c/o bilateral  lower leg numbness and back pain resulting in 3 falls occurring last week. Pt reports that her tailbone is injured from falls last week. Pt denies head injury. Daughter reports pt has had AMS over the past week with hallucinations and confusion following a medication change. Daughter reports that pt has had similar symptoms in the past when she was put on lyrica. Daughter reports pt not having normal sleep patterns.  Pt reports no urinary problems.    The history is provided by the patient and a relative. No  was used.   Fall  The fall occurred while walking. Distance fallen: ground level. She landed on [place]. The point of impact was the buttocks. Pain location: buttocks. Associated symptoms include back pain and numbness. Pertinent negatives include no neck pain, no fever, no abdominal pain, no nausea, no vomiting, no hematuria and no headaches. She has tried nothing for the symptoms.   Neurologic Problem  The primary symptoms include altered mental status. Primary symptoms do not include headaches, fever, nausea or vomiting. Illness onset: 1 week ago. The symptoms are unchanged. Context: after medication change.   The change in mental status began more than 2 days ago. The altered mental status developed suddenly. The change in mental status has been unchanged since its onset. The change in mental status includes confusion.   Additional symptoms  include hallucinations. Medical issues also include cerebral vascular accident, diabetes and hypertension.   Review of patient's allergies indicates:   Allergen Reactions    Glipizide Swelling     Swelling of the lips      Influenza vaccine tr-s 11 (pf) Hives    Pregabalin Other (See Comments)     nightmares     Past Medical History:   Diagnosis Date    CVA (cerebral vascular accident)     affected right side of body    Depression     Diabetes mellitus     Hyperlipidemia     Hypertension     Unspecified dementia without behavioral disturbance      Past Surgical History:   Procedure Laterality Date    BACK SURGERY      CAROTID ENDARTERECTOMY Left     CAROTID ENDARTERECTOMY Right     LEFT HEART CATHETERIZATION  05/20/2022    Dr. Estrella; Stent placement    LEFT HEART CATHETERIZATION Left 5/20/2022    Procedure: CATHETERIZATION, HEART, LEFT;  Surgeon: Jone Estrella MD;  [facility] CATH LAB;  Service: Cardiology;  Laterality: Left;  C VIA R GROIN     Family History   Problem Relation Age of Onset    Heart attack Son      Social History     Tobacco Use    Smoking status: Every Day     Packs/day: 0.50     Types: Cigarettes    Smokeless tobacco: Never    Tobacco comments:     Started smoking in late 20s   Substance Use Topics    Alcohol use: Never    Drug use: Never     Review of Systems   Constitutional:  Negative for fatigue and fever.   Eyes:  Negative for visual disturbance.   Respiratory:  Negative for chest tightness and shortness of breath.    Cardiovascular:  Negative for chest pain.   Gastrointestinal:  Negative for abdominal pain, diarrhea, nausea and vomiting.   Genitourinary:  Negative for difficulty urinating, dysuria, frequency and hematuria.   Musculoskeletal:  Positive for back pain. Negative for neck pain.   Skin:  Negative for rash.   Allergic/Immunologic: Negative for immunocompromised state.   Neurological:  Positive for numbness. Negative for headaches.   Psychiatric/Behavioral:  Positive for  confusion and hallucinations.    All other systems reviewed and are negative.    Physical Exam     Initial Vitals [09/26/22 1736]   BP Pulse Resp Temp SpO2   (!) 150/89 89 18 97.9 °F (36.6 °C) 100 %      MAP       --         Physical Exam    Constitutional: She appears well-developed and well-nourished. No distress.   HENT:   Head: Normocephalic and atraumatic.   Mouth/Throat: Oropharynx is clear and moist.   Eyes: EOM are normal. Pupils are equal, round, and reactive to light.   Neck: Neck supple.   Normal range of motion.  Cardiovascular:  Normal rate, regular rhythm and normal heart sounds.           No murmur heard.  Pulses:       Radial pulses are 2+ on the right side and 2+ on the left side.        Dorsalis pedis pulses are 2+ on the right side and 2+ on the left side.   Pulmonary/Chest: Breath sounds normal. No respiratory distress.   Abdominal: Abdomen is soft. Bowel sounds are normal. She exhibits no distension. There is no abdominal tenderness.   Musculoskeletal:         General: Tenderness (sacrum and left scapula) present. Normal range of motion.      Left upper arm: Tenderness (humerus) present.      Cervical back: Normal range of motion and neck supple. No tenderness.      Lumbar back: Tenderness present.      Comments: No saddle deformity     Neurological: She is alert and oriented to person, place, and time. GCS score is 15. GCS eye subscore is 4. GCS verbal subscore is 5. GCS motor subscore is 6.   5/5 strength to planter and dorsiflexion, Decreased sensation to light touch of bilateral feet and lower legs to knees; normal sensation bilateral thighs,    Skin: Skin is warm. No rash noted.   Psychiatric: She has a normal mood and affect.       ED Course   Procedures  Labs Reviewed   COMPREHENSIVE METABOLIC PANEL - Abnormal; Notable for the following components:       Result Value    Sodium Level 133 (*)     Potassium Level 5.2 (*)     Glucose Level 243 (*)     Creatinine 1.49 (*)     Globulin 3.6 (*)      Albumin/Globulin Ratio 0.9 (*)     Alanine Aminotransferase 60 (*)     Aspartate Aminotransferase 88 (*)     All other components within normal limits   URINALYSIS, REFLEX TO URINE CULTURE - Abnormal; Notable for the following components:    Glucose, UA 3+ (*)     All other components within normal limits   CBC WITH DIFFERENTIAL - Abnormal; Notable for the following components:    RBC 3.86 (*)     Hgb 11.2 (*)     Hct 35.6 (*)     MCHC 31.5 (*)     All other components within normal limits   TROPONIN I - Normal   MAGNESIUM - Normal   URINALYSIS, MICROSCOPIC - Normal   CBC W/ AUTO DIFFERENTIAL    Narrative:     The following orders were created for panel order CBC auto differential.  Procedure                               Abnormality         Status                     ---------                               -----------         ------                     CBC with Differential[[id number]]        Abnormal            Final result                 Please view results for these tests on the individual orders.     EKG Readings: (Independently Interpreted)   Initial Reading: No STEMI. Rhythm: Normal Sinus Rhythm. Heart Rate: 97. Ectopy: No Ectopy. ST Segments: Normal ST Segments. T Waves: Normal.   09/26/2022 @ 1834     Imaging Results              CT Lumbar Spine Without Contrast (Preliminary result)  Result time 09/26/22 22:03:56      Preliminary result by Juancho Nicole Jr., MD (09/26/22 22:03:56)                   Narrative:    START OF REPORT:  Technique: CT of the lumbar spine was performed without intravenous contrast with direct axial as well as sagittal and coronal reconstruction images.    Comparison: None.    Clinical history: Fall, back pain.    Findings:  Anatomy: Unremarkable.  Mineralization: Mild osteopenia is seen of the visualized bony structures.  Congenital: None.  Bone alignment: Grade I degenerative anterior listhesis of L3 on L4 .  Curvature: The lumbar lordosis is maintained.  Bone and bone  marrow: The vertebral body heights are maintained.  Intervertebral disc spaces: Moderately decreased disc height is seen at L4-L5.  Osteophytes: There are small osteophytes at L4 L5.  Endplate Sclerosis: No endplate sclerosis is seen.  Facet degenerative changes: Mild to moderate multilevel facet degenerative changes are seen. Severe facet degenerative changes are seen at L3-L4.  Spinal canal: Mild stenosis of the spinal canal is seen at the L3-L4 intervertebral disc level.  Fractures: No acute fracture or dislocation is seen.  Orthopedic Hardware: Posterior decompression and fusion of L4-S1 is seen with transpedicular screws and rods in place. No radiologically-evident hardware complication.      Impression:  1. No acute fracture or dislocation is seen.  2. Details and findings as above.                          Preliminary result by Interface, Rad Results In (09/26/22 22:03:56)                   Narrative:    START OF REPORT:  Technique: CT of the lumbar spine was performed without intravenous contrast with direct axial as well as sagittal and coronal reconstruction images.    Comparison: None.    Clinical history: Fall, back pain.    Findings:  Anatomy: Unremarkable.  Mineralization: Mild osteopenia is seen of the visualized bony structures.  Congenital: None.  Bone alignment: Grade I degenerative anterior listhesis of L3 on L4 .  Curvature: The lumbar lordosis is maintained.  Bone and bone marrow: The vertebral body heights are maintained.  Intervertebral disc spaces: Moderately decreased disc height is seen at L4-L5.  Osteophytes: There are small osteophytes at L4 L5.  Endplate Sclerosis: No endplate sclerosis is seen.  Facet degenerative changes: Mild to moderate multilevel facet degenerative changes are seen. Severe facet degenerative changes are seen at L3-L4.  Spinal canal: Mild stenosis of the spinal canal is seen at the L3-L4 intervertebral disc level.  Fractures: No acute fracture or dislocation is  seen.  Orthopedic Hardware: Posterior decompression and fusion of L4-S1 is seen with transpedicular screws and rods in place. No radiologically-evident hardware complication.      Impression:  1. No acute fracture or dislocation is seen.  2. Details and findings as above.                                         X-Ray Pelvis Routine AP (In process)                      X-Ray Chest 1 View (In process)                      X-Ray Shoulder 2 or More Views Left (In process)                      X-Ray Lumbar Spine 2 Or 3 Views (Final result)  Result time 09/26/22 18:13:23   Procedure changed from X-Ray Lumbar Spine Ap And Lateral     Final result by José Miguel Parmar MD (09/26/22 18:13:23)                   Impression:      Degenerative and postsurgical changes seen no acute process      Electronically signed by: José Miguel Parmar  Date:    09/26/2022  Time:    18:13               Narrative:    EXAMINATION:  XR LUMBAR SPINE 2 OR 3 VIEWS    CLINICAL HISTORY:  fall;    TECHNIQUE:  Three views of the lumbar spine were obtained    COMPARISON:  None    FINDINGS:  The patient is status post spinal fusion at the level of L4-5 and S1.  The orthopedic hardware appears to be intact.  Vertebral body heights are well maintained.  No fracture seen.  No dislocation is seen.  Multilevel facet arthropathy seen in the lumbar spine.                                       CT Head Without Contrast (Final result)  Result time 09/26/22 18:11:30      Final result by José Miguel Parmar MD (09/26/22 18:11:30)                   Impression:      Persistent area of rounded hypoattenuation in the posterior fossa on the left side.  MRI with contrast is recommended for correlation      Electronically signed by: José Miguel Parmar  Date:    09/26/2022  Time:    18:11               Narrative:    EXAMINATION:  CT HEAD WITHOUT CONTRAST    CLINICAL HISTORY:  Mental status change, unknown cause;    TECHNIQUE:  Multiple axial images were obtained from  the base of the brain to the vertex without contrast administration.  Sagittal and coronal reconstructions were performed..Automatic exposure control is utilized to reduce patient radiation exposure.    COMPARISON:  06/25/2022    FINDINGS:  There is a area of rounded hypoattenuation seen in the posterior fossa on the left side.  It measures 2 cm x 1.9 cm.  A similar area was seen on prior examination as well.  No evidence of acute hemorrhage or infarction is seen.  Ventricles basal cisterns appear grossly unremarkable.  There is some cerebral atrophy seen consistent with patient's age.  Calvarium is intact.                                    X-Rays:   Independently Interpreted Readings:   Other Readings:  XR Pelvis: no acute fracture or subluxation  CXR: no pneumothorax, no pneumonia  XR shoulder: degenerative changes, no fracture or subluxation  Medications   LIDOcaine 5 % patch 1 patch (1 patch Transdermal Patch Applied 9/26/22 2130)   calcium gluconate 1 g in NS IVPB (premixed) (0 g Intravenous Stopped 9/26/22 2145)   traMADoL tablet 50 mg (50 mg Oral Given 9/26/22 2130)   sodium chloride 0.9% bolus 1,000 mL (1,000 mLs Intravenous New Bag 9/27/22 0030)     Medical Decision Making:   Initial Assessment:   Ms. Parker was brought in for evaluation, progressive weakness, multiple falls at home. Will obtain labs, imaging to evaluate for potential injuries, eval for indication of acute condition that may be contributing to her weakness.  Differential Diagnosis:   Fracture, CVA, adverse medication effect, dehydration, electrolyte derangements, ANIYA, degenerative disc disease  Independently Interpreted Test(s):   I have ordered and independently interpreted EKG Reading(s) - see prior notes  Clinical Tests:   Lab Tests: Ordered and Reviewed  Radiological Study: Ordered and Reviewed  Medical Tests: Ordered and Reviewed  ED Management:  See ED course below        Scribe Attestation:   Scribe #1: I performed the above  scribed service and the documentation accurately describes the services I performed. I attest to the accuracy of the note.    Attending Attestation:           Physician Attestation for Scribe:  Physician Attestation Statement for Scribe #1: I, Britt Hernandez MD, reviewed documentation, as scribed by Yasmany Kingsley in my presence, and it is both accurate and complete.             ED Course as of 09/27/22 0203   Mon Sep 26, 2022   2021 RN has been called by the lab, reportedly markedly elevated K on initial lab but somewhat hemolyzed. Repeat lab ordered. EKG does not have any indication of hyperkalemia at this time. Calcium gluconate ordered for membrane stabilization while repeat lab pending.  [KS]   2257 Mild ANIYA, only minimal hyperK. No Fx on CT scan of the lumbar spine. Degenerative changes on XR of the right hip but no acute fracture noted. Given patient's progressive difficulty ambulating, new stocking distribution numbness, abnormal CT head w/ recommendations for MRI to further evaluate, will admit to hospital medicine service for ongoing IV fluids, PT eval, further neuro work up. Findings and plan discussed with the patient and she is agreeable to admission at this time.    [KS]      ED Course User Index  [KS] Britt Hernandez MD                 Clinical Impression:   Final diagnoses:  [R29.6] Multiple falls  [W19.XXXA, Y92.009] Fall at home, initial encounter  [M19.90] Osteoarthritis, unspecified osteoarthritis type, unspecified site (Primary)  [R20.2] Bilateral leg paresthesia        ED Disposition Condition    Observation                 Britt Hernandez MD  10/07/22 1313

## 2022-09-28 LAB
ALBUMIN SERPL-MCNC: 2.1 GM/DL (ref 3.4–4.8)
ALBUMIN/GLOB SERPL: 1 RATIO (ref 1.1–2)
ALP SERPL-CCNC: 69 UNIT/L (ref 40–150)
ALT SERPL-CCNC: 32 UNIT/L (ref 0–55)
AST SERPL-CCNC: 43 UNIT/L (ref 5–34)
BASOPHILS # BLD AUTO: 0.03 X10(3)/MCL (ref 0–0.2)
BASOPHILS NFR BLD AUTO: 0.8 %
BILIRUBIN DIRECT+TOT PNL SERPL-MCNC: 0.8 MG/DL
BUN SERPL-MCNC: 10.5 MG/DL (ref 9.8–20.1)
CALCIUM SERPL-MCNC: 6.4 MG/DL (ref 8.4–10.2)
CHLORIDE SERPL-SCNC: 119 MMOL/L (ref 98–107)
CO2 SERPL-SCNC: 18 MMOL/L (ref 23–31)
CREAT SERPL-MCNC: 0.78 MG/DL (ref 0.55–1.02)
EOSINOPHIL # BLD AUTO: 0.1 X10(3)/MCL (ref 0–0.9)
EOSINOPHIL NFR BLD AUTO: 2.8 %
ERYTHROCYTE [DISTWIDTH] IN BLOOD BY AUTOMATED COUNT: 14.1 % (ref 11.5–17)
GFR SERPLBLD CREATININE-BSD FMLA CKD-EPI: >60 MLS/MIN/1.73/M2
GLOBULIN SER-MCNC: 2.2 GM/DL (ref 2.4–3.5)
GLUCOSE SERPL-MCNC: 137 MG/DL (ref 82–115)
HCT VFR BLD AUTO: 28.2 % (ref 37–47)
HGB BLD-MCNC: 8.8 GM/DL (ref 12–16)
IMM GRANULOCYTES # BLD AUTO: 0 X10(3)/MCL (ref 0–0.04)
IMM GRANULOCYTES NFR BLD AUTO: 0 %
LYMPHOCYTES # BLD AUTO: 1.21 X10(3)/MCL (ref 0.6–4.6)
LYMPHOCYTES NFR BLD AUTO: 33.6 %
MCH RBC QN AUTO: 29.1 PG (ref 27–31)
MCHC RBC AUTO-ENTMCNC: 31.2 MG/DL (ref 33–36)
MCV RBC AUTO: 93.4 FL (ref 80–94)
MONOCYTES # BLD AUTO: 0.4 X10(3)/MCL (ref 0.1–1.3)
MONOCYTES NFR BLD AUTO: 11.1 %
NEUTROPHILS # BLD AUTO: 1.9 X10(3)/MCL (ref 2.1–9.2)
NEUTROPHILS NFR BLD AUTO: 51.7 %
NRBC BLD AUTO-RTO: 0 %
PLATELET # BLD AUTO: 197 X10(3)/MCL (ref 130–400)
PMV BLD AUTO: 9.8 FL (ref 7.4–10.4)
POCT GLUCOSE: 152 MG/DL (ref 70–110)
POCT GLUCOSE: 155 MG/DL (ref 70–110)
POCT GLUCOSE: 224 MG/DL (ref 70–110)
POCT GLUCOSE: 230 MG/DL (ref 70–110)
POTASSIUM SERPL-SCNC: 3.7 MMOL/L (ref 3.5–5.1)
PROT SERPL-MCNC: 4.3 GM/DL (ref 5.8–7.6)
RBC # BLD AUTO: 3.02 X10(6)/MCL (ref 4.2–5.4)
SODIUM SERPL-SCNC: 140 MMOL/L (ref 136–145)
WBC # SPEC AUTO: 3.6 X10(3)/MCL (ref 4.5–11.5)

## 2022-09-28 PROCEDURE — G0378 HOSPITAL OBSERVATION PER HR: HCPCS

## 2022-09-28 PROCEDURE — 92610 EVALUATE SWALLOWING FUNCTION: CPT

## 2022-09-28 PROCEDURE — 36415 COLL VENOUS BLD VENIPUNCTURE: CPT | Performed by: NURSE PRACTITIONER

## 2022-09-28 PROCEDURE — 97162 PT EVAL MOD COMPLEX 30 MIN: CPT

## 2022-09-28 PROCEDURE — 25000003 PHARM REV CODE 250: Performed by: INTERNAL MEDICINE

## 2022-09-28 PROCEDURE — 96372 THER/PROPH/DIAG INJ SC/IM: CPT | Performed by: INTERNAL MEDICINE

## 2022-09-28 PROCEDURE — 85025 COMPLETE CBC W/AUTO DIFF WBC: CPT | Performed by: NURSE PRACTITIONER

## 2022-09-28 PROCEDURE — 63600175 PHARM REV CODE 636 W HCPCS: Performed by: INTERNAL MEDICINE

## 2022-09-28 PROCEDURE — 80053 COMPREHEN METABOLIC PANEL: CPT | Performed by: NURSE PRACTITIONER

## 2022-09-28 PROCEDURE — 96361 HYDRATE IV INFUSION ADD-ON: CPT

## 2022-09-28 RX ORDER — ENOXAPARIN SODIUM 100 MG/ML
40 INJECTION SUBCUTANEOUS EVERY 24 HOURS
Status: DISCONTINUED | OUTPATIENT
Start: 2022-09-28 | End: 2022-10-06

## 2022-09-28 RX ORDER — CALCIUM CARBONATE 200(500)MG
1000 TABLET,CHEWABLE ORAL
Status: COMPLETED | OUTPATIENT
Start: 2022-09-28 | End: 2022-09-28

## 2022-09-28 RX ADMIN — CARVEDILOL 12.5 MG: 12.5 TABLET, FILM COATED ORAL at 08:09

## 2022-09-28 RX ADMIN — ASPIRIN 81 MG: 81 TABLET, COATED ORAL at 08:09

## 2022-09-28 RX ADMIN — ENOXAPARIN SODIUM 40 MG: 40 INJECTION SUBCUTANEOUS at 05:09

## 2022-09-28 RX ADMIN — AMLODIPINE BESYLATE 10 MG: 5 TABLET ORAL at 08:09

## 2022-09-28 RX ADMIN — PANTOPRAZOLE SODIUM 40 MG: 40 TABLET, DELAYED RELEASE ORAL at 08:09

## 2022-09-28 RX ADMIN — INSULIN ASPART 2 UNITS: 100 INJECTION, SOLUTION INTRAVENOUS; SUBCUTANEOUS at 08:09

## 2022-09-28 RX ADMIN — SODIUM CHLORIDE: 9 INJECTION, SOLUTION INTRAVENOUS at 06:09

## 2022-09-28 RX ADMIN — CALCIUM CARBONATE (ANTACID) CHEW TAB 500 MG 1000 MG: 500 CHEW TAB at 09:09

## 2022-09-28 RX ADMIN — ATORVASTATIN CALCIUM 40 MG: 40 TABLET, FILM COATED ORAL at 08:09

## 2022-09-28 RX ADMIN — INSULIN ASPART 4 UNITS: 100 INJECTION, SOLUTION INTRAVENOUS; SUBCUTANEOUS at 05:09

## 2022-09-28 RX ADMIN — CLOPIDOGREL 75 MG: 75 TABLET, FILM COATED ORAL at 08:09

## 2022-09-28 RX ADMIN — CALCIUM CARBONATE (ANTACID) CHEW TAB 500 MG 1000 MG: 500 CHEW TAB at 05:09

## 2022-09-28 NOTE — CONSULTS
[facility] - Observation Unit  Neurosurgery  Consult Note    Inpatient consult to Neurosurgery  Consult performed by: Kayla Benson AGACNP-BC  Consult ordered by: Benji Anderson MD      Subjective:     Chief Complaint/Reason for Admission:  Bilateral lower extremity leg numbness, back pain, several falls last week.  Sore tailbone.    History of Present Illness:  This is a 72-year-old  female with history of  hypertension, diabetes, dementia, CVA presented to the ED with complaints of bilateral lower extremity numbness and back pain.  The patient had some falls last week.  She states that her tailbone is sore and injured from previous falls.  She does endorse chronic lower back pain.  No head injury.  Daughter reported that patient was having altered mental status over the past week with hallucinations and confusion since starting on Remeron.  She also had this same reaction when she was taking Lyrica.  Daughter reports patient isn't sleeping normal.  No urinary are problems having bowel movements.    Imaging was performed.  MRI lumbar spine:  Impression:       1. Moderate to severe degenerative narrowing of the spinal canal at L3-L4.   2. Multilevel neural foraminal stenoses as described.        On exam the patient is pleasant.  She is sitting up in bed eating.  Oriented.  Some mild confusion in conversation.  She states that she has had several falls recently.  She states her tailbone is sore.  Upper extremity strength bilaterally is 5/5.  Right lower extremity is slightly weaker than left lower extremity.  Right lower extremity 4/5.  Left lower extremity 5/5.  No bowel or bladder issues.  She does have upper and lower extremity numbness and tingling but she states this is a result of her diabetes.  She can lift the right leg off the bed but it falls back to the bed quicker than the left leg.  The left leg can resist gravity.      PTA Medications   Medication Sig    aspirin (ECOTRIN)  81 MG EC tablet Take 81 mg by mouth once daily.    atorvastatin (LIPITOR) 40 MG tablet Take 40 mg by mouth every evening.    clopidogreL (PLAVIX) 75 mg tablet Take 1 tablet (75 mg total) by mouth once daily.    donepeziL (ARICEPT) 5 MG tablet Take 1 tablet (5 mg total) by mouth every evening.    [] ergocalciferol (ERGOCALCIFEROL) 50,000 unit Cap Take 1 capsule (50,000 Units total) by mouth every 7 days. for 12 doses    JARDIANCE 10 mg tablet Take 10 mg by mouth once daily.    magnesium oxide (MAG-OX) 400 mg (241.3 mg magnesium) tablet Take 1 tablet (400 mg total) by mouth 2 (two) times daily.    metFORMIN (GLUCOPHAGE) 1000 MG tablet Take 1 tablet (1,000 mg total) by mouth 2 (two) times daily with meals.    mirtazapine (REMERON) 7.5 MG Tab Take 7.5 mg by mouth every evening.    pantoprazole (PROTONIX) 40 MG tablet Take 40 mg by mouth once daily.    SITagliptin (JANUVIA) 100 MG Tab Take 1 tablet (100 mg total) by mouth once daily.    amLODIPine (NORVASC) 10 MG tablet Take 1 tablet (10 mg total) by mouth once daily. (Patient not taking: Reported on 2022)    carvediloL (COREG) 3.125 MG tablet Take 1 tablet (3.125 mg total) by mouth 2 (two) times daily. (Patient taking differently: Take 3.125 mg by mouth daily as needed.)    DULoxetine (CYMBALTA) 60 MG capsule Take 1 capsule (60 mg total) by mouth once daily. (Patient not taking: Reported on 2022)    traMADoL (ULTRAM) 50 mg tablet Take 1 tablet (50 mg total) by mouth every 4 (four) hours as needed (for pain). (Patient not taking: Reported on 2022)       Review of patient's allergies indicates:   Allergen Reactions    Glipizide Swelling     Swelling of the lips      Influenza vaccine tr-s 11 (pf) Hives    Pregabalin Other (See Comments)     nightmares       Past Medical History:   Diagnosis Date    Arthritis     Coronary artery disease     CVA (cerebral vascular accident)     affected right side of body    Depression     Diabetes mellitus      Hyperlipidemia     Hypertension     Unspecified dementia without behavioral disturbance      Past Surgical History:   Procedure Laterality Date    BACK SURGERY      CAROTID ENDARTERECTOMY Left     CAROTID ENDARTERECTOMY Right     LEFT HEART CATHETERIZATION  05/20/2022    Dr. Estrella; Stent placement    LEFT HEART CATHETERIZATION Left 5/20/2022    Procedure: CATHETERIZATION, HEART, LEFT;  Surgeon: Jone Estrella MD;  Location: [facility] CATH LAB;  Service: Cardiology;  Laterality: Left;  LHC VIA R GROIN     Family History       Problem Relation (Age of Onset)    Heart attack Son          Tobacco Use    Smoking status: Every Day     Packs/day: 0.50     Types: Cigarettes    Smokeless tobacco: Never    Tobacco comments:     Started smoking in late 20s   Substance and Sexual Activity    Alcohol use: Never    Drug use: Never    Sexual activity: Not Currently     Review of Systems   Musculoskeletal:  Positive for back pain.   Neurological:  Positive for weakness and numbness.   Objective:     Weight: 68.9 kg (152 lb)  Body mass index is 26.09 kg/m².  Vital Signs (Most Recent):  Temp: 98.6 °F (37 °C) (09/28/22 1132)  Pulse: 74 (09/28/22 1132)  Resp: 16 (09/28/22 1132)  BP: (!) 101/56 (09/28/22 1132)  SpO2: 96 % (09/28/22 1132)   Vital Signs (24h Range):  Temp:  [98.1 °F (36.7 °C)-98.8 °F (37.1 °C)] 98.6 °F (37 °C)  Pulse:  [] 74  Resp:  [16-18] 16  SpO2:  [94 %-98 %] 96 %  BP: ()/(56-69) 101/56                          Physical Exam:  Nursing note and vitals reviewed.    Constitutional: She appears well-developed and well-nourished. She is not diaphoretic. No distress.     Eyes: Pupils are equal, round, and reactive to light.     Cardiovascular: Normal rate, regular rhythm, normal pulses and intact distal pulses.     Abdominal: Soft. Bowel sounds are normal.     Skin: Skin displays no rash on trunk and no rash on extremities. Skin displays no lesions on trunk and no lesions on extremities.     Psych/Behavior: She is  alert. She has a normal mood and affect.     Musculoskeletal:        Right Upper Extremities: Muscle strength is 5/5.        Left Upper Extremities: Muscle strength is 5/5.       Right Lower Extremities: Muscle strength is 4/5.        Left Lower Extremities: Muscle strength is 5/5.     Neurological:        Sensory: There is no sensory deficit in the trunk. There is no sensory deficit in the extremities.        DTRs: DTRs are DTRS NORMAL AND SYMMETRICnormal and symmetric.        Cranial nerves: Cranial nerve(s) II, III, IV, V, VI, VII, VIII, IX, X, XI and XII are intact.     Significant Labs:  Recent Labs   Lab 09/26/22 2028 09/27/22  0701 09/28/22  0447   * 138 140   K 5.2* 4.7 3.7   CO2 25 24 18*   BUN 18.2 14.7 10.5   CREATININE 1.49* 1.24* 0.78   CALCIUM 8.6 8.7 6.4*   MG 1.70  --   --      Recent Labs   Lab 09/26/22 1914 09/27/22  0701 09/28/22  0447   WBC 4.8 4.4* 3.6*   HGB 11.2* 11.1* 8.8*   HCT 35.6* 35.1* 28.2*    260 197     No results for input(s): LABPT, INR, APTT in the last 48 hours.  Microbiology Results (last 7 days)       ** No results found for the last 168 hours. **            Significant Diagnostics:      Assessment/Plan:    The patient states that she is not interested in any type of back surgery and would prefer to try conservative measures.    Pain control   PT and OT   LSO brace   Fall precautions   No neurosurgical interventions at this time per patient request.       Active Diagnoses:    Diagnosis Date Noted POA    PRINCIPAL PROBLEM:  Multiple falls [R29.6] 09/27/2022 Not Applicable      Problems Resolved During this Admission:       Thank you for your consult.     Kayla Benson [facility]-BC  Neurosurgery  [facility] - Observation Unit

## 2022-09-28 NOTE — PROGRESS NOTES
[facility]  HOSPITAL MEDICINE   PROGRESS NOTE        CHIEF COMPLAINT   Hospital follow up    HOSPITAL COURSE   Mayra Parker is a 72 y.o. female with a PMHx of  dementia, HTN, DM and CVA who presented to [facility] on 9/26/2022 with c/o BLE numbness and back pain following 3 falls over the past week. Denied head injury or LOC. Pt's daughter reported that the patient was also more confused over the past week with hallucinations after being started on Remeron. The majority of the history was obtained from review of the medical record.      Initial ED VS include /89, HR 89, RR 18, SpO2 100%, temp 97.9F. Labs notable for hgb 11.2, hct 35.6, sodium 133, potassium 5.2, creatinine 1.49, glucose 243, AST 88, ALT 60. UA not impressive. CXR negative for acute abnormality. CT L-spine negative for acute fracture; postoperative and degenerative changes of the lumbar spine noted. CT head with persistent area of rounded hypo attenuation in the posterior fossa on the left side. She received Lidocaine patch, Calcium gluconate IV, tramadol PO, and IV fluids in ED. She was admitted to hospital medicine services for further work-up and management of care.     Today  Seen examined this morning.  When I stood her up she stated that she was feeling lightheaded and dizzy but had to sit down after about 10 seconds secondary to back pain which she states has gotten worse in the last 1 and half week but she suffers from chronic back pain.  She also complains of numbness to hands and feet.        OBJECTIVE/PHYSICAL EXAM     VITAL SIGNS (MOST RECENT):  Temp: 98.6 °F (37 °C) (09/28/22 0742)  Pulse: 79 (09/28/22 0742)  Resp: 16 (09/28/22 0742)  BP: 108/61 (09/28/22 0742)  SpO2: (!) 94 % (09/28/22 0742)   VITAL SIGNS (24 HOUR RANGE):  Temp:  [98.1 °F (36.7 °C)-98.8 °F (37.1 °C)] 98.6 °F (37 °C)  Pulse:  [] 79  Resp:  [15-20] 16  SpO2:  [94 %-98 %] 94 %  BP: ()/() 108/61   GENERAL: In no acute  distress, afebrile  HEENT:  CHEST: Clear to auscultation bilaterally  HEART: S1, S2, no appreciable murmur  ABDOMEN: Soft, nontender, BS +  MSK: Warm, no lower extremity edema, no clubbing or cyanosis  NEUROLOGIC: Alert and oriented x4, moving all extremities with good strength, hypo reflexive at ankle and knee bilaterally  INTEGUMENTARY:  PSYCHIATRY:        ASSESSMENT/PLAN   Falls  Acute metabolic encephalopathy-resolved   Acute kidney injury-resolved     History of: Chronic HFrEF 45%-compensated, normocytic anemia, CAD PCI, dm 2, HTN, HLD, CVA, dementia, depression      MRI brain with and without contrast negative.  Obtain MRI lumbar spine given lower back pain and numbness to both feet.  Numbness may be secondary to her diabetes.  Obtain orthostatics if possible   Discontinue IV fluids.  Replace calcium.  PT and OT    DVT prophylaxis: Lovenox 40    Anticipated discharge and disposition:   __________________________________________________________________________    LABS/MICRO/MEDS/DIAGNOSTICS       LABS  Recent Labs     09/28/22  0447      K 3.7   CHLORIDE 119*   CO2 18*   BUN 10.5   CREATININE 0.78   GLUCOSE 137*   CALCIUM 6.4*   ALKPHOS 69   AST 43*   ALT 32   ALBUMIN 2.1*     Recent Labs     09/28/22  0447   WBC 3.6*   RBC 3.02*   HCT 28.2*   MCV 93.4          MICROBIOLOGY  Microbiology Results (last 7 days)       ** No results found for the last 168 hours. **            MEDICATIONS   amLODIPine  10 mg Oral Daily    aspirin  81 mg Oral Daily    atorvastatin  40 mg Oral QHS    carvediloL  12.5 mg Oral BID    clopidogreL  75 mg Oral Daily    donepeziL  5 mg Oral QHS    pantoprazole  40 mg Oral Daily      INFUSIONS   sodium chloride 0.9% 50 mL/hr at 09/28/22 0601       DIAGNOSTIC TESTS  MRI Brain W WO Contrast   Final Result      1. No acute intracranial abnormality.   2.   Moderate chronic microvascular ischemic changes.         Electronically signed by: Jena Valderrama   Date:    09/27/2022    Time:    09:46      CT Lumbar Spine Without Contrast   Final Result      1. No acute fracture identified.   2. Postoperative and degenerative changes of the lumbar spine.   No significant change from the [facility] interpretation.         Electronically signed by: Jena Valderrama   Date:    09/27/2022   Time:    07:39      X-Ray Pelvis Routine AP   Final Result      Degenerative changes.         Electronically signed by: Campos Philippe   Date:    09/27/2022   Time:    09:32      X-Ray Chest 1 View   Final Result      No acute cardiopulmonary process.         Electronically signed by: Sacha Adhikari   Date:    09/27/2022   Time:    07:26      X-Ray Shoulder 2 or More Views Left   Final Result      Degenerative changes.         Electronically signed by: Campos Philippe   Date:    09/27/2022   Time:    09:32      X-Ray Lumbar Spine 2 Or 3 Views   Final Result      Degenerative and postsurgical changes seen no acute process         Electronically signed by: José Miguel Parmar   Date:    09/26/2022   Time:    18:13      CT Head Without Contrast   Final Result      Persistent area of rounded hypoattenuation in the posterior fossa on the left side.  MRI with contrast is recommended for correlation         Electronically signed by: José Miguel Parmar   Date:    09/26/2022   Time:    18:11      MRI Lumbar Spine Without Contrast    (Results Pending)            All diagnosis and differential diagnosis have been reviewed; assessment and plan has been documented. I have personally reviewed the labs and test results that are presently available; I have reviewed the patients medication list. I have reviewed the consulting providers response and recommendations. I have reviewed or attempted to review medical records based upon their availability.  All of the patient's questions have been addressed and answered. Patient's is agreeable to the above stated plan. I will continue to monitor closely and make adjustments to medical  management as needed.  This document was created using [email or web address] Fluency Direct.  Transcription errors may have been made.  Please contact me if any questions may rise regarding documentation to clarify verbiage.        Benji Anderson MD   09/28/2022   Internal Medicine

## 2022-09-28 NOTE — PT/OT/SLP EVAL
Speech Language Pathology Department  Clinical Swallow Evaluation    Patient Name:  Mayra Parker   MRN:  [id number]  Admitting Diagnosis: Multiple falls    Recommendations:     General recommendations:  SLP intervention not indicated  Diet recommendations:  Regular, Liquid Diet Level: Thin   Swallow strategies/precautions: small bites/sips, slow rate, and medications per patient preference  Precautions: Standard, aspiration    History:     Past Medical History:   Diagnosis Date    Arthritis     Coronary artery disease     CVA (cerebral vascular accident)     affected right side of body    Depression     Diabetes mellitus     Hyperlipidemia     Hypertension     Unspecified dementia without behavioral disturbance        Past Surgical History:   Procedure Laterality Date    BACK SURGERY      CAROTID ENDARTERECTOMY Left     CAROTID ENDARTERECTOMY Right     LEFT HEART CATHETERIZATION  05/20/2022    Dr. Estrella; Stent placement    LEFT HEART CATHETERIZATION Left 5/20/2022    Procedure: CATHETERIZATION, HEART, LEFT;  Surgeon: Jone Estrella MD;  Location: [facility] CATH LAB;  Service: Cardiology;  Laterality: Left;  LHC VIA R GROIN     Home Diet: Regular and thin liquids  Current Method of Nutrition: PO diet (diabetic)    Subjective     Patient awake, alert, and oriented x4 .    Patient goals: to go home     Pain/Comfort: Pain Rating 1: 0/10    Respiratory Status: room air    Objective:     Oral Musculature Evaluation  Oral Musculature: WFL  Dentition: scattered dentition  Secretion Management: adequate  Mucosal Quality: good  Mandibular Strength and Mobility: WFL  Oral Labial Strength and Mobility: WFL  Lingual Strength and Mobility: WFL  Voice Prior to PO Intake: clear    Consistency Fed By Oral Symptoms Pharyngeal Symptoms   Thin liquid by cup Self None None   Puree Self None None   Thin liquid by straw Self None None   Chewable solid Self None None     Assessment:     Oropharyngeal swallow WFL with no overt/clinical  signs/sx aspiration noted.    Plan:     Plan of Care reviewed with:  patient   SLP Follow-Up:  No      Time Tracking:     SLP Treatment Date:   09/28/22  Speech Start Time:  1145  Speech Stop Time:  1200     Speech Total Time (min):  15 min    Billable minutes:  Swallow and Oral Function Evaluation, 15 minutes      09/28/2022

## 2022-09-28 NOTE — PLAN OF CARE
Problem: Physical Therapy  Goal: Physical Therapy Goal  Description: Goals to be met by: 10/28/22     Patient will increase functional independence with mobility by performin. Supine to sit with Modified [place]  2. Sit to stand transfer with Modified [place]  3. Gait  x 200 feet with Modified [place] using Rolling Walker.     Outcome: Ongoing, Progressing

## 2022-09-28 NOTE — PT/OT/SLP PROGRESS
Physical Therapy      Patient Name:  Mayra Parker   MRN:  [id number]    Patient off floor for MRI, will f/u when able.

## 2022-09-29 LAB
ALBUMIN SERPL-MCNC: 2.9 GM/DL (ref 3.4–4.8)
ALBUMIN/GLOB SERPL: 0.8 RATIO (ref 1.1–2)
ALP SERPL-CCNC: 100 UNIT/L (ref 40–150)
ALT SERPL-CCNC: 52 UNIT/L (ref 0–55)
AST SERPL-CCNC: 90 UNIT/L (ref 5–34)
BASOPHILS # BLD AUTO: 0.04 X10(3)/MCL (ref 0–0.2)
BASOPHILS NFR BLD AUTO: 0.9 %
BILIRUBIN DIRECT+TOT PNL SERPL-MCNC: 1.2 MG/DL
BUN SERPL-MCNC: 11.1 MG/DL (ref 9.8–20.1)
CALCIUM SERPL-MCNC: 9 MG/DL (ref 8.4–10.2)
CHLORIDE SERPL-SCNC: 107 MMOL/L (ref 98–107)
CO2 SERPL-SCNC: 24 MMOL/L (ref 23–31)
CREAT SERPL-MCNC: 1.18 MG/DL (ref 0.55–1.02)
EOSINOPHIL # BLD AUTO: 0.09 X10(3)/MCL (ref 0–0.9)
EOSINOPHIL NFR BLD AUTO: 2.1 %
ERYTHROCYTE [DISTWIDTH] IN BLOOD BY AUTOMATED COUNT: 13.9 % (ref 11.5–17)
FOLATE SERPL-MCNC: 11.8 NG/ML (ref 7–31.4)
GFR SERPLBLD CREATININE-BSD FMLA CKD-EPI: 49 MLS/MIN/1.73/M2
GLOBULIN SER-MCNC: 3.5 GM/DL (ref 2.4–3.5)
GLUCOSE SERPL-MCNC: 231 MG/DL (ref 82–115)
HCT VFR BLD AUTO: 30.7 % (ref 37–47)
HGB BLD-MCNC: 9.8 GM/DL (ref 12–16)
IMM GRANULOCYTES # BLD AUTO: 0.01 X10(3)/MCL (ref 0–0.04)
IMM GRANULOCYTES NFR BLD AUTO: 0.2 %
LYMPHOCYTES # BLD AUTO: 1.7 X10(3)/MCL (ref 0.6–4.6)
LYMPHOCYTES NFR BLD AUTO: 40.2 %
MCH RBC QN AUTO: 29.4 PG (ref 27–31)
MCHC RBC AUTO-ENTMCNC: 31.9 MG/DL (ref 33–36)
MCV RBC AUTO: 92.2 FL (ref 80–94)
MONOCYTES # BLD AUTO: 0.57 X10(3)/MCL (ref 0.1–1.3)
MONOCYTES NFR BLD AUTO: 13.5 %
NEUTROPHILS # BLD AUTO: 1.8 X10(3)/MCL (ref 2.1–9.2)
NEUTROPHILS NFR BLD AUTO: 43.1 %
NRBC BLD AUTO-RTO: 0 %
PLATELET # BLD AUTO: 216 X10(3)/MCL (ref 130–400)
PMV BLD AUTO: 9.8 FL (ref 7.4–10.4)
POCT GLUCOSE: 183 MG/DL (ref 70–110)
POCT GLUCOSE: 234 MG/DL (ref 70–110)
POCT GLUCOSE: 262 MG/DL (ref 70–110)
POCT GLUCOSE: 272 MG/DL (ref 70–110)
POTASSIUM SERPL-SCNC: 4.5 MMOL/L (ref 3.5–5.1)
PROT SERPL-MCNC: 6.4 GM/DL (ref 5.8–7.6)
RBC # BLD AUTO: 3.33 X10(6)/MCL (ref 4.2–5.4)
SODIUM SERPL-SCNC: 137 MMOL/L (ref 136–145)
VIT B12 SERPL-MCNC: 529 PG/ML (ref 213–816)
WBC # SPEC AUTO: 4.2 X10(3)/MCL (ref 4.5–11.5)

## 2022-09-29 PROCEDURE — 63600175 PHARM REV CODE 636 W HCPCS: Performed by: INTERNAL MEDICINE

## 2022-09-29 PROCEDURE — 97116 GAIT TRAINING THERAPY: CPT

## 2022-09-29 PROCEDURE — 25000003 PHARM REV CODE 250: Performed by: NURSE PRACTITIONER

## 2022-09-29 PROCEDURE — 36415 COLL VENOUS BLD VENIPUNCTURE: CPT | Performed by: INTERNAL MEDICINE

## 2022-09-29 PROCEDURE — 85025 COMPLETE CBC W/AUTO DIFF WBC: CPT | Performed by: INTERNAL MEDICINE

## 2022-09-29 PROCEDURE — 25000003 PHARM REV CODE 250: Performed by: INTERNAL MEDICINE

## 2022-09-29 PROCEDURE — 96372 THER/PROPH/DIAG INJ SC/IM: CPT | Performed by: INTERNAL MEDICINE

## 2022-09-29 PROCEDURE — 82607 VITAMIN B-12: CPT | Performed by: INTERNAL MEDICINE

## 2022-09-29 PROCEDURE — G0378 HOSPITAL OBSERVATION PER HR: HCPCS

## 2022-09-29 PROCEDURE — 97530 THERAPEUTIC ACTIVITIES: CPT

## 2022-09-29 PROCEDURE — 80053 COMPREHEN METABOLIC PANEL: CPT | Performed by: INTERNAL MEDICINE

## 2022-09-29 PROCEDURE — 82746 ASSAY OF FOLIC ACID SERUM: CPT | Performed by: INTERNAL MEDICINE

## 2022-09-29 RX ADMIN — PANTOPRAZOLE SODIUM 40 MG: 40 TABLET, DELAYED RELEASE ORAL at 09:09

## 2022-09-29 RX ADMIN — ATORVASTATIN CALCIUM 40 MG: 40 TABLET, FILM COATED ORAL at 09:09

## 2022-09-29 RX ADMIN — ACETAMINOPHEN 650 MG: 325 TABLET ORAL at 09:09

## 2022-09-29 RX ADMIN — ASPIRIN 81 MG: 81 TABLET, COATED ORAL at 09:09

## 2022-09-29 RX ADMIN — CLOPIDOGREL 75 MG: 75 TABLET, FILM COATED ORAL at 09:09

## 2022-09-29 RX ADMIN — INSULIN ASPART 3 UNITS: 100 INJECTION, SOLUTION INTRAVENOUS; SUBCUTANEOUS at 09:09

## 2022-09-29 RX ADMIN — CARVEDILOL 12.5 MG: 12.5 TABLET, FILM COATED ORAL at 09:09

## 2022-09-29 RX ADMIN — INSULIN ASPART 6 UNITS: 100 INJECTION, SOLUTION INTRAVENOUS; SUBCUTANEOUS at 12:09

## 2022-09-29 NOTE — PROGRESS NOTES
[facility] HOSPITAL MEDICINE   PROGRESS NOTE        CHIEF COMPLAINT   Hospital follow up    HOSPITAL COURSE   Mayra Parker is a 72 y.o. female with a PMHx of  dementia, HTN, DM and CVA who presented to [facility] on 9/26/2022 with c/o BLE numbness and back pain following 3 falls over the past week. Denied head injury or LOC. Pt's daughter reported that the patient was also more confused over the past week with hallucinations after being started on Remeron. The majority of the history was obtained from review of the medical record.      Initial ED VS include /89, HR 89, RR 18, SpO2 100%, temp 97.9F. Labs notable for hgb 11.2, hct 35.6, sodium 133, potassium 5.2, creatinine 1.49, glucose 243, AST 88, ALT 60. UA not impressive. CXR negative for acute abnormality. CT L-spine negative for acute fracture; postoperative and degenerative changes of the lumbar spine noted. CT head with persistent area of rounded hypo attenuation in the posterior fossa on the left side. She received Lidocaine patch, Calcium gluconate IV, tramadol PO, and IV fluids in ED. She was admitted to hospital medicine services for further work-up and management of care.   MRI brain with and without contrast was negative except moderate chronic microvascular ischemic changes.  MRI lumbar spine without contrast noted moderate to severe degenerative narrowing of the spinal canal at L3-4 and multilevel neural foraminal stenosis.  Neurosurgery was consulted and patient did not want any kind of surgical intervention at this time.    Today  Seen examined this morning.  No new issues still complains of lower back pain but declined surgery with the neurosurgery consultation yesterday.        OBJECTIVE/PHYSICAL EXAM     VITAL SIGNS (MOST RECENT):  Temp: 98.6 °F (37 °C) (09/29/22 0700)  Pulse: 77 (09/29/22 0700)  Resp: 16 (09/29/22 0700)  BP: (!) 147/67 (09/29/22 0700)  SpO2: 96 % (09/29/22 0700)   VITAL SIGNS (24 HOUR  RANGE):  Temp:  [97.4 °F (36.3 °C)-98.8 °F (37.1 °C)] 98.6 °F (37 °C)  Pulse:  [74-82] 77  Resp:  [16-18] 16  SpO2:  [94 %-99 %] 96 %  BP: (101-147)/(52-71) 147/67   GENERAL: In no acute distress, afebrile  HEENT:  CHEST: Clear to auscultation bilaterally  HEART: S1, S2, no appreciable murmur  ABDOMEN: Soft, nontender, BS +  MSK: Warm, no lower extremity edema, no clubbing or cyanosis  NEUROLOGIC: Alert and oriented x4, moving all extremities with good strength, hypo reflexive at ankle and knee bilaterally, loss of sensation up to mid tibia bilaterally  INTEGUMENTARY:  PSYCHIATRY:        ASSESSMENT/PLAN   Falls  Orthostatic hypotension  Acute metabolic encephalopathy-resolved   Acute kidney injury-resolved   Moderate to severe spinal canal stenosis L3-4  Diabetic neuropathy    History of: Chronic HFrEF 45%-compensated, normocytic anemia, CAD PCI, dm 2, HTN, HLD, CVA, dementia, depression        Repeat orthostatic vitals.  Continue holding amlodipine and allow permissive hypertension.  Will call daughter to discuss next plan of care possibly rehab?  PT and OT    DVT prophylaxis: Lovenox 40    Anticipated discharge and disposition:   __________________________________________________________________________    LABS/MICRO/MEDS/DIAGNOSTICS       LABS  Recent Labs     09/29/22  0400      K 4.5   CHLORIDE 107   CO2 24   BUN 11.1   CREATININE 1.18*   GLUCOSE 231*   CALCIUM 9.0   ALKPHOS 100   AST 90*   ALT 52   ALBUMIN 2.9*     Recent Labs     09/29/22  0359   WBC 4.2*   RBC 3.33*   HCT 30.7*   MCV 92.2          MICROBIOLOGY  Microbiology Results (last 7 days)       ** No results found for the last 168 hours. **            MEDICATIONS   aspirin  81 mg Oral Daily    atorvastatin  40 mg Oral QHS    carvediloL  12.5 mg Oral BID    clopidogreL  75 mg Oral Daily    enoxaparin  40 mg Subcutaneous Daily    pantoprazole  40 mg Oral Daily      INFUSIONS        DIAGNOSTIC TESTS  MRI Lumbar Spine Without Contrast   Final  Result      1. Moderate to severe degenerative narrowing of the spinal canal at L3-L4.   2. Multilevel neural foraminal stenoses as described.         Electronically signed by: Jena Valderrama   Date:    09/28/2022   Time:    11:27      MRI Brain W WO Contrast   Final Result      1. No acute intracranial abnormality.   2.   Moderate chronic microvascular ischemic changes.         Electronically signed by: Jena Valderrama   Date:    09/27/2022   Time:    09:46      CT Lumbar Spine Without Contrast   Final Result      1. No acute fracture identified.   2. Postoperative and degenerative changes of the lumbar spine.   [facility] significant [facility] interpretation.         Electronically signed by: Jena Valderrama   Date:    09/27/2022   Time:    07:39      X-Ray Pelvis Routine AP   Final Result      Degenerative changes.         Electronically signed by: Campos Philippe   Date:    09/27/2022   Time:    09:32      X-Ray Chest 1 View   Final Result      No acute cardiopulmonary process.         Electronically signed by: Sacha Adhikari   Date:    09/27/2022   Time:    07:26      X-Ray Shoulder 2 or More Views Left   Final Result      Degenerative changes.         Electronically signed by: Campos Philippe   Date:    09/27/2022   Time:    09:32      X-Ray Lumbar Spine 2 Or 3 Views   Final Result      Degenerative and postsurgical changes seen no acute process         Electronically signed by: José Miguel Parmar   Date:    09/26/2022   Time:    18:13      CT Head Without Contrast   Final Result      Persistent area of rounded hypoattenuation in the posterior fossa on the left side.  MRI with contrast is recommended for correlation         Electronically signed by: José Miguel Parmar   Date:    09/26/2022   Time:    18:11               All diagnosis and differential diagnosis have been reviewed; assessment and plan has been documented. I have personally reviewed the labs and test results that are presently  available; I have reviewed the patients medication list. I have reviewed the consulting providers response and recommendations. I have reviewed or attempted to review medical records based upon their availability.  All of the patient's questions have been addressed and answered. Patient's is agreeable to the above stated plan. I will continue to monitor closely and make adjustments to medical management as needed.  This document was created using [employer] Flu[employer] Direct.  Transcription errors may have been made.  Please contact me if any questions may rise regarding documentation to clarify verbiage.        Benji Anderson MD   09/29/2022   Internal Medicine

## 2022-09-29 NOTE — PT/OT/SLP PROGRESS
"Physical Therapy Treatment    Patient Name:  Mayra Parker   MRN:  [id number]    Recommendations:     Discharge Recommendations:  rehabilitation facility   Discharge Equipment Recommendations: walker, rolling   Barriers to discharge:  Fall risk, weakness in B LE's    Assessment:     Mayra Parker is a 72 y.o. female admitted with a medical diagnosis of Multiple falls.  She presents with the following impairments/functional limitations:  weakness, impaired endurance, impaired self care skills, impaired functional mobility, impaired sensation, gait instability, impaired balance, decreased lower extremity function, pain, impaired coordination, orthopedic precautions. Pt tolerated session well and displayed increased ambulation during gait today with RW. Pt displayed increased quivering in B SRAVANI's, however displayed no LOB during gait. Pt required increased verbal cueing and education for importance of wearing LSO during sitting and out of bed activities.     Rehab Prognosis: Good; patient would benefit from acute skilled PT services to address these deficits and reach maximum level of function.    Recent Surgery: * No surgery found *      Plan:     During this hospitalization, patient to be seen 5 x/week to address the identified rehab impairments via gait training, therapeutic activities, therapeutic exercises and progress toward the following goals:    Plan of Care Expires:  10/28/22    Subjective     Chief Complaint: "Pain in my lower back with increased numbness in my legs."  Patient/Family Comments/goals: to get stronger  Pain/Comfort:  Pain Rating 1: 7/10  Location - Orientation 1: lower  Location 1: back  Pain Addressed 1: Reposition, Nurse notified      Objective:     Communicated with RN prior to session.  Patient found HOB elevated with RN and nursing students giving IV with peripheral IV upon PT entry to room.     General Precautions: Standard, fall   Orthopedic Precautions:spinal precautions "   Braces: LSO  Respiratory Status: Room air     Functional Mobility:  Bed Mobility:     Supine to Sit: stand by assistance  Transfers:     Sit to Stand:  minimum assistance with rolling walker (x2 from EOB)  Bed to Chair: minimum assistance with  rolling walker  using  Step Transfer  Gait: 30ft with RW at Mod A (limited due to increased fatigue) (displayed decreased gait speed and required seated rest break in recliner following behind due to increased fatigue.)      Patient left up in chair with all lines intact, call button in reach, and RN notified..    GOALS:   Multidisciplinary Problems       Physical Therapy Goals          Problem: Physical Therapy    Goal Priority Disciplines Outcome Goal Variances Interventions   Physical Therapy Goal     PT, PT/OT Ongoing, Progressing     Description: Goals to be met by: 10/28/22     Patient will increase functional independence with mobility by performin. Supine to sit with Modified [place]  2. Sit to stand transfer with Modified [place]  3. Gait  x 200 feet with Modified [place] using Rolling Walker.                          Time Tracking:     PT Received On: 22  PT Start Time: 1023     PT Stop Time: 1048  PT Total Time (min): 25 min     Billable Minutes: Gait Training 14 and Therapeutic Activity 11    Treatment Type: Treatment  PT/PTA: PT     PTA Visit Number: 1     2022

## 2022-09-30 LAB
POCT GLUCOSE: 183 MG/DL (ref 70–110)
POCT GLUCOSE: 195 MG/DL (ref 70–110)
POCT GLUCOSE: 213 MG/DL (ref 70–110)
POCT GLUCOSE: 316 MG/DL (ref 70–110)

## 2022-09-30 PROCEDURE — 63600175 PHARM REV CODE 636 W HCPCS: Performed by: INTERNAL MEDICINE

## 2022-09-30 PROCEDURE — G0378 HOSPITAL OBSERVATION PER HR: HCPCS

## 2022-09-30 PROCEDURE — 25000003 PHARM REV CODE 250: Performed by: INTERNAL MEDICINE

## 2022-09-30 PROCEDURE — 96372 THER/PROPH/DIAG INJ SC/IM: CPT | Performed by: INTERNAL MEDICINE

## 2022-09-30 RX ORDER — METFORMIN HYDROCHLORIDE 500 MG/1
1000 TABLET ORAL 2 TIMES DAILY WITH MEALS
Status: DISCONTINUED | OUTPATIENT
Start: 2022-09-30 | End: 2022-10-12 | Stop reason: HOSPADM

## 2022-09-30 RX ADMIN — METFORMIN HYDROCHLORIDE 1000 MG: 500 TABLET, FILM COATED ORAL at 04:09

## 2022-09-30 RX ADMIN — ENOXAPARIN SODIUM 40 MG: 40 INJECTION SUBCUTANEOUS at 04:09

## 2022-09-30 RX ADMIN — CLOPIDOGREL 75 MG: 75 TABLET, FILM COATED ORAL at 09:09

## 2022-09-30 RX ADMIN — CARVEDILOL 12.5 MG: 12.5 TABLET, FILM COATED ORAL at 09:09

## 2022-09-30 RX ADMIN — ATORVASTATIN CALCIUM 40 MG: 40 TABLET, FILM COATED ORAL at 10:09

## 2022-09-30 RX ADMIN — METFORMIN HYDROCHLORIDE 1000 MG: 500 TABLET, FILM COATED ORAL at 09:09

## 2022-09-30 RX ADMIN — PANTOPRAZOLE SODIUM 40 MG: 40 TABLET, DELAYED RELEASE ORAL at 09:09

## 2022-09-30 RX ADMIN — ASPIRIN 81 MG: 81 TABLET, COATED ORAL at 09:09

## 2022-09-30 RX ADMIN — CARVEDILOL 12.5 MG: 12.5 TABLET, FILM COATED ORAL at 10:09

## 2022-09-30 RX ADMIN — INSULIN ASPART 8 UNITS: 100 INJECTION, SOLUTION INTRAVENOUS; SUBCUTANEOUS at 11:09

## 2022-09-30 NOTE — PROGRESS NOTES
[facility] MEDICINE   PROGRESS NOTE        CHIEF COMPLAINT   Hospital follow up    HOSPITAL COURSE   Mayra Parker is a 72 y.o. female with a PMHx of  dementia, HTN, DM and CVA who presented to [facility] on 9/26/2022 with c/o BLE numbness and back pain following 3 falls over the past week. Denied head injury or LOC. Pt's daughter reported that the patient was also more confused over the past week with hallucinations after being started on Remeron. The majority of the history was obtained from review of the medical record.      Initial ED VS include /89, HR 89, RR 18, SpO2 100%, temp 97.9F. Labs notable for hgb 11.2, hct 35.6, sodium 133, potassium 5.2, creatinine 1.49, glucose 243, AST 88, ALT 60. UA not impressive. CXR negative for acute abnormality. CT L-spine negative for acute fracture; postoperative and degenerative changes of the lumbar spine noted. CT head with persistent area of rounded hypo attenuation in the posterior fossa on the left side. She received Lidocaine patch, Calcium gluconate IV, tramadol PO, and IV fluids in ED. She was admitted to hospital medicine services for further work-up and management of care.   MRI brain with and without contrast was negative except moderate chronic microvascular ischemic changes.  MRI lumbar spine without contrast noted moderate to severe degenerative narrowing of the spinal canal at L3-4 and multilevel neural foraminal stenosis.  Neurosurgery was consulted and patient did not want any kind of surgical intervention at this time.    Today  Seen examined this morning.  Spoke to daughter yesterday and planning for rehab placement.  Case management was notified and looking into this.  Otherwise patient still doing well without any issues, she does have some obvious memory deficit.        OBJECTIVE/PHYSICAL EXAM     VITAL SIGNS (MOST RECENT):  Temp: 98 °F (36.7 °C) (09/30/22 0710)  Pulse: 85 (09/30/22 0710)  Resp: 19  (09/30/22 0710)  BP: (!) 145/79 (09/30/22 0710)  SpO2: 98 % (09/30/22 0710)   VITAL SIGNS (24 HOUR RANGE):  Temp:  [97.6 °F (36.4 °C)-98.3 °F (36.8 °C)] 98 °F (36.7 °C)  Pulse:  [75-87] 85  Resp:  [18-20] 19  SpO2:  [95 %-100 %] 98 %  BP: (126-179)/(58-79) 145/79   GENERAL: In no acute distress, afebrile  HEENT:  CHEST: Clear to auscultation bilaterally  HEART: S1, S2, no appreciable murmur  ABDOMEN: Soft, nontender, BS +  MSK: Warm, no lower extremity edema, no clubbing or cyanosis  NEUROLOGIC: Alert and oriented x4, moving all extremities with good strength, hypo reflexive at ankle and knee bilaterally, loss of sensation up to mid tibia bilaterally  INTEGUMENTARY:  PSYCHIATRY:        ASSESSMENT/PLAN   Falls  Orthostatic hypotension  Acute metabolic encephalopathy-resolved   Acute kidney injury-resolved   Moderate to severe spinal canal stenosis L3-4  Diabetic neuropathy    History of: Chronic HFrEF 45%-compensated, normocytic anemia, CAD PCI, dm 2, HTN, HLD, CVA, dementia, depression      Continue holding amlodipine and allow permissive hypertension.  Orthostatic still positive but much better than before.  Case management following.  Discussed rehab placement.  PT and OT    DVT prophylaxis: Lovenox 40    Anticipated discharge and disposition:   __________________________________________________________________________    LABS/MICRO/MEDS/DIAGNOSTICS       LABS  Recent Labs     09/29/22  0400      K 4.5   CHLORIDE 107   CO2 24   BUN 11.1   CREATININE 1.18*   GLUCOSE 231*   CALCIUM 9.0   ALKPHOS 100   AST 90*   ALT 52   ALBUMIN 2.9*     Recent Labs     09/29/22  0359   WBC 4.2*   RBC 3.33*   HCT 30.7*   MCV 92.2          MICROBIOLOGY  Microbiology Results (last 7 days)       ** No results found for the last 168 hours. **            MEDICATIONS   aspirin  81 mg Oral Daily    atorvastatin  40 mg Oral QHS    carvediloL  12.5 mg Oral BID    clopidogreL  75 mg Oral Daily    enoxaparin  40 mg Subcutaneous  Daily    pantoprazole  40 mg Oral Daily      INFUSIONS        DIAGNOSTIC TESTS  MRI Lumbar Spine Without Contrast   Final Result      1. Moderate to severe degenerative narrowing of the spinal canal at L3-L4.   2. Multilevel neural foraminal stenoses as described.         Electronically signed by: Jena Valderrama   Date:    09/28/2022   Time:    11:27      MRI Brain W WO Contrast   Final Result      1. No acute intracranial abnormality.   2.   Moderate chronic microvascular ischemic changes.         Electronically signed by: Jena Valderrama   Date:    09/27/2022   Time:    09:46      CT Lumbar Spine Without Contrast   Final Result      1. No acute fracture identified.   2. Postoperative and degenerative changes of the lumbar spine.   [facility]         Electronically signed by: Jena Valderrama   Date:    09/27/2022   Time:    07:39      X-Ray Pelvis Routine AP   Final Result      Degenerative changes.         Electronically signed by: Campos Philippe   Date:    09/27/2022   Time:    09:32      X-Ray Chest 1 View   Final Result      No acute cardiopulmonary process.         Electronically signed by: Sacha Adhikari   Date:    09/27/2022   Time:    07:26      X-Ray Shoulder 2 or More Views Left   Final Result      Degenerative changes.         Electronically signed by: Campos Philippe   Date:    09/27/2022   Time:    09:32      X-Ray Lumbar Spine 2 Or 3 Views   Final Result      Degenerative and postsurgical changes seen no acute process         Electronically signed by: José Miguel Parmar   Date:    09/26/2022   Time:    18:13      CT Head Without Contrast   Final Result      Persistent area of rounded hypoattenuation in the posterior fossa on the left side.  MRI with contrast is recommended for correlation         Electronically signed by: José Miguel Parmar   Date:    09/26/2022   Time:    18:11               All diagnosis and differential diagnosis have been  reviewed; assessment and plan has been documented. I have personally reviewed the labs and test results that are presently available; I have reviewed the patients medication list. I have reviewed the consulting providers response and recommendations. I have reviewed or attempted to review medical records based upon their availability.  All of the patient's questions have been addressed and answered. Patient's is agreeable to the above stated plan. I will continue to monitor closely and make adjustments to medical management as needed.  This document was created using M*Modal Fluency Direct.  Transcription errors may have been made.  Please contact me if any questions may rise regarding documentation to clarify verbiage.        Benji Anderson MD   09/30/2022   Internal Medicine

## 2022-09-30 NOTE — PLAN OF CARE
09/30/22 1659   Discharge Assessment   Assessment Type Discharge Planning Assessment   Confirmed/corrected address, phone number and insurance Yes   Confirmed Demographics Correct on Facesheet   Source of Information patient   Does patient/caregiver understand observation status No  (INPT)   Communicated GIAN with patient/caregiver No   Reason For Admission MULTIPLE FALLS   Lives With alone   Facility Arrived From: HOME   Do you expect to return to your current living situation? No   Do you have help at home or someone to help you manage your care at home? No   Prior to hospitilization cognitive status: Not Oriented to Time  (FORGETFUL)   Current cognitive status: Not Oriented to Time   Walking or Climbing Stairs Difficulty ambulation difficulty, requires equipment   Dressing/Bathing Difficulty bathing difficulty, requires equipment   Equipment Currently Used at Home walker, rolling;wheelchair   Readmission within 30 days? No   Patient currently being followed by outpatient case management? No   Do you currently have service(s) that help you manage your care at home? No   Do you take prescription medications? Yes   Do you have prescription coverage? Yes   Coverage INS   Do you have any problems affording any of your prescribed medications? No   Who is going to help you get home at discharge? DGTR   How do you get to doctors appointments? family or friend will provide   Discharge Plan A Skilled Nursing Facility   Discharge Plan B Skilled Nursing Facility   DME Needed Upon Discharge  none   Discharge Plan discussed with: Patient;Adult children  (SARI [phone])   Discharge Barriers Identified None   PT HAS THOUGHT BLOCKING, FOR MUCH OF OUR CONVERSATION, SHE IS AWARE AT TIMES THAT SHE CANNOT REMEMBER.  SHE LIVES ALONE ..HER DGTR .SARI IS HER CAREGIVER.  WE SPOKE ABOUT MANY OPTIONS, FOC FOR [facility] FOR SNFF.  REFERRAL SENT TO  [facility] FOR  CO-ORDINATION

## 2022-10-01 LAB
POCT GLUCOSE: 169 MG/DL (ref 70–110)
POCT GLUCOSE: 194 MG/DL (ref 70–110)
POCT GLUCOSE: 211 MG/DL (ref 70–110)

## 2022-10-01 PROCEDURE — 96372 THER/PROPH/DIAG INJ SC/IM: CPT | Performed by: INTERNAL MEDICINE

## 2022-10-01 PROCEDURE — 63600175 PHARM REV CODE 636 W HCPCS: Performed by: INTERNAL MEDICINE

## 2022-10-01 PROCEDURE — 25000003 PHARM REV CODE 250: Performed by: INTERNAL MEDICINE

## 2022-10-01 PROCEDURE — G0378 HOSPITAL OBSERVATION PER HR: HCPCS

## 2022-10-01 RX ORDER — QUETIAPINE FUMARATE 25 MG/1
25 TABLET, FILM COATED ORAL ONCE
Status: COMPLETED | OUTPATIENT
Start: 2022-10-01 | End: 2022-10-01

## 2022-10-01 RX ORDER — TALC
3 POWDER (GRAM) TOPICAL
Status: DISCONTINUED | OUTPATIENT
Start: 2022-10-01 | End: 2022-10-12 | Stop reason: HOSPADM

## 2022-10-01 RX ADMIN — SITAGLIPTIN 50 MG: 50 TABLET, FILM COATED ORAL at 04:10

## 2022-10-01 RX ADMIN — METFORMIN HYDROCHLORIDE 1000 MG: 500 TABLET, FILM COATED ORAL at 08:10

## 2022-10-01 RX ADMIN — INSULIN ASPART 3 UNITS: 100 INJECTION, SOLUTION INTRAVENOUS; SUBCUTANEOUS at 08:10

## 2022-10-01 RX ADMIN — CLOPIDOGREL 75 MG: 75 TABLET, FILM COATED ORAL at 08:10

## 2022-10-01 RX ADMIN — ENOXAPARIN SODIUM 40 MG: 40 INJECTION SUBCUTANEOUS at 04:10

## 2022-10-01 RX ADMIN — METFORMIN HYDROCHLORIDE 1000 MG: 500 TABLET, FILM COATED ORAL at 04:10

## 2022-10-01 RX ADMIN — QUETIAPINE FUMARATE 25 MG: 25 TABLET ORAL at 08:10

## 2022-10-01 RX ADMIN — PANTOPRAZOLE SODIUM 40 MG: 40 TABLET, DELAYED RELEASE ORAL at 08:10

## 2022-10-01 RX ADMIN — ASPIRIN 81 MG: 81 TABLET, COATED ORAL at 08:10

## 2022-10-01 RX ADMIN — CARVEDILOL 12.5 MG: 12.5 TABLET, FILM COATED ORAL at 08:10

## 2022-10-01 RX ADMIN — ATORVASTATIN CALCIUM 40 MG: 40 TABLET, FILM COATED ORAL at 08:10

## 2022-10-01 RX ADMIN — MELATONIN TAB 3 MG 3 MG: 3 TAB at 08:10

## 2022-10-01 NOTE — PROGRESS NOTES
[facility]IC[facility]   PROGRESS NOTE        CHIEF COMPLAINT   Hospital follow up    HOSPITAL COURSE   Mayra Parker is a 72 y.o. female with a PMHx of  dementia, HTN, DM and CVA who presented to [facility] on 9/26/2022 with c/o BLE numbness and back pain following 3 falls over the past week. Denied head injury or LOC. Pt's daughter reported that the patient was also more confused over the past week with hallucinations after being started on Remeron. The majority of the history was obtained from review of the medical record.      Initial ED VS include /89, HR 89, RR 18, SpO2 100%, temp 97.9F. Labs notable for hgb 11.2, hct 35.6, sodium 133, potassium 5.2, creatinine 1.49, glucose 243, AST 88, ALT 60. UA not impressive. CXR negative for acute abnormality. CT L-spine negative for acute fracture; postoperative and degenerative changes of the lumbar spine noted. CT head with persistent area of rounded hypo attenuation in the posterior fossa on the left side. She received Lidocaine patch, Calcium gluconate IV, tramadol PO, and IV fluids in ED. She was admitted to hospital medicine services for further work-up and management of care.   MRI brain with and without contrast was negative except moderate chronic microvascular ischemic changes.  MRI lumbar spine without contrast noted moderate to severe degenerative narrowing of the spinal canal at L3-4 and multilevel neural foraminal stenosis.  Neurosurgery was consulted and patient did not want any kind of surgical intervention at this time.    Today  She was a little confused this morning but was able to hold a conversation and respond appropriately.  She stated that she was trying to fix her close and was fidgety this morning.  Spoke with nurse and there was some confusion noted as well.        OBJECTIVE/PHYSICAL EXAM     VITAL SIGNS (MOST RECENT):  Temp: 98.2 °F (36.8 °C) (10/01/22 0732)  Pulse: 74 (10/01/22 0732)  Resp: 16  (10/01/22 0732)  BP: 138/71 (10/01/22 0732)  SpO2: 97 % (10/01/22 0732)   VITAL SIGNS (24 HOUR RANGE):  Temp:  [97.5 °F (36.4 °C)-98.2 °F (36.8 °C)] 98.2 °F (36.8 °C)  Pulse:  [69-83] 74  Resp:  [13-18] 16  SpO2:  [95 %-100 %] 97 %  BP: (110-160)/(62-80) 138/71   GENERAL: In no acute distress, afebrile  HEENT:  CHEST: Clear to auscultation bilaterally  HEART: S1, S2, no appreciable murmur  ABDOMEN: Soft, nontender, BS +  MSK: Warm, no lower extremity edema, no clubbing or cyanosis  NEUROLOGIC: Alert and oriented x4, moving all extremities with good strength, hypo reflexive at ankle and knee bilaterally, loss of sensation up to mid tibia bilaterally  INTEGUMENTARY:  PSYCHIATRY:        ASSESSMENT/PLAN   Falls  Orthostatic hypotension  Acute metabolic encephalopathy-resolved   Acute kidney injury-resolved   Moderate to severe spinal canal stenosis L3-4  Diabetic neuropathy    History of: Chronic HFrEF 45%-compensated, normocytic anemia, CAD PCI, dm 2, HTN, HLD, CVA, dementia, depression      Continue holding amlodipine and allow permissive hypertension.   Case management following.  Pending SNF disposition plan.  PT and OT  Start melatonin nightly and give 1 dose of Seroquel for this morning.    DVT prophylaxis: Lovenox 40    Anticipated discharge and disposition:   __________________________________________________________________________    LABS/MICRO/MEDS/DIAGNOSTICS       LABS  Recent Labs     09/29/22  0400      K 4.5   CHLORIDE 107   CO2 24   BUN 11.1   CREATININE 1.18*   GLUCOSE 231*   CALCIUM 9.0   ALKPHOS 100   AST 90*   ALT 52   ALBUMIN 2.9*     Recent Labs     09/29/22  0359   WBC 4.2*   RBC 3.33*   HCT 30.7*   MCV 92.2          MICROBIOLOGY  Microbiology Results (last 7 days)       ** No results found for the last 168 hours. **            MEDICATIONS   aspirin  81 mg Oral Daily    atorvastatin  40 mg Oral QHS    carvediloL  12.5 mg Oral BID    clopidogreL  75 mg Oral Daily    enoxaparin  40 mg  Subcutaneous Daily    melatonin  3 mg Oral Q24H    metFORMIN  1,000 mg Oral BID WM    pantoprazole  40 mg Oral Daily      INFUSIONS        DIAGNOSTIC TESTS  MRI Lumbar Spine Without Contrast   Final Result      1. Moderate to severe degenerative narrowing of the spinal canal at L3-L4.   2. Multilevel neural foraminal stenoses as described.         Electronically signed by: Jena Valderrama   Date:    09/28/2022   Time:    11:27      MRI Brain W WO Contrast   Final Result      1. No acute intracranial abnormality.   2.   Moderate chronic microvascular ischemic changes.         Electronically signed by: Jena Valderrama   Date:    09/27/2022   Time:    09:46      CT Lumbar Spine Without Contrast   Final Result      1. No acute fracture identified.   2. Postoperative and degenerative changes of the lumbar spine.   [facility].         Electronically signed by: Jena Valderrama   Date:    09/27/2022   Time:    07:39      X-Ray Pelvis Routine AP   Final Result      Degenerative changes.         Electronically signed by: Campos Philippe   Date:    09/27/2022   Time:    09:32      X-Ray Chest 1 View   Final Result      No acute cardiopulmonary process.         Electronically signed by: Sacha Adhikari   Date:    09/27/2022   Time:    07:26      X-Ray Shoulder 2 or More Views Left   Final Result      Degenerative changes.         Electronically signed by: Campos Philippe   Date:    09/27/2022   Time:    09:32      X-Ray Lumbar Spine 2 Or 3 Views   Final Result      Degenerative and postsurgical changes seen no acute process         Electronically signed by: José Miguel Parmar   Date:    09/26/2022   Time:    18:13      CT Head Without Contrast   Final Result      Persistent area of rounded hypoattenuation in the posterior fossa on the left side.  MRI with contrast is recommended for correlation         Electronically signed by: José Miguel Parmar   Date:    09/26/2022    Time:    18:11               All diagnosis and differential diagnosis have been reviewed; assessment and plan has been documented. I have personally reviewed the labs and test results that are presently available; I have reviewed the patients medication list. I have reviewed the consulting providers response and recommendations. I have reviewed or attempted to review medical records based upon their availability.  All of the patient's questions have been addressed and answered. Patient's is agreeable to the above stated plan. I will continue to monitor closely and make adjustments to medical management as needed.  This document was created using M*Modal Fluency Direct.  Transcription errors may have been made.  Please contact me if any questions may rise regarding documentation to clarify verbiage.        Benji Anderson MD   10/01/2022   Internal Medicine

## 2022-10-01 NOTE — PROGRESS NOTES
"Inpatient Nutrition Evaluation    Admit Date: 9/26/2022   Total duration of encounter: 5 days    Nutrition Recommendation/Prescription     Continue diabetic diet as tolerated  RD to monitor po intake and weight changes    Nutrition Assessment     Chart Review    Reason Seen: length of stay    Diagnosis: Falls  Orthostatic hypotension  Acute metabolic encephalopathy-resolved   Acute kidney injury-resolved   Moderate to severe spinal canal stenosis L3-4  Diabetic neuropathy      Relevant Medical History:  dementia, HLD, HTN, DM 2, CVA, HF, anemia, CAD PCI, depression    Nutrition-Related Medications: insulin PRN, Zofran PRN, Protonix daily    Nutrition-Related Labs: 10/1: POCT [id number]      Diet Order: Diet diabetic  Oral Supplement Order: none at this time  Appetite/Oral Intake: good/% of meals  Factors Affecting Nutritional Intake: none identified at this time  Food/Zoroastrianism/Cultural Preferences: none reported       Wound(s):       Comments    10/1: pt reports good appetite, eating most of meals, with no n/v/d/c. UBW reported 152 lb with no recent weight loss. Pt stated weight loss 2-3 months ago, with stable weight since. Latest weight of 68.9 kg (152 lb) on 9/28 and previous weight of 73.5 kg (162 lb) on 7/6. Possible 6.2% weight loss in 2.5 months; weight loss not significant at this time. Will continue to monitor.    Anthropometrics    Height: 5' 4.02" (162.6 cm) Height Method: Stated  Last Weight: 68.9 kg (152 lb) (09/28/22 2359) Weight Method: Standard Scale  BMI (Calculated): 26.1  BMI Classification: overweight (BMI 25-29.9)     Ideal Body Weight (IBW), Female: 120.1 lb     % Ideal Body Weight, Female (lb): 126.56 %                             Usual Weight Provided By: patient    Wt Readings from Last 5 Encounters:   09/28/22 68.9 kg (152 lb)   07/06/22 73.5 kg (162 lb)   06/26/22 73.5 kg (162 lb 0.6 oz)   05/29/22 73.1 kg (161 lb 2.5 oz)   05/23/22 81.6 kg (180 lb)     Weight Change(s) Since " Admission:  Admit Weight: 68.9 kg (152 lb) (09/26/22 9971)      Patient Education    Not applicable.    Monitoring & Evaluation     Dietitian will monitor food and beverage intake and weight change.  Nutrition Risk/Follow-Up: low (follow-up in 5-7 days)  Patients assigned 'low nutrition risk' status do not qualify for a full nutritional assessment but will be monitored and re-evaluated in a 5-7 day time period.    Svetlana Pelayo, Registration Eligible, Provisional LDN

## 2022-10-02 LAB
POCT GLUCOSE: 136 MG/DL (ref 70–110)
POCT GLUCOSE: 177 MG/DL (ref 70–110)
POCT GLUCOSE: 210 MG/DL (ref 70–110)
POCT GLUCOSE: 220 MG/DL (ref 70–110)
POCT GLUCOSE: 262 MG/DL (ref 70–110)
POCT GLUCOSE: 290 MG/DL (ref 70–110)

## 2022-10-02 PROCEDURE — 25000003 PHARM REV CODE 250: Performed by: INTERNAL MEDICINE

## 2022-10-02 PROCEDURE — 63600175 PHARM REV CODE 636 W HCPCS: Performed by: INTERNAL MEDICINE

## 2022-10-02 PROCEDURE — G0378 HOSPITAL OBSERVATION PER HR: HCPCS

## 2022-10-02 PROCEDURE — 96372 THER/PROPH/DIAG INJ SC/IM: CPT | Performed by: INTERNAL MEDICINE

## 2022-10-02 RX ADMIN — INSULIN ASPART 6 UNITS: 100 INJECTION, SOLUTION INTRAVENOUS; SUBCUTANEOUS at 12:10

## 2022-10-02 RX ADMIN — METFORMIN HYDROCHLORIDE 1000 MG: 500 TABLET, FILM COATED ORAL at 11:10

## 2022-10-02 RX ADMIN — CARVEDILOL 12.5 MG: 12.5 TABLET, FILM COATED ORAL at 08:10

## 2022-10-02 RX ADMIN — INSULIN ASPART 2 UNITS: 100 INJECTION, SOLUTION INTRAVENOUS; SUBCUTANEOUS at 05:10

## 2022-10-02 RX ADMIN — INSULIN DETEMIR 7 UNITS: 100 INJECTION, SOLUTION SUBCUTANEOUS at 09:10

## 2022-10-02 RX ADMIN — METFORMIN HYDROCHLORIDE 1000 MG: 500 TABLET, FILM COATED ORAL at 04:10

## 2022-10-02 RX ADMIN — ENOXAPARIN SODIUM 40 MG: 40 INJECTION SUBCUTANEOUS at 04:10

## 2022-10-02 RX ADMIN — MELATONIN TAB 3 MG 3 MG: 3 TAB at 08:10

## 2022-10-02 RX ADMIN — ATORVASTATIN CALCIUM 40 MG: 40 TABLET, FILM COATED ORAL at 08:10

## 2022-10-02 NOTE — PLAN OF CARE
Problem: Skin Injury Risk Increased  Goal: Skin Health and Integrity  Outcome: Ongoing, Progressing  Intervention: Optimize Skin Protection  Flowsheets (Taken 10/2/2022 1739)  Pressure Reduction Techniques:   frequent weight shift encouraged   pressure points protected   sit time limited to 1 hour  Pressure Reduction Devices:   positioning supports utilized   foam padding utilized   chair cushion utilized  Head of Bed (HOB) Positioning: HOB elevated     Problem: Fall Injury Risk  Goal: Absence of Fall and Fall-Related Injury  Outcome: Ongoing, Progressing  Intervention: Identify and Manage Contributors  Flowsheets (Taken 10/2/2022 1739)  Self-Care Promotion:   independence encouraged   meal set-up provided  Medication Review/Management:   medications reviewed   high-risk medications identified  Intervention: Promote Injury-Free Environment  Flowsheets (Taken 10/2/2022 1739)  Safety Promotion/Fall Prevention:   assistive device/personal item within reach   family to remain at bedside   nonskid shoes/socks when out of bed     Problem: Hyperglycemia  Goal: Blood Glucose Level Within Targeted Range  Outcome: Ongoing, Progressing  Intervention: Optimize Glycemic Control  Flowsheets (Taken 10/2/2022 1739)  Glycemic Management:   blood glucose monitored   supplemental insulin given

## 2022-10-02 NOTE — PROGRESS NOTES
[facility]   PROGRESS NOTE        CHIEF COMPLAINT   Hospital follow up    HOSPITAL COURSE   Mayra Parker is a 72 y.o. female with a PMHx of  dementia, HTN, DM and CVA who presented to [facility] on 9/26/2022 with c/o BLE numbness and back pain following 3 falls over the past week. Denied head injury or LOC. Pt's daughter reported that the patient was also more confused over the past week with hallucinations after being started on Remeron. The majority of the history was obtained from review of the medical record.      Initial ED VS include /89, HR 89, RR 18, SpO2 100%, temp 97.9F. Labs notable for hgb 11.2, hct 35.6, sodium 133, potassium 5.2, creatinine 1.49, glucose 243, AST 88, ALT 60. UA not impressive. CXR negative for acute abnormality. CT L-spine negative for acute fracture; postoperative and degenerative changes of the lumbar spine noted. CT head with persistent area of rounded hypo attenuation in the posterior fossa on the left side. She received Lidocaine patch, Calcium gluconate IV, tramadol PO, and IV fluids in ED. She was admitted to hospital medicine services for further work-up and management of care.   MRI brain with and without contrast was negative except moderate chronic microvascular ischemic changes.  MRI lumbar spine without contrast noted moderate to severe degenerative narrowing of the spinal canal at L3-4 and multilevel neural foraminal stenosis.  Neurosurgery was consulted and patient did not want any kind of surgical intervention at this time.    Today  Seen examined this morning.  He is doing well and no confusion noted this morning.  Slept well.        OBJECTIVE/PHYSICAL EXAM     VITAL SIGNS (MOST RECENT):  Temp: 98.2 °F (36.8 °C) (10/02/22 0740)  Pulse: 70 (10/02/22 0740)  Resp: 20 (10/02/22 0740)  BP: (!) 155/64 (10/02/22 0740)  SpO2: 98 % (10/02/22 0740)   VITAL SIGNS (24 HOUR RANGE):  Temp:  [97.5 °F (36.4 °C)-99 °F (37.2 °C)]  98.2 °F (36.8 °C)  Pulse:  [65-75] 70  Resp:  [16-20] 20  SpO2:  [95 %-99 %] 98 %  BP: (118-163)/(64-76) 155/64   GENERAL: In no acute distress, afebrile  HEENT:  CHEST: Clear to auscultation bilaterally  HEART: S1, S2, no appreciable murmur  ABDOMEN: Soft, nontender, BS +  MSK: Warm, no lower extremity edema, no clubbing or cyanosis  NEUROLOGIC: Alert and oriented x4, moving all extremities with good strength, hypo reflexive at ankle and knee bilaterally, loss of sensation up to mid tibia bilaterally  INTEGUMENTARY:  PSYCHIATRY:        ASSESSMENT/PLAN   Falls  Orthostatic hypotension  Acute metabolic encephalopathy-resolved   Acute kidney injury-resolved   Moderate to severe spinal canal stenosis L3-4  Diabetic neuropathy    History of: Chronic HFrEF 45%-compensated, normocytic anemia, CAD PCI, dm 2, HTN, HLD, CVA, dementia, depression      Continue holding amlodipine and allow permissive hypertension.   Case management following.  Pending SNF disposition plan.  PT and OT  Continue nightly melatonin.  Metformin and sitagliptin.  Start Levemir 7 units.    DVT prophylaxis: Lovenox 40    Anticipated discharge and disposition:   __________________________________________________________________________    LABS/MICRO/MEDS/DIAGNOSTICS       LABS  No results for input(s): NA, K, CHLORIDE, CO2, BUN, CREATININE, GLUCOSE, CALCIUM, ALKPHOS, AST, ALT, ALBUMIN in the last 72 hours.    No results for input(s): WBC, RBC, HCT, MCV, PLT in the last 72 hours.    Invalid input(s):       MICROBIOLOGY  Microbiology Results (last 7 days)       ** No results found for the last 168 hours. **            MEDICATIONS   aspirin  81 mg Oral Daily    atorvastatin  40 mg Oral QHS    carvediloL  12.5 mg Oral BID    clopidogreL  75 mg Oral Daily    enoxaparin  40 mg Subcutaneous Daily    melatonin  3 mg Oral Q24H    metFORMIN  1,000 mg Oral BID WM    pantoprazole  40 mg Oral Daily    SITagliptin  50 mg Oral Daily      INFUSIONS        DIAGNOSTIC  TESTS  MRI Lumbar Spine Without Contrast   Final Result      1. Moderate to severe degenerative narrowing of the spinal canal at L3-L4.   2. Multilevel neural foraminal stenoses as described.         Electronically signed by: Jena Valderrama   Date:    09/28/2022   Time:    11:27      MRI Brain W WO Contrast   Final Result      1. No acute intracranial abnormality.   2.   Moderate chronic microvascular ischemic changes.         Electronically signed by: Jena Valderrama   Date:    09/27/2022   Time:    09:46      CT Lumbar Spine Without Contrast   Final Result      1. No acute fracture identified.   2. Postoperative and degenerative changes of the lumbar spine.   No significant change from the [facility] interpretation.         Electronically signed by: Jena Valderrama   Date:    09/27/2022   Time:    07:39      X-Ray Pelvis Routine AP   Final Result      Degenerative changes.         Electronically signed by: Campos Philippe   Date:    09/27/2022   Time:    09:32      X-Ray Chest 1 View   Final Result      No acute cardiopulmonary process.         Electronically signed by: Sacha Adhikari   Date:    09/27/2022   Time:    07:26      X-Ray Shoulder 2 or More Views Left   Final Result      Degenerative changes.         Electronically signed by: Campos Philippe   Date:    09/27/2022   Time:    09:32      X-Ray Lumbar Spine 2 Or 3 Views   Final Result      Degenerative and postsurgical changes seen no acute process         Electronically signed by: José Miguel Parmar   Date:    09/26/2022   Time:    18:13      CT Head Without Contrast   Final Result      Persistent area of rounded hypoattenuation in the posterior fossa on the left side.  MRI with contrast is recommended for correlation         Electronically signed by: José Miguel Parmar   Date:    09/26/2022   Time:    18:11               All diagnosis and differential diagnosis have been reviewed; assessment and plan has been documented. I have personally reviewed  the labs and test results that are presently available; I have reviewed the patients medication list. I have reviewed the consulting providers response and recommendations. I have reviewed or attempted to review medical records based upon their availability.  All of the patient's questions have been addressed and answered. Patient's is agreeable to the above stated plan. I will continue to monitor closely and make adjustments to medical management as needed.  This document was created using [employer] Fluency Direct.  Transcription errors may have been made.  Please contact me if any questions may rise regarding documentation to clarify verbiage.        Benji Anderson MD   10/02/2022   Internal Medicine

## 2022-10-02 NOTE — PLAN OF CARE
Problem: Adult Inpatient Plan of Care  Goal: Plan of Care Review  Outcome: Ongoing, Progressing     Problem: Skin Injury Risk Increased  Goal: Skin Health and Integrity  Outcome: Ongoing, Progressing  Intervention: Optimize Skin Protection  Flowsheets (Taken 10/2/2022 0120)  Pressure Reduction Techniques:   weight shift assistance provided   frequent weight shift encouraged  Pressure Reduction Devices: chair cushion utilized  Skin Protection: adhesive use limited  Head of Bed (HOB) Positioning: HOB at 30-45 degrees  Intervention: Promote and Optimize Oral Intake  Flowsheets (Taken 10/2/2022 0120)  Oral Nutrition Promotion: rest periods promoted     Problem: Fall Injury Risk  Goal: Absence of Fall and Fall-Related Injury  Outcome: Ongoing, Progressing  Intervention: Identify and Manage Contributors  Flowsheets (Taken 10/2/2022 0120)  Medication Review/Management: medications reviewed  Intervention: Promote Injury-Free Environment  Flowsheets (Taken 10/2/2022 0120)  Safety Promotion/Fall Prevention:   assistive device/personal item within reach   nonskid shoes/socks when out of bed   side rails raised x 2   instructed to call staff for mobility     Problem: Hyperglycemia  Goal: Blood Glucose Level Within Targeted Range  Outcome: Ongoing, Progressing  Intervention: Optimize Glycemic Control  Flowsheets (Taken 10/2/2022 0120)  Glycemic Management:   blood glucose monitored   supplemental insulin given

## 2022-10-03 LAB
ANION GAP SERPL CALC-SCNC: 5 MEQ/L
BASOPHILS # BLD AUTO: 0.03 X10(3)/MCL (ref 0–0.2)
BASOPHILS NFR BLD AUTO: 0.7 %
BUN SERPL-MCNC: 13.7 MG/DL (ref 9.8–20.1)
CALCIUM SERPL-MCNC: 9.1 MG/DL (ref 8.4–10.2)
CHLORIDE SERPL-SCNC: 108 MMOL/L (ref 98–107)
CO2 SERPL-SCNC: 27 MMOL/L (ref 23–31)
CREAT SERPL-MCNC: 0.98 MG/DL (ref 0.55–1.02)
CREAT/UREA NIT SERPL: 14
EOSINOPHIL # BLD AUTO: 0.11 X10(3)/MCL (ref 0–0.9)
EOSINOPHIL NFR BLD AUTO: 2.6 %
ERYTHROCYTE [DISTWIDTH] IN BLOOD BY AUTOMATED COUNT: 14.2 % (ref 11.5–17)
GFR SERPLBLD CREATININE-BSD FMLA CKD-EPI: >60 MLS/MIN/1.73/M2
GLUCOSE SERPL-MCNC: 159 MG/DL (ref 82–115)
HCT VFR BLD AUTO: 30.3 % (ref 37–47)
HGB BLD-MCNC: 9.4 GM/DL (ref 12–16)
IMM GRANULOCYTES # BLD AUTO: 0.01 X10(3)/MCL (ref 0–0.04)
IMM GRANULOCYTES NFR BLD AUTO: 0.2 %
LYMPHOCYTES # BLD AUTO: 1.53 X10(3)/MCL (ref 0.6–4.6)
LYMPHOCYTES NFR BLD AUTO: 36.4 %
MAGNESIUM SERPL-MCNC: 1.4 MG/DL (ref 1.6–2.6)
MCH RBC QN AUTO: 29.1 PG (ref 27–31)
MCHC RBC AUTO-ENTMCNC: 31 MG/DL (ref 33–36)
MCV RBC AUTO: 93.8 FL (ref 80–94)
MONOCYTES # BLD AUTO: 0.55 X10(3)/MCL (ref 0.1–1.3)
MONOCYTES NFR BLD AUTO: 13.1 %
NEUTROPHILS # BLD AUTO: 2 X10(3)/MCL (ref 2.1–9.2)
NEUTROPHILS NFR BLD AUTO: 47 %
NRBC BLD AUTO-RTO: 0 %
PHOSPHATE SERPL-MCNC: 2.8 MG/DL (ref 2.3–4.7)
PLATELET # BLD AUTO: 214 X10(3)/MCL (ref 130–400)
PMV BLD AUTO: 9.5 FL (ref 7.4–10.4)
POCT GLUCOSE: 140 MG/DL (ref 70–110)
POCT GLUCOSE: 171 MG/DL (ref 70–110)
POCT GLUCOSE: 217 MG/DL (ref 70–110)
POCT GLUCOSE: 220 MG/DL (ref 70–110)
POCT GLUCOSE: 255 MG/DL (ref 70–110)
POTASSIUM SERPL-SCNC: 4.7 MMOL/L (ref 3.5–5.1)
RBC # BLD AUTO: 3.23 X10(6)/MCL (ref 4.2–5.4)
SODIUM SERPL-SCNC: 140 MMOL/L (ref 136–145)
WBC # SPEC AUTO: 4.2 X10(3)/MCL (ref 4.5–11.5)

## 2022-10-03 PROCEDURE — 83735 ASSAY OF MAGNESIUM: CPT | Performed by: INTERNAL MEDICINE

## 2022-10-03 PROCEDURE — 96372 THER/PROPH/DIAG INJ SC/IM: CPT | Performed by: INTERNAL MEDICINE

## 2022-10-03 PROCEDURE — 25000003 PHARM REV CODE 250: Performed by: INTERNAL MEDICINE

## 2022-10-03 PROCEDURE — 80048 BASIC METABOLIC PNL TOTAL CA: CPT | Performed by: INTERNAL MEDICINE

## 2022-10-03 PROCEDURE — G0378 HOSPITAL OBSERVATION PER HR: HCPCS

## 2022-10-03 PROCEDURE — 63600175 PHARM REV CODE 636 W HCPCS: Performed by: INTERNAL MEDICINE

## 2022-10-03 PROCEDURE — 85025 COMPLETE CBC W/AUTO DIFF WBC: CPT | Performed by: INTERNAL MEDICINE

## 2022-10-03 PROCEDURE — 84100 ASSAY OF PHOSPHORUS: CPT | Performed by: INTERNAL MEDICINE

## 2022-10-03 PROCEDURE — 36415 COLL VENOUS BLD VENIPUNCTURE: CPT | Performed by: INTERNAL MEDICINE

## 2022-10-03 RX ORDER — LANOLIN ALCOHOL/MO/W.PET/CERES
400 CREAM (GRAM) TOPICAL 2 TIMES DAILY
Status: DISCONTINUED | OUTPATIENT
Start: 2022-10-03 | End: 2022-10-08

## 2022-10-03 RX ADMIN — INSULIN ASPART 4 UNITS: 100 INJECTION, SOLUTION INTRAVENOUS; SUBCUTANEOUS at 12:10

## 2022-10-03 RX ADMIN — Medication 400 MG: at 09:10

## 2022-10-03 RX ADMIN — INSULIN ASPART 3 UNITS: 100 INJECTION, SOLUTION INTRAVENOUS; SUBCUTANEOUS at 09:10

## 2022-10-03 RX ADMIN — METFORMIN HYDROCHLORIDE 1000 MG: 500 TABLET, FILM COATED ORAL at 05:10

## 2022-10-03 RX ADMIN — SITAGLIPTIN 50 MG: 50 TABLET, FILM COATED ORAL at 09:10

## 2022-10-03 RX ADMIN — ATORVASTATIN CALCIUM 40 MG: 40 TABLET, FILM COATED ORAL at 09:10

## 2022-10-03 RX ADMIN — METFORMIN HYDROCHLORIDE 1000 MG: 500 TABLET, FILM COATED ORAL at 09:10

## 2022-10-03 RX ADMIN — ASPIRIN 81 MG: 81 TABLET, COATED ORAL at 09:10

## 2022-10-03 RX ADMIN — CARVEDILOL 12.5 MG: 12.5 TABLET, FILM COATED ORAL at 09:10

## 2022-10-03 RX ADMIN — INSULIN DETEMIR 7 UNITS: 100 INJECTION, SOLUTION SUBCUTANEOUS at 09:10

## 2022-10-03 RX ADMIN — PANTOPRAZOLE SODIUM 40 MG: 40 TABLET, DELAYED RELEASE ORAL at 09:10

## 2022-10-03 RX ADMIN — CLOPIDOGREL 75 MG: 75 TABLET, FILM COATED ORAL at 09:10

## 2022-10-03 RX ADMIN — ENOXAPARIN SODIUM 40 MG: 40 INJECTION SUBCUTANEOUS at 05:10

## 2022-10-03 NOTE — PLAN OF CARE
Problem: Adult Inpatient Plan of Care  Goal: Plan of Care Review  Outcome: Ongoing, Progressing  Goal: Patient-Specific Goal (Individualized)  Outcome: Ongoing, Progressing  Goal: Absence of Hospital-Acquired Illness or Injury  Outcome: Ongoing, Progressing  Goal: Optimal Comfort and Wellbeing  Outcome: Ongoing, Progressing  Goal: Readiness for Transition of Care  Outcome: Ongoing, Progressing     Problem: Skin Injury Risk Increased  Goal: Skin Health and Integrity  Outcome: Ongoing, Progressing  Intervention: Optimize Skin Protection  Flowsheets (Taken 10/3/2022 0242)  Pressure Reduction Techniques:   frequent weight shift encouraged   weight shift assistance provided  Pressure Reduction Devices:   chair cushion utilized   positioning supports utilized  Skin Protection: adhesive use limited  Head of Bed (HOB) Positioning: HOB elevated  Intervention: Promote and Optimize Oral Intake  Flowsheets (Taken 10/3/2022 0242)  Oral Nutrition Promotion: rest periods promoted     Problem: Fall Injury Risk  Goal: Absence of Fall and Fall-Related Injury  Outcome: Ongoing, Progressing  Intervention: Identify and Manage Contributors  Flowsheets (Taken 10/3/2022 0242)  Self-Care Promotion: independence encouraged  Medication Review/Management:   medications reviewed   high-risk medications identified  Intervention: Promote Injury-Free Environment  Flowsheets (Taken 10/3/2022 0242)  Safety Promotion/Fall Prevention:   assistive device/personal item within reach   in recliner, wheels locked   nonskid shoes/socks when out of bed   side rails raised x 3   instructed to call staff for mobility     Problem: Hyperglycemia  Goal: Blood Glucose Level Within Targeted Range  Outcome: Ongoing, Progressing  Intervention: Optimize Glycemic Control  Flowsheets (Taken 10/3/2022 0242)  Glycemic Management: blood glucose monitored

## 2022-10-03 NOTE — PROGRESS NOTES
[facility]   PROGRESS NOTE        CHIEF COMPLAINT   Hospital follow up    HOSPITAL COURSE   Mayra Parker is a 72 y.o. female with a PMHx of  dementia, HTN, DM and CVA who presented to [facility] on 9/26/2022 with c/o BLE numbness and back pain following 3 falls over the past week. Denied head injury or LOC. Pt's daughter reported that the patient was also more confused over the past week with hallucinations after being started on Remeron. The majority of the history was obtained from review of the medical record.      Initial ED VS include /89, HR 89, RR 18, SpO2 100%, temp 97.9F. Labs notable for hgb 11.2, hct 35.6, sodium 133, potassium 5.2, creatinine 1.49, glucose 243, AST 88, ALT 60. UA not impressive. CXR negative for acute abnormality. CT L-spine negative for acute fracture; postoperative and degenerative changes of the lumbar spine noted. CT head with persistent area of rounded hypo attenuation in the posterior fossa on the left side. She received Lidocaine patch, Calcium gluconate IV, tramadol PO, and IV fluids in ED. She was admitted to hospital medicine services for further work-up and management of care.   MRI brain with and without contrast was negative except moderate chronic microvascular ischemic changes.  MRI lumbar spine without contrast noted moderate to severe degenerative narrowing of the spinal canal at L3-4 and multilevel neural foraminal stenosis.  Neurosurgery was consulted and patient did not want any kind of surgical intervention at this time.    Today  Seen examined this morning.  She is awake and responding appropriately this morning.  Spoke with daughter over the phone as well.  We are waiting for placement.        OBJECTIVE/PHYSICAL EXAM     VITAL SIGNS (MOST RECENT):  Temp: 98.4 °F (36.9 °C) (10/03/22 0740)  Pulse: 65 (10/03/22 0740)  Resp: 14 (10/03/22 0740)  BP: 106/64 (10/03/22 0740)  SpO2: 96 % (10/03/22 0740)   VITAL SIGNS  (24 HOUR RANGE):  Temp:  [97.9 °F (36.6 °C)-98.4 °F (36.9 °C)] 98.4 °F (36.9 °C)  Pulse:  [65-83] 65  Resp:  [14-22] 14  SpO2:  [96 %-98 %] 96 %  BP: ()/(56-72) 106/64   GENERAL: In no acute distress, afebrile  HEENT:  CHEST: Clear to auscultation bilaterally  HEART: S1, S2, no appreciable murmur  ABDOMEN: Soft, nontender, BS +  MSK: Warm, no lower extremity edema, no clubbing or cyanosis  NEUROLOGIC: Alert and oriented x4, moving all extremities with good strength, hypo reflexive at ankle and knee bilaterally, loss of sensation up to mid tibia bilaterally  INTEGUMENTARY:  PSYCHIATRY:        ASSESSMENT/PLAN   Falls  Orthostatic hypotension  Acute metabolic encephalopathy-resolved   Acute kidney injury-resolved   Moderate to severe spinal canal stenosis L3-4  Diabetic neuropathy    History of: Chronic HFrEF 45%-compensated, normocytic anemia, CAD PCI, dm 2, HTN, HLD, CVA, dementia, depression      Continue holding amlodipine.  Case management following.  Pending SNF disposition plan.  PT and OT  Continue nightly melatonin.  Metformin and sitagliptin.  Levemir 7 units.    DVT prophylaxis: Lovenox 40    Anticipated discharge and disposition:   __________________________________________________________________________    LABS/MICRO/MEDS/DIAGNOSTICS       LABS  Recent Labs     10/03/22  0658      K 4.7   CHLORIDE 108*   CO2 27   BUN 13.7   CREATININE 0.98   GLUCOSE 159*   CALCIUM 9.1       Recent Labs     10/03/22  0658   WBC 4.2*   RBC 3.23*   HCT 30.3*   MCV 93.8            MICROBIOLOGY  Microbiology Results (last 7 days)       ** No results found for the last 168 hours. **            MEDICATIONS   aspirin  81 mg Oral Daily    atorvastatin  40 mg Oral QHS    carvediloL  12.5 mg Oral BID    clopidogreL  75 mg Oral Daily    enoxaparin  40 mg Subcutaneous Daily    insulin detemir U-100  7 Units Subcutaneous Daily    melatonin  3 mg Oral Q24H    metFORMIN  1,000 mg Oral BID WM    pantoprazole  40 mg Oral  Daily    SITagliptin  50 mg Oral Daily      INFUSIONS        DIAGNOSTIC TESTS  MRI Lumbar Spine Without Contrast   Final Result      1. Moderate to severe degenerative narrowing of the spinal canal at L3-L4.   2. Multilevel neural foraminal stenoses as described.         Electronically signed by: Jena Valderrama   Date:    09/28/2022   Time:    11:27      MRI Brain W WO Contrast   Final Result      1. No acute intracranial abnormality.   2.   Moderate chronic microvascular ischemic changes.         Electronically signed by: Jena Valderrama   Date:    09/27/2022   Time:    09:46      CT Lumbar Spine Without Contrast   Final Result      1. No acute fracture identified.   2. Postoperative and degenerative changes of the lumbar spine.   [facility].         Electronically signed by: Jena Valderrama   Date:    09/27/2022   Time:    07:39      X-Ray Pelvis Routine AP   Final Result      Degenerative changes.         Electronically signed by: Campos Philippe   Date:    09/27/2022   Time:    09:32      X-Ray Chest 1 View   Final Result      No acute cardiopulmonary process.         Electronically signed by: Sacha Adhikari   Date:    09/27/2022   Time:    07:26      X-Ray Shoulder 2 or More Views Left   Final Result      Degenerative changes.         Electronically signed by: Campos Philippe   Date:    09/27/2022   Time:    09:32      X-Ray Lumbar Spine 2 Or 3 Views   Final Result      Degenerative and postsurgical changes seen no acute process         Electronically signed by: José Miguel Parmar   Date:    09/26/2022   Time:    18:13      CT Head Without Contrast   Final Result      Persistent area of rounded hypoattenuation in the posterior fossa on the left side.  MRI with contrast is recommended for correlation         Electronically signed by: José Miguel Parmar   Date:    09/26/2022   Time:    18:11               All diagnosis and differential diagnosis have been  reviewed; assessment and plan has been documented. I have personally reviewed the labs and test results that are presently available; I have reviewed the patients medication list. I have reviewed the consulting providers response and recommendations. I have reviewed or attempted to review medical records based upon their availability.  All of the patient's questions have been addressed and answered. Patient's is agreeable to the above stated plan. I will continue to monitor closely and make adjustments to medical management as needed.  This document was created using [employer] Direct.  Transcription errors may have been made.  Please contact me if any questions may rise regarding documentation to clarify verbiage.        Benji Anderson MD   10/03/2022   Internal Medicine

## 2022-10-04 LAB
POCT GLUCOSE: 160 MG/DL (ref 70–110)
POCT GLUCOSE: 182 MG/DL (ref 70–110)
POCT GLUCOSE: 287 MG/DL (ref 70–110)

## 2022-10-04 PROCEDURE — 97530 THERAPEUTIC ACTIVITIES: CPT | Mod: CQ

## 2022-10-04 PROCEDURE — 63600175 PHARM REV CODE 636 W HCPCS: Performed by: INTERNAL MEDICINE

## 2022-10-04 PROCEDURE — 25000003 PHARM REV CODE 250: Performed by: INTERNAL MEDICINE

## 2022-10-04 PROCEDURE — 96372 THER/PROPH/DIAG INJ SC/IM: CPT | Performed by: INTERNAL MEDICINE

## 2022-10-04 PROCEDURE — G0378 HOSPITAL OBSERVATION PER HR: HCPCS

## 2022-10-04 PROCEDURE — 97116 GAIT TRAINING THERAPY: CPT | Mod: CQ

## 2022-10-04 RX ADMIN — PANTOPRAZOLE SODIUM 40 MG: 40 TABLET, DELAYED RELEASE ORAL at 08:10

## 2022-10-04 RX ADMIN — ASPIRIN 81 MG: 81 TABLET, COATED ORAL at 08:10

## 2022-10-04 RX ADMIN — METFORMIN HYDROCHLORIDE 1000 MG: 500 TABLET, FILM COATED ORAL at 08:10

## 2022-10-04 RX ADMIN — ATORVASTATIN CALCIUM 40 MG: 40 TABLET, FILM COATED ORAL at 09:10

## 2022-10-04 RX ADMIN — CARVEDILOL 12.5 MG: 12.5 TABLET, FILM COATED ORAL at 09:10

## 2022-10-04 RX ADMIN — CARVEDILOL 12.5 MG: 12.5 TABLET, FILM COATED ORAL at 08:10

## 2022-10-04 RX ADMIN — INSULIN DETEMIR 10 UNITS: 100 INJECTION, SOLUTION SUBCUTANEOUS at 09:10

## 2022-10-04 RX ADMIN — MELATONIN TAB 3 MG 3 MG: 3 TAB at 09:10

## 2022-10-04 RX ADMIN — Medication 400 MG: at 09:10

## 2022-10-04 RX ADMIN — ENOXAPARIN SODIUM 40 MG: 40 INJECTION SUBCUTANEOUS at 05:10

## 2022-10-04 RX ADMIN — CLOPIDOGREL 75 MG: 75 TABLET, FILM COATED ORAL at 08:10

## 2022-10-04 RX ADMIN — SITAGLIPTIN 50 MG: 50 TABLET, FILM COATED ORAL at 08:10

## 2022-10-04 RX ADMIN — METFORMIN HYDROCHLORIDE 1000 MG: 500 TABLET, FILM COATED ORAL at 05:10

## 2022-10-04 RX ADMIN — Medication 400 MG: at 08:10

## 2022-10-04 NOTE — PROGRESS NOTES
[facility]   PROGRESS NOTE        CHIEF COMPLAINT   Hospital follow up    HOSPITAL COURSE   Mayra Parker is a 72 y.o. female with a PMHx of  dementia, HTN, DM and CVA who presented to [facility] on 9/26/2022 with c/o BLE numbness and back pain following 3 falls over the past week. Denied head injury or LOC. Pt's daughter reported that the patient was also more confused over the past week with hallucinations after being started on Remeron. The majority of the history was obtained from review of the medical record.      Initial ED VS include /89, HR 89, RR 18, SpO2 100%, temp 97.9F. Labs notable for hgb 11.2, hct 35.6, sodium 133, potassium 5.2, creatinine 1.49, glucose 243, AST 88, ALT 60. UA not impressive. CXR negative for acute abnormality. CT L-spine negative for acute fracture; postoperative and degenerative changes of the lumbar spine noted. CT head with persistent area of rounded hypo attenuation in the posterior fossa on the left side. She received Lidocaine patch, Calcium gluconate IV, tramadol PO, and IV fluids in ED. She was admitted to hospital medicine services for further work-up and management of care.   MRI brain with and without contrast was negative except moderate chronic microvascular ischemic changes.  MRI lumbar spine without contrast noted moderate to severe degenerative narrowing of the spinal canal at L3-4 and multilevel neural foraminal stenosis.  Neurosurgery was consulted and patient did not want any kind of surgical intervention at this time.    Today  Continues to do well without any issues.        OBJECTIVE/PHYSICAL EXAM     VITAL SIGNS (MOST RECENT):  Temp: 98.7 °F (37.1 °C) (10/04/22 0710)  Pulse: 81 (10/04/22 0710)  Resp: 16 (10/04/22 0710)  BP: (!) 178/71 (10/04/22 0710)  SpO2: 95 % (10/04/22 0710)   VITAL SIGNS (24 HOUR RANGE):  Temp:  [97.6 °F (36.4 °C)-98.7 °F (37.1 °C)] 98.7 °F (37.1 °C)  Pulse:  [76-84] 81  Resp:   [16-18] 16  SpO2:  [95 %-99 %] 95 %  BP: (123-178)/(58-71) 178/71   GENERAL: In no acute distress, afebrile  HEENT:  CHEST: Clear to auscultation bilaterally  HEART: S1, S2, no appreciable murmur  ABDOMEN: Soft, nontender, BS +  MSK: Warm, no lower extremity edema, no clubbing or cyanosis  NEUROLOGIC: Alert and oriented x4, moving all extremities with good strength, hypo reflexive at ankle and knee bilaterally, loss of sensation up to mid tibia bilaterally  INTEGUMENTARY:  PSYCHIATRY:        ASSESSMENT/PLAN   Falls  Orthostatic hypotension  Acute metabolic encephalopathy-resolved   Acute kidney injury-resolved   Moderate to severe spinal canal stenosis L3-4  Diabetic neuropathy    History of: Chronic HFrEF 45%-compensated, normocytic anemia, CAD PCI, dm 2, HTN, HLD, CVA, dementia, depression      Continue holding amlodipine.  Case management following.  Pending SNF disposition plan.  PT and OT  Continue nightly melatonin.  Metformin and sitagliptin.  Levemir 10 units.    DVT prophylaxis: Lovenox 40    Anticipated discharge and disposition:   __________________________________________________________________________    LABS/MICRO/MEDS/DIAGNOSTICS       LABS  Recent Labs     10/03/22  0658      K 4.7   CHLORIDE 108*   CO2 27   BUN 13.7   CREATININE 0.98   GLUCOSE 159*   CALCIUM 9.1       Recent Labs     10/03/22  0658   WBC 4.2*   RBC 3.23*   HCT 30.3*   MCV 93.8            MICROBIOLOGY  Microbiology Results (last 7 days)       ** No results found for the last 168 hours. **            MEDICATIONS   aspirin  81 mg Oral Daily    atorvastatin  40 mg Oral QHS    carvediloL  12.5 mg Oral BID    clopidogreL  75 mg Oral Daily    enoxaparin  40 mg Subcutaneous Daily    insulin detemir U-100  10 Units Subcutaneous Daily    magnesium oxide  400 mg Oral BID    melatonin  3 mg Oral Q24H    metFORMIN  1,000 mg Oral BID WM    pantoprazole  40 mg Oral Daily    SITagliptin  50 mg Oral Daily       INFUSIONS        DIAGNOSTIC TESTS  MRI Lumbar Spine Without Contrast   Final Result      1. Moderate to severe degenerative narrowing of the spinal canal at L3-L4.   2. Multilevel neural foraminal stenoses as described.         Electronically signed by: Jena Valderrama   Date:    09/28/2022   Time:    11:27      MRI Brain W WO Contrast   Final Result      1. No acute intracranial abnormality.   2.   Moderate chronic microvascular ischemic changes.         Electronically signed by: Jena Valderrama   Date:    09/27/2022   Time:    09:46      CT Lumbar Spine Without Contrast   Final Result      1. No acute fracture identified.   2. Postoperative and degenerative changes of the lumbar spine.   [facility].         Electronically signed by: Jena Valderrama   Date:    09/27/2022   Time:    07:39      X-Ray Pelvis Routine AP   Final Result      Degenerative changes.         Electronically signed by: Campos Philippe   Date:    09/27/2022   Time:    09:32      X-Ray Chest 1 View   Final Result      No acute cardiopulmonary process.         Electronically signed by: Sacha Adhikari   Date:    09/27/2022   Time:    07:26      X-Ray Shoulder 2 or More Views Left   Final Result      Degenerative changes.         Electronically signed by: Campos Philippe   Date:    09/27/2022   Time:    09:32      X-Ray Lumbar Spine 2 Or 3 Views   Final Result      Degenerative and postsurgical changes seen no acute process         Electronically signed by: José Miguel Parmar   Date:    09/26/2022   Time:    18:13      CT Head Without Contrast   Final Result      Persistent area of rounded hypoattenuation in the posterior fossa on the left side.  MRI with contrast is recommended for correlation         Electronically signed by: José Miguel Parmar   Date:    09/26/2022   Time:    18:11               All diagnosis and differential diagnosis have been reviewed; assessment and plan has been  documented. I have personally reviewed the labs and test results that are presently available; I have reviewed the patients medication list. I have reviewed the consulting providers response and recommendations. I have reviewed or attempted to review medical records based upon their availability.  All of the patient's questions have been addressed and answered. Patient's is agreeable to the above stated plan. I will continue to monitor closely and make adjustments to medical management as needed.  This document was created using [employer].  Transcription errors may have been made.  Please contact me if any questions may rise regarding documentation to clarify verbiage.        Benji Anderson MD   10/04/2022   Internal Medicine              None

## 2022-10-04 NOTE — PT/OT/SLP PROGRESS
Physical Therapy         Treatment        Mayra Parker   MRN: [id number]        PT Start Time: 1329     PT Stop Time: 1355    PT Total Time (min): 26 min       Billable Minutes:  Gait [id number] and Therapeutic Activity 11  Total Minutes: 26    Treatment Type: Treatment  PT/PTA: PTA     PTA Visit Number: 2       General Precautions: Standard, fall  Orthopedic Precautions: Orthopedic Precautions : spinal precautions   Braces: Braces: LSO    Spiritual, Cultural Beliefs, Voodoo Practices, Values that Affect Care: no    Subjective:  Communicated with nurse prior to session.    Pain/Comfort  Pain Rating 1: 0/10    Objective:  Patient found laying in bed upon arrival, with Patient found with: bed alarm  Spinal precautions educated, with log roll method educated for bed use  Vitals taken before mobility, BP- 152/71, HR 80  Functional Mobility:  Bed Mobility:   Supine to sit: Standby Assistance- HOB flat, log roll in use   Sit to supine: Activity did not occur   Rolling: Standby Assistance   Scooting: Activity did not occur    Balance:   Static Sit: GOOD: Takes MODERATE challenges from all directions  Dynamic Sit:  0: N/A  Static Stand: POOR: Needs MODERATE assist to maintain  Dynamic stand: 0: N/A    Transfer Training:  Sit to stand:Minimal Assistance with Rolling Walker sit to stand from BSC- LSO donned on before standing  Bed <> Chair:  Squat Pivot with Moderate Assistance with No Assistive Device pt noted to buckle during t/f, with mod A to help with balance, pt was able to self clean after performing a void of urine    Gait Training:  Patient gait trained FWB/WBAT: bilateral lower extremity 130  feet on level tile with Rolling Walker with Moderate Assistance x2.  Pt with demonstarting a  swing-through gait with decreased gelacio and decreased step length.Impairments contributing to gait deviations include impaired balance, decreased strength, and with chair in tow due to pt's B knee buckling in stance  phase    Activity Tolerance:  Patient tolerated treatment well    Patient left up in chair with call button in reach and nurse notified whom stated pt does not need chair alarm at this time    Assessment:  Mayra Parker is a 72 y.o. female with a medical diagnosis of Multiple falls. Pt was able to ambulate further today with mod A due to B knee buckling during stance phase    Rehab potential is good.    Activity tolerance: Good    Discharge recommendations: Discharge Facility/Level of Care Needs: rehabilitation facility, nursing facility, skilled     Equipment recommendations:       GOALS:   Multidisciplinary Problems       Physical Therapy Goals          Problem: Physical Therapy    Goal Priority Disciplines Outcome Goal Variances Interventions   Physical Therapy Goal     PT, PT/OT Ongoing, Progressing     Description: Goals to be met by: 10/28/22     Patient will increase functional independence with mobility by performin. Supine to sit with Modified [place]  2. Sit to stand transfer with Modified [place]  3. Gait  x 200 feet with Modified [place] using Rolling Walker.                          PLAN:    Patient to be seen 5 x/week  to address the above listed problems via therapeutic activities, gait training  Plan of Care expires: 10/28/22  Plan of Care reviewed with: patient         10/4/2022

## 2022-10-05 LAB
POCT GLUCOSE: 136 MG/DL (ref 70–110)
POCT GLUCOSE: 163 MG/DL (ref 70–110)
POCT GLUCOSE: 197 MG/DL (ref 70–110)
POCT GLUCOSE: 214 MG/DL (ref 70–110)

## 2022-10-05 PROCEDURE — 25000003 PHARM REV CODE 250: Performed by: INTERNAL MEDICINE

## 2022-10-05 PROCEDURE — 96372 THER/PROPH/DIAG INJ SC/IM: CPT | Performed by: INTERNAL MEDICINE

## 2022-10-05 PROCEDURE — 63600175 PHARM REV CODE 636 W HCPCS: Performed by: INTERNAL MEDICINE

## 2022-10-05 PROCEDURE — G0378 HOSPITAL OBSERVATION PER HR: HCPCS

## 2022-10-05 PROCEDURE — 97530 THERAPEUTIC ACTIVITIES: CPT | Mod: CQ

## 2022-10-05 RX ORDER — GABAPENTIN 100 MG/1
100 CAPSULE ORAL 2 TIMES DAILY
Status: DISCONTINUED | OUTPATIENT
Start: 2022-10-05 | End: 2022-10-08

## 2022-10-05 RX ADMIN — CLOPIDOGREL 75 MG: 75 TABLET, FILM COATED ORAL at 09:10

## 2022-10-05 RX ADMIN — CARVEDILOL 12.5 MG: 12.5 TABLET, FILM COATED ORAL at 08:10

## 2022-10-05 RX ADMIN — ASPIRIN 81 MG: 81 TABLET, COATED ORAL at 09:10

## 2022-10-05 RX ADMIN — GABAPENTIN 100 MG: 100 CAPSULE ORAL at 10:10

## 2022-10-05 RX ADMIN — ATORVASTATIN CALCIUM 40 MG: 40 TABLET, FILM COATED ORAL at 08:10

## 2022-10-05 RX ADMIN — MELATONIN TAB 3 MG 3 MG: 3 TAB at 08:10

## 2022-10-05 RX ADMIN — GABAPENTIN 100 MG: 100 CAPSULE ORAL at 08:10

## 2022-10-05 RX ADMIN — ENOXAPARIN SODIUM 40 MG: 40 INJECTION SUBCUTANEOUS at 04:10

## 2022-10-05 RX ADMIN — Medication 400 MG: at 08:10

## 2022-10-05 RX ADMIN — SITAGLIPTIN 50 MG: 50 TABLET, FILM COATED ORAL at 09:10

## 2022-10-05 RX ADMIN — CARVEDILOL 12.5 MG: 12.5 TABLET, FILM COATED ORAL at 09:10

## 2022-10-05 RX ADMIN — METFORMIN HYDROCHLORIDE 1000 MG: 500 TABLET, FILM COATED ORAL at 04:10

## 2022-10-05 RX ADMIN — INSULIN DETEMIR 10 UNITS: 100 INJECTION, SOLUTION SUBCUTANEOUS at 09:10

## 2022-10-05 RX ADMIN — METFORMIN HYDROCHLORIDE 1000 MG: 500 TABLET, FILM COATED ORAL at 09:10

## 2022-10-05 RX ADMIN — PANTOPRAZOLE SODIUM 40 MG: 40 TABLET, DELAYED RELEASE ORAL at 09:10

## 2022-10-05 RX ADMIN — Medication 400 MG: at 09:10

## 2022-10-05 RX ADMIN — INSULIN ASPART 2 UNITS: 100 INJECTION, SOLUTION INTRAVENOUS; SUBCUTANEOUS at 05:10

## 2022-10-05 NOTE — PLAN OF CARE
Problem: Adult Inpatient Plan of Care  Goal: Plan of Care Review  Outcome: Ongoing, Progressing  Goal: Patient-Specific Goal (Individualized)  Outcome: Ongoing, Progressing  Goal: Absence of Hospital-Acquired Illness or Injury  Outcome: Ongoing, Progressing  Goal: Optimal Comfort and Wellbeing  Outcome: Ongoing, Progressing  Goal: Readiness for Transition of Care  Outcome: Ongoing, Progressing     Problem: Skin Injury Risk Increased  Goal: Skin Health and Integrity  Outcome: Ongoing, Progressing  Intervention: Optimize Skin Protection  Flowsheets (Taken 10/5/2022 0201)  Pressure Reduction Techniques: frequent weight shift encouraged  Pressure Reduction Devices: chair cushion utilized  Skin Protection: adhesive use limited  Head of Bed (HOB) Positioning: HOB elevated  Intervention: Promote and Optimize Oral Intake  Flowsheets (Taken 10/5/2022 0201)  Oral Nutrition Promotion:   social interaction promoted   rest periods promoted   safe use of adaptive equipment encouraged     Problem: Fall Injury Risk  Goal: Absence of Fall and Fall-Related Injury  Outcome: Ongoing, Progressing  Intervention: Identify and Manage Contributors  Flowsheets (Taken 10/5/2022 0201)  Self-Care Promotion: independence encouraged  Medication Review/Management:   medications reviewed   high-risk medications identified     Problem: Hyperglycemia  Goal: Blood Glucose Level Within Targeted Range  Outcome: Ongoing, Progressing  Intervention: Optimize Glycemic Control  Flowsheets (Taken 10/5/2022 0201)  Glycemic Management: blood glucose monitored

## 2022-10-05 NOTE — PROGRESS NOTES
[facility]   PROGRESS NOTE        CHIEF COMPLAINT   Hospital follow up    HOSPITAL COURSE   Mayra Parker is a 72 y.o. female with a PMHx of  dementia, HTN, DM and CVA who presented to [facility] on 9/26/2022 with c/o BLE numbness and back pain following 3 falls over the past week. Denied head injury or LOC. Pt's daughter reported that the patient was also more confused over the past week with hallucinations after being started on Remeron. The majority of the history was obtained from review of the medical record.      Initial ED VS include /89, HR 89, RR 18, SpO2 100%, temp 97.9F. Labs notable for hgb 11.2, hct 35.6, sodium 133, potassium 5.2, creatinine 1.49, glucose 243, AST 88, ALT 60. UA not impressive. CXR negative for acute abnormality. CT L-spine negative for acute fracture; postoperative and degenerative changes of the lumbar spine noted. CT head with persistent area of rounded hypo attenuation in the posterior fossa on the left side. She received Lidocaine patch, Calcium gluconate IV, tramadol PO, and IV fluids in ED. She was admitted to hospital medicine services for further work-up and management of care.   MRI brain with and without contrast was negative except moderate chronic microvascular ischemic changes.  MRI lumbar spine without contrast noted moderate to severe degenerative narrowing of the spinal canal at L3-4 and multilevel neural foraminal stenosis.  Neurosurgery was consulted and patient did not want any kind of surgical intervention at this time.    Today  Continues to do well without any issues.  Slept well.  She is definitely improving with therapy and would benefit from continued therapy.        OBJECTIVE/PHYSICAL EXAM     VITAL SIGNS (MOST RECENT):  Temp: 98.1 °F (36.7 °C) (10/05/22 0757)  Pulse: 84 (10/05/22 0757)  Resp: 17 (10/05/22 0757)  BP: 107/65 (10/05/22 0757)  SpO2: 99 % (10/05/22 0757)   VITAL SIGNS (24 HOUR  RANGE):  Temp:  [98.1 °F (36.7 °C)-98.6 °F (37 °C)] 98.1 °F (36.7 °C)  Pulse:  [73-84] 84  Resp:  [16-20] 17  SpO2:  [96 %-99 %] 99 %  BP: (107-176)/(42-71) 107/65   GENERAL: In no acute distress, afebrile  HEENT:  CHEST: Clear to auscultation bilaterally  HEART: S1, S2, no appreciable murmur  ABDOMEN: Soft, nontender, BS +  MSK: Warm, no lower extremity edema, no clubbing or cyanosis  NEUROLOGIC: Alert and oriented x4, moving all extremities with good strength, hypo reflexive at ankle and knee bilaterally, loss of sensation up to mid tibia bilaterally  INTEGUMENTARY:  PSYCHIATRY:        ASSESSMENT/PLAN   Falls  Orthostatic hypotension  Acute metabolic encephalopathy-resolved   Acute kidney injury-resolved   Moderate to severe spinal canal stenosis L3-4  Diabetic neuropathy    History of: Chronic HFrEF 45%-compensated, normocytic anemia, CAD PCI, dm 2, HTN, HLD, CVA, dementia, depression      Continue holding amlodipine.  Allow for slight permissive hypertension temporarily.  Case management following.  Pending SNF disposition plan.  PT and OT  Continue nightly melatonin.  Metformin and sitagliptin.  Levemir 10 units.    DVT prophylaxis: Lovenox 40    Anticipated discharge and disposition:   __________________________________________________________________________    LABS/MICRO/MEDS/DIAGNOSTICS       LABS  Recent Labs     10/03/22  0658      K 4.7   CHLORIDE 108*   CO2 27   BUN 13.7   CREATININE 0.98   GLUCOSE 159*   CALCIUM 9.1       Recent Labs     10/03/22  0658   WBC 4.2*   RBC 3.23*   HCT 30.3*   MCV 93.8            MICROBIOLOGY  Microbiology Results (last 7 days)       ** No results found for the last 168 hours. **            MEDICATIONS   aspirin  81 mg Oral Daily    atorvastatin  40 mg Oral QHS    carvediloL  12.5 mg Oral BID    clopidogreL  75 mg Oral Daily    enoxaparin  40 mg Subcutaneous Daily    insulin detemir U-100  10 Units Subcutaneous Daily    magnesium oxide  400 mg Oral BID     melatonin  3 mg Oral Q24H    metFORMIN  1,000 mg Oral BID WM    pantoprazole  40 mg Oral Daily    SITagliptin  50 mg Oral Daily      INFUSIONS        DIAGNOSTIC TESTS  MRI Lumbar Spine Without Contrast   Final Result      1. Moderate to severe degenerative narrowing of the spinal canal at L3-L4.   2. Multilevel neural foraminal stenoses as described.         Electronically signed by: Jena Valderrama   Date:    09/28/2022   Time:    11:27      MRI Brain W WO Contrast   Final Result      1. No acute intracranial abnormality.   2.   Moderate chronic microvascular ischemic changes.         Electronically signed by: Jena Valderrama   Date:    09/27/2022   Time:    09:46      CT Lumbar Spine Without Contrast   Final Result      1. No acute fracture identified.   2. Postoperative and degenerative changes of the lumbar spine.   [facility].         Electronically signed by: Jena Valderrama   Date:    09/27/2022   Time:    07:39      X-Ray Pelvis Routine AP   Final Result      Degenerative changes.         Electronically signed by: Campos Philippe   Date:    09/27/2022   Time:    09:32      X-Ray Chest 1 View   Final Result      No acute cardiopulmonary process.         Electronically signed by: Sacha Adhikari   Date:    09/27/2022   Time:    07:26      X-Ray Shoulder 2 or More Views Left   Final Result      Degenerative changes.         Electronically signed by: Campos Philippe   Date:    09/27/2022   Time:    09:32      X-Ray Lumbar Spine 2 Or 3 Views   Final Result      Degenerative and postsurgical changes seen no acute process         Electronically signed by: José Miguel Parmar   Date:    09/26/2022   Time:    18:13      CT Head Without Contrast   Final Result      Persistent area of rounded hypoattenuation in the posterior fossa on the left side.  MRI with contrast is recommended for correlation         Electronically signed by: José Miguel Parmar    Date:    09/26/2022   Time:    18:11               All diagnosis and differential diagnosis have been reviewed; assessment and plan has been documented. I have personally reviewed the labs and test results that are presently available; I have reviewed the patients medication list. I have reviewed the consulting providers response and recommendations. I have reviewed or attempted to review medical records based upon their availability.  All of the patient's questions have been addressed and answered. Patient's is agreeable to the above stated plan. I will continue to monitor closely and make adjustments to medical management as needed.  This document was created using M*Modal Fluency Direct.  Transcription errors may have been made.  Please contact me if any questions may rise regarding documentation to clarify verbiage.        Benji Anderson MD   10/05/2022   Internal Medicine

## 2022-10-05 NOTE — PLAN OF CARE
SW followed up with pt and nurse, Jessica regarding pt's condition. Pt was sitting up in the recliner and reports doing well. SW will f/u on SNF placement.

## 2022-10-05 NOTE — PT/OT/SLP PROGRESS
Physical Therapy Treatment    Patient Name:  Mayra Parker   MRN:  [id number]    Recommendations:     Discharge Recommendations:  rehabilitation facility, nursing facility, skilled   Discharge Equipment Recommendations: walker, rolling   Barriers to discharge:       Assessment:     Mayra Parker is a 72 y.o. female admitted with a medical diagnosis of Multiple falls, orthostatic hypotension, spinal canal stenosis L3 - L4.  She presents with the following impairments/functional limitations:  weakness, gait instability, impaired balance.    Rehab Prognosis: Good; patient would benefit from acute skilled PT services to address these deficits and reach maximum level of function.    Recent Surgery: * No surgery found *      Plan:     During this hospitalization, patient to be seen 5 x/week to address the identified rehab impairments via therapeutic activities, gait training, therapeutic exercises and progress toward the following goals:    Plan of Care Expires:  10/28/22    Subjective     Chief Complaint:   Patient/Family Comments/goals:   Pain/Comfort:   Pt c/o feeling nauseous, NSG aware.       Objective:     Communicated with NSG prior to session.  Patient found up in chair  upon PTA entry to room.     General Precautions: Standard, fall   Orthopedic Precautions:spinal precautions   Braces: LSO  Respiratory Status: Room air     Functional Mobility:  T/F: Dionisio, bunny knees buckled when standing initially   Gait: Pt amb for 30 ft x 2 walks, step through gait pattern, inconsistent step length, ModA for balance with chair in tow for safety. Limited by nausea.     Patient left up in chair with all lines intact and call button in reach..    GOALS:   Multidisciplinary Problems       Physical Therapy Goals          Problem: Physical Therapy    Goal Priority Disciplines Outcome Goal Variances Interventions   Physical Therapy Goal     PT, PT/OT Ongoing, Progressing     Description: Goals to be met by: 10/28/22      Patient will increase functional independence with mobility by performin. Supine to sit with Modified [place]  2. Sit to stand transfer with Modified [place]  3. Gait  x 200 feet with Modified [place] using Rolling Walker.                          Time Tracking:     PT Received On:    PT Start Time: 940     PT Stop Time: 100  PT Total Time (min): 24 min     Billable Minutes: Therapeutic Activity 24    Treatment Type: Treatment  PT/PTA: PTA     PTA Visit Number: 2     10/05/2022

## 2022-10-06 LAB
ALBUMIN SERPL-MCNC: 3 GM/DL (ref 3.4–4.8)
ALBUMIN/GLOB SERPL: 0.9 RATIO (ref 1.1–2)
ALP SERPL-CCNC: 159 UNIT/L (ref 40–150)
ALT SERPL-CCNC: 50 UNIT/L (ref 0–55)
AST SERPL-CCNC: 88 UNIT/L (ref 5–34)
BASOPHILS # BLD AUTO: 0.03 X10(3)/MCL (ref 0–0.2)
BASOPHILS NFR BLD AUTO: 0.7 %
BILIRUBIN DIRECT+TOT PNL SERPL-MCNC: 0.8 MG/DL
BUN SERPL-MCNC: 13.9 MG/DL (ref 9.8–20.1)
CALCIUM SERPL-MCNC: 9.7 MG/DL (ref 8.4–10.2)
CHLORIDE SERPL-SCNC: 108 MMOL/L (ref 98–107)
CO2 SERPL-SCNC: 26 MMOL/L (ref 23–31)
CREAT SERPL-MCNC: 1.12 MG/DL (ref 0.55–1.02)
EOSINOPHIL # BLD AUTO: 0.15 X10(3)/MCL (ref 0–0.9)
EOSINOPHIL NFR BLD AUTO: 3.3 %
ERYTHROCYTE [DISTWIDTH] IN BLOOD BY AUTOMATED COUNT: 14.2 % (ref 11.5–17)
GFR SERPLBLD CREATININE-BSD FMLA CKD-EPI: 53 MLS/MIN/1.73/M2
GLOBULIN SER-MCNC: 3.2 GM/DL (ref 2.4–3.5)
GLUCOSE SERPL-MCNC: 190 MG/DL (ref 82–115)
HCT VFR BLD AUTO: 31.5 % (ref 37–47)
HGB BLD-MCNC: 9.5 GM/DL (ref 12–16)
IMM GRANULOCYTES # BLD AUTO: 0.01 X10(3)/MCL (ref 0–0.04)
IMM GRANULOCYTES NFR BLD AUTO: 0.2 %
LYMPHOCYTES # BLD AUTO: 1.49 X10(3)/MCL (ref 0.6–4.6)
LYMPHOCYTES NFR BLD AUTO: 33.3 %
MCH RBC QN AUTO: 28.7 PG (ref 27–31)
MCHC RBC AUTO-ENTMCNC: 30.2 MG/DL (ref 33–36)
MCV RBC AUTO: 95.2 FL (ref 80–94)
MONOCYTES # BLD AUTO: 0.57 X10(3)/MCL (ref 0.1–1.3)
MONOCYTES NFR BLD AUTO: 12.7 %
NEUTROPHILS # BLD AUTO: 2.2 X10(3)/MCL (ref 2.1–9.2)
NEUTROPHILS NFR BLD AUTO: 49.8 %
NRBC BLD AUTO-RTO: 0 %
PLATELET # BLD AUTO: 247 X10(3)/MCL (ref 130–400)
PMV BLD AUTO: 10.3 FL (ref 7.4–10.4)
POCT GLUCOSE: 132 MG/DL (ref 70–110)
POCT GLUCOSE: 175 MG/DL (ref 70–110)
POCT GLUCOSE: 223 MG/DL (ref 70–110)
POCT GLUCOSE: 233 MG/DL (ref 70–110)
POTASSIUM SERPL-SCNC: 4.5 MMOL/L (ref 3.5–5.1)
PROT SERPL-MCNC: 6.2 GM/DL (ref 5.8–7.6)
RBC # BLD AUTO: 3.31 X10(6)/MCL (ref 4.2–5.4)
SODIUM SERPL-SCNC: 139 MMOL/L (ref 136–145)
WBC # SPEC AUTO: 4.5 X10(3)/MCL (ref 4.5–11.5)

## 2022-10-06 PROCEDURE — 25000003 PHARM REV CODE 250: Performed by: INTERNAL MEDICINE

## 2022-10-06 PROCEDURE — 25000003 PHARM REV CODE 250: Performed by: NURSE PRACTITIONER

## 2022-10-06 PROCEDURE — 63600175 PHARM REV CODE 636 W HCPCS: Performed by: INTERNAL MEDICINE

## 2022-10-06 PROCEDURE — 85025 COMPLETE CBC W/AUTO DIFF WBC: CPT | Performed by: INTERNAL MEDICINE

## 2022-10-06 PROCEDURE — 36415 COLL VENOUS BLD VENIPUNCTURE: CPT | Performed by: INTERNAL MEDICINE

## 2022-10-06 PROCEDURE — 96372 THER/PROPH/DIAG INJ SC/IM: CPT | Performed by: INTERNAL MEDICINE

## 2022-10-06 PROCEDURE — 80053 COMPREHEN METABOLIC PANEL: CPT | Performed by: INTERNAL MEDICINE

## 2022-10-06 PROCEDURE — 97116 GAIT TRAINING THERAPY: CPT | Mod: CQ

## 2022-10-06 PROCEDURE — G0378 HOSPITAL OBSERVATION PER HR: HCPCS

## 2022-10-06 RX ORDER — LACTULOSE 10 G/15ML
20 SOLUTION ORAL ONCE
Status: DISCONTINUED | OUTPATIENT
Start: 2022-10-06 | End: 2022-10-07

## 2022-10-06 RX ADMIN — GABAPENTIN 100 MG: 100 CAPSULE ORAL at 08:10

## 2022-10-06 RX ADMIN — CLOPIDOGREL 75 MG: 75 TABLET, FILM COATED ORAL at 09:10

## 2022-10-06 RX ADMIN — RIVAROXABAN 10 MG: 10 TABLET, FILM COATED ORAL at 05:10

## 2022-10-06 RX ADMIN — ATORVASTATIN CALCIUM 40 MG: 40 TABLET, FILM COATED ORAL at 08:10

## 2022-10-06 RX ADMIN — ASPIRIN 81 MG: 81 TABLET, COATED ORAL at 09:10

## 2022-10-06 RX ADMIN — PANTOPRAZOLE SODIUM 40 MG: 40 TABLET, DELAYED RELEASE ORAL at 09:10

## 2022-10-06 RX ADMIN — MELATONIN TAB 3 MG 3 MG: 3 TAB at 08:10

## 2022-10-06 RX ADMIN — METFORMIN HYDROCHLORIDE 1000 MG: 500 TABLET, FILM COATED ORAL at 09:10

## 2022-10-06 RX ADMIN — ACETAMINOPHEN 650 MG: 325 TABLET ORAL at 01:10

## 2022-10-06 RX ADMIN — GABAPENTIN 100 MG: 100 CAPSULE ORAL at 09:10

## 2022-10-06 RX ADMIN — Medication 400 MG: at 09:10

## 2022-10-06 RX ADMIN — INSULIN DETEMIR 10 UNITS: 100 INJECTION, SOLUTION SUBCUTANEOUS at 09:10

## 2022-10-06 RX ADMIN — METFORMIN HYDROCHLORIDE 1000 MG: 500 TABLET, FILM COATED ORAL at 05:10

## 2022-10-06 RX ADMIN — INSULIN ASPART 2 UNITS: 100 INJECTION, SOLUTION INTRAVENOUS; SUBCUTANEOUS at 08:10

## 2022-10-06 RX ADMIN — Medication 400 MG: at 08:10

## 2022-10-06 RX ADMIN — CARVEDILOL 12.5 MG: 12.5 TABLET, FILM COATED ORAL at 08:10

## 2022-10-06 RX ADMIN — CARVEDILOL 12.5 MG: 12.5 TABLET, FILM COATED ORAL at 09:10

## 2022-10-06 RX ADMIN — SITAGLIPTIN 50 MG: 50 TABLET, FILM COATED ORAL at 09:10

## 2022-10-06 NOTE — PLAN OF CARE
BESSY sent more referrals to [facility] per her daughter to [facility]

## 2022-10-06 NOTE — PROGRESS NOTES
[facility]IC[facility]   PROGRESS NOTE        CHIEF COMPLAINT   Hospital follow up    HOSPITAL COURSE   Mayra Parker is a 72 y.o. female with a PMHx of  dementia, HTN, DM and CVA who presented to [facility] on 9/26/2022 with c/o BLE numbness and back pain following 3 falls over the past week. Denied head injury or LOC. Pt's daughter reported that the patient was also more confused over the past week with hallucinations after being started on Remeron. The majority of the history was obtained from review of the medical record.      Initial ED VS include /89, HR 89, RR 18, SpO2 100%, temp 97.9F. Labs notable for hgb 11.2, hct 35.6, sodium 133, potassium 5.2, creatinine 1.49, glucose 243, AST 88, ALT 60. UA not impressive. CXR negative for acute abnormality. CT L-spine negative for acute fracture; postoperative and degenerative changes of the lumbar spine noted. CT head with persistent area of rounded hypo attenuation in the posterior fossa on the left side. She received Lidocaine patch, Calcium gluconate IV, tramadol PO, and IV fluids in ED. She was admitted to hospital medicine services for further work-up and management of care.   MRI brain with and without contrast was negative except moderate chronic microvascular ischemic changes.  MRI lumbar spine without contrast noted moderate to severe degenerative narrowing of the spinal canal at L3-4 and multilevel neural foraminal stenosis.  Neurosurgery was consulted and patient did not want any kind of surgical intervention at this time.    Today  Complains of abdominal pain today more generalized and localized.  No other issues.  No nausea or vomiting        OBJECTIVE/PHYSICAL EXAM     VITAL SIGNS (MOST RECENT):  Temp: 98.2 °F (36.8 °C) (10/06/22 0700)  Pulse: 77 (10/06/22 0700)  Resp: (!) 22 (10/06/22 0700)  BP: 135/68 (10/06/22 0700)  SpO2: 95 % (10/06/22 0700)   VITAL SIGNS (24 HOUR RANGE):  Temp:  [97.6 °F (36.4 °C)-98.8  °F (37.1 °C)] 98.2 °F (36.8 °C)  Pulse:  [72-82] 77  Resp:  [18-22] 22  SpO2:  [95 %-99 %] 95 %  BP: (115-146)/(57-71) 135/68   GENERAL: In no acute distress, afebrile  HEENT:  CHEST: Clear to auscultation bilaterally  HEART: S1, S2, no appreciable murmur  ABDOMEN: Soft, nontender, BS +  MSK: Warm, no lower extremity edema, no clubbing or cyanosis  NEUROLOGIC: Alert and oriented x4, moving all extremities with good strength, hypo reflexive at ankle and knee bilaterally, loss of sensation up to mid tibia bilaterally  INTEGUMENTARY:  PSYCHIATRY:        ASSESSMENT/PLAN   Falls  Orthostatic hypotension  Acute metabolic encephalopathy-resolved   Acute kidney injury-resolved   Moderate to severe spinal canal stenosis L3-4  Diabetic neuropathy    History of: Chronic HFrEF 45%-compensated, normocytic anemia, CAD PCI, dm 2, HTN, HLD, CVA, dementia, depression      Continue holding amlodipine.  Allow for slight permissive hypertension temporarily.  Case management following.  Pending SNF disposition plan.  PT and OT  Continue nightly melatonin.  Metformin and sitagliptin.  Levemir 10 units.  Get KUB and labs today.  Check orthostatics.    DVT prophylaxis:  Xarelto 10    Anticipated discharge and disposition:   __________________________________________________________________________    LABS/MICRO/MEDS/DIAGNOSTICS       LABS  No results for input(s): NA, K, CHLORIDE, CO2, BUN, CREATININE, GLUCOSE, CALCIUM, ALKPHOS, AST, ALT, ALBUMIN in the last 72 hours.      No results for input(s): WBC, RBC, HCT, MCV, PLT in the last 72 hours.    Invalid input(s):         MICROBIOLOGY  Microbiology Results (last 7 days)       ** No results found for the last 168 hours. **            MEDICATIONS   aspirin  81 mg Oral Daily    atorvastatin  40 mg Oral QHS    carvediloL  12.5 mg Oral BID    clopidogreL  75 mg Oral Daily    gabapentin  100 mg Oral BID    insulin detemir U-100  10 Units Subcutaneous Daily    magnesium oxide  400 mg Oral BID     melatonin  3 mg Oral Q24H    metFORMIN  1,000 mg Oral BID WM    pantoprazole  40 mg Oral Daily    rivaroxaban  10 mg Oral Daily with dinner    SITagliptin  50 mg Oral Daily      INFUSIONS        DIAGNOSTIC TESTS  MRI Lumbar Spine Without Contrast   Final Result      1. Moderate to severe degenerative narrowing of the spinal canal at L3-L4.   2. Multilevel neural foraminal stenoses as described.         Electronically signed by: Jena Valderrama   Date:    09/28/2022   Time:    11:27      MRI Brain W WO Contrast   Final Result      1. No acute intracranial abnormality.   2.   Moderate chronic microvascular ischemic changes.         Electronically signed by: Jena Valderrama   Date:    09/27/2022   Time:    09:46      CT Lumbar Spine Without Contrast   Final Result      1. No acute fracture identified.   2. Postoperative and degenerative changes of the lumbar spine.   [facility].         Electronically signed by: Jena Valderrama   Date:    09/27/2022   Time:    07:39      X-Ray Pelvis Routine AP   Final Result      Degenerative changes.         Electronically signed by: Campos Philippe   Date:    09/27/2022   Time:    09:32      X-Ray Chest 1 View   Final Result      No acute cardiopulmonary process.         Electronically signed by: Sacha Adhikari   Date:    09/27/2022   Time:    07:26      X-Ray Shoulder 2 or More Views Left   Final Result      Degenerative changes.         Electronically signed by: Campos Philippe   Date:    09/27/2022   Time:    09:32      X-Ray Lumbar Spine 2 Or 3 Views   Final Result      Degenerative and postsurgical changes seen no acute process         Electronically signed by: José Miguel Parmar   Date:    09/26/2022   Time:    18:13      CT Head Without Contrast   Final Result      Persistent area of rounded hypoattenuation in the posterior fossa on the left side.  MRI with contrast is recommended for correlation         Electronically  signed by: José Miguel Parmar   Date:    09/26/2022   Time:    18:11      X-Ray Abdomen AP 1 View    (Results Pending)            All diagnosis and differential diagnosis have been reviewed; assessment and plan has been documented. I have personally reviewed the labs and test results that are presently available; I have reviewed the patients medication list. I have reviewed the consulting providers response and recommendations. I have reviewed or attempted to review medical records based upon their availability.  All of the patient's questions have been addressed and answered. Patient's is agreeable to the above stated plan. I will continue to monitor closely and make adjustments to medical management as needed.  This document was created using M*Modal Fluency Direct.  Transcription errors may have been made.  Please contact me if any questions may rise regarding documentation to clarify verbiage.        Benji Anderson MD   10/06/2022   Internal Medicine

## 2022-10-06 NOTE — PT/OT/SLP PROGRESS
Physical Therapy         Treatment        Mayra Parker   MRN: [id number]        PT Start Time: 0927     PT Stop Time: 0940    PT Total Time (min): 13 min       Billable Minutes:  Gait [id number]  Total Minutes: 13    Treatment Type: Treatment  PT/PTA: PTA     PTA Visit Number: 3       General Precautions: Standard, fall  Orthopedic Precautions: Orthopedic Precautions : spinal precautions   Braces: Braces: LSO    Spiritual, Cultural Beliefs, Mandaeism Practices, Values that Affect Care: no    Subjective:  Communicated with nurse prior to session.    Pain/Comfort  Pain Rating 1: 0/10    Objective:  Patient found laying in bed upon arrival, with  spinal precautions educated    Functional Mobility:  Bed Mobility:   Supine to sit: Standby Assistance- HOB flat   Sit to supine: Standby Assistance   Rolling: Minimal Assistance   Scooting: Contact Guard Assistance    Balance:   Static Sit: GOOD: Takes MODERATE challenges from all directions  Dynamic Sit:  0: N/A  Static Stand: GOOD-: Takes MODERATE challenges from all directions inconsistently  Dynamic stand: 0: N/A    Transfer Training:  Sit to stand:Minimal Assistance with Rolling Walker sit to stand from EOB, with LSO donned on once EOB    Gait Training:  Patient gait trained FWB/WBAT: bilateral lower extremity 50  ft x2 on level tile with Rolling Walker with Moderate Assistance.  Pt with demonstarting a  swing-through gait with decreased gelacio and decreased step length.Impairments contributing to gait deviations include impaired postural control, decreased strength, and chair in tow, with noted slight BLE knee buckling in stance phase, pt able to correct however    Activity Tolerance:  Patient tolerated treatment well and limited due to x-ray present upon conclusion, thus pt back to bed and tx terminated    Patient left HOB elevated with call button in reach and nurse notified.    Assessment:  Mayra Parker is a 72 y.o. female with a medical diagnosis of  Multiple falls. Pt is able to ambulate further today, with knee buckling noted, no major LOB noted    Rehab potential is good.    Activity tolerance: Good    Discharge recommendations: Discharge Facility/Level of Care Needs: rehabilitation facility, nursing facility, skilled     Equipment recommendations:       GOALS:   Multidisciplinary Problems       Physical Therapy Goals          Problem: Physical Therapy    Goal Priority Disciplines Outcome Goal Variances Interventions   Physical Therapy Goal     PT, PT/OT Ongoing, Progressing     Description: Goals to be met by: 10/28/22     Patient will increase functional independence with mobility by performin. Supine to sit with Modified [place]  2. Sit to stand transfer with Modified [place]  3. Gait  x 200 feet with Modified [place] using Rolling Walker.                          PLAN:    Patient to be seen 5 x/week  to address the above listed problems via gait training, therapeutic activities  Plan of Care expires: 10/28/22  Plan of Care reviewed with: patient         10/6/2022

## 2022-10-07 LAB
POCT GLUCOSE: 147 MG/DL (ref 70–110)
POCT GLUCOSE: 177 MG/DL (ref 70–110)
POCT GLUCOSE: 192 MG/DL (ref 70–110)
POCT GLUCOSE: 239 MG/DL (ref 70–110)

## 2022-10-07 PROCEDURE — G0378 HOSPITAL OBSERVATION PER HR: HCPCS

## 2022-10-07 PROCEDURE — 97164 PT RE-EVAL EST PLAN CARE: CPT

## 2022-10-07 PROCEDURE — 63600175 PHARM REV CODE 636 W HCPCS: Performed by: NURSE PRACTITIONER

## 2022-10-07 PROCEDURE — 96374 THER/PROPH/DIAG INJ IV PUSH: CPT | Mod: 59

## 2022-10-07 PROCEDURE — 96372 THER/PROPH/DIAG INJ SC/IM: CPT | Performed by: INTERNAL MEDICINE

## 2022-10-07 PROCEDURE — 25000003 PHARM REV CODE 250: Performed by: INTERNAL MEDICINE

## 2022-10-07 PROCEDURE — 63600175 PHARM REV CODE 636 W HCPCS: Performed by: INTERNAL MEDICINE

## 2022-10-07 PROCEDURE — 94761 N-INVAS EAR/PLS OXIMETRY MLT: CPT

## 2022-10-07 RX ORDER — AMOXICILLIN 250 MG
2 CAPSULE ORAL NIGHTLY
Status: DISCONTINUED | OUTPATIENT
Start: 2022-10-07 | End: 2022-10-12 | Stop reason: HOSPADM

## 2022-10-07 RX ADMIN — SENNOSIDES AND DOCUSATE SODIUM 2 TABLET: 50; 8.6 TABLET ORAL at 08:10

## 2022-10-07 RX ADMIN — RIVAROXABAN 10 MG: 10 TABLET, FILM COATED ORAL at 04:10

## 2022-10-07 RX ADMIN — MELATONIN TAB 3 MG 3 MG: 3 TAB at 08:10

## 2022-10-07 RX ADMIN — CARVEDILOL 12.5 MG: 12.5 TABLET, FILM COATED ORAL at 08:10

## 2022-10-07 RX ADMIN — METFORMIN HYDROCHLORIDE 1000 MG: 500 TABLET, FILM COATED ORAL at 08:10

## 2022-10-07 RX ADMIN — SITAGLIPTIN 50 MG: 50 TABLET, FILM COATED ORAL at 08:10

## 2022-10-07 RX ADMIN — Medication 400 MG: at 08:10

## 2022-10-07 RX ADMIN — CLOPIDOGREL 75 MG: 75 TABLET, FILM COATED ORAL at 08:10

## 2022-10-07 RX ADMIN — PANTOPRAZOLE SODIUM 40 MG: 40 TABLET, DELAYED RELEASE ORAL at 08:10

## 2022-10-07 RX ADMIN — ONDANSETRON 4 MG: 2 INJECTION INTRAMUSCULAR; INTRAVENOUS at 11:10

## 2022-10-07 RX ADMIN — ASPIRIN 81 MG: 81 TABLET, COATED ORAL at 08:10

## 2022-10-07 RX ADMIN — GABAPENTIN 100 MG: 100 CAPSULE ORAL at 08:10

## 2022-10-07 RX ADMIN — ATORVASTATIN CALCIUM 40 MG: 40 TABLET, FILM COATED ORAL at 08:10

## 2022-10-07 RX ADMIN — INSULIN DETEMIR 10 UNITS: 100 INJECTION, SOLUTION SUBCUTANEOUS at 08:10

## 2022-10-07 RX ADMIN — METFORMIN HYDROCHLORIDE 1000 MG: 500 TABLET, FILM COATED ORAL at 04:10

## 2022-10-07 NOTE — PROGRESS NOTES
"Inpatient Nutrition Evaluation    Admit Date: 9/26/2022   Total duration of encounter: 11 days    Nutrition Recommendation/Prescription     Continue diabetic diet as tolerated.    Nutrition Assessment     Chart Review    Reason Seen: length of stay and follow-up    Diagnosis: Falls  Orthostatic hypotension  Acute metabolic encephalopathy-resolved   Acute kidney injury-resolved   Moderate to severe spinal canal stenosis L3-4  Diabetic neuropathy      Relevant Medical History:  dementia, HLD, HTN, DM 2, CVA, HF, anemia, CAD PCI, depression    Nutrition-Related Medications: 10units detemir, mag ox, metformin, pantoprazole, senna-docusate, sitaglipitin, insulin PRN    Nutrition-Related Labs: 10/1: POCT glu-194  10/6/22 Cl 108, Crea 1.12, GFR 53, Gluc 190, , Alb 3, AST 88, CBGs 132-233      Diet Order: Diet diabetic  Oral Supplement Order: none at this time  Appetite/Oral Intake: good/% of meals  Factors Affecting Nutritional Intake: none identified at this time  Food/Shinto/Cultural Preferences: none reported       Wound(s):   none documented    Comments    10/1: pt reports good appetite, eating most of meals, with no n/v/d/c. UBW reported 152 lb with no recent weight loss. Pt stated weight loss 2-3 months ago, with stable weight since. Latest weight of 68.9 kg (152 lb) on 9/28 and previous weight of 73.5 kg (162 lb) on 7/6. Possible 6.2% weight loss in 2.5 months; weight loss not significant at this time. Will continue to monitor.    10/7: Reports fair-good appetite. Intermittent abdominal pain but not significantly affecting meals. Denies any n/v/d/c today. Politely declines oral supplement, does not like them.     Anthropometrics    Height: 5' 4.02" (162.6 cm) Height Method: Stated  Last Weight: 68.9 kg (152 lb) (09/28/22 3199) Weight Method: Standard Scale  BMI (Calculated): 26.1  BMI Classification: overweight (BMI 25-29.9)     Ideal Body Weight (IBW), Female: 120.1 lb     % Ideal Body Weight, " Female (lb): 126.56 %                             Usual Weight Provided By: patient    Wt Readings from Last 5 Encounters:   09/28/22 68.9 kg (152 lb)   07/06/22 73.5 kg (162 lb)   06/26/22 73.5 kg (162 lb 0.6 oz)   05/29/22 73.1 kg (161 lb 2.5 oz)   05/23/22 81.6 kg (180 lb)     Weight Change(s) Since Admission:  Admit Weight: 68.9 kg (152 lb) (09/26/22 1736)  10/7/22 no updated wts     Patient Education    Not applicable.    Monitoring & Evaluation     Dietitian will monitor food and beverage intake and weight change.  Nutrition Risk/Follow-Up: low (follow-up in 5-7 days)  Patients assigned 'low nutrition risk' status do not qualify for a full nutritional assessment but will be monitored and re-evaluated in a 5-7 day time period.

## 2022-10-07 NOTE — PT/OT/SLP RE-EVAL
Physical Therapy Re-evaluation    Patient Name:  Mayra Parker   MRN:  [id number]    Recommendations:     Discharge Recommendations:  rehabilitation facility, nursing facility, skilled   Discharge Equipment Recommendations: walker, rolling   Barriers to discharge: None    Assessment:     Mayra Parker is a 72 y.o. female admitted with a medical diagnosis of Multiple falls.  She presents with the following impairments/functional limitations:  weakness, gait instability, impaired balance, impaired endurance, decreased safety awareness, impaired functional mobility. The pt demonstrates very unsafe gait, 2/2 multiple episodes of knee buckling. Pt would benefit from placement upon discharge.     Rehab Prognosis:  Good; patient would benefit from acute skilled PT services to address these deficits and reach maximum level of function.      Recent Surgery: * No surgery found *      Plan:     During this hospitalization, patient to be seen 5 x/week to address the above listed problems via gait training, therapeutic exercises, therapeutic activities  Plan of Care Expires:  10/28/22  Plan of Care Reviewed with: patient    Subjective     Communicated with NSG prior to session.  Patient found supine with   upon PT entry to room, agreeable to evaluation.      Chief Complaint: nausea  Patient comments/goals: return to PLOF  Pain/Comfort:  Location - Orientation 1: lower  Location 1: abdomen    Patients cultural, spiritual, Sabianism conflicts given the current situation: no      Objective:     Patient found with:       General Precautions: Standard, fall   Orthopedic Precautions:spinal precautions   Braces: LSO  Respiratory Status: Room air    Exams:  RLE ROM: WFL  RLE Strength: WFL  LLE ROM: WFL  LLE Strength: WFL    Functional Mobility:  Transfers:     Sit to Stand:  minimum assistance with rolling walker  Gait: 25 feet, mod A, with RW. Multiple buckling of knees noted.    Patient left up in chair with all lines  intact and call button in reach.    GOALS:   Multidisciplinary Problems       Physical Therapy Goals          Problem: Physical Therapy    Goal Priority Disciplines Outcome Goal Variances Interventions   Physical Therapy Goal     PT, PT/OT Ongoing, Progressing     Description: Goals to be met by: 10/28/22     Patient will increase functional independence with mobility by performin. Supine to sit with Modified [place]  2. Sit to stand transfer with Modified [place]  3. Gait  x 200 feet with Modified [place] using Rolling Walker.                          History:     Past Medical History:   Diagnosis Date    Arthritis     Coronary artery disease     CVA (cerebral vascular accident)     affected right side of body    Depression     Diabetes mellitus     Hyperlipidemia     Hypertension     Unspecified dementia without behavioral disturbance        Past Surgical History:   Procedure Laterality Date    BACK SURGERY      CAROTID ENDARTERECTOMY Left     CAROTID ENDARTERECTOMY Right     LEFT HEART CATHETERIZATION  2022    Dr. Estrella; Stent placement    LEFT HEART CATHETERIZATION Left 2022    Procedure: CATHETERIZATION, HEART, LEFT;  Surgeon: Jone Estrella MD;  Location: [facility] CATH LAB;  Service: Cardiology;  Laterality: Left;  LHC VIA R GROIN       Time Tracking:     PT Received On: 10/07/22  PT Start Time: 1315     PT Stop Time: 1330  PT Total Time (min): 15 min     Billable Minutes: Re-eval 15 mins      10/07/2022

## 2022-10-07 NOTE — PLAN OF CARE
SSC spoke to Cynthia the pt daughter she refuse to allow her mom to discharge to accepting facility [facility]. SSC reached out to [facility]  on sat[facility] to accept the pt & Mag. Denied but [facility] with begin the auth. I also confirmed that [facility] didn't began auth.  SSC updated daughter and she approves

## 2022-10-07 NOTE — PROGRESS NOTES
[facility] MEDIC[facility]   PROGRESS NOTE        CHIEF COMPLAINT   Hospital follow up    HOSPITAL COURSE   Mayra Parker is a 72 y.o. female with a PMHx of  dementia, HTN, DM and CVA who presented to [facility] on 9/26/2022 with c/o BLE numbness and back pain following 3 falls over the past week. Denied head injury or LOC. Pt's daughter reported that the patient was also more confused over the past week with hallucinations after being started on Remeron. The majority of the history was obtained from review of the medical record.      Initial ED VS include /89, HR 89, RR 18, SpO2 100%, temp 97.9F. Labs notable for hgb 11.2, hct 35.6, sodium 133, potassium 5.2, creatinine 1.49, glucose 243, AST 88, ALT 60. UA not impressive. CXR negative for acute abnormality. CT L-spine negative for acute fracture; postoperative and degenerative changes of the lumbar spine noted. CT head with persistent area of rounded hypo attenuation in the posterior fossa on the left side. She received Lidocaine patch, Calcium gluconate IV, tramadol PO, and IV fluids in ED. She was admitted to hospital medicine services for further work-up and management of care.   MRI brain with and without contrast was negative except moderate chronic microvascular ischemic changes.  MRI lumbar spine without contrast noted moderate to severe degenerative narrowing of the spinal canal at L3-4 and multilevel neural foraminal stenosis.  Neurosurgery was consulted and patient did not want any kind of surgical intervention at this time.    Today  And 2 bowel movements yesterday.  Feeling better.  We are still waiting for placement.        OBJECTIVE/PHYSICAL EXAM     VITAL SIGNS (MOST RECENT):  Temp: 98.2 °F (36.8 °C) (10/07/22 0800)  Pulse: 73 (10/07/22 0800)  Resp: 16 (10/07/22 0800)  BP: 121/61 (10/07/22 0800)  SpO2: 99 % (10/07/22 0800)   VITAL SIGNS (24 HOUR RANGE):  Temp:  [97.2 °F (36.2 °C)-98.3 °F (36.8 °C)] 98.2 °F  (36.8 °C)  Pulse:  [69-81] 73  Resp:  [16-18] 16  SpO2:  [96 %-99 %] 99 %  BP: ()/(56-66) 121/61   GENERAL: In no acute distress, afebrile  HEENT:  CHEST: Clear to auscultation bilaterally  HEART: S1, S2, no appreciable murmur  ABDOMEN: Soft, nontender, BS +  MSK: Warm, no lower extremity edema, no clubbing or cyanosis  NEUROLOGIC: Alert and oriented x4, moving all extremities with good strength, hypo reflexive at ankle and knee bilaterally, loss of sensation up to mid tibia bilaterally  INTEGUMENTARY:  PSYCHIATRY:        ASSESSMENT/PLAN   Falls  Orthostatic hypotension  Acute metabolic encephalopathy-resolved   Acute kidney injury-resolved   Moderate to severe spinal canal stenosis L3-4  Diabetic neuropathy    History of: Chronic HFrEF 45%-compensated, normocytic anemia, CAD PCI, dm 2, HTN, HLD, CVA, dementia, depression      Continue holding amlodipine.  Allow for slight permissive hypertension temporarily.  Case management following.  Pending SNF disposition plan.  PT and OT  Continue nightly melatonin.  Metformin and sitagliptin.  Levemir 10 units.  Get KUB and labs today.  Check orthostatics.    DVT prophylaxis:  Xarelto 10    Anticipated discharge and disposition:   __________________________________________________________________________    LABS/MICRO/MEDS/DIAGNOSTICS       LABS  Recent Labs     10/06/22  1021      K 4.5   CHLORIDE 108*   CO2 26   BUN 13.9   CREATININE 1.12*   GLUCOSE 190*   CALCIUM 9.7   ALKPHOS 159*   AST 88*   ALT 50   ALBUMIN 3.0*         Recent Labs     10/06/22  1021   WBC 4.5   RBC 3.31*   HCT 31.5*   MCV 95.2*              MICROBIOLOGY  Microbiology Results (last 7 days)       ** No results found for the last 168 hours. **            MEDICATIONS   aspirin  81 mg Oral Daily    atorvastatin  40 mg Oral QHS    carvediloL  12.5 mg Oral BID    clopidogreL  75 mg Oral Daily    gabapentin  100 mg Oral BID    insulin detemir U-100  10 Units Subcutaneous Daily    magnesium  oxide  400 mg Oral BID    melatonin  3 mg Oral Q24H    metFORMIN  1,000 mg Oral BID WM    pantoprazole  40 mg Oral Daily    rivaroxaban  10 mg Oral Daily with dinner    senna-docusate 8.6-50 mg  2 tablet Oral QHS    SITagliptin  50 mg Oral Daily      INFUSIONS        DIAGNOSTIC TESTS  X-Ray Abdomen AP 1 View   Final Result      Moderate colonic stool burden.         Electronically signed by: Cat Oswald   Date:    10/06/2022   Time:    12:15      MRI Lumbar Spine Without Contrast   Final Result      1. Moderate to severe degenerative narrowing of the spinal canal at L3-L4.   2. Multilevel neural foraminal stenoses as described.         Electronically signed by: Jena Valderrama   Date:    09/28/2022   Time:    11:27      MRI Brain W WO Contrast   Final Result      1. No acute intracranial abnormality.   2.   Moderate chronic microvascular ischemic changes.         Electronically signed by: Jena Valderrama   Date:    09/27/2022   Time:    09:46      CT Lumbar Spine Without Contrast   Final Result      1. No acute fracture identified.   2. Postoperative and degenerative changes of the lumbar spine.   [facility].         Electronically signed by: Jena Valderrama   Date:    09/27/2022   Time:    07:39      X-Ray Pelvis Routine AP   Final Result      Degenerative changes.         Electronically signed by: Campos Philippe   Date:    09/27/2022   Time:    09:32      X-Ray Chest 1 View   Final Result      No acute cardiopulmonary process.         Electronically signed by: Sacha Adhikari   Date:    09/27/2022   Time:    07:26      X-Ray Shoulder 2 or More Views Left   Final Result      Degenerative changes.         Electronically signed by: Campos Philippe   Date:    09/27/2022   Time:    09:32      X-Ray Lumbar Spine 2 Or 3 Views   Final Result      Degenerative and postsurgical changes seen no acute process         Electronically signed by: José Miguel Parmar    Date:    09/26/2022   Time:    18:13      CT Head Without Contrast   Final Result      Persistent area of rounded hypoattenuation in the posterior fossa on the left side.  MRI with contrast is recommended for correlation         Electronically signed by: José Miguel Parmar   Date:    09/26/2022   Time:    18:11               All diagnosis and differential diagnosis have been reviewed; assessment and plan has been documented. I have personally reviewed the labs and test results that are presently available; I have reviewed the patients medication list. I have reviewed the consulting providers response and recommendations. I have reviewed or attempted to review medical records based upon their availability.  All of the patient's questions have been addressed and answered. Patient's is agreeable to the above stated plan. I will continue to monitor closely and make adjustments to medical management as needed.  This document was created using M*Modal Fluency Direct.  Transcription errors may have been made.  Please contact me if any questions may rise regarding documentation to clarify verbiage.        Benji Anderson MD   10/07/2022   Internal Medicine

## 2022-10-08 LAB
POCT GLUCOSE: 145 MG/DL (ref 70–110)
POCT GLUCOSE: 146 MG/DL (ref 70–110)
POCT GLUCOSE: 172 MG/DL (ref 70–110)
POCT GLUCOSE: 179 MG/DL (ref 70–110)

## 2022-10-08 PROCEDURE — 25000003 PHARM REV CODE 250: Performed by: INTERNAL MEDICINE

## 2022-10-08 PROCEDURE — G0378 HOSPITAL OBSERVATION PER HR: HCPCS

## 2022-10-08 PROCEDURE — 63600175 PHARM REV CODE 636 W HCPCS: Performed by: INTERNAL MEDICINE

## 2022-10-08 PROCEDURE — 96372 THER/PROPH/DIAG INJ SC/IM: CPT | Performed by: INTERNAL MEDICINE

## 2022-10-08 RX ORDER — GABAPENTIN 100 MG/1
200 CAPSULE ORAL 2 TIMES DAILY
Status: DISCONTINUED | OUTPATIENT
Start: 2022-10-08 | End: 2022-10-12 | Stop reason: HOSPADM

## 2022-10-08 RX ADMIN — SENNOSIDES AND DOCUSATE SODIUM 2 TABLET: 50; 8.6 TABLET ORAL at 09:10

## 2022-10-08 RX ADMIN — CLOPIDOGREL 75 MG: 75 TABLET, FILM COATED ORAL at 09:10

## 2022-10-08 RX ADMIN — GABAPENTIN 200 MG: 100 CAPSULE ORAL at 09:10

## 2022-10-08 RX ADMIN — METFORMIN HYDROCHLORIDE 1000 MG: 500 TABLET, FILM COATED ORAL at 09:10

## 2022-10-08 RX ADMIN — MELATONIN TAB 3 MG 3 MG: 3 TAB at 09:10

## 2022-10-08 RX ADMIN — CARVEDILOL 12.5 MG: 12.5 TABLET, FILM COATED ORAL at 09:10

## 2022-10-08 RX ADMIN — RIVAROXABAN 10 MG: 10 TABLET, FILM COATED ORAL at 06:10

## 2022-10-08 RX ADMIN — GABAPENTIN 100 MG: 100 CAPSULE ORAL at 09:10

## 2022-10-08 RX ADMIN — SITAGLIPTIN 50 MG: 50 TABLET, FILM COATED ORAL at 09:10

## 2022-10-08 RX ADMIN — ATORVASTATIN CALCIUM 40 MG: 40 TABLET, FILM COATED ORAL at 09:10

## 2022-10-08 RX ADMIN — Medication 400 MG: at 09:10

## 2022-10-08 RX ADMIN — INSULIN DETEMIR 12 UNITS: 100 INJECTION, SOLUTION SUBCUTANEOUS at 09:10

## 2022-10-08 RX ADMIN — PANTOPRAZOLE SODIUM 40 MG: 40 TABLET, DELAYED RELEASE ORAL at 09:10

## 2022-10-08 RX ADMIN — ASPIRIN 81 MG: 81 TABLET, COATED ORAL at 09:10

## 2022-10-08 NOTE — PROGRESS NOTES
[facility]   PROGRESS NOTE        CHIEF COMPLAINT   Hospital follow up    HOSPITAL COURSE   Mayra Parker is a 72 y.o. female with a PMHx of  dementia, HTN, DM and CVA who presented to [facility] on 9/26/2022 with c/o BLE numbness and back pain following 3 falls over the past week. Denied head injury or LOC. Pt's daughter reported that the patient was also more confused over the past week with hallucinations after being started on Remeron. The majority of the history was obtained from review of the medical record.      Initial ED VS include /89, HR 89, RR 18, SpO2 100%, temp 97.9F. Labs notable for hgb 11.2, hct 35.6, sodium 133, potassium 5.2, creatinine 1.49, glucose 243, AST 88, ALT 60. UA not impressive. CXR negative for acute abnormality. CT L-spine negative for acute fracture; postoperative and degenerative changes of the lumbar spine noted. CT head with persistent area of rounded hypo attenuation in the posterior fossa on the left side. She received Lidocaine patch, Calcium gluconate IV, tramadol PO, and IV fluids in ED. She was admitted to hospital medicine services for further work-up and management of care.   MRI brain with and without contrast was negative except moderate chronic microvascular ischemic changes.  MRI lumbar spine without contrast noted moderate to severe degenerative narrowing of the spinal canal at L3-4 and multilevel neural foraminal stenosis.  Neurosurgery was consulted and patient did not want any kind of surgical intervention at this time.    Today  No new issues.  Seen in feeling well.        OBJECTIVE/PHYSICAL EXAM     VITAL SIGNS (MOST RECENT):  Temp: 98.4 °F (36.9 °C) (10/08/22 0717)  Pulse: 76 (10/08/22 0717)  Resp: 16 (10/08/22 0717)  BP: (P) 132/74 (10/08/22 0943)  SpO2: 98 % (10/08/22 0717)   VITAL SIGNS (24 HOUR RANGE):  Temp:  [97.7 °F (36.5 °C)-98.6 °F (37 °C)] 98.4 °F (36.9 °C)  Pulse:  [73-85] 76  Resp:  [16-18]  16  SpO2:  [93 %-100 %] 98 %  BP: (110-151)/(59-79) (P) 132/74   GENERAL: In no acute distress, afebrile  HEENT:  CHEST: Clear to auscultation bilaterally  HEART: S1, S2, no appreciable murmur  ABDOMEN: Soft, nontender, BS +  MSK: Warm, no lower extremity edema, no clubbing or cyanosis  NEUROLOGIC: Alert and oriented x4, moving all extremities with good strength, hypo reflexive at ankle and knee bilaterally, loss of sensation up to mid tibia bilaterally  INTEGUMENTARY:  PSYCHIATRY:        ASSESSMENT/PLAN   Falls  Orthostatic hypotension  Acute metabolic encephalopathy-resolved   Acute kidney injury-resolved   Moderate to severe spinal canal stenosis L3-4  Diabetic neuropathy    History of: Chronic HFrEF 45%-compensated, normocytic anemia, CAD PCI, dm 2, HTN, HLD, CVA, dementia, depression      Continue holding amlodipine.   Case management following.  Pending SNF disposition plan.  PT and OT  Continue nightly melatonin.  Metformin and sitagliptin.  Levemir 14 units.  Repeat orthostatic vitals today    DVT prophylaxis:  Xarelto 10    Anticipated discharge and disposition:   __________________________________________________________________________    LABS/MICRO/MEDS/DIAGNOSTICS       LABS  Recent Labs     10/06/22  1021      K 4.5   CHLORIDE 108*   CO2 26   BUN 13.9   CREATININE 1.12*   GLUCOSE 190*   CALCIUM 9.7   ALKPHOS 159*   AST 88*   ALT 50   ALBUMIN 3.0*         Recent Labs     10/06/22  1021   WBC 4.5   RBC 3.31*   HCT 31.5*   MCV 95.2*              MICROBIOLOGY  Microbiology Results (last 7 days)       ** No results found for the last 168 hours. **            MEDICATIONS   aspirin  81 mg Oral Daily    atorvastatin  40 mg Oral QHS    carvediloL  12.5 mg Oral BID    clopidogreL  75 mg Oral Daily    gabapentin  100 mg Oral BID    insulin detemir U-100  12 Units Subcutaneous Daily    magnesium oxide  400 mg Oral BID    melatonin  3 mg Oral Q24H    metFORMIN  1,000 mg Oral BID WM    pantoprazole  40  mg Oral Daily    rivaroxaban  10 mg Oral Daily with dinner    senna-docusate 8.6-50 mg  2 tablet Oral QHS    SITagliptin  50 mg Oral Daily      INFUSIONS        DIAGNOSTIC TESTS  X-Ray Abdomen AP 1 View   Final Result      Moderate colonic stool burden.         Electronically signed by: Cat Oswald   Date:    10/06/2022   Time:    12:15      MRI Lumbar Spine Without Contrast   Final Result      1. Moderate to severe degenerative narrowing of the spinal canal at L3-L4.   2. Multilevel neural foraminal stenoses as described.         Electronically signed by: Jena Valderrama   Date:    09/28/2022   Time:    11:27      MRI Brain W WO Contrast   Final Result      1. No acute intracranial abnormality.   2.   Moderate chronic microvascular ischemic changes.         Electronically signed by: Jena Valderrama   Date:    09/27/2022   Time:    09:46      CT Lumbar Spine Without Contrast   Final Result      1. No acute fracture identified.   2. Postoperative and degenerative changes of the lumbar spine.   No significant change from the [facility] interpretation.         Electronically signed by: Jena Valderrama   Date:    09/27/2022   Time:    07:39      X-Ray Pelvis Routine AP   Final Result      Degenerative changes.         Electronically signed by: Campos Philippe   Date:    09/27/2022   Time:    09:32      X-Ray Chest 1 View   Final Result      No acute cardiopulmonary process.         Electronically signed by: Sacha Adhikari   Date:    09/27/2022   Time:    07:26      X-Ray Shoulder 2 or More Views Left   Final Result      Degenerative changes.         Electronically signed by: Campos Philippe   Date:    09/27/2022   Time:    09:32      X-Ray Lumbar Spine 2 Or 3 Views   Final Result      Degenerative and postsurgical changes seen no acute process         Electronically signed by: José Miguel Parmar   Date:    09/26/2022   Time:    18:13      CT Head Without Contrast   Final Result      Persistent area of rounded  hypoattenuation in the posterior fossa on the left side.  MRI with contrast is recommended for correlation         Electronically signed by: José Miguel Parmar   Date:    09/26/2022   Time:    18:11               All diagnosis and differential diagnosis have been reviewed; assessment and plan has been documented. I have personally reviewed the labs and test results that are presently available; I have reviewed the patients medication list. I have reviewed the consulting providers response and recommendations. I have reviewed or attempted to review medical records based upon their availability.  All of the patient's questions have been addressed and answered. Patient's is agreeable to the above stated plan. I will continue to monitor closely and make adjustments to medical management as needed.  This document was created using M*Modal Fluency Direct.  Transcription errors may have been made.  Please contact me if any questions may rise regarding documentation to clarify verbiage.        Benji Anderson MD   10/08/2022   Internal Medicine

## 2022-10-08 NOTE — NURSING
"Patient refuses to  get insulin if CBG is not over 200. I have educated patient about the importance of blood sugar management. Patient decline "stating she knows her body and doesn't want the insulin."  "

## 2022-10-09 LAB
ANION GAP SERPL CALC-SCNC: 8 MEQ/L
BUN SERPL-MCNC: 17.9 MG/DL (ref 9.8–20.1)
CALCIUM SERPL-MCNC: 9.5 MG/DL (ref 8.4–10.2)
CHLORIDE SERPL-SCNC: 109 MMOL/L (ref 98–107)
CO2 SERPL-SCNC: 24 MMOL/L (ref 23–31)
CREAT SERPL-MCNC: 0.96 MG/DL (ref 0.55–1.02)
CREAT/UREA NIT SERPL: 19
GFR SERPLBLD CREATININE-BSD FMLA CKD-EPI: >60 MLS/MIN/1.73/M2
GLUCOSE SERPL-MCNC: 112 MG/DL (ref 82–115)
MAGNESIUM SERPL-MCNC: 1.4 MG/DL (ref 1.6–2.6)
POCT GLUCOSE: 106 MG/DL (ref 70–110)
POCT GLUCOSE: 148 MG/DL (ref 70–110)
POCT GLUCOSE: 152 MG/DL (ref 70–110)
POCT GLUCOSE: 241 MG/DL (ref 70–110)
POTASSIUM SERPL-SCNC: 4.6 MMOL/L (ref 3.5–5.1)
SODIUM SERPL-SCNC: 141 MMOL/L (ref 136–145)

## 2022-10-09 PROCEDURE — 36415 COLL VENOUS BLD VENIPUNCTURE: CPT | Performed by: INTERNAL MEDICINE

## 2022-10-09 PROCEDURE — 80048 BASIC METABOLIC PNL TOTAL CA: CPT | Performed by: INTERNAL MEDICINE

## 2022-10-09 PROCEDURE — 25000003 PHARM REV CODE 250: Performed by: INTERNAL MEDICINE

## 2022-10-09 PROCEDURE — G0378 HOSPITAL OBSERVATION PER HR: HCPCS

## 2022-10-09 PROCEDURE — 96372 THER/PROPH/DIAG INJ SC/IM: CPT | Performed by: INTERNAL MEDICINE

## 2022-10-09 PROCEDURE — 83735 ASSAY OF MAGNESIUM: CPT | Performed by: INTERNAL MEDICINE

## 2022-10-09 PROCEDURE — 63600175 PHARM REV CODE 636 W HCPCS: Performed by: INTERNAL MEDICINE

## 2022-10-09 RX ADMIN — MELATONIN TAB 3 MG 3 MG: 3 TAB at 08:10

## 2022-10-09 RX ADMIN — INSULIN DETEMIR 12 UNITS: 100 INJECTION, SOLUTION SUBCUTANEOUS at 08:10

## 2022-10-09 RX ADMIN — METFORMIN HYDROCHLORIDE 1000 MG: 500 TABLET, FILM COATED ORAL at 08:10

## 2022-10-09 RX ADMIN — PANTOPRAZOLE SODIUM 40 MG: 40 TABLET, DELAYED RELEASE ORAL at 08:10

## 2022-10-09 RX ADMIN — GABAPENTIN 200 MG: 100 CAPSULE ORAL at 08:10

## 2022-10-09 RX ADMIN — SITAGLIPTIN 50 MG: 50 TABLET, FILM COATED ORAL at 08:10

## 2022-10-09 RX ADMIN — METFORMIN HYDROCHLORIDE 1000 MG: 500 TABLET, FILM COATED ORAL at 05:10

## 2022-10-09 RX ADMIN — CARVEDILOL 12.5 MG: 12.5 TABLET, FILM COATED ORAL at 08:10

## 2022-10-09 RX ADMIN — ATORVASTATIN CALCIUM 40 MG: 40 TABLET, FILM COATED ORAL at 08:10

## 2022-10-09 RX ADMIN — ASPIRIN 81 MG: 81 TABLET, COATED ORAL at 08:10

## 2022-10-09 RX ADMIN — CLOPIDOGREL 75 MG: 75 TABLET, FILM COATED ORAL at 08:10

## 2022-10-09 NOTE — PROGRESS NOTES
[facility] MEDICINE   PROGRESS NOTE        CHIEF COMPLAINT   Hospital follow up    HOSPITAL COURSE   Mayra Parker is a 72 y.o. female with a PMHx of  dementia, HTN, DM and CVA who presented to [facility] on 9/26/2022 with c/o BLE numbness and back pain following 3 falls over the past week. Denied head injury or LOC. Pt's daughter reported that the patient was also more confused over the past week with hallucinations after being started on Remeron. The majority of the history was obtained from review of the medical record.      Initial ED VS include /89, HR 89, RR 18, SpO2 100%, temp 97.9F. Labs notable for hgb 11.2, hct 35.6, sodium 133, potassium 5.2, creatinine 1.49, glucose 243, AST 88, ALT 60. UA not impressive. CXR negative for acute abnormality. CT L-spine negative for acute fracture; postoperative and degenerative changes of the lumbar spine noted. CT head with persistent area of rounded hypo attenuation in the posterior fossa on the left side. She received Lidocaine patch, Calcium gluconate IV, tramadol PO, and IV fluids in ED. She was admitted to hospital medicine services for further work-up and management of care.   MRI brain with and without contrast was negative except moderate chronic microvascular ischemic changes.  MRI lumbar spine without contrast noted moderate to severe degenerative narrowing of the spinal canal at L3-4 and multilevel neural foraminal stenosis.  Neurosurgery was consulted and patient did not want any kind of surgical intervention at this time.    Today  No new issues.  Seen in feeling well.  Still unable to ambulate much due to pain in her legs and giving out.  She would like to contemplate on the surgery.        OBJECTIVE/PHYSICAL EXAM     VITAL SIGNS (MOST RECENT):  Temp: 98.2 °F (36.8 °C) (10/09/22 0718)  Pulse: 75 (10/09/22 0720)  Resp: 16 (10/09/22 0543)  BP: (!) 173/74 (10/09/22 0823)  SpO2: 98 % (10/09/22 0718)   VITAL SIGNS  (24 HOUR RANGE):  Temp:  [96.6 °F (35.9 °C)-98.4 °F (36.9 °C)] 98.2 °F (36.8 °C)  Pulse:  [71-84] 75  Resp:  [16-18] 16  SpO2:  [94 %-100 %] 98 %  BP: (112-173)/(62-78) 173/74   GENERAL: In no acute distress, afebrile  HEENT:  CHEST: Clear to auscultation bilaterally  HEART: S1, S2, no appreciable murmur  ABDOMEN: Soft, nontender, BS +  MSK: Warm, no lower extremity edema, no clubbing or cyanosis  NEUROLOGIC: Alert and oriented x4, moving all extremities with good strength, hypo reflexive at ankle and knee bilaterally, loss of sensation up to mid tibia bilaterally  INTEGUMENTARY:  PSYCHIATRY:        ASSESSMENT/PLAN   Falls  Orthostatic hypotension  Acute metabolic encephalopathy-resolved   Acute kidney injury-resolved   Moderate to severe spinal canal stenosis L3-4  Diabetic neuropathy    History of: Chronic HFrEF 45%-compensated, normocytic anemia, CAD PCI, dm 2, HTN, HLD, CVA, dementia, depression      Continue holding amlodipine.   Case management following.  Pending SNF disposition plan.  PT and OT  Continue nightly melatonin.  Metformin and sitagliptin.  Levemir 12 units.  Suspect her ambulation is limited secondary to her spinal stenosis.  She would like to reconsider surgery with her daughter.    DVT prophylaxis:  Hold Xarelto 10 in case she decides for surgery    Anticipated discharge and disposition:   __________________________________________________________________________    LABS/MICRO/MEDS/DIAGNOSTICS       LABS  Recent Labs     10/06/22  1021 10/09/22  0504    141   K 4.5 4.6   CHLORIDE 108* 109*   CO2 26 24   BUN 13.9 17.9   CREATININE 1.12* 0.96   GLUCOSE 190* 112   CALCIUM 9.7 9.5   ALKPHOS 159*  --    AST 88*  --    ALT 50  --    ALBUMIN 3.0*  --          Recent Labs     10/06/22  1021   WBC 4.5   RBC 3.31*   HCT 31.5*   MCV 95.2*              MICROBIOLOGY  Microbiology Results (last 7 days)       ** No results found for the last 168 hours. **            MEDICATIONS   aspirin  81 mg  Oral Daily    atorvastatin  40 mg Oral QHS    carvediloL  12.5 mg Oral BID    clopidogreL  75 mg Oral Daily    gabapentin  200 mg Oral BID    insulin detemir U-100  12 Units Subcutaneous Daily    melatonin  3 mg Oral Q24H    metFORMIN  1,000 mg Oral BID WM    pantoprazole  40 mg Oral Daily    rivaroxaban  10 mg Oral Daily with dinner    senna-docusate 8.6-50 mg  2 tablet Oral QHS    SITagliptin  50 mg Oral Daily      INFUSIONS        DIAGNOSTIC TESTS  X-Ray Abdomen AP 1 View   Final Result      Moderate colonic stool burden.         Electronically signed by: Cat Oswald   Date:    10/06/2022   Time:    12:15      MRI Lumbar Spine Without Contrast   Final Result      1. Moderate to severe degenerative narrowing of the spinal canal at L3-L4.   2. Multilevel neural foraminal stenoses as described.         Electronically signed by: Jena Valderrama   Date:    09/28/2022   Time:    11:27      MRI Brain W WO Contrast   Final Result      1. No acute intracranial abnormality.   2.   Moderate chronic microvascular ischemic changes.         Electronically signed by: eJna Valderrama   Date:    09/27/2022   Time:    09:46      CT Lumbar Spine Without Contrast   Final Result      1. No acute fracture identified.   2. Postoperative and degenerative changes of the lumbar spine.   [facility]         Electronically signed by: Jena Valderrama   Date:    09/27/2022   Time:    07:39      X-Ray Pelvis Routine AP   Final Result      Degenerative changes.         Electronically signed by: Campos Philippe   Date:    09/27/2022   Time:    09:32      X-Ray Chest 1 View   Final Result      No acute cardiopulmonary process.         Electronically signed by: Sacha Adhikari   Date:    09/27/2022   Time:    07:26      X-Ray Shoulder 2 or More Views Left   Final Result      Degenerative changes.         Electronically signed by: Campos Philippe   Date:    09/27/2022   Time:    09:32       X-Ray Lumbar Spine 2 Or 3 Views   Final Result      Degenerative and postsurgical changes seen no acute process         Electronically signed by: José Miguel Parmar   Date:    09/26/2022   Time:    18:13      CT Head Without Contrast   Final Result      Persistent area of rounded hypoattenuation in the posterior fossa on the left side.  MRI with contrast is recommended for correlation         Electronically signed by: José Miguel Parmar   Date:    09/26/2022   Time:    18:11               All diagnosis and differential diagnosis have been reviewed; assessment and plan has been documented. I have personally reviewed the labs and test results that are presently available; I have reviewed the patients medication list. I have reviewed the consulting providers response and recommendations. I have reviewed or attempted to review medical records based upon their availability.  All of the patient's questions have been addressed and answered. Patient's is agreeable to the above stated plan. I will continue to monitor closely and make adjustments to medical management as needed.  This document was created using M*Modal Fluency Direct.  Transcription errors may have been made.  Please contact me if any questions may rise regarding documentation to clarify verbiage.        Benji Anderson MD   10/09/2022   Internal Medicine

## 2022-10-09 NOTE — PLAN OF CARE
Problem: Adult Inpatient Plan of Care  Goal: Plan of Care Review  Outcome: Ongoing, Progressing  Goal: Patient-Specific Goal (Individualized)  Outcome: Ongoing, Progressing  Goal: Absence of Hospital-Acquired Illness or Injury  Outcome: Ongoing, Progressing  Goal: Optimal Comfort and Wellbeing  Outcome: Ongoing, Progressing  Goal: Readiness for Transition of Care  Outcome: Ongoing, Progressing     Problem: Skin Injury Risk Increased  Goal: Skin Health and Integrity  Outcome: Ongoing, Progressing     Problem: Fall Injury Risk  Goal: Absence of Fall and Fall-Related Injury  Outcome: Ongoing, Progressing     Problem: Hyperglycemia  Goal: Blood Glucose Level Within Targeted Range  Outcome: Ongoing, Progressing

## 2022-10-10 LAB
POCT GLUCOSE: 159 MG/DL (ref 70–110)
POCT GLUCOSE: 169 MG/DL (ref 70–110)
POCT GLUCOSE: 172 MG/DL (ref 70–110)
POCT GLUCOSE: 177 MG/DL (ref 70–110)

## 2022-10-10 PROCEDURE — G0378 HOSPITAL OBSERVATION PER HR: HCPCS

## 2022-10-10 PROCEDURE — 25000003 PHARM REV CODE 250: Performed by: INTERNAL MEDICINE

## 2022-10-10 PROCEDURE — 96372 THER/PROPH/DIAG INJ SC/IM: CPT | Performed by: INTERNAL MEDICINE

## 2022-10-10 PROCEDURE — 63600175 PHARM REV CODE 636 W HCPCS: Performed by: INTERNAL MEDICINE

## 2022-10-10 PROCEDURE — 97530 THERAPEUTIC ACTIVITIES: CPT | Mod: CQ

## 2022-10-10 PROCEDURE — 94761 N-INVAS EAR/PLS OXIMETRY MLT: CPT

## 2022-10-10 RX ORDER — ENOXAPARIN SODIUM 100 MG/ML
40 INJECTION SUBCUTANEOUS EVERY 24 HOURS
Status: DISCONTINUED | OUTPATIENT
Start: 2022-10-10 | End: 2022-10-12 | Stop reason: HOSPADM

## 2022-10-10 RX ORDER — LANOLIN ALCOHOL/MO/W.PET/CERES
800 CREAM (GRAM) TOPICAL 2 TIMES DAILY
Status: DISCONTINUED | OUTPATIENT
Start: 2022-10-10 | End: 2022-10-12 | Stop reason: HOSPADM

## 2022-10-10 RX ADMIN — ATORVASTATIN CALCIUM 40 MG: 40 TABLET, FILM COATED ORAL at 08:10

## 2022-10-10 RX ADMIN — SENNOSIDES AND DOCUSATE SODIUM 2 TABLET: 50; 8.6 TABLET ORAL at 08:10

## 2022-10-10 RX ADMIN — METFORMIN HYDROCHLORIDE 1000 MG: 500 TABLET, FILM COATED ORAL at 04:10

## 2022-10-10 RX ADMIN — METFORMIN HYDROCHLORIDE 1000 MG: 500 TABLET, FILM COATED ORAL at 08:10

## 2022-10-10 RX ADMIN — PANTOPRAZOLE SODIUM 40 MG: 40 TABLET, DELAYED RELEASE ORAL at 08:10

## 2022-10-10 RX ADMIN — ASPIRIN 81 MG: 81 TABLET, COATED ORAL at 08:10

## 2022-10-10 RX ADMIN — CARVEDILOL 12.5 MG: 12.5 TABLET, FILM COATED ORAL at 08:10

## 2022-10-10 RX ADMIN — GABAPENTIN 200 MG: 100 CAPSULE ORAL at 08:10

## 2022-10-10 RX ADMIN — CLOPIDOGREL 75 MG: 75 TABLET, FILM COATED ORAL at 08:10

## 2022-10-10 RX ADMIN — CLONIDINE HYDROCHLORIDE 0.2 MG: 0.2 TABLET ORAL at 12:10

## 2022-10-10 RX ADMIN — MELATONIN TAB 3 MG 3 MG: 3 TAB at 08:10

## 2022-10-10 RX ADMIN — ENOXAPARIN SODIUM 40 MG: 40 INJECTION SUBCUTANEOUS at 04:10

## 2022-10-10 RX ADMIN — SITAGLIPTIN 50 MG: 50 TABLET, FILM COATED ORAL at 08:10

## 2022-10-10 RX ADMIN — MAGNESIUM OXIDE TAB 400 MG (241.3 MG ELEMENTAL MG) 800 MG: 400 (241.3 MG) TAB at 08:10

## 2022-10-10 RX ADMIN — MAGNESIUM OXIDE TAB 400 MG (241.3 MG ELEMENTAL MG) 800 MG: 400 (241.3 MG) TAB at 10:10

## 2022-10-10 RX ADMIN — INSULIN DETEMIR 12 UNITS: 100 INJECTION, SOLUTION SUBCUTANEOUS at 08:10

## 2022-10-10 NOTE — PROGRESS NOTES
[facility]   PROGRESS NOTE        CHIEF COMPLAINT   Hospital follow up    HOSPITAL COURSE   Mayra Parker is a 72 y.o. female with a PMHx of  dementia, HTN, DM and CVA who presented to [facility] on 9/26/2022 with c/o BLE numbness and back pain following 3 falls over the past week. Denied head injury or LOC. Pt's daughter reported that the patient was also more confused over the past week with hallucinations after being started on Remeron. The majority of the history was obtained from review of the medical record.      Initial ED VS include /89, HR 89, RR 18, SpO2 100%, temp 97.9F. Labs notable for hgb 11.2, hct 35.6, sodium 133, potassium 5.2, creatinine 1.49, glucose 243, AST 88, ALT 60. UA not impressive. CXR negative for acute abnormality. CT L-spine negative for acute fracture; postoperative and degenerative changes of the lumbar spine noted. CT head with persistent area of rounded hypo attenuation in the posterior fossa on the left side. She received Lidocaine patch, Calcium gluconate IV, tramadol PO, and IV fluids in ED. She was admitted to hospital medicine services for further work-up and management of care.   MRI brain with and without contrast was negative except moderate chronic microvascular ischemic changes.  MRI lumbar spine without contrast noted moderate to severe degenerative narrowing of the spinal canal at L3-4 and multilevel neural foraminal stenosis.  Neurosurgery was consulted and patient did not want any kind of surgical intervention at this time.    Today  No new issues at this time.  Having occasional difficulty with ambulation and buckling.        OBJECTIVE/PHYSICAL EXAM     VITAL SIGNS (MOST RECENT):  Temp: 98.1 °F (36.7 °C) (10/10/22 0425)  Pulse: 72 (retake at 0731) (10/10/22 0837)  Resp: 18 (10/10/22 0425)  BP: 123/73 (retake  at 0731) (10/10/22 0837)  SpO2: (!) 92 % (10/10/22 0425)   VITAL SIGNS (24 HOUR RANGE):  Temp:  [97.6 °F  (36.4 °C)-98.3 °F (36.8 °C)] 98.1 °F (36.7 °C)  Pulse:  [70-77] 72  Resp:  [15-18] 18  SpO2:  [92 %-99 %] 92 %  BP: (102-163)/(56-74) 123/73   GENERAL: In no acute distress, afebrile  HEENT:  CHEST: Clear to auscultation bilaterally  HEART: S1, S2, no appreciable murmur  ABDOMEN: Soft, nontender, BS +  MSK: Warm, no lower extremity edema, no clubbing or cyanosis  NEUROLOGIC: Alert and oriented x4, moving all extremities with good strength, hypo reflexive at ankle and knee bilaterally, loss of sensation up to mid tibia bilaterally  INTEGUMENTARY:  PSYCHIATRY:        ASSESSMENT/PLAN   Falls  Moderate to severe spinal canal stenosis L3-4  Orthostatic hypotension  Acute metabolic encephalopathy-resolved   Acute kidney injury-resolved   Diabetic neuropathy    History of: Chronic HFrEF 45%-compensated, normocytic anemia, CAD PCI, dm 2, HTN, HLD, CVA, dementia, depression      Continue holding amlodipine.   Case management following.  Pending SNF disposition plan.  PT and OT  Continue nightly melatonin.  Metformin and sitagliptin.  Levemir 12 units.  Suspect her ambulation is limited secondary to her spinal stenosis.  She would like to reconsider surgery with her daughter.    DVT prophylaxis:  Lovenox 40    Anticipated discharge and disposition:   __________________________________________________________________________    LABS/MICRO/MEDS/DIAGNOSTICS       LABS  Recent Labs     10/09/22  0504      K 4.6   CHLORIDE 109*   CO2 24   BUN 17.9   CREATININE 0.96   GLUCOSE 112   CALCIUM 9.5         No results for input(s): WBC, RBC, HCT, MCV, PLT in the last 72 hours.    Invalid input(s):           MICROBIOLOGY  Microbiology Results (last 7 days)       ** No results found for the last 168 hours. **            MEDICATIONS   aspirin  81 mg Oral Daily    atorvastatin  40 mg Oral QHS    carvediloL  12.5 mg Oral BID    clopidogreL  75 mg Oral Daily    gabapentin  200 mg Oral BID    insulin detemir U-100  12 Units Subcutaneous  Daily    melatonin  3 mg Oral Q24H    metFORMIN  1,000 mg Oral BID WM    pantoprazole  40 mg Oral Daily    senna-docusate 8.6-50 mg  2 tablet Oral QHS    SITagliptin  50 mg Oral Daily      INFUSIONS        DIAGNOSTIC TESTS  X-Ray Abdomen AP 1 View   Final Result      Moderate colonic stool burden.         Electronically signed by: Cat Oswald   Date:    10/06/2022   Time:    12:15      MRI Lumbar Spine Without Contrast   Final Result      1. Moderate to severe degenerative narrowing of the spinal canal at L3-L4.   2. Multilevel neural foraminal stenoses as described.         Electronically signed by: Jena Valderrama   Date:    09/28/2022   Time:    11:27      MRI Brain W WO Contrast   Final Result      1. No acute intracranial abnormality.   2.   Moderate chronic microvascular ischemic changes.         Electronically signed by: Jena Valderrama   Date:    09/27/2022   Time:    09:46      CT Lumbar Spine Without Contrast   Final Result      1. No acute fracture identified.   2. Postoperative and degenerative changes of the lumbar spine.   [facility].         Electronically signed by: Jena Valderrama   Date:    09/27/2022   Time:    07:39      X-Ray Pelvis Routine AP   Final Result      Degenerative changes.         Electronically signed by: Campos Philippe   Date:    09/27/2022   Time:    09:32      X-Ray Chest 1 View   Final Result      No acute cardiopulmonary process.         Electronically signed by: Sacha Adhikari   Date:    09/27/2022   Time:    07:26      X-Ray Shoulder 2 or More Views Left   Final Result      Degenerative changes.         Electronically signed by: Campos Philippe   Date:    09/27/2022   Time:    09:32      X-Ray Lumbar Spine 2 Or 3 Views   Final Result      Degenerative and postsurgical changes seen no acute process         Electronically signed by: José Miguel Parmar   Date:    09/26/2022   Time:    18:13      CT Head Without Contrast    Final Result      Persistent area of rounded hypoattenuation in the posterior fossa on the left side.  MRI with contrast is recommended for correlation         Electronically signed by: José Miguel Parmar   Date:    09/26/2022   Time:    18:11               All diagnosis and differential diagnosis have been reviewed; assessment and plan has been documented. I have personally reviewed the labs and test results that are presently available; I have reviewed the patients medication list. I have reviewed the consulting providers response and recommendations. I have reviewed or attempted to review medical records based upon their availability.  All of the patient's questions have been addressed and answered. Patient's is agreeable to the above stated plan. I will continue to monitor closely and make adjustments to medical management as needed.  This document was created using M*Modal Fluency Direct.  Transcription errors may have been made.  Please contact me if any questions may rise regarding documentation to clarify verbiage.        Benji Anderson MD   10/10/2022   Internal Medicine

## 2022-10-10 NOTE — PT/OT/SLP PROGRESS
Physical Therapy         Treatment        Mayra Parker   MRN: [id number]        PT Start Time: 1100     PT Stop Time: 1118    PT Total Time (min): 18 min       Billable Minutes:  Therapeutic Activity 18  Total Minutes: 18    Treatment Type: Treatment  PT/PTA: PTA     PTA Visit Number: 1       General Precautions: Standard, fall  Orthopedic Precautions: Orthopedic Precautions : spinal precautions   Braces: Braces: LSO    Spiritual, Cultural Beliefs, Oriental orthodox Practices, Values that Affect Care: no    Subjective:  Communicated with nurse prior to session.  Pt stated she felt very fatigue today  Pain/Comfort  Pain Rating 1: 0/10    Objective:  Patient found sitting UIC upon arrival, sleeping but easily aroused by name, with  spinal precautions educated and LSO donned on upon arrival  Ortho static BP taken with semi supine BP- 95/59, HR 64  Sitting BP- 97/62 HR 66   Standing BP- 89/59, HR 76   Conclusion BP- 86/54, HR 66- nurse notified    Balance:   Static Stand: POOR: Needs MODERATE assist to maintain- given to take pt's standing BP  Dynamic stand: 0: N/A    Transfer Training:  Sit to stand:Maximum Assistance with Rolling Walker sit to stand from bedside chair, with 2 noted hard knee buckling, with max A tc given in order to keep pt from buckling    Additional Treatment:  Sitting therex, BLE 10x- ankle pumps, heel slides, SAQ, hip abd/add,  SLR    Activity Tolerance:  Patient tolerated treatment well and Treatment limited secondary to medical complications drop in BP    Patient left up in chair with call button in reach.    Assessment:  Mayra Parker is a 72 y.o. female with a medical diagnosis of Multiple falls. Pt noted very fatigue today with noted drop in ortho static BP, nurse notified    Rehab potential is good.    Activity tolerance: Good    Discharge recommendations: Discharge Facility/Level of Care Needs: nursing facility, skilled, rehabilitation facility     Equipment recommendations:        GOALS:   Multidisciplinary Problems       Physical Therapy Goals          Problem: Physical Therapy    Goal Priority Disciplines Outcome Goal Variances Interventions   Physical Therapy Goal     PT, PT/OT Ongoing, Progressing     Description: Goals to be met by: 10/28/22     Patient will increase functional independence with mobility by performin. Supine to sit with Modified [place]  2. Sit to stand transfer with Modified [place]  3. Gait  x 200 feet with Modified [place] using Rolling Walker.                          PLAN:    Patient to be seen 5 x/week  to address the above listed problems via therapeutic activities, therapeutic exercises  Plan of Care expires: 10/28/22  Plan of Care reviewed with: patient         10/10/2022

## 2022-10-11 LAB
POCT GLUCOSE: 160 MG/DL (ref 70–110)
POCT GLUCOSE: 186 MG/DL (ref 70–110)
POCT GLUCOSE: 224 MG/DL (ref 70–110)

## 2022-10-11 PROCEDURE — 97530 THERAPEUTIC ACTIVITIES: CPT | Mod: CQ

## 2022-10-11 PROCEDURE — 25000003 PHARM REV CODE 250: Performed by: INTERNAL MEDICINE

## 2022-10-11 PROCEDURE — G0378 HOSPITAL OBSERVATION PER HR: HCPCS

## 2022-10-11 PROCEDURE — 97116 GAIT TRAINING THERAPY: CPT | Mod: CQ

## 2022-10-11 PROCEDURE — 96372 THER/PROPH/DIAG INJ SC/IM: CPT | Performed by: INTERNAL MEDICINE

## 2022-10-11 PROCEDURE — 63600175 PHARM REV CODE 636 W HCPCS: Performed by: INTERNAL MEDICINE

## 2022-10-11 RX ADMIN — CARVEDILOL 12.5 MG: 12.5 TABLET, FILM COATED ORAL at 08:10

## 2022-10-11 RX ADMIN — INSULIN DETEMIR 12 UNITS: 100 INJECTION, SOLUTION SUBCUTANEOUS at 09:10

## 2022-10-11 RX ADMIN — METFORMIN HYDROCHLORIDE 1000 MG: 500 TABLET, FILM COATED ORAL at 09:10

## 2022-10-11 RX ADMIN — ATORVASTATIN CALCIUM 40 MG: 40 TABLET, FILM COATED ORAL at 08:10

## 2022-10-11 RX ADMIN — GABAPENTIN 200 MG: 100 CAPSULE ORAL at 08:10

## 2022-10-11 RX ADMIN — MAGNESIUM OXIDE TAB 400 MG (241.3 MG ELEMENTAL MG) 800 MG: 400 (241.3 MG) TAB at 09:10

## 2022-10-11 RX ADMIN — CARVEDILOL 12.5 MG: 12.5 TABLET, FILM COATED ORAL at 09:10

## 2022-10-11 RX ADMIN — MELATONIN TAB 3 MG 3 MG: 3 TAB at 08:10

## 2022-10-11 RX ADMIN — ENOXAPARIN SODIUM 40 MG: 40 INJECTION SUBCUTANEOUS at 06:10

## 2022-10-11 RX ADMIN — PANTOPRAZOLE SODIUM 40 MG: 40 TABLET, DELAYED RELEASE ORAL at 09:10

## 2022-10-11 RX ADMIN — ASPIRIN 81 MG: 81 TABLET, COATED ORAL at 09:10

## 2022-10-11 RX ADMIN — SITAGLIPTIN 50 MG: 50 TABLET, FILM COATED ORAL at 09:10

## 2022-10-11 RX ADMIN — SENNOSIDES AND DOCUSATE SODIUM 2 TABLET: 50; 8.6 TABLET ORAL at 08:10

## 2022-10-11 RX ADMIN — MAGNESIUM OXIDE TAB 400 MG (241.3 MG ELEMENTAL MG) 800 MG: 400 (241.3 MG) TAB at 08:10

## 2022-10-11 RX ADMIN — METFORMIN HYDROCHLORIDE 1000 MG: 500 TABLET, FILM COATED ORAL at 06:10

## 2022-10-11 RX ADMIN — CLOPIDOGREL 75 MG: 75 TABLET, FILM COATED ORAL at 09:10

## 2022-10-11 RX ADMIN — GABAPENTIN 200 MG: 100 CAPSULE ORAL at 09:10

## 2022-10-11 NOTE — PT/OT/SLP PROGRESS
Physical Therapy         Treatment        Mayra Parker   MRN: [id number]     PT Received On: 10/11/22  PT Start Time: 0858     PT Stop Time: 0921    PT Total Time (min): 23 min       Billable Minutes:  Gait [id number] and Therapeutic Activity 10  Total Minutes: 23    Treatment Type: Treatment  PT/PTA: PTA     PTA Visit Number: 2       General Precautions: Standard, fall  Orthopedic Precautions: Orthopedic Precautions : spinal precautions   Braces: Braces: LSO    Spiritual, Cultural Beliefs, Pentecostalism Practices, Values that Affect Care: no    Subjective:  Communicated with NSG prior to session.    Pain/Comfort  Pain Rating 1: 0/10    Objective:  Patient found in bed with HOB elevated, with Patient found with: bed alarm    Vitals prior to mobility:  BP: 137/75 with HR of 72    Vitals afer mobility:  BP: 99/65 with HR of 87 (pt not symptomatic)     Functional Mobility:  Bed Mobility:   Supine to sit: Contact Guard Assistance   Sit to supine: Activity did not occur   Rolling: Activity did not occur   Scooting: Contact Guard Assistance    Balance:   Static Sit: GOOD+: Takes MAXIMAL challenges from all directions.    Dynamic Sit:  GOOD: Maintains balance through MODERATE excursions of active trunk movement. Donned LSO while sitting EOB.   Static Stand: POOR+: Needs MINIMAL assist to maintain  Dynamic stand: POOR+: Needs MIN (minimal ) assist during gait    Transfer Training:  Sit to stand: Sage with RW    T/F bedside chair>different bedside chair (lock broken). Sage stand pivot T/F.    T/F EOB>BSC via squat pivot CGA.     Gait Training:  Pt amb 90ft with RW Sage. Pt with no major LOB.       Additional Treatment:    Activity Tolerance:  Patient tolerated treatment well    Patient left up in chair with call button in reach and chair alarm on.    Assessment:  Mayra Parker is a 72 y.o. female with a medical diagnosis of Multiple falls. She presents with decreased safety awareness and remains a fall risk. Pt  stating she feels much better than yesterday and eager to move around.     Rehab potential is good.    Activity tolerance: Good    Discharge recommendations: Discharge Facility/Level of Care Needs: nursing facility, skilled, rehabilitation facility     Equipment recommendations: Equipment Needed After Discharge: walker, rolling     GOALS:   Multidisciplinary Problems       Physical Therapy Goals          Problem: Physical Therapy    Goal Priority Disciplines Outcome Goal Variances Interventions   Physical Therapy Goal     PT, PT/OT Ongoing, Progressing     Description: Goals to be met by: 10/28/22     Patient will increase functional independence with mobility by performin. Supine to sit with Modified [place]  2. Sit to stand transfer with Modified [place]  3. Gait  x 200 feet with Modified [place] using Rolling Walker.                          PLAN:    Patient to be seen 5 x/week  to address the above listed problems via therapeutic activities, therapeutic exercises  Plan of Care expires: 10/28/22  Plan of Care reviewed with: patient         10/11/2022

## 2022-10-11 NOTE — PROGRESS NOTES
[facility] HOSPITAL MEDICINE   PROGRESS NOTE        CHIEF COMPLAINT   Hospital follow up    HOSPITAL COURSE   Mayra Parker is a 72 y.o. female with a PMHx of  dementia, HTN, DM and CVA who presented to [facility] on 9/26/2022 with c/o BLE numbness and back pain following 3 falls over the past week. Denied head injury or LOC. Pt's daughter reported that the patient was also more confused over the past week with hallucinations after being started on Remeron. The majority of the history was obtained from review of the medical record.      Initial ED VS include /89, HR 89, RR 18, SpO2 100%, temp 97.9F. Labs notable for hgb 11.2, hct 35.6, sodium 133, potassium 5.2, creatinine 1.49, glucose 243, AST 88, ALT 60. UA not impressive. CXR negative for acute abnormality. CT L-spine negative for acute fracture; postoperative and degenerative changes of the lumbar spine noted. CT head with persistent area of rounded hypo attenuation in the posterior fossa on the left side. She received Lidocaine patch, Calcium gluconate IV, tramadol PO, and IV fluids in ED. She was admitted to hospital medicine services for further work-up and management of care.   MRI brain with and without contrast was negative except moderate chronic microvascular ischemic changes.  MRI lumbar spine without contrast noted moderate to severe degenerative narrowing of the spinal canal at L3-4 and multilevel neural foraminal stenosis.  Neurosurgery was consulted and patient did not want any kind of surgical intervention at this time.    Today  PTA note I do not see that she ambulated.  Otherwise she is about the same.  She does not want surgery.        OBJECTIVE/PHYSICAL EXAM     VITAL SIGNS (MOST RECENT):  Temp: 98.1 °F (36.7 °C) (10/11/22 0735)  Pulse: 72 (10/11/22 0735)  Resp: 16 (10/11/22 0735)  BP: (!) 163/74 (10/11/22 0735)  SpO2: 96 % (10/11/22 0735)   VITAL SIGNS (24 HOUR RANGE):  Temp:  [97.9 °F (36.6 °C)-98.4 °F  (36.9 °C)] 98.1 °F (36.7 °C)  Pulse:  [61-72] 72  Resp:  [16] 16  SpO2:  [96 %-100 %] 96 %  BP: (102-163)/(60-74) 163/74   GENERAL: In no acute distress, afebrile  HEENT:  CHEST: Clear to auscultation bilaterally  HEART: S1, S2, no appreciable murmur  ABDOMEN: Soft, nontender, BS +  MSK: Warm, no lower extremity edema, no clubbing or cyanosis  NEUROLOGIC: Alert and oriented x4, moving all extremities with good strength, hypo reflexive at ankle and knee bilaterally, loss of sensation up to mid tibia bilaterally  INTEGUMENTARY:  PSYCHIATRY:        ASSESSMENT/PLAN   Falls  Moderate to severe spinal canal stenosis L3-4  Orthostatic hypotension  Acute metabolic encephalopathy-resolved   Acute kidney injury-resolved   Diabetic neuropathy    History of: Chronic HFrEF 45%-compensated, normocytic anemia, CAD PCI, dm 2, HTN, HLD, CVA, dementia, depression      Continue holding amlodipine.   Case management following.  Pending SNF disposition plan.  PT and OT  Continue nightly melatonin.  Metformin and sitagliptin.  Levemir 12 units.  Suspect her ambulation is limited secondary to her spinal stenosis.  She does not want surgery after discussing with her daughter.    DVT prophylaxis:  Lovenox 40    Anticipated discharge and disposition:   __________________________________________________________________________    LABS/MICRO/MEDS/DIAGNOSTICS       LABS  Recent Labs     10/09/22  0504      K 4.6   CHLORIDE 109*   CO2 24   BUN 17.9   CREATININE 0.96   GLUCOSE 112   CALCIUM 9.5         No results for input(s): WBC, RBC, HCT, MCV, PLT in the last 72 hours.    Invalid input(s):           MICROBIOLOGY  Microbiology Results (last 7 days)       ** No results found for the last 168 hours. **            MEDICATIONS   aspirin  81 mg Oral Daily    atorvastatin  40 mg Oral QHS    carvediloL  12.5 mg Oral BID    clopidogreL  75 mg Oral Daily    enoxaparin  40 mg Subcutaneous Daily    gabapentin  200 mg Oral BID    insulin detemir  U-100  12 Units Subcutaneous Daily    magnesium oxide  800 mg Oral BID    melatonin  3 mg Oral Q24H    metFORMIN  1,000 mg Oral BID WM    pantoprazole  40 mg Oral Daily    senna-docusate 8.6-50 mg  2 tablet Oral QHS    SITagliptin  50 mg Oral Daily      INFUSIONS        DIAGNOSTIC TESTS  X-Ray Abdomen AP 1 View   Final Result      Moderate colonic stool burden.         Electronically signed by: Cat Oswald   Date:    10/06/2022   Time:    12:15      MRI Lumbar Spine Without Contrast   Final Result      1. Moderate to severe degenerative narrowing of the spinal canal at L3-L4.   2. Multilevel neural foraminal stenoses as described.         Electronically signed by: Jena Valderrama   Date:    09/28/2022   Time:    11:27      MRI Brain W WO Contrast   Final Result      1. No acute intracranial abnormality.   2.   Moderate chronic microvascular ischemic changes.         Electronically signed by: Jena Valderrama   Date:    09/27/2022   Time:    09:46      CT Lumbar Spine Without Contrast   Final Result      1. No acute fracture identified.   2. Postoperative and degenerative changes of the lumbar spine.   [facility].         Electronically signed by: Jena Valderrama   Date:    09/27/2022   Time:    07:39      X-Ray Pelvis Routine AP   Final Result      Degenerative changes.         Electronically signed by: Campos Philippe   Date:    09/27/2022   Time:    09:32      X-Ray Chest 1 View   Final Result      No acute cardiopulmonary process.         Electronically signed by: Sacha Adhikari   Date:    09/27/2022   Time:    07:26      X-Ray Shoulder 2 or More Views Left   Final Result      Degenerative changes.         Electronically signed by: Campos Philippe   Date:    09/27/2022   Time:    09:32      X-Ray Lumbar Spine 2 Or 3 Views   Final Result      Degenerative and postsurgical changes seen no acute process         Electronically signed by: José Miguel Parmar    Date:    09/26/2022   Time:    18:13      CT Head Without Contrast   Final Result      Persistent area of rounded hypoattenuation in the posterior fossa on the left side.  MRI with contrast is recommended for correlation         Electronically signed by: José Miguel Parmar   Date:    09/26/2022   Time:    18:11               All diagnosis and differential diagnosis have been reviewed; assessment and plan has been documented. I have personally reviewed the labs and test results that are presently available; I have reviewed the patients medication list. I have reviewed the consulting providers response and recommendations. I have reviewed or attempted to review medical records based upon their availability.  All of the patient's questions have been addressed and answered. Patient's is agreeable to the above stated plan. I will continue to monitor closely and make adjustments to medical management as needed.  This document was created using [employer] Fluency Direct.  Transcription errors may have been made.  Please contact me if any questions may rise regarding documentation to clarify verbiage.        Benji Anderson MD   10/11/2022   Internal Medicine

## 2022-10-12 VITALS
TEMPERATURE: 97 F | DIASTOLIC BLOOD PRESSURE: 66 MMHG | HEART RATE: 66 BPM | WEIGHT: 152 LBS | HEIGHT: 64 IN | RESPIRATION RATE: 18 BRPM | OXYGEN SATURATION: 97 % | SYSTOLIC BLOOD PRESSURE: 124 MMHG | BODY MASS INDEX: 25.95 KG/M2

## 2022-10-12 LAB
POCT GLUCOSE: 121 MG/DL (ref 70–110)
SARS-COV-2 RDRP RESP QL NAA+PROBE: NEGATIVE

## 2022-10-12 PROCEDURE — 25000003 PHARM REV CODE 250: Performed by: INTERNAL MEDICINE

## 2022-10-12 PROCEDURE — 87635 SARS-COV-2 COVID-19 AMP PRB: CPT | Performed by: INTERNAL MEDICINE

## 2022-10-12 PROCEDURE — G0378 HOSPITAL OBSERVATION PER HR: HCPCS

## 2022-10-12 PROCEDURE — 96372 THER/PROPH/DIAG INJ SC/IM: CPT | Performed by: INTERNAL MEDICINE

## 2022-10-12 PROCEDURE — 63600175 PHARM REV CODE 636 W HCPCS: Performed by: INTERNAL MEDICINE

## 2022-10-12 RX ORDER — GABAPENTIN 300 MG/1
300 CAPSULE ORAL 2 TIMES DAILY
Qty: 60 CAPSULE | Refills: 0 | Status: SHIPPED | OUTPATIENT
Start: 2022-10-12

## 2022-10-12 RX ORDER — CARVEDILOL 12.5 MG/1
12.5 TABLET ORAL 2 TIMES DAILY
Qty: 60 TABLET | Refills: 11 | Status: ON HOLD | OUTPATIENT
Start: 2022-10-12 | End: 2023-07-13 | Stop reason: HOSPADM

## 2022-10-12 RX ADMIN — MAGNESIUM OXIDE TAB 400 MG (241.3 MG ELEMENTAL MG) 800 MG: 400 (241.3 MG) TAB at 09:10

## 2022-10-12 RX ADMIN — CARVEDILOL 12.5 MG: 12.5 TABLET, FILM COATED ORAL at 09:10

## 2022-10-12 RX ADMIN — PANTOPRAZOLE SODIUM 40 MG: 40 TABLET, DELAYED RELEASE ORAL at 09:10

## 2022-10-12 RX ADMIN — ASPIRIN 81 MG: 81 TABLET, COATED ORAL at 09:10

## 2022-10-12 RX ADMIN — METFORMIN HYDROCHLORIDE 1000 MG: 500 TABLET, FILM COATED ORAL at 09:10

## 2022-10-12 RX ADMIN — CLOPIDOGREL 75 MG: 75 TABLET, FILM COATED ORAL at 09:10

## 2022-10-12 RX ADMIN — SITAGLIPTIN 50 MG: 50 TABLET, FILM COATED ORAL at 09:10

## 2022-10-12 RX ADMIN — INSULIN DETEMIR 13 UNITS: 100 INJECTION, SOLUTION SUBCUTANEOUS at 09:10

## 2022-10-12 RX ADMIN — GABAPENTIN 200 MG: 100 CAPSULE ORAL at 09:10

## 2022-10-12 NOTE — PROGRESS NOTES
[facility] MEDICINE   PROGRESS NOTE        CHIEF COMPLAINT   Hospital follow up    HOSPITAL COURSE   Mayra Parker is a 72 y.o. female with a PMHx of  dementia, HTN, DM and CVA who presented to [facility] on 9/26/2022 with c/o BLE numbness and back pain following 3 falls over the past week. Denied head injury or LOC. Pt's daughter reported that the patient was also more confused over the past week with hallucinations after being started on Remeron. The majority of the history was obtained from review of the medical record.      Initial ED VS include /89, HR 89, RR 18, SpO2 100%, temp 97.9F. Labs notable for hgb 11.2, hct 35.6, sodium 133, potassium 5.2, creatinine 1.49, glucose 243, AST 88, ALT 60. UA not impressive. CXR negative for acute abnormality. CT L-spine negative for acute fracture; postoperative and degenerative changes of the lumbar spine noted. CT head with persistent area of rounded hypo attenuation in the posterior fossa on the left side. She received Lidocaine patch, Calcium gluconate IV, tramadol PO, and IV fluids in ED. She was admitted to hospital medicine services for further work-up and management of care.   MRI brain with and without contrast was negative except moderate chronic microvascular ischemic changes.  MRI lumbar spine without contrast noted moderate to severe degenerative narrowing of the spinal canal at L3-4 and multilevel neural foraminal stenosis.  Neurosurgery was consulted and patient did not want any kind of surgical intervention at this time.    Today  Seen examined this morning.  Sleeping when walking in.  Her PTA note she ambulated 90 ft yesterday with rolling walker and minimal assistance and no major loss of balance.        OBJECTIVE/PHYSICAL EXAM     VITAL SIGNS (MOST RECENT):  Temp: 97.3 °F (36.3 °C) (10/12/22 0726)  Pulse: 66 (10/12/22 0726)  Resp: 18 (10/12/22 0416)  BP: 124/66 (10/12/22 0726)  SpO2: 97 % (10/12/22 0726)    VITAL SIGNS (24 HOUR RANGE):  Temp:  [97.3 °F (36.3 °C)-98.9 °F (37.2 °C)] 97.3 °F (36.3 °C)  Pulse:  [66-79] 66  Resp:  [18] 18  SpO2:  [95 %-98 %] 97 %  BP: (106-146)/(58-71) 124/66   GENERAL: In no acute distress, afebrile  HEENT:  CHEST: Clear to auscultation bilaterally  HEART: S1, S2, no appreciable murmur  ABDOMEN: Soft, nontender, BS +  MSK: Warm, no lower extremity edema, no clubbing or cyanosis  NEUROLOGIC: Alert and oriented x4, moving all extremities with good strength, hypo reflexive at ankle and knee bilaterally, loss of sensation up to mid tibia bilaterally  INTEGUMENTARY:  PSYCHIATRY:        ASSESSMENT/PLAN   Falls  Moderate to severe spinal canal stenosis L3-4  Orthostatic hypotension  Acute metabolic encephalopathy-resolved   Acute kidney injury-resolved   Diabetic neuropathy    History of: Chronic HFrEF 45%-compensated, normocytic anemia, CAD PCI, dm 2, HTN, HLD, CVA, dementia, depression      Continue holding amlodipine.   Case management following.  Pending SNF disposition plan.  PT and OT  Continue nightly melatonin.  Metformin and sitagliptin.  Levemir 13 units.  Suspect her ambulation is limited secondary to her spinal stenosis.  She does not want surgery after discussing with her daughter.    DVT prophylaxis:  Lovenox 40    Anticipated discharge and disposition:   __________________________________________________________________________    LABS/MICRO/MEDS/DIAGNOSTICS       LABS  No results for input(s): NA, K, CHLORIDE, CO2, BUN, CREATININE, GLUCOSE, CALCIUM, ALKPHOS, AST, ALT, ALBUMIN in the last 72 hours.        No results for input(s): WBC, RBC, HCT, MCV, PLT in the last 72 hours.    Invalid input(s):           MICROBIOLOGY  Microbiology Results (last 7 days)       ** No results found for the last 168 hours. **            MEDICATIONS   aspirin  81 mg Oral Daily    atorvastatin  40 mg Oral QHS    carvediloL  12.5 mg Oral BID    clopidogreL  75 mg Oral Daily    enoxaparin  40 mg  Subcutaneous Daily    gabapentin  200 mg Oral BID    insulin detemir U-100  12 Units Subcutaneous Daily    magnesium oxide  800 mg Oral BID    melatonin  3 mg Oral Q24H    metFORMIN  1,000 mg Oral BID WM    pantoprazole  40 mg Oral Daily    senna-docusate 8.6-50 mg  2 tablet Oral QHS    SITagliptin  50 mg Oral Daily      INFUSIONS        DIAGNOSTIC TESTS  X-Ray Abdomen AP 1 View   Final Result      Moderate colonic stool burden.         Electronically signed by: Cat Oswald   Date:    10/06/2022   Time:    12:15      MRI Lumbar Spine Without Contrast   Final Result      1. Moderate to severe degenerative narrowing of the spinal canal at L3-L4.   2. Multilevel neural foraminal stenoses as described.         Electronically signed by: Jena Valderrama   Date:    09/28/2022   Time:    11:27      MRI Brain W WO Contrast   Final Result      1. No acute intracranial abnormality.   2.   Moderate chronic microvascular ischemic changes.         Electronically signed by: Jena Valderrama   Date:    09/27/2022   Time:    09:46      CT Lumbar Spine Without Contrast   Final Result      1. No acute fracture identified.   2. Postoperative and degenerative changes of the lumbar spine.   [facility].         Electronically signed by: Jena Valderrama   Date:    09/27/2022   Time:    07:39      X-Ray Pelvis Routine AP   Final Result      Degenerative changes.         Electronically signed by: Campos Philippe   Date:    09/27/2022   Time:    09:32      X-Ray Chest 1 View   Final Result      No acute cardiopulmonary process.         Electronically signed by: Sacha Adhikari   Date:    09/27/2022   Time:    07:26      X-Ray Shoulder 2 or More Views Left   Final Result      Degenerative changes.         Electronically signed by: Campos Philippe   Date:    09/27/2022   Time:    09:32      X-Ray Lumbar Spine 2 Or 3 Views   Final Result      Degenerative and postsurgical changes seen no  acute process         Electronically signed by: José Miguel Parmar   Date:    09/26/2022   Time:    18:13      CT Head Without Contrast   Final Result      Persistent area of rounded hypoattenuation in the posterior fossa on the left side.  MRI with contrast is recommended for correlation         Electronically signed by: José Miguel Parmar   Date:    09/26/2022   Time:    18:11               All diagnosis and differential diagnosis have been reviewed; assessment and plan has been documented. I have personally reviewed the labs and test results that are presently available; I have reviewed the patients medication list. I have reviewed the consulting providers response and recommendations. I have reviewed or attempted to review medical records based upon their availability.  All of the patient's questions have been addressed and answered. Patient's is agreeable to the above stated plan. I will continue to monitor closely and make adjustments to medical management as needed.  This document was created using [employer] Fluency Direct.  Transcription errors may have been made.  Please contact me if any questions may rise regarding documentation to clarify verbiage.        Benji Anderson MD   10/12/2022   Internal Medicine

## 2022-10-12 NOTE — PLAN OF CARE
Problem: Adult Inpatient Plan of Care  Goal: Plan of Care Review  Outcome: Ongoing, Progressing  Goal: Patient-Specific Goal (Individualized)  Outcome: Ongoing, Progressing  Goal: Absence of Hospital-Acquired Illness or Injury  Outcome: Ongoing, Progressing  Goal: Optimal Comfort and Wellbeing  Outcome: Ongoing, Progressing  Goal: Readiness for Transition of Care  Outcome: Ongoing, Progressing     Problem: Skin Injury Risk Increased  Goal: Skin Health and Integrity  Outcome: Ongoing, Progressing  Intervention: Optimize Skin Protection  Flowsheets (Taken 10/12/2022 0543)  Pressure Reduction Techniques:   frequent weight shift encouraged   weight shift assistance provided  Pressure Reduction Devices: chair cushion utilized  Head of Bed (HOB) Positioning: HOB lowered  Intervention: Promote and Optimize Oral Intake  Flowsheets (Taken 10/12/2022 0543)  Oral Nutrition Promotion:   safe use of adaptive equipment encouraged   social interaction promoted     Problem: Fall Injury Risk  Goal: Absence of Fall and Fall-Related Injury  Outcome: Ongoing, Progressing  Intervention: Identify and Manage Contributors  Flowsheets (Taken 10/12/2022 0543)  Self-Care Promotion:   independence encouraged   safe use of adaptive equipment encouraged  Medication Review/Management: medications reviewed  Intervention: Promote Injury-Free Environment  Flowsheets (Taken 10/12/2022 0543)  Safety Promotion/Fall Prevention:   assistive device/personal item within reach   bed alarm set   chair alarm set   nonskid shoes/socks when out of bed   side rails raised x 2   instructed to call staff for mobility     Problem: Hyperglycemia  Goal: Blood Glucose Level Within Targeted Range  Outcome: Ongoing, Progressing  Intervention: Optimize Glycemic Control  Flowsheets (Taken 10/12/2022 0543)  Glycemic Management: blood glucose monitored

## 2022-10-12 NOTE — NURSING
Pt AAOx,3,nad. Medical education given and informed about fu appt. Verbalized understanding. Gave report to ROSEANN Cornelius. Pt is being dc'd via [facility] jimmy.

## 2022-10-12 NOTE — PT/OT/SLP PROGRESS
Attempted to see pt for PT tx. Pt declined tx session and stated she was waiting to be discharged. NSG stated pt scheduled to d/c this afternoon.

## 2022-10-12 NOTE — DISCHARGE SUMMARY
[facility]  HOSPITAL MEDICINE   DISCHARGE SUMMARY    Patient Name: Mayra Parker  MRN: [id number]  Admission Date: 9/26/2022  Hospital Length of Stay: 0 days  Discharge Date and Time: 10/12/2022  Discharge Provider: Benji Anderson  Primary Care Provider: Rashaun Weiss MD      HOSPITAL COURSE   Mayra Parker is a 72 y.o. female with a PMHx of  dementia, HTN, DM and CVA who presented to [facility] on 9/26/2022 with c/o BLE numbness and back pain following 3 falls over the past week. Denied head injury or LOC. Pt's daughter reported that the patient was also more confused over the past week with hallucinations after being started on Remeron. The majority of the history was obtained from review of the medical record.      Initial ED VS include /89, HR 89, RR 18, SpO2 100%, temp 97.9F. Labs notable for hgb 11.2, hct 35.6, sodium 133, potassium 5.2, creatinine 1.49, glucose 243, AST 88, ALT 60. UA not impressive. CXR negative for acute abnormality. CT L-spine negative for acute fracture; postoperative and degenerative changes of the lumbar spine noted. CT head with persistent area of rounded hypo attenuation in the posterior fossa on the left side. She received Lidocaine patch, Calcium gluconate IV, tramadol PO, and IV fluids in ED. She was admitted to hospital medicine services for further work-up and management of care.   MRI brain with and without contrast was negative except moderate chronic microvascular ischemic changes.  MRI lumbar spine without contrast noted moderate to severe degenerative narrowing of the spinal canal at L3-4 and multilevel neural foraminal stenosis.  Neurosurgery was consulted and patient did not want any kind of surgical intervention at this time.  She did work with PT here and had great improvement and ambulating longer distances.  She has been accepted to SNF today and will be discharged.         PHYSICAL EXAM     Most Recent Vital Signs:  Temp:  97.3 °F (36.3 °C) (10/12/22 0726)  Pulse: 66 (10/12/22 0726)  Resp: 18 (10/12/22 0416)  BP: 124/66 (10/12/22 0726)  SpO2: 97 % (10/12/22 0726)   GENERAL: In no acute distress, afebrile  HEENT:  CHEST: Clear to auscultation bilaterally  HEART: S1, S2, no appreciable murmur  ABDOMEN: Soft, nontender, BS +  MSK: Warm, no lower extremity edema, no clubbing or cyanosis  NEUROLOGIC: Alert and oriented x4, moving all extremities with good strength, hypo reflexive at ankle and knee bilaterally, loss of sensation up to mid tibia bilaterally  INTEGUMENTARY:  PSYCHIATRY:          DISCHARGE DIAGNOSIS   Falls  Moderate to severe spinal canal stenosis L3-4  Orthostatic hypotension  Acute metabolic encephalopathy-resolved   Acute kidney injury-resolved   Diabetic neuropathy     History of: Chronic HFrEF 45%-compensated, normocytic anemia, CAD PCI, dm 2, HTN, HLD, CVA, dementia, depression      Fall likely from the spinal stenosis hoever patient and daughter dosent want to do surgery.     _____________________________________________________________________________      DISCHARGE MED REC     Current Discharge Medication List        START taking these medications    Details   gabapentin (NEURONTIN) 300 MG capsule Take 1 capsule (300 mg total) by mouth 2 (two) times daily.  Qty: 60 capsule, Refills: 0           CONTINUE these medications which have CHANGED    Details   carvediloL (COREG) 12.5 MG tablet Take 1 tablet (12.5 mg total) by mouth 2 (two) times daily.  Qty: 60 tablet, Refills: 11    Comments: .           CONTINUE these medications which have NOT CHANGED    Details   aspirin (ECOTRIN) 81 MG EC tablet Take 81 mg by mouth once daily.      atorvastatin (LIPITOR) 40 MG tablet Take 40 mg by mouth every evening.      clopidogreL (PLAVIX) 75 mg tablet Take 1 tablet (75 mg total) by mouth once daily.  Qty: 30 tablet, Refills: 0      donepeziL (ARICEPT) 5 MG tablet Take 1 tablet (5 mg total) by mouth every evening.  Qty: 30 tablet,  Refills: 0      JARDIANCE 10 mg tablet Take 10 mg by mouth once daily.      magnesium oxide (MAG-OX) 400 mg (241.3 mg magnesium) tablet Take 1 tablet (400 mg total) by mouth 2 (two) times daily.  Qty: 60 tablet, Refills: 0      metFORMIN (GLUCOPHAGE) 1000 MG tablet Take 1 tablet (1,000 mg total) by mouth 2 (two) times daily with meals.  Qty: 180 tablet, Refills: 3      pantoprazole (PROTONIX) 40 MG tablet Take 40 mg by mouth once daily.      SITagliptin (JANUVIA) 100 MG Tab Take 1 tablet (100 mg total) by mouth once daily.  Qty: 30 tablet, Refills: 0           STOP taking these medications       ergocalciferol (ERGOCALCIFEROL) 50,000 unit Cap Comments:   Reason for Stopping:         mirtazapine (REMERON) 7.5 MG Tab Comments:   Reason for Stopping:         amLODIPine (NORVASC) 10 MG tablet Comments:   Reason for Stopping:         DULoxetine (CYMBALTA) 60 MG capsule Comments:   Reason for Stopping:         traMADoL (ULTRAM) 50 mg tablet Comments:   Reason for Stopping:                  CONSULTS     Consults (From admission, onward)          Status Ordering Provider     Inpatient consult to Social Work/Case Management  Once        Provider:  (Not yet assigned)    Completed NILE DIAZ     Inpatient consult to Orthotics  Once        Provider:  [facility]    Acknowledged KIMMY LEAHY     Inpatient consult to Neurosurgery  Once        Provider:  Sagrario Barker MD    Completed NILE DIAZ              FOLLOW UP      Follow-up Information       Carol Banks MD Follow up on 10/14/2022.    Specialty: General Surgery  Why: FOLLOW UP APPT WITH DR BANKS ON OCT. 14TH @ 3:40  Contact information:  [address] [facility] [address]  [phone]                                 DISCHARGE INSTRUCTIONS     Explained in detail to the patient about the discharge plan, medications, and follow-up visits. Pt understands and agrees with the treatment plan.  Discharged Condition:  stable  Diet as tolerated  Activities as tolerated  Discharge to: SNF     TIME SPENT ON DISCHARGE   35 minutes        Benji Navarro M.D, on 10/12/2022  Internal Medicine  Department of Hospital Medicine    This document was created using electronic dictation services.  Please excuse any errors that may have been made.  Contact me if any questions regarding documentation to clarify verbiage.

## 2022-10-13 ENCOUNTER — LAB REQUISITION (OUTPATIENT)
Dept: LAB | Facility: HOSPITAL | Age: 72
End: 2022-10-13
Payer: MEDICARE

## 2022-10-13 DIAGNOSIS — E03.9 HYPOTHYROIDISM, UNSPECIFIED: ICD-10-CM

## 2022-10-13 DIAGNOSIS — R79.9 ABNORMAL FINDING OF BLOOD CHEMISTRY, UNSPECIFIED: ICD-10-CM

## 2022-10-13 LAB
ALBUMIN SERPL-MCNC: 2.8 GM/DL (ref 3.4–4.8)
ALBUMIN/GLOB SERPL: 0.9 RATIO (ref 1.1–2)
ALP SERPL-CCNC: 180 UNIT/L (ref 40–150)
ALT SERPL-CCNC: 62 UNIT/L (ref 0–55)
AST SERPL-CCNC: 111 UNIT/L (ref 5–34)
BASOPHILS # BLD AUTO: 0.04 X10(3)/MCL (ref 0–0.2)
BASOPHILS NFR BLD AUTO: 0.9 %
BILIRUBIN DIRECT+TOT PNL SERPL-MCNC: 0.8 MG/DL
BUN SERPL-MCNC: 17.4 MG/DL (ref 9.8–20.1)
CALCIUM SERPL-MCNC: 9.1 MG/DL (ref 8.4–10.2)
CHLORIDE SERPL-SCNC: 109 MMOL/L (ref 98–107)
CHOLEST SERPL-MCNC: 133 MG/DL
CHOLEST/HDLC SERPL: 3 {RATIO} (ref 0–5)
CO2 SERPL-SCNC: 25 MMOL/L (ref 23–31)
CREAT SERPL-MCNC: 0.95 MG/DL (ref 0.55–1.02)
DEPRECATED CALCIDIOL+CALCIFEROL SERPL-MC: 16.4 NG/ML (ref 30–80)
EOSINOPHIL # BLD AUTO: 0.17 X10(3)/MCL (ref 0–0.9)
EOSINOPHIL NFR BLD AUTO: 3.9 %
ERYTHROCYTE [DISTWIDTH] IN BLOOD BY AUTOMATED COUNT: 14.3 % (ref 11.5–17)
EST. AVERAGE GLUCOSE BLD GHB EST-MCNC: 148.5 MG/DL
GFR SERPLBLD CREATININE-BSD FMLA CKD-EPI: >60 MLS/MIN/1.73/M2
GLOBULIN SER-MCNC: 3 GM/DL (ref 2.4–3.5)
GLUCOSE SERPL-MCNC: 124 MG/DL (ref 82–115)
HBA1C MFR BLD: 6.8 %
HCT VFR BLD AUTO: 29.9 % (ref 37–47)
HDLC SERPL-MCNC: 49 MG/DL (ref 35–60)
HGB BLD-MCNC: 9 GM/DL (ref 12–16)
IMM GRANULOCYTES # BLD AUTO: 0.01 X10(3)/MCL (ref 0–0.04)
IMM GRANULOCYTES NFR BLD AUTO: 0.2 %
LDLC SERPL CALC-MCNC: 68 MG/DL (ref 50–140)
LYMPHOCYTES # BLD AUTO: 1.66 X10(3)/MCL (ref 0.6–4.6)
LYMPHOCYTES NFR BLD AUTO: 37.8 %
MCH RBC QN AUTO: 29.2 PG (ref 27–31)
MCHC RBC AUTO-ENTMCNC: 30.1 MG/DL (ref 33–36)
MCV RBC AUTO: 97.1 FL (ref 80–94)
MONOCYTES # BLD AUTO: 0.57 X10(3)/MCL (ref 0.1–1.3)
MONOCYTES NFR BLD AUTO: 13 %
NEUTROPHILS # BLD AUTO: 1.9 X10(3)/MCL (ref 2.1–9.2)
NEUTROPHILS NFR BLD AUTO: 44.2 %
NRBC BLD AUTO-RTO: 0 %
PLATELET # BLD AUTO: 243 X10(3)/MCL (ref 130–400)
PMV BLD AUTO: 10.5 FL (ref 7.4–10.4)
POCT GLUCOSE: 123 MG/DL (ref 70–110)
POTASSIUM SERPL-SCNC: 4.5 MMOL/L (ref 3.5–5.1)
PREALB SERPL-MCNC: 18.3 MG/DL (ref 14–37)
PROT SERPL-MCNC: 5.8 GM/DL (ref 5.8–7.6)
RBC # BLD AUTO: 3.08 X10(6)/MCL (ref 4.2–5.4)
SODIUM SERPL-SCNC: 140 MMOL/L (ref 136–145)
T4 FREE SERPL-MCNC: 0.9 NG/DL (ref 0.7–1.48)
TRIGL SERPL-MCNC: 78 MG/DL (ref 37–140)
TSH SERPL-ACNC: 1.77 UIU/ML (ref 0.35–4.94)
VLDLC SERPL CALC-MCNC: 16 MG/DL
WBC # SPEC AUTO: 4.4 X10(3)/MCL (ref 4.5–11.5)

## 2022-10-13 PROCEDURE — 80053 COMPREHEN METABOLIC PANEL: CPT | Performed by: NURSE PRACTITIONER

## 2022-10-13 PROCEDURE — 84443 ASSAY THYROID STIM HORMONE: CPT | Performed by: NURSE PRACTITIONER

## 2022-10-13 PROCEDURE — 82306 VITAMIN D 25 HYDROXY: CPT | Performed by: NURSE PRACTITIONER

## 2022-10-13 PROCEDURE — 85025 COMPLETE CBC W/AUTO DIFF WBC: CPT | Performed by: NURSE PRACTITIONER

## 2022-10-13 PROCEDURE — 84134 ASSAY OF PREALBUMIN: CPT | Performed by: NURSE PRACTITIONER

## 2022-10-13 PROCEDURE — 80061 LIPID PANEL: CPT | Performed by: NURSE PRACTITIONER

## 2022-10-13 PROCEDURE — 84439 ASSAY OF FREE THYROXINE: CPT | Performed by: NURSE PRACTITIONER

## 2022-10-13 PROCEDURE — 83036 HEMOGLOBIN GLYCOSYLATED A1C: CPT | Performed by: NURSE PRACTITIONER

## 2022-10-27 ENCOUNTER — DOCUMENT SCAN (OUTPATIENT)
Dept: HOME HEALTH SERVICES | Facility: HOSPITAL | Age: 72
End: 2022-10-27
Payer: MEDICARE

## 2023-02-14 ENCOUNTER — LAB REQUISITION (OUTPATIENT)
Dept: LAB | Facility: HOSPITAL | Age: 73
End: 2023-02-14
Payer: MEDICARE

## 2023-02-14 DIAGNOSIS — I15.2 HYPERTENSION SECONDARY TO ENDOCRINE DISORDERS: ICD-10-CM

## 2023-02-14 DIAGNOSIS — I70.213 ATHEROSCLEROSIS OF NATIVE ARTERIES OF EXTREMITIES WITH INTERMITTENT CLAUDICATION, BILATERAL LEGS: ICD-10-CM

## 2023-02-14 DIAGNOSIS — I50.9 HEART FAILURE, UNSPECIFIED: ICD-10-CM

## 2023-02-14 DIAGNOSIS — M48.061 SPINAL STENOSIS, LUMBAR REGION WITHOUT NEUROGENIC CLAUDICATION: ICD-10-CM

## 2023-02-14 DIAGNOSIS — E11.51 TYPE 2 DIABETES MELLITUS WITH DIABETIC PERIPHERAL ANGIOPATHY WITHOUT GANGRENE: ICD-10-CM

## 2023-02-14 LAB
BASOPHILS # BLD AUTO: 0.03 X10(3)/MCL (ref 0–0.2)
BASOPHILS NFR BLD AUTO: 0.6 %
DEPRECATED CALCIDIOL+CALCIFEROL SERPL-MC: 18.1 NG/ML (ref 30–80)
EOSINOPHIL # BLD AUTO: 0.1 X10(3)/MCL (ref 0–0.9)
EOSINOPHIL NFR BLD AUTO: 2.1 %
ERYTHROCYTE [DISTWIDTH] IN BLOOD BY AUTOMATED COUNT: 12.8 % (ref 11.5–17)
EST. AVERAGE GLUCOSE BLD GHB EST-MCNC: 142.7 MG/DL
HBA1C MFR BLD: 6.6 %
HCT VFR BLD AUTO: 39.2 % (ref 37–47)
HGB BLD-MCNC: 12.4 GM/DL (ref 12–16)
IMM GRANULOCYTES # BLD AUTO: 0.01 X10(3)/MCL (ref 0–0.04)
IMM GRANULOCYTES NFR BLD AUTO: 0.2 %
LYMPHOCYTES # BLD AUTO: 1.42 X10(3)/MCL (ref 0.6–4.6)
LYMPHOCYTES NFR BLD AUTO: 29.6 %
MCH RBC QN AUTO: 29.5 PG
MCHC RBC AUTO-ENTMCNC: 31.6 MG/DL (ref 33–36)
MCV RBC AUTO: 93.1 FL (ref 80–94)
MONOCYTES # BLD AUTO: 0.53 X10(3)/MCL (ref 0.1–1.3)
MONOCYTES NFR BLD AUTO: 11 %
NEUTROPHILS # BLD AUTO: 2.71 X10(3)/MCL (ref 2.1–9.2)
NEUTROPHILS NFR BLD AUTO: 56.5 %
NRBC BLD AUTO-RTO: 0 %
PLATELET # BLD AUTO: 281 X10(3)/MCL (ref 130–400)
PMV BLD AUTO: 9.9 FL (ref 7.4–10.4)
RBC # BLD AUTO: 4.21 X10(6)/MCL (ref 4.2–5.4)
WBC # SPEC AUTO: 4.8 X10(3)/MCL (ref 4.5–11.5)

## 2023-02-14 PROCEDURE — 82306 VITAMIN D 25 HYDROXY: CPT | Performed by: FAMILY MEDICINE

## 2023-02-14 PROCEDURE — 85025 COMPLETE CBC W/AUTO DIFF WBC: CPT | Performed by: FAMILY MEDICINE

## 2023-02-14 PROCEDURE — 83036 HEMOGLOBIN GLYCOSYLATED A1C: CPT | Performed by: FAMILY MEDICINE

## 2023-07-05 ENCOUNTER — HOSPITAL ENCOUNTER (INPATIENT)
Facility: HOSPITAL | Age: 73
LOS: 8 days | Discharge: HOME HEALTH | DRG: 271 | End: 2023-07-13
Attending: EMERGENCY MEDICINE | Admitting: INTERNAL MEDICINE
Payer: MEDICARE

## 2023-07-05 DIAGNOSIS — I99.8 ACUTE LOWER LIMB ISCHEMIA: ICD-10-CM

## 2023-07-05 DIAGNOSIS — M79.604 BILATERAL LEG PAIN: ICD-10-CM

## 2023-07-05 DIAGNOSIS — M79.605 BILATERAL LEG PAIN: ICD-10-CM

## 2023-07-05 DIAGNOSIS — M79.606 LEG PAIN: Primary | ICD-10-CM

## 2023-07-05 DIAGNOSIS — R73.9 HYPERGLYCEMIA: ICD-10-CM

## 2023-07-05 DIAGNOSIS — R07.9 CHEST PAIN: ICD-10-CM

## 2023-07-05 DIAGNOSIS — I77.1 ARTERIAL OCCLUSION DUE TO STENOSIS: ICD-10-CM

## 2023-07-05 LAB
ALBUMIN SERPL-MCNC: 3.4 G/DL (ref 3.4–4.8)
ALBUMIN/GLOB SERPL: 0.9 RATIO (ref 1.1–2)
ALP SERPL-CCNC: 66 UNIT/L (ref 40–150)
ALT SERPL-CCNC: 19 UNIT/L (ref 0–55)
APTT PPP: 29.3 SECONDS (ref 23.2–33.7)
AST SERPL-CCNC: 22 UNIT/L (ref 5–34)
BASOPHILS # BLD AUTO: 0.04 X10(3)/MCL
BASOPHILS NFR BLD AUTO: 0.7 %
BILIRUBIN DIRECT+TOT PNL SERPL-MCNC: 0.6 MG/DL
BUN SERPL-MCNC: 13.9 MG/DL (ref 9.8–20.1)
CALCIUM SERPL-MCNC: 9.2 MG/DL (ref 8.4–10.2)
CHLORIDE SERPL-SCNC: 102 MMOL/L (ref 98–107)
CO2 SERPL-SCNC: 22 MMOL/L (ref 23–31)
CREAT SERPL-MCNC: 1.22 MG/DL (ref 0.55–1.02)
EOSINOPHIL # BLD AUTO: 0.14 X10(3)/MCL (ref 0–0.9)
EOSINOPHIL NFR BLD AUTO: 2.5 %
ERYTHROCYTE [DISTWIDTH] IN BLOOD BY AUTOMATED COUNT: 12.6 % (ref 11.5–17)
GFR SERPLBLD CREATININE-BSD FMLA CKD-EPI: 47 MLS/MIN/1.73/M2
GLOBULIN SER-MCNC: 3.6 GM/DL (ref 2.4–3.5)
GLUCOSE SERPL-MCNC: 326 MG/DL (ref 82–115)
HCT VFR BLD AUTO: 35.1 % (ref 37–47)
HGB BLD-MCNC: 11.1 G/DL (ref 12–16)
IMM GRANULOCYTES # BLD AUTO: 0.01 X10(3)/MCL (ref 0–0.04)
IMM GRANULOCYTES NFR BLD AUTO: 0.2 %
INR BLD: 0.92 (ref 0–1.3)
LYMPHOCYTES # BLD AUTO: 1.61 X10(3)/MCL (ref 0.6–4.6)
LYMPHOCYTES NFR BLD AUTO: 28.6 %
MCH RBC QN AUTO: 29.4 PG (ref 27–31)
MCHC RBC AUTO-ENTMCNC: 31.6 G/DL (ref 33–36)
MCV RBC AUTO: 93.1 FL (ref 80–94)
MONOCYTES # BLD AUTO: 0.46 X10(3)/MCL (ref 0.1–1.3)
MONOCYTES NFR BLD AUTO: 8.2 %
NEUTROPHILS # BLD AUTO: 3.37 X10(3)/MCL (ref 2.1–9.2)
NEUTROPHILS NFR BLD AUTO: 59.8 %
NRBC BLD AUTO-RTO: 0 %
PLATELET # BLD AUTO: 273 X10(3)/MCL (ref 130–400)
PMV BLD AUTO: 9.6 FL (ref 7.4–10.4)
POCT GLUCOSE: 194 MG/DL (ref 70–110)
POCT GLUCOSE: 373 MG/DL (ref 70–110)
POTASSIUM SERPL-SCNC: 4.4 MMOL/L (ref 3.5–5.1)
PROT SERPL-MCNC: 7 GM/DL (ref 5.8–7.6)
PROTHROMBIN TIME: 12.3 SECONDS (ref 12.5–14.5)
RBC # BLD AUTO: 3.77 X10(6)/MCL (ref 4.2–5.4)
SODIUM SERPL-SCNC: 135 MMOL/L (ref 136–145)
WBC # SPEC AUTO: 5.63 X10(3)/MCL (ref 4.5–11.5)

## 2023-07-05 PROCEDURE — 11000001 HC ACUTE MED/SURG PRIVATE ROOM

## 2023-07-05 PROCEDURE — 82962 GLUCOSE BLOOD TEST: CPT

## 2023-07-05 PROCEDURE — 80053 COMPREHEN METABOLIC PANEL: CPT | Performed by: PHYSICIAN ASSISTANT

## 2023-07-05 PROCEDURE — 96375 TX/PRO/DX INJ NEW DRUG ADDON: CPT

## 2023-07-05 PROCEDURE — 85610 PROTHROMBIN TIME: CPT | Performed by: PHYSICIAN ASSISTANT

## 2023-07-05 PROCEDURE — 85025 COMPLETE CBC W/AUTO DIFF WBC: CPT | Performed by: PHYSICIAN ASSISTANT

## 2023-07-05 PROCEDURE — 25000003 PHARM REV CODE 250: Performed by: NURSE PRACTITIONER

## 2023-07-05 PROCEDURE — 25000003 PHARM REV CODE 250: Performed by: INTERNAL MEDICINE

## 2023-07-05 PROCEDURE — 96361 HYDRATE IV INFUSION ADD-ON: CPT

## 2023-07-05 PROCEDURE — 25500020 PHARM REV CODE 255: Performed by: NURSE PRACTITIONER

## 2023-07-05 PROCEDURE — 85730 THROMBOPLASTIN TIME PARTIAL: CPT | Performed by: PHYSICIAN ASSISTANT

## 2023-07-05 PROCEDURE — 63600175 PHARM REV CODE 636 W HCPCS: Performed by: NURSE PRACTITIONER

## 2023-07-05 PROCEDURE — 93005 ELECTROCARDIOGRAM TRACING: CPT

## 2023-07-05 PROCEDURE — 99285 EMERGENCY DEPT VISIT HI MDM: CPT | Mod: 25

## 2023-07-05 PROCEDURE — 96374 THER/PROPH/DIAG INJ IV PUSH: CPT

## 2023-07-05 RX ORDER — POLYETHYLENE GLYCOL 3350 17 G/17G
17 POWDER, FOR SOLUTION ORAL 2 TIMES DAILY PRN
Status: DISCONTINUED | OUTPATIENT
Start: 2023-07-05 | End: 2023-07-13 | Stop reason: HOSPADM

## 2023-07-05 RX ORDER — NALOXONE HCL 0.4 MG/ML
0.02 VIAL (ML) INJECTION
Status: DISCONTINUED | OUTPATIENT
Start: 2023-07-05 | End: 2023-07-13 | Stop reason: HOSPADM

## 2023-07-05 RX ORDER — MAG HYDROX/ALUMINUM HYD/SIMETH 200-200-20
30 SUSPENSION, ORAL (FINAL DOSE FORM) ORAL 4 TIMES DAILY PRN
Status: DISCONTINUED | OUTPATIENT
Start: 2023-07-05 | End: 2023-07-13 | Stop reason: HOSPADM

## 2023-07-05 RX ORDER — DULOXETIN HYDROCHLORIDE 60 MG/1
1 CAPSULE, DELAYED RELEASE ORAL EVERY MORNING
COMMUNITY
Start: 2022-01-07

## 2023-07-05 RX ORDER — HYDROCODONE BITARTRATE AND ACETAMINOPHEN 5; 325 MG/1; MG/1
1 TABLET ORAL EVERY 6 HOURS PRN
Status: DISCONTINUED | OUTPATIENT
Start: 2023-07-05 | End: 2023-07-13 | Stop reason: HOSPADM

## 2023-07-05 RX ORDER — CARVEDILOL 3.12 MG/1
3.12 TABLET ORAL 2 TIMES DAILY
COMMUNITY
Start: 2023-05-31

## 2023-07-05 RX ORDER — ATORVASTATIN CALCIUM 40 MG/1
1 TABLET, FILM COATED ORAL NIGHTLY
Status: ON HOLD | COMMUNITY
Start: 2023-05-31 | End: 2023-07-13 | Stop reason: HOSPADM

## 2023-07-05 RX ORDER — ONDANSETRON 2 MG/ML
4 INJECTION INTRAMUSCULAR; INTRAVENOUS EVERY 4 HOURS PRN
Status: DISCONTINUED | OUTPATIENT
Start: 2023-07-05 | End: 2023-07-13 | Stop reason: HOSPADM

## 2023-07-05 RX ORDER — TALC
6 POWDER (GRAM) TOPICAL NIGHTLY PRN
Status: DISCONTINUED | OUTPATIENT
Start: 2023-07-05 | End: 2023-07-13 | Stop reason: HOSPADM

## 2023-07-05 RX ORDER — SITAGLIPTIN 50 MG/1
50 TABLET, FILM COATED ORAL EVERY MORNING
Status: ON HOLD | COMMUNITY
Start: 2023-05-19 | End: 2023-07-13 | Stop reason: HOSPADM

## 2023-07-05 RX ORDER — CARVEDILOL 3.12 MG/1
3.12 TABLET ORAL
Status: COMPLETED | OUTPATIENT
Start: 2023-07-05 | End: 2023-07-05

## 2023-07-05 RX ORDER — HYDRALAZINE HYDROCHLORIDE 20 MG/ML
20 INJECTION INTRAMUSCULAR; INTRAVENOUS
Status: COMPLETED | OUTPATIENT
Start: 2023-07-05 | End: 2023-07-05

## 2023-07-05 RX ORDER — CLOPIDOGREL BISULFATE 75 MG/1
75 TABLET ORAL DAILY
Status: ON HOLD | COMMUNITY
Start: 2023-05-31 | End: 2023-07-13 | Stop reason: HOSPADM

## 2023-07-05 RX ORDER — SODIUM CHLORIDE 9 MG/ML
INJECTION, SOLUTION INTRAVENOUS CONTINUOUS
Status: DISCONTINUED | OUTPATIENT
Start: 2023-07-05 | End: 2023-07-06

## 2023-07-05 RX ORDER — INSULIN ASPART 100 [IU]/ML
1-10 INJECTION, SOLUTION INTRAVENOUS; SUBCUTANEOUS EVERY 6 HOURS PRN
Status: DISCONTINUED | OUTPATIENT
Start: 2023-07-05 | End: 2023-07-13 | Stop reason: HOSPADM

## 2023-07-05 RX ORDER — GABAPENTIN 100 MG/1
100 CAPSULE ORAL
Status: ON HOLD | COMMUNITY
Start: 2023-06-08 | End: 2023-07-13 | Stop reason: HOSPADM

## 2023-07-05 RX ORDER — ONDANSETRON 2 MG/ML
4 INJECTION INTRAMUSCULAR; INTRAVENOUS
Status: COMPLETED | OUTPATIENT
Start: 2023-07-05 | End: 2023-07-05

## 2023-07-05 RX ORDER — GABAPENTIN 300 MG/1
300 CAPSULE ORAL
Status: COMPLETED | OUTPATIENT
Start: 2023-07-05 | End: 2023-07-05

## 2023-07-05 RX ORDER — MORPHINE SULFATE 4 MG/ML
4 INJECTION, SOLUTION INTRAMUSCULAR; INTRAVENOUS
Status: COMPLETED | OUTPATIENT
Start: 2023-07-05 | End: 2023-07-05

## 2023-07-05 RX ORDER — MORPHINE SULFATE 4 MG/ML
4 INJECTION, SOLUTION INTRAMUSCULAR; INTRAVENOUS EVERY 4 HOURS PRN
Status: DISCONTINUED | OUTPATIENT
Start: 2023-07-05 | End: 2023-07-13 | Stop reason: HOSPADM

## 2023-07-05 RX ORDER — PROCHLORPERAZINE EDISYLATE 5 MG/ML
5 INJECTION INTRAMUSCULAR; INTRAVENOUS EVERY 6 HOURS PRN
Status: DISCONTINUED | OUTPATIENT
Start: 2023-07-05 | End: 2023-07-13 | Stop reason: HOSPADM

## 2023-07-05 RX ORDER — SODIUM CHLORIDE 9 MG/ML
1000 INJECTION, SOLUTION INTRAVENOUS
Status: DISCONTINUED | OUTPATIENT
Start: 2023-07-05 | End: 2023-07-05

## 2023-07-05 RX ORDER — GLUCAGON 1 MG
1 KIT INJECTION
Status: DISCONTINUED | OUTPATIENT
Start: 2023-07-05 | End: 2023-07-13 | Stop reason: HOSPADM

## 2023-07-05 RX ORDER — SIMETHICONE 80 MG
1 TABLET,CHEWABLE ORAL 4 TIMES DAILY PRN
Status: DISCONTINUED | OUTPATIENT
Start: 2023-07-05 | End: 2023-07-13 | Stop reason: HOSPADM

## 2023-07-05 RX ORDER — ACETAMINOPHEN 325 MG/1
650 TABLET ORAL EVERY 6 HOURS PRN
Status: DISCONTINUED | OUTPATIENT
Start: 2023-07-05 | End: 2023-07-13 | Stop reason: HOSPADM

## 2023-07-05 RX ORDER — EMPAGLIFLOZIN 25 MG/1
25 TABLET, FILM COATED ORAL EVERY MORNING
COMMUNITY
Start: 2023-05-31

## 2023-07-05 RX ORDER — HEPARIN SODIUM,PORCINE/D5W 25000/250
0-40 INTRAVENOUS SOLUTION INTRAVENOUS CONTINUOUS
Status: DISCONTINUED | OUTPATIENT
Start: 2023-07-05 | End: 2023-07-11

## 2023-07-05 RX ADMIN — IOPAMIDOL 1 ML: 755 INJECTION, SOLUTION INTRAVENOUS at 06:07

## 2023-07-05 RX ADMIN — NITROGLYCERIN 0.5 INCH: 20 OINTMENT TOPICAL at 08:07

## 2023-07-05 RX ADMIN — CARVEDILOL 3.12 MG: 3.12 TABLET, FILM COATED ORAL at 06:07

## 2023-07-05 RX ADMIN — IOPAMIDOL 120 ML: 755 INJECTION, SOLUTION INTRAVENOUS at 05:07

## 2023-07-05 RX ADMIN — SODIUM CHLORIDE 1000 ML: 9 INJECTION, SOLUTION INTRAVENOUS at 02:07

## 2023-07-05 RX ADMIN — ONDANSETRON 4 MG: 2 INJECTION INTRAMUSCULAR; INTRAVENOUS at 02:07

## 2023-07-05 RX ADMIN — Medication 6 MG: at 10:07

## 2023-07-05 RX ADMIN — GABAPENTIN 300 MG: 300 CAPSULE ORAL at 07:07

## 2023-07-05 RX ADMIN — SODIUM CHLORIDE: 9 INJECTION, SOLUTION INTRAVENOUS at 10:07

## 2023-07-05 RX ADMIN — HEPARIN SODIUM 18 UNITS/KG/HR: 10000 INJECTION, SOLUTION INTRAVENOUS at 08:07

## 2023-07-05 RX ADMIN — INSULIN ASPART 10 UNITS: 100 INJECTION, SOLUTION INTRAVENOUS; SUBCUTANEOUS at 10:07

## 2023-07-05 RX ADMIN — MORPHINE SULFATE 4 MG: 4 INJECTION INTRAVENOUS at 02:07

## 2023-07-05 RX ADMIN — HYDROCODONE BITARTRATE AND ACETAMINOPHEN 1 TABLET: 5; 325 TABLET ORAL at 10:07

## 2023-07-05 RX ADMIN — HYDRALAZINE HYDROCHLORIDE 20 MG: 20 INJECTION INTRAMUSCULAR; INTRAVENOUS at 07:07

## 2023-07-05 NOTE — FIRST PROVIDER EVALUATION
"Medical screening examination initiated.  I have conducted a focused provider triage encounter, findings are as follows:    Chief Complaint   Patient presents with    Hyperglycemia    Foot Pain     C/o bilateral neuropathy pain in feet x1 year, worsening since yesterday after decrease in gabapentin. Also c/o hyperglycemia. Cbg 367.hx of dementia     Brief history of present illness:  73 y.o. female presents to the ED via EMS with bilateral lower leg/foot pain. Notes they recently decreased her gabapentin for her neuropathy. Per EMS, patient is also scheduled to have LE stent revisions done this week; patient unsure of details     Vitals:    07/05/23 1207   BP: (!) 152/84   Pulse: 86   Resp: 18   Temp: 97.5 °F (36.4 °C)   TempSrc: Temporal   SpO2: 99%   Weight: 73.5 kg (162 lb)   Height: 5' 4" (1.626 m)       Pertinent physical exam:  Awake, alert, non-labored respirations    Brief workup plan:  labs    Preliminary workup initiated; this workup will be continued and followed by the physician or advanced practice provider that is assigned to the patient when roomed.  "

## 2023-07-05 NOTE — ED PROVIDER NOTES
Encounter Date: 7/5/2023       History     Chief Complaint   Patient presents with    Hyperglycemia    Foot Pain     C/o bilateral neuropathy pain in feet x1 year, worsening since yesterday after decrease in gabapentin. Also c/o hyperglycemia. Cbg 367.hx of dementia     See MDM    The history is provided by the patient and a relative. No  was used.   Review of patient's allergies indicates:   Allergen Reactions    Glipizide Swelling     Swelling of the lips      Influenza vaccine tr-s 11 (pf) Hives    Pregabalin Other (See Comments)     nightmares     Past Medical History:   Diagnosis Date    Arthritis     Coronary artery disease     CVA (cerebral vascular accident)     affected right side of body    Depression     Diabetes mellitus     Hyperlipidemia     Hypertension     Unspecified dementia without behavioral disturbance      Past Surgical History:   Procedure Laterality Date    BACK SURGERY      CAROTID ENDARTERECTOMY Left     CAROTID ENDARTERECTOMY Right     LEFT HEART CATHETERIZATION  05/20/2022    Dr. Estrella; Stent placement    LEFT HEART CATHETERIZATION Left 5/20/2022    Procedure: CATHETERIZATION, HEART, LEFT;  Surgeon: Jone Estrella MD;  Location: [facility] CATH LAB;  Service: Cardiology;  Laterality: Left;  C VIA R GROIN     Family History   Problem Relation Age of Onset    Heart attack Son      Social History     Tobacco Use    Smoking status: Every Day     Packs/day: 0.50     Types: Cigarettes    Smokeless tobacco: Never    Tobacco comments:     Started smoking in late 20s   Substance Use Topics    Alcohol use: Never    Drug use: Never     Review of Systems   Endocrine:        Elevated glucose   Musculoskeletal:         Bilateral leg pain, left worse than right   All other systems reviewed and are negative.    Physical Exam     Initial Vitals [07/05/23 1207]   BP Pulse Resp Temp SpO2   (!) 152/84 86 18 97.5 °F (36.4 °C) 99 %      MAP       --         Physical Exam    Nursing note and  vitals reviewed.  Constitutional: She appears well-developed and well-nourished.   Eyes: Conjunctivae are normal.   Cardiovascular:  Normal rate, regular rhythm and normal heart sounds.           Pulses:       Dorsalis pedis pulses are 1+ on the right side and 0 on the left side.   Pulmonary/Chest: Breath sounds normal. No respiratory distress.   Abdominal: Abdomen is soft. Bowel sounds are normal. She exhibits no distension.   Musculoskeletal:      Right lower leg: Tenderness present. No swelling or bony tenderness.      Left lower leg: Tenderness present. No swelling or bony tenderness.      Right ankle:      Right Achilles Tendon: No tenderness.      Left ankle:      Left Achilles Tendon: No tenderness.      Right foot: Tenderness present. No swelling.      Left foot: Tenderness present. No swelling.      Comments: Bilateral lower leg pain from knees down. No redness/swelling. Not cold. She is able to bend them both but does cause a lot of pain to do so.      Neurological: She is alert.   Skin: Skin is warm and dry.       ED Course   Procedures  Labs Reviewed   COMPREHENSIVE METABOLIC PANEL - Abnormal; Notable for the following components:       Result Value    Sodium Level 135 (*)     Carbon Dioxide 22 (*)     Glucose Level 326 (*)     Creatinine 1.22 (*)     Globulin 3.6 (*)     Albumin/Globulin Ratio 0.9 (*)     All other components within normal limits   CBC WITH DIFFERENTIAL - Abnormal; Notable for the following components:    RBC 3.77 (*)     Hgb 11.1 (*)     Hct 35.1 (*)     MCHC 31.6 (*)     All other components within normal limits   PROTIME-INR - Abnormal; Notable for the following components:    PT 12.3 (*)     All other components within normal limits   APTT - Normal   CBC W/ AUTO DIFFERENTIAL    Narrative:     The following orders were created for panel order CBC auto differential.  Procedure                               Abnormality         Status                     ---------                                -----------         ------                     CBC with Differential[[id number]]        Abnormal            Final result                 Please view results for these tests on the individual orders.          Imaging Results              CTA Runoff ABD PEL Bilat Lower Ext (Final result)  Result time 07/05/23 18:15:41      Final result by José Miguel Parmar MD (07/05/23 18:15:41)                   Impression:      Extensive atherosclerotic disease seen as outlined above but no evidence of acute occlusion seen      Electronically signed by: José Miguel Parmar  Date:    07/05/2023  Time:    18:15               Narrative:    EXAMINATION:  CTA RUNOFF ABD PEL BILAT LOWER EXT    CLINICAL HISTORY:  Claudication or leg ischemia;    TECHNIQUE:  Multiple axial images were obtained from the abdominal aorta to the level of the feet after contrast administration.  Sagittal and coronal reconstructions and MIPS reconstruction was performed.  Precontrast source imaging was also performed.  Automatic exposure control (AEC) is utilized to reduce patient radiation exposure.    COMPARISON:  None    FINDINGS:  Vascular images: The abdominal aorta is normal in caliber.  There is some atherosclerotic plaque in the abdominal aorta and mesenteric vessels.  The celiac axis is patent.  SMA is patent.  There is a single renal artery on the left.  There is a 60% stenosis in the proximal left renal artery secondary to calcified plaque.  There is a 50 -60% stenosis in the right proximal renal artery secondary to calcified plaque.  The DEVI is patent but heavily calcified.    Distal abdominal aorta has some atherosclerotic plaque within it.  Calcified plaque is seen in both common iliac arteries with multifocal areas of stenosis measuring up to 70%.  The proximal external iliac artery on the left side has a high-grade 80% stenosis.  The distal external iliac artery on the left side has some calcifications but no significant stenosis.   External iliac artery on the right side is patent with some calcified plaque but no high-grade stenosis is seen.    The right common femoral artery has some calcified plaque within it with a 40-50% stenosis.  The superficial femoral artery on the right side has a stent within it with multifocal areas of stenosis within the stent measuring up to 70%.  Popliteal artery is patent but is heavily calcified with multifocal areas of 50-60% stenosis.  There is very sluggish  single vessel runoff in the right lower extremity via the anterior tibial artery.  No significant flow is seen in the posterior tibial artery.    There is a stent in the superficial femoral artery on the left side.  Multifocal areas of stenosis seen within the stent.  There is flow in the popliteal artery.  It is heavily calcified.  There is sluggish 2 vessel runoff seen via the posterior tibial and anterior tibial artery.    Source images: The liver appears normal.  No liver mass or lesion is seen.  The patient is status post cholecystectomy.  The pancreas appears grossly unremarkable. Spleen appears normal.  The adrenal glands appear normal.  No renal abnormality seen. Visualized portions of the bowel appear grossly unremarkable. Urinary bladder appears normal.                                       Medications   0.9%  NaCl infusion (1,000 mLs Intravenous Not Given 7/5/23 1415)   nitroGLYCERIN 2% TD oint ointment 0.5 inch (has no administration in time range)   heparin 25,000 units in dextrose 5% 250 mL (100 units/mL) infusion HIGH INTENSITY nomogram - LAF (18 Units/kg/hr × 62.2 kg (Adjusted) Intravenous New Bag 7/5/23 2026)   heparin 25,000 units in dextrose 5% (100 units/ml) IV bolus from bag - ADDITIONAL PRN BOLUS - 60 units/kg (has no administration in time range)   heparin 25,000 units in dextrose 5% (100 units/ml) IV bolus from bag - ADDITIONAL PRN BOLUS - 30 units/kg (has no administration in time range)   sodium chloride 0.9% bolus 1,000 mL  1,000 mL (0 mLs Intravenous Stopped 7/5/23 1518)   morphine injection 4 mg (4 mg Intravenous Given 7/5/23 1418)   ondansetron injection 4 mg (4 mg Intravenous Given 7/5/23 1418)   iopamidoL (ISOVUE-370) injection 120 mL (120 mLs Intravenous Given 7/5/23 1717)   iopamidoL (ISOVUE-370) injection 100 mL (1 mL Intravenous Given 7/5/23 1801)   carvediloL tablet 3.125 mg (3.125 mg Oral Given 7/5/23 1829)   gabapentin capsule 300 mg (300 mg Oral Given 7/5/23 1906)   hydrALAZINE injection 20 mg (20 mg Intravenous Given 7/5/23 1956)   heparin 25,000 units in dextrose 5% (100 units/ml) IV bolus from bag INITIAL BOLUS (4,976 Units Intravenous Bolus from Bag 7/5/23 2025)     Medical Decision Making:   History:   I obtained history from: someone other than patient.       <> Summary of History: Patient's daughter at bedside who cares for the patient. She states her mom usually walks with walker and works with PT/OT 2-3 times per week. Yesterday she was able to walk with the walker but today she couldn't because of the pain. She does have history of neuropathy but also has stents in her legs that need to be redone and so unsure what the issue is. She states she rubbed her down and gave her tylenol but no relief. This is not her usual so she was concerned and brought her in.   Differential Diagnosis:   Includes but not limited to PAD, arterial occlusion, limb ischemia, hyperglycemia, dehydration  Clinical Tests:   Lab Tests: Ordered and Reviewed  The following lab test(s) were unremarkable: CBC, CMP, PT and PTT  Radiological Study: Ordered and Reviewed  ED Management:  Patient's daughter at bedside who cares for the patient. She states her mom usually walks with walker and works with PT/OT 2-3 times per week. Yesterday she was able to walk with the walker but today she couldn't because of the pain. She does have history of neuropathy but also has stents in her legs that need to be redone and so unsure what the issue is. She states  she rubbed her down and gave her tylenol but no relief. This is not her usual so she was concerned and brought her in. She is on plavix. She was supposed to see [facility] tomorrow for procedure and then Tuesday for another procedure. Dr. Estrella is who she sees for her legs. No recent injury/trauma    Labs show glucose 368, creatinine 1.2, anion gap of 11, no leukocytosis, mild anemia 11/35.     US arterial shows no flow left PD and severe flow reduction of left leg with moderate flow reducion of right leg.   CTA shows sever stenosis as well. No acute occlusion  Other:   I have discussed this case with another health care provider.       <> Summary of the Discussion: Spoke with [facility] NP who recommends get CTA with runoff then will decide on heparin gtt.     Spoke with [facility] NP ari after CTA with runoff and he recommends heparin gtt, plavix, nitro paste to left foot. Npo after midnight. Pain control. Admit to  since other comorbidities and they will see tomorrow to decide further action    Spoke with Araceli NP with  and she accepts.     Spoke with Dr. Wilkes regarding admission, he had face to face with patient.                         Clinical Impression:   Final diagnoses:  [M79.606] Leg pain  [I77.1] Arterial occlusion due to stenosis  [R73.9] Hyperglycemia (Primary)  [M79.604, M79.605] Bilateral leg pain        ED Disposition Condition    Admit Stable                Nicole Washingtonair, VAN  07/05/23 2027

## 2023-07-05 NOTE — Clinical Note
Diagnosis: Arterial occlusion due to stenosis [192279]   Admitting Provider:: FERNY HUTCHISON [[id number]]   Future Attending Provider: FERNY HUTCHISON [[id number]]   Reason for IP Medical Treatment  (Clinical interventions that can only be accomplished in the IP setting? ) :: heparin drip   I certify that Inpatient services for greater than or equal to 2 midnights are medically necessary:: Yes   Plans for Post-Acute care--if anticipated (pick the single best option):: A. No post acute care anticipated at this time

## 2023-07-05 NOTE — Clinical Note
The groin and left radial was prepped. The site was prepped with ChloraPrep. The site was clipped. The patient was draped. The patient was positioned supine.

## 2023-07-06 LAB
ALBUMIN SERPL-MCNC: 2.9 G/DL (ref 3.4–4.8)
ALBUMIN/GLOB SERPL: 0.9 RATIO (ref 1.1–2)
ALP SERPL-CCNC: 55 UNIT/L (ref 40–150)
ALT SERPL-CCNC: 17 UNIT/L (ref 0–55)
APTT PPP: 124.6 SECONDS (ref 23.2–33.7)
APTT PPP: 71.6 SECONDS (ref 23.2–33.7)
APTT PPP: >200 SECONDS (ref 23.2–33.7)
AST SERPL-CCNC: 25 UNIT/L (ref 5–34)
BASOPHILS # BLD AUTO: 0.03 X10(3)/MCL
BASOPHILS NFR BLD AUTO: 0.5 %
BILIRUBIN DIRECT+TOT PNL SERPL-MCNC: 0.5 MG/DL
BUN SERPL-MCNC: 14.3 MG/DL (ref 9.8–20.1)
CALCIUM SERPL-MCNC: 8.4 MG/DL (ref 8.4–10.2)
CHLORIDE SERPL-SCNC: 103 MMOL/L (ref 98–107)
CO2 SERPL-SCNC: 24 MMOL/L (ref 23–31)
CREAT SERPL-MCNC: 1.09 MG/DL (ref 0.55–1.02)
EOSINOPHIL # BLD AUTO: 0.27 X10(3)/MCL (ref 0–0.9)
EOSINOPHIL NFR BLD AUTO: 4.4 %
ERYTHROCYTE [DISTWIDTH] IN BLOOD BY AUTOMATED COUNT: 12.9 % (ref 11.5–17)
FERRITIN SERPL-MCNC: 31.06 NG/ML (ref 4.63–204)
FOLATE SERPL-MCNC: 12.5 NG/ML (ref 7–31.4)
GFR SERPLBLD CREATININE-BSD FMLA CKD-EPI: 54 MLS/MIN/1.73/M2
GLOBULIN SER-MCNC: 3.4 GM/DL (ref 2.4–3.5)
GLUCOSE SERPL-MCNC: 177 MG/DL (ref 82–115)
HCT VFR BLD AUTO: 34 % (ref 37–47)
HGB BLD-MCNC: 10.5 G/DL (ref 12–16)
IMM GRANULOCYTES # BLD AUTO: 0.02 X10(3)/MCL (ref 0–0.04)
IMM GRANULOCYTES NFR BLD AUTO: 0.3 %
IRON SATN MFR SERPL: 28 % (ref 20–50)
IRON SERPL-MCNC: 71 UG/DL (ref 50–170)
LEFT ABI: 0.67
LEFT ARM BP: 178 MMHG
LEFT DORSALIS PEDIS: 0 MMHG
LEFT POSTERIOR TIBIAL: 120 MMHG
LYMPHOCYTES # BLD AUTO: 1.61 X10(3)/MCL (ref 0.6–4.6)
LYMPHOCYTES NFR BLD AUTO: 26.5 %
MAGNESIUM SERPL-MCNC: 1.5 MG/DL (ref 1.6–2.6)
MCH RBC QN AUTO: 28.8 PG (ref 27–31)
MCHC RBC AUTO-ENTMCNC: 30.9 G/DL (ref 33–36)
MCV RBC AUTO: 93.2 FL (ref 80–94)
MONOCYTES # BLD AUTO: 0.59 X10(3)/MCL (ref 0.1–1.3)
MONOCYTES NFR BLD AUTO: 9.7 %
NEUTROPHILS # BLD AUTO: 3.55 X10(3)/MCL (ref 2.1–9.2)
NEUTROPHILS NFR BLD AUTO: 58.6 %
NRBC BLD AUTO-RTO: 0 %
PHOSPHATE SERPL-MCNC: 4.7 MG/DL (ref 2.3–4.7)
PLATELET # BLD AUTO: 249 X10(3)/MCL (ref 130–400)
PMV BLD AUTO: 9.4 FL (ref 7.4–10.4)
POCT GLUCOSE: 202 MG/DL (ref 70–110)
POCT GLUCOSE: 207 MG/DL (ref 70–110)
POCT GLUCOSE: 338 MG/DL (ref 70–110)
POTASSIUM SERPL-SCNC: 4.4 MMOL/L (ref 3.5–5.1)
PROT SERPL-MCNC: 6.3 GM/DL (ref 5.8–7.6)
RBC # BLD AUTO: 3.65 X10(6)/MCL (ref 4.2–5.4)
RIGHT ABI: 0.79
RIGHT DORSALIS PEDIS: 140 MMHG
RIGHT POSTERIOR TIBIAL: 130 MMHG
SODIUM SERPL-SCNC: 135 MMOL/L (ref 136–145)
TIBC SERPL-MCNC: 186 UG/DL (ref 70–310)
TIBC SERPL-MCNC: 257 UG/DL (ref 250–450)
TRANSFERRIN SERPL-MCNC: 219 MG/DL (ref 173–360)
VIT B12 SERPL-MCNC: 257 PG/ML (ref 213–816)
WBC # SPEC AUTO: 6.07 X10(3)/MCL (ref 4.5–11.5)

## 2023-07-06 PROCEDURE — 63600175 PHARM REV CODE 636 W HCPCS: Performed by: INTERNAL MEDICINE

## 2023-07-06 PROCEDURE — 85730 THROMBOPLASTIN TIME PARTIAL: CPT | Performed by: INTERNAL MEDICINE

## 2023-07-06 PROCEDURE — 80053 COMPREHEN METABOLIC PANEL: CPT | Performed by: NURSE PRACTITIONER

## 2023-07-06 PROCEDURE — 25000003 PHARM REV CODE 250: Performed by: INTERNAL MEDICINE

## 2023-07-06 PROCEDURE — 21400001 HC TELEMETRY ROOM

## 2023-07-06 PROCEDURE — 82728 ASSAY OF FERRITIN: CPT | Performed by: INTERNAL MEDICINE

## 2023-07-06 PROCEDURE — 82746 ASSAY OF FOLIC ACID SERUM: CPT | Performed by: INTERNAL MEDICINE

## 2023-07-06 PROCEDURE — 83735 ASSAY OF MAGNESIUM: CPT | Performed by: NURSE PRACTITIONER

## 2023-07-06 PROCEDURE — 85025 COMPLETE CBC W/AUTO DIFF WBC: CPT | Performed by: NURSE PRACTITIONER

## 2023-07-06 PROCEDURE — 83550 IRON BINDING TEST: CPT | Performed by: INTERNAL MEDICINE

## 2023-07-06 PROCEDURE — 63600175 PHARM REV CODE 636 W HCPCS: Performed by: NURSE PRACTITIONER

## 2023-07-06 PROCEDURE — 25000003 PHARM REV CODE 250: Performed by: NURSE PRACTITIONER

## 2023-07-06 PROCEDURE — 82607 VITAMIN B-12: CPT | Performed by: INTERNAL MEDICINE

## 2023-07-06 PROCEDURE — 84100 ASSAY OF PHOSPHORUS: CPT | Performed by: NURSE PRACTITIONER

## 2023-07-06 RX ORDER — SODIUM CHLORIDE 9 MG/ML
INJECTION, SOLUTION INTRAVENOUS ONCE
Status: DISCONTINUED | OUTPATIENT
Start: 2023-07-09 | End: 2023-07-09

## 2023-07-06 RX ORDER — CLOPIDOGREL BISULFATE 75 MG/1
75 TABLET ORAL DAILY
Status: DISCONTINUED | OUTPATIENT
Start: 2023-07-06 | End: 2023-07-13 | Stop reason: HOSPADM

## 2023-07-06 RX ORDER — ATORVASTATIN CALCIUM 40 MG/1
40 TABLET, FILM COATED ORAL NIGHTLY
Status: DISCONTINUED | OUTPATIENT
Start: 2023-07-06 | End: 2023-07-13 | Stop reason: HOSPADM

## 2023-07-06 RX ORDER — LANOLIN ALCOHOL/MO/W.PET/CERES
400 CREAM (GRAM) TOPICAL 2 TIMES DAILY
Status: DISCONTINUED | OUTPATIENT
Start: 2023-07-06 | End: 2023-07-13 | Stop reason: HOSPADM

## 2023-07-06 RX ORDER — MAGNESIUM SULFATE HEPTAHYDRATE 40 MG/ML
4 INJECTION, SOLUTION INTRAVENOUS ONCE
Status: DISCONTINUED | OUTPATIENT
Start: 2023-07-06 | End: 2023-07-06

## 2023-07-06 RX ORDER — GABAPENTIN 300 MG/1
300 CAPSULE ORAL 2 TIMES DAILY
Status: DISCONTINUED | OUTPATIENT
Start: 2023-07-06 | End: 2023-07-06

## 2023-07-06 RX ORDER — MAGNESIUM SULFATE HEPTAHYDRATE 40 MG/ML
2 INJECTION, SOLUTION INTRAVENOUS ONCE
Status: COMPLETED | OUTPATIENT
Start: 2023-07-06 | End: 2023-07-06

## 2023-07-06 RX ORDER — CARVEDILOL 3.12 MG/1
3.12 TABLET ORAL 2 TIMES DAILY
Status: DISCONTINUED | OUTPATIENT
Start: 2023-07-06 | End: 2023-07-07

## 2023-07-06 RX ORDER — PANTOPRAZOLE SODIUM 40 MG/1
40 TABLET, DELAYED RELEASE ORAL DAILY
Status: DISCONTINUED | OUTPATIENT
Start: 2023-07-06 | End: 2023-07-13 | Stop reason: HOSPADM

## 2023-07-06 RX ORDER — DULOXETIN HYDROCHLORIDE 30 MG/1
60 CAPSULE, DELAYED RELEASE ORAL EVERY MORNING
Status: DISCONTINUED | OUTPATIENT
Start: 2023-07-06 | End: 2023-07-13 | Stop reason: HOSPADM

## 2023-07-06 RX ORDER — ASPIRIN 81 MG/1
81 TABLET ORAL DAILY
Status: DISCONTINUED | OUTPATIENT
Start: 2023-07-06 | End: 2023-07-13 | Stop reason: HOSPADM

## 2023-07-06 RX ORDER — SODIUM CHLORIDE 0.9 % (FLUSH) 0.9 %
10 SYRINGE (ML) INJECTION
Status: DISCONTINUED | OUTPATIENT
Start: 2023-07-06 | End: 2023-07-13 | Stop reason: HOSPADM

## 2023-07-06 RX ORDER — SODIUM CHLORIDE 9 MG/ML
INJECTION, SOLUTION INTRAVENOUS CONTINUOUS
Status: ACTIVE | OUTPATIENT
Start: 2023-07-06 | End: 2023-07-07

## 2023-07-06 RX ORDER — DONEPEZIL HYDROCHLORIDE 5 MG/1
5 TABLET, FILM COATED ORAL NIGHTLY
Status: DISCONTINUED | OUTPATIENT
Start: 2023-07-06 | End: 2023-07-06

## 2023-07-06 RX ORDER — GABAPENTIN 100 MG/1
100 CAPSULE ORAL NIGHTLY
Status: DISCONTINUED | OUTPATIENT
Start: 2023-07-07 | End: 2023-07-13 | Stop reason: HOSPADM

## 2023-07-06 RX ADMIN — INSULIN ASPART 4 UNITS: 100 INJECTION, SOLUTION INTRAVENOUS; SUBCUTANEOUS at 04:07

## 2023-07-06 RX ADMIN — MORPHINE SULFATE 4 MG: 4 INJECTION INTRAVENOUS at 02:07

## 2023-07-06 RX ADMIN — CARVEDILOL 3.12 MG: 3.12 TABLET, FILM COATED ORAL at 10:07

## 2023-07-06 RX ADMIN — ATORVASTATIN CALCIUM 40 MG: 40 TABLET, FILM COATED ORAL at 10:07

## 2023-07-06 RX ADMIN — MAGNESIUM SULFATE HEPTAHYDRATE 2 G: 40 INJECTION, SOLUTION INTRAVENOUS at 12:07

## 2023-07-06 RX ADMIN — Medication 400 MG: at 10:07

## 2023-07-06 RX ADMIN — SODIUM CHLORIDE: 9 INJECTION, SOLUTION INTRAVENOUS at 12:07

## 2023-07-06 RX ADMIN — HEPARIN SODIUM 11 UNITS/KG/HR: 10000 INJECTION, SOLUTION INTRAVENOUS at 03:07

## 2023-07-06 RX ADMIN — ASPIRIN 81 MG: 81 TABLET, COATED ORAL at 10:07

## 2023-07-06 RX ADMIN — INSULIN ASPART 4 UNITS: 100 INJECTION, SOLUTION INTRAVENOUS; SUBCUTANEOUS at 10:07

## 2023-07-06 RX ADMIN — CLOPIDOGREL BISULFATE 75 MG: 75 TABLET ORAL at 10:07

## 2023-07-06 RX ADMIN — Medication 6 MG: at 10:07

## 2023-07-06 RX ADMIN — PANTOPRAZOLE SODIUM 40 MG: 40 TABLET, DELAYED RELEASE ORAL at 10:07

## 2023-07-06 RX ADMIN — DULOXETINE HYDROCHLORIDE 60 MG: 30 CAPSULE, DELAYED RELEASE ORAL at 10:07

## 2023-07-06 NOTE — NURSING
Nurses Note -- 4 Eyes      7/6/2023   2:06 AM      Skin assessed during: Admit      [x] No Altered Skin Integrity Present    []Prevention Measures Documented      [] Yes- Altered Skin Integrity Present or Discovered   [] LDA Added if Not in Epic (Describe Wound)   [] New Altered Skin Integrity was Present on Admit and Documented in LDA   [] Wound Image Taken    Wound Care Consulted? No    Attending Nurse:  Marie Rueda RN     Second RN/Staff Member:  Edelmira Caban RN

## 2023-07-06 NOTE — PROGRESS NOTES
[facility] Medicine Progress Note        Chief Complaint: Inpatient Follow-up    HPI:   73-year-old female with significant history of ischemic cardiomyopathy ejection fraction-40-50%, CAD status post PCI on aspirin, Plavix, chronic kidney disease stage 2, type 2 diabetes mellitus, HTN, HLD, CVA, chronic dementia, depression.  Patient presented to the ED with complaints of worsening bilateral lower extremity pain x1 day.  Worsening pain on walking.  Hemodynamically stable.  Lab significant for hyperglycemia mild renal insufficiency.  Arterial ultrasound concerning for acute limb ischemia.  CT angiogram with severe stenosis.  Patient was admitted to hospitalist medicine service paid resumed aspirin, Plavix.  Initiated heparin GTT.  [facility] consulted for angiogram with intervention    Interval Hx:   Patient seen at bedside.  Comfortably laying in bed.  Still has lower extremity pain.  Otherwise no specific complaints.  Family member at bedside.  No acute events overnight.    Objective/physical exam:  General: In no acute distress, afebrile  Chest: Clear to auscultation bilaterally  Heart: S1, S2, no appreciable murmur  Abdomen: Soft, nontender, BS +  MSK: Warm, no lower extremity edema, no clubbing or cyanosis  Neurologic: Alert and oriented x4, moving all extremities with good strength     VITAL SIGNS: 24 HRS MIN & MAX LAST   Temp  Min: 96.4 °F (35.8 °C)  Max: 97.5 °F (36.4 °C) 96.4 °F (35.8 °C)   BP  Min: 101/63  Max: 188/99 113/67   Pulse  Min: 79  Max: 92  84   Resp  Min: 15  Max: 27 18   SpO2  Min: 95 %  Max: 100 % 95 %       Recent Labs   Lab 07/06/23  0503   WBC 6.07   RBC 3.65*   HGB 10.5*   HCT 34.0*   MCV 93.2   MCH 28.8   MCHC 30.9*   RDW 12.9      MPV 9.4         Recent Labs   Lab 07/06/23  0503   *   K 4.4   CO2 24   BUN 14.3   CREATININE 1.09*   CALCIUM 8.4   MG 1.50*   ALBUMIN 2.9*   ALKPHOS 55   ALT 17   AST 25   BILITOT 0.5          Microbiology Results  (last 7 days)       ** No results found for the last 168 hours. **             Scheduled Med:   aspirin  81 mg Oral Daily    atorvastatin  40 mg Oral QHS    carvediloL  3.125 mg Oral BID    clopidogreL  75 mg Oral Daily    donepeziL  5 mg Oral QHS    DULoxetine  60 mg Oral QAM    gabapentin  300 mg Oral BID    magnesium oxide  400 mg Oral BID    pantoprazole  40 mg Oral Daily          Assessment/Plan:    Bilateral critical limb ischemia/severe bilateral PVD  Bilateral lower extremity claudication secondary to above  Anemia of chronic disease  CKD stage 2-stable   Hypo magnesemia   History of CAD status post PCI with ischemic cardiomyopathy-appears compensated  Essential HTN-stable   Type 2 diabetes mellitus with hyperglycemia  HLD  History of CVA  Chronic dementia  Depression  Prophylaxis    Continue heparin GTT   Also on aspirin, Plavix, high-intensity statin  Cardiology planning for peripheral angiogram with intervention today   Keep normal saline at 75 cc/hour in preparation for peripheral angiogram   Magnesium replaced-2 g IV x1 dose  Patient is also on p.o. magnesium supplementation   Continue other home meds-Coreg, Cymbalta, gabapentin, ppi  cbg at times in 300/200s   Check A1c  For now keep sliding scale  DVT prophylaxis-on heparin GTT    Critical care time-35 minutes  Critical care diagnosis-bilateral critical limb ischemia requiring heparin GTT  Critical care interventions: Hands-on evaluation, review of labs/radiographs/records and discussions with patient     Delia Vee MD   07/06/2023

## 2023-07-06 NOTE — PLAN OF CARE
07/06/23 0918   Discharge Assessment   Assessment Type Discharge Planning Assessment   Confirmed/corrected address, phone number and insurance Yes   Confirmed Demographics Correct on Facesheet   Source of Information patient;family   Communicated GIAN with patient/caregiver Date not available/Unable to determine   Reason For Admission leg pain, hyperglycemia   People in Home alone   Do you expect to return to your current living situation? Yes   Do you have help at home or someone to help you manage your care at home? Yes   Who are your caregiver(s) and their phone number(s)? daughter Cynthia Pratt   Prior to hospitilization cognitive status: Alert/Oriented   Current cognitive status: Alert/Oriented   Walking or Climbing Stairs ambulation difficulty, requires equipment;stair climbing difficulty, requires equipment;transferring difficulty, requires equipment   Mobility Management rolling walker   Dressing/Bathing bathing difficulty, assistance 1 person   Dressing/Bathing Management daughter assist   Home Accessibility stairs to enter home   Number of Stairs, Main Entrance three   Stair Railings, Main Entrance railings safe and in good condition   Equipment Currently Used at Home walker, rolling;shower chair;wheelchair;bedside commode   Readmission within 30 days? No   Patient currently being followed by outpatient case management? No   Do you currently have service(s) that help you manage your care at home? Yes   Name and Contact number of agency [facility]   Is the pt/caregiver preference to resume services with current agency Yes   Do you take prescription medications? Yes   Do you have prescription coverage? Yes   Do you have any problems affording any of your prescribed medications? No   Is the patient taking medications as prescribed? yes   Who is going to help you get home at discharge? daughter   How do you get to doctors appointments? family or friend will provide   Are you on dialysis? No   Do you take  coumadin? No   Discharge Plan A Home Health   Discharge Plan B Home Health   DME Needed Upon Discharge  none   Discharge Plan discussed with: Patient;Adult children   Transition of Care Barriers None

## 2023-07-06 NOTE — H&P
[facility] Hospital Medicine   History & Physical Note      Patient Name: Mayra Parker  : 1950  MRN: [id number]  Patient Class: IP- Inpatient   Admission Date: 2023   Length of Stay: 0  Admitting Physician:  Service  Attending Physician: Maine Riggs MD  PCP: Rashaun Weiss MD  Source of history: Patient, patient's family, and EMR.   Face-to-Face encounter date: 2023  Code status: --Full      Chief Complaint   Hyperglycemia and Foot Pain (C/o bilateral neuropathy pain in feet x1 year, worsening since yesterday after decrease in gabapentin. Also c/o hyperglycemia. Cbg 367.hx of dementia)      History of Present Illness   Ms. Parker is a 73 year old female with a pmh of CAD, CVA, CKD, DM2, HTN, HLD, Dementia and depression who presented to the ED with c/o bilateral leg pain, worsening since yesterday.  She states that she is usually able to get around using her walker at home, but today the pain in her lower legs and feet prevented her from doing that today.  She does state a history of bilateral leg stents for which she was supposed to follow up with [facility] tomorrow.    Today's ED lab work revealed H&H 11.1/35.1-at baseline, creatinine 1.22, glucose 326, all other indices unremarkable.  Arterial ultrasound showed no flow left PD, severe flow reduction of left leg with moderate flow reduction of right leg.  CTA shows severe stenosis, no acute occlusion.  ED vitals:  Temp 97.5° F, pulse 86, resp 18, /84-getting as high as 188/99, SpO2 99% on RA.  Cardiology was consulted in the ED.  She was admitted to Hospital Medicine for management.    ROS   Except as documented, all other systems reviewed and negative     Past Medical History     Ischemic cardiomyopathy with LVEF 40 to 50% on TTE 2022  CAD s/p Stents 2022  CKD2  T2DM  HTN  HLD  CVA  Dementia  Depression  Chronic neck/back pain  Bilateral carpal tunnel syndrome    Past Surgical History     Past  Surgical History:   Procedure Laterality Date    BACK SURGERY      CAROTID ENDARTERECTOMY Left     CAROTID ENDARTERECTOMY Right     LEFT HEART CATHETERIZATION  05/20/2022    Dr. Estrella; Stent placement    LEFT HEART CATHETERIZATION Left 5/20/2022    Procedure: CATHETERIZATION, HEART, LEFT;  Surgeon: Jone Estrella MD;  Location: [facility] CATH LAB;  Service: Cardiology;  Laterality: Left;  LHC VIA R GROIN       Social History     Social History     Tobacco Use    Smoking status: Every Day     Packs/day: 0.50     Types: Cigarettes    Smokeless tobacco: Never    Tobacco comments:     Started smoking in late 20s   Substance Use Topics    Alcohol use: Never        Family History   Reviewed and negative    Allergies   Glipizide, Influenza vaccine tr-s 11 (pf), and Pregabalin    Home Medications     Prior to Admission medications    Medication Sig Start Date End Date Taking? Authorizing Provider   aspirin (ECOTRIN) 81 MG EC tablet Take 81 mg by mouth once daily.    Historical Provider   atorvastatin (LIPITOR) 40 MG tablet Take 40 mg by mouth every evening.    Historical Provider   carvediloL (COREG) 12.5 MG tablet Take 1 tablet (12.5 mg total) by mouth 2 (two) times daily. 10/12/22 10/12/23  Benji Anderson MD   clopidogreL (PLAVIX) 75 mg tablet Take 1 tablet (75 mg total) by mouth once daily. 6/6/22 6/6/23  VAN Hall   donepeziL (ARICEPT) 5 MG tablet Take 1 tablet (5 mg total) by mouth every evening. 6/6/22   VAN Hall   DULoxetine (CYMBALTA) 60 MG capsule TAKE 1 CAPSULE BY MOUTH ONCE DAILY DO  NOT  CRUSH  OR  CHEW 3/27/23   Jon Roberts MD   gabapentin (NEURONTIN) 300 MG capsule Take 1 capsule (300 mg total) by mouth 2 (two) times daily. 10/12/22   Benji Anderson MD   JARDIANCE 10 mg tablet Take 10 mg by mouth once daily. 8/21/22   Historical Provider   magnesium oxide (MAG-OX) 400 mg (241.3 mg magnesium) tablet Take 1 tablet (400 mg total) by mouth 2 (two) times daily. 6/6/22   VAN Hall    metFORMIN (GLUCOPHAGE) 1000 MG tablet Take 1 tablet (1,000 mg total) by mouth 2 (two) times daily with meals. 6/6/22 6/6/23  VAN Hall   pantoprazole (PROTONIX) 40 MG tablet Take 40 mg by mouth once daily.    Historical Provider   SITagliptin (JANUVIA) 100 MG Tab Take 1 tablet (100 mg total) by mouth once daily. 6/6/22   VAN Hall   losartan (COZAAR) 25 MG tablet Take 1 tablet (25 mg total) by mouth once daily. 6/6/22 6/27/22  VAN Hall   pregabalin (LYRICA) 75 MG capsule Take 75 mg by mouth 2 (two) times daily.  5/25/22  Historical Provider        Inpatient Medications   Scheduled Meds   sodium chloride 0.9%  1,000 mL Intravenous ED 1 Time     Continuous Infusions   heparin (porcine) in D5W 18 Units/kg/hr (07/05/23 2026)     PRN Meds  heparin (PORCINE), heparin (PORCINE)    Physical Exam   Vital Signs  Temp:  [97.5 °F (36.4 °C)]   Pulse:  [79-92]   Resp:  [18-23]   BP: (101-188)/()   SpO2:  [97 %-100 %]       General: Well developed, well nourished. In no acute distress, non-toxic appearing. Appears comfortable  HEENT: NC/AT  Neck:  Supple  Chest: Clear bilaterally, no wheezing or rales, no accessory muscle use   CVS: Regular rhythm. Normal S1/S2.  Abdomen: nondistended, normoactive BS, soft and non-tender.  MSK: No obvious deformity or joint swelling  Skin: Warm and dry  Neuro: AAOx3, no focal neurological deficit  Psych: Cooperative    Labs     Recent Labs     07/05/23  1242   WBC 5.63   RBC 3.77*   HGB 11.1*   HCT 35.1*   MCV 93.1   MCH 29.4   MCHC 31.6*   RDW 12.6        No results for input(s): LACTIC in the last 72 hours.  Recent Labs     07/05/23  1242   INR 0.92     No results for input(s): HGBA1C, CHOL, TRIG, LDL, VLDL, HDL in the last 72 hours.   Recent Labs     07/05/23  1242   *   K 4.4   CHLORIDE 102   CO2 22*   BUN 13.9   CREATININE 1.22*   GLUCOSE 326*   CALCIUM 9.2   ALBUMIN 3.4   GLOBULIN 3.6*   ALKPHOS 66   ALT 19   AST 22   BILITOT 0.6      No results for input(s): BNP, CPK, TROPONINI in the last 72 hours.       Microbiology Results (last 7 days)       ** No results found for the last 168 hours. **           Imaging     CTA Runoff ABD PEL Bilat Lower Ext   Final Result      Extensive atherosclerotic disease seen as outlined above but no evidence of acute occlusion seen         Electronically signed by: José Miguel Parmar   Date:    07/05/2023   Time:    18:15        Assessment & Plan   Left leg ischemia 2/2 severe arterial stenosis  Acute kidney injury  Hyperglycemia in the setting of DM2  Chronic normocytic anemia  Accelerated blood pressure    History:  CAD, CVA, CKD, DM2, HTN, HLD, Dementia and depression    Plan:  -Heparin gtt per protocol  -Cardiology consulted in ED  -NPO after midnight  -Sliding scale insulin with accu-checks  -Pain control  -Antihypertensives as needed  -Resume home meds as appropriate once available  -Folate/TIBC/Ferritin/Vit B12  -Labs in AM      VTE Prophylaxis: Heparin gtt    I, Araceli Agosto NP have reviewed and discussed this case with Dr. Riggs.  Please see addendum for further assessment and plan from attending MD.

## 2023-07-06 NOTE — CONSULTS
"[facility] - 5[facility] Med Surg    Cardiology  Consult Note    Patient Name: Mayra Parker  MRN: [id number]  Admission Date: 7/5/2023  Hospital Length of Stay: 1 days  Code Status: Full Code   Attending Provider: Maine Riggs MD   Consulting Provider: Xiomy Terry MD  Primary Care Physician: Rashaun Weiss MD  Principal Problem:<principal problem not specified>    Patient information was obtained from patient, relative(s), ER records, and primary team.     Subjective:     Reason for Consult: "left leg ischemia"     HPI: Mayra Parker is a 73 y.o. female with a pmh of hypertension, type 2 diabetes mellitus with peripheral neuropathy, CAD, CVA, PAD, and CKD2, known to [facility] (Dr. Estrella - scheduled for peripheral angiogram on 7.11.23), who presented with complaint of worsening bilateral leg pain after a recent decrease in gabapentin dose. Workup with CTA showed severe stenosis without acute occlusion and arterial ultrasound showed severe flow reduction in left with moderate flow reduction in right. Patient also found to be hyperglycemic and was admitted to hospital medicine for management. Cardiology was consulted for left leg ischemia. Per family member, patient's left lower extremity was colder than normal yesterday, and the patient was in such severe pain that she could not walk. Heparin gtt started upon admission.     Hospital Course:  7.6.23: NAD. Warm extremities. Motor function intact.     PMH: CVA, HLD, depression, dementia, T2DM, HTN, CKD2, CAD stents to RCAx2 and OM1  PSH: Right and left CEA, back surgery  Family History: Father- CAD native, Mother- CAD native, Son- CAD, MI- reason for death  Social History: Former smoker, quit 2022    Previous Cardiac Diagnostics:   CTA Runoff 7.5.23  Vascular images: The abdominal aorta is normal in caliber.  There is some atherosclerotic plaque in the abdominal aorta and mesenteric vessels.  The celiac axis is patent.  SMA is patent.  There is a " single renal artery on the left.  There is a 60% stenosis in the proximal left renal artery secondary to calcified plaque.  There is a 50 -60% stenosis in the right proximal renal artery secondary to calcified plaque.  The DEVI is patent but heavily calcified.     Distal abdominal aorta has some atherosclerotic plaque within it.  Calcified plaque is seen in both common iliac arteries with multifocal areas of stenosis measuring up to 70%.  The proximal external iliac artery on the left side has a high-grade 80% stenosis.  The distal external iliac artery on the left side has some calcifications but no significant stenosis.  External iliac artery on the right side is patent with some calcified plaque but no high-grade stenosis is seen.     The right common femoral artery has some calcified plaque within it with a 40-50% stenosis.  The superficial femoral artery on the right side has a stent within it with multifocal areas of stenosis within the stent measuring up to 70%.  Popliteal artery is patent but is heavily calcified with multifocal areas of 50-60% stenosis.  There is very sluggish  single vessel runoff in the right lower extremity via the anterior tibial artery.  No significant flow is seen in the posterior tibial artery.     There is a stent in the superficial femoral artery on the left side.  Multifocal areas of stenosis seen within the stent.  There is flow in the popliteal artery.  It is heavily calcified.  There is sluggish 2 vessel runoff seen via the posterior tibial and anterior tibial artery.     CV US Arterial & ESSIE 7.5.22: (preliminary report)  Patent right lower extremity arterial system with biphasic waveforms throughout demonstrating moderate arterial flow reduction consistent with inflow disease.   The left lower extremity arterial system demonstrated severe arterial flow reduction with a stenosis of the mid superficial femoral and no flow identified in the dorsalis pedis artery suggestive of an  occlusion.   Biphasic waveforms in the common femoral and then change to monophasic waveforms throughout are consistent with inflow and multilevel disease.   Right ESSIE 0.73  Left ESSIE 0.67    CV US Arterial & ESSIE 7.6.22:  The right lower extremity demonstrated mild arterial flow reduction consistent with multi level disease.  The left lower extremity demonstrated moderate arterial flow reduction consistent with multilevel disease.  Right ESSIE 0.82  Left ESSIE 0.82    LHC/stent 5.20.22:  There was mild left ventricular systolic dysfunction.  The left ventricular end diastolic pressure was normal.  The pre-procedure left ventricular end diastolic pressure was 12.  The ejection fraction was calculated to be 45%.  The ejection fraction was 40-50% by visual estimate.  There was no mitral valve regurgitation.  There was no aortic valve stenosis.  The Prox RCA to Dist RCA lesion was 90% stenosed with 0% stenosis post-intervention. The stents were placed. The distal stent to the RCA was a 2.25 by 38 mm drug-eluting stent. The mid and proximal stent was a 2.5 x 22 mm drug-eluting stent.  A stent was successfully placed.  A stent was successfully placed.  The estimated blood loss was none.  There was two vessel coronary artery disease.  The PCI was successful.  The 1st Mrg lesion was 90% stenosed with 0% stenosis post-intervention. This marginal stent was a 2.25 x 38 mm drug-eluting stent post dilated to 2.5 mm  A stent was successfully placed.    TTE 5.3.22:  LV normal in size. Global LVSF. LVEF 55%. LV diastolic function is impaired Grade I with normal LA pressure. Noted mild concentric LVH. Mild calcification of aortic valve noted with adequate cuspal excursion. 1+ TR. PASP 31mmHg. Mobility of anterior and posterior mitral leaflet is normal. Mild MAC with Trace MR.      PET 4.19.22:  Abnormal perfusion study consistent with large area of moderate to severe inferior and inferoapical ischemia. Perfusion imaging suggestive of  single vessel disease in distribution of RCA. LV cavity normal on stress studies. Stress LVEF 42% and LV global function is mildly reduced. Rest LV cavity normal. Rest LVEF 56% and rest LV global function is normal.     Past Medical History:   Diagnosis Date    Arthritis     Coronary artery disease     CVA (cerebral vascular accident)     affected right side of body    Depression     Diabetes mellitus     Hyperlipidemia     Hypertension     Unspecified dementia without behavioral disturbance        Past Surgical History:   Procedure Laterality Date    BACK SURGERY      CAROTID ENDARTERECTOMY Left     CAROTID ENDARTERECTOMY Right     LEFT HEART CATHETERIZATION  05/20/2022    Dr. Estrella; Stent placement    LEFT HEART CATHETERIZATION Left 5/20/2022    Procedure: CATHETERIZATION, HEART, LEFT;  Surgeon: Jone Estrella MD;  Location: [facility] CATH LAB;  Service: Cardiology;  Laterality: Left;  LHC VIA R GROIN       Review of patient's allergies indicates:   Allergen Reactions    Glipizide Swelling     Swelling of the lips      Influenza vaccine tr-s 11 (pf) Hives    Pregabalin Other (See Comments)     nightmares       No current facility-administered medications on file prior to encounter.     Current Outpatient Medications on File Prior to Encounter   Medication Sig    aspirin (ECOTRIN) 81 MG EC tablet Take 81 mg by mouth once daily.    atorvastatin (LIPITOR) 40 MG tablet Take 40 mg by mouth every evening.    carvediloL (COREG) 3.125 MG tablet Take 3.125 mg by mouth 2 (two) times daily.    clopidogreL (PLAVIX) 75 mg tablet Take 1 tablet (75 mg total) by mouth once daily.    clopidogreL (PLAVIX) 75 mg tablet Take 75 mg by mouth once daily.    donepeziL (ARICEPT) 5 MG tablet Take 1 tablet (5 mg total) by mouth every evening.    DULoxetine (CYMBALTA) 60 MG capsule Take 1 capsule by mouth every morning.    gabapentin (NEURONTIN) 100 MG capsule Take 100 mg by mouth.    JANUVIA 50 mg Tab Take 50 mg by mouth every morning.     JARDIANCE 10 mg tablet Take 10 mg by mouth once daily.    magnesium oxide (MAG-OX) 400 mg (241.3 mg magnesium) tablet Take 1 tablet (400 mg total) by mouth 2 (two) times daily.    pantoprazole (PROTONIX) 40 MG tablet Take 40 mg by mouth once daily.    atorvastatin (LIPITOR) 40 MG tablet Take 1 tablet by mouth every evening.    carvediloL (COREG) 12.5 MG tablet Take 1 tablet (12.5 mg total) by mouth 2 (two) times daily.    DULoxetine (CYMBALTA) 60 MG capsule TAKE 1 CAPSULE BY MOUTH ONCE DAILY DO  NOT  CRUSH  OR  CHEW    gabapentin (NEURONTIN) 300 MG capsule Take 1 capsule (300 mg total) by mouth 2 (two) times daily.    JARDIANCE 25 mg tablet Take 25 mg by mouth every morning.    metFORMIN (GLUCOPHAGE) 1000 MG tablet Take 1 tablet (1,000 mg total) by mouth 2 (two) times daily with meals.    SITagliptin (JANUVIA) 100 MG Tab Take 1 tablet (100 mg total) by mouth once daily.    [DISCONTINUED] losartan (COZAAR) 25 MG tablet Take 1 tablet (25 mg total) by mouth once daily.    [DISCONTINUED] pregabalin (LYRICA) 75 MG capsule Take 75 mg by mouth 2 (two) times daily.     Family History       Problem Relation (Age of Onset)    Heart attack Son          Tobacco Use    Smoking status: Former     Packs/day: 0.50     Types: Cigarettes    Smokeless tobacco: Never    Tobacco comments:     Started smoking in late 20s   Substance and Sexual Activity    Alcohol use: Never    Drug use: Never    Sexual activity: Not Currently       Review of Systems   Respiratory:  Negative for shortness of breath.    Cardiovascular:  Negative for chest pain, palpitations and leg swelling.   Musculoskeletal:         Bilateral lower extremity pain, chronic pain from peripheral neuropathy     Objective:     Vital Signs (Most Recent):  Temp: 96.4 °F (35.8 °C) (07/06/23 0738)  Pulse: 84 (07/06/23 0738)  Resp: 18 (07/06/23 0738)  BP: 113/67 (07/06/23 0738)  SpO2: 95 % (07/06/23 0738) Vital Signs (24h Range):  Temp:  [96.4 °F (35.8 °C)-97.5 °F (36.4 °C)] 96.4  °F (35.8 °C)  Pulse:  [79-92] 84  Resp:  [15-27] 18  SpO2:  [95 %-100 %] 95 %  BP: (101-188)/() 113/67     Weight: 75.3 kg (166 lb)  Body mass index is 28.49 kg/m².    SpO2: 95 %         Intake/Output Summary (Last 24 hours) at 7/6/2023 0832  Last data filed at 7/5/2023 1518  Gross per 24 hour   Intake 999 ml   Output --   Net 999 ml       Lines/Drains/Airways       Drain  Duration             Female External Urinary Catheter 07/05/23 1436 <1 day              Peripheral Intravenous Line  Duration                  Peripheral IV - Single Lumen 20 G Left Forearm -- days         Peripheral IV - Single Lumen 07/05/23 2231 22 G Left;Posterior Hand <1 day                    Significant Labs:  Recent Results (from the past 72 hour(s))   Comprehensive metabolic panel    Collection Time: 07/05/23 12:42 PM   Result Value Ref Range    Sodium Level 135 (L) 136 - 145 mmol/L    Potassium Level 4.4 3.5 - 5.1 mmol/L    Chloride 102 98 - 107 mmol/L    Carbon Dioxide 22 (L) 23 - 31 mmol/L    Glucose Level 326 (H) 82 - 115 mg/dL    Blood Urea Nitrogen 13.9 9.8 - 20.1 mg/dL    Creatinine 1.22 (H) 0.55 - 1.02 mg/dL    Calcium Level Total 9.2 8.4 - 10.2 mg/dL    Protein Total 7.0 5.8 - 7.6 gm/dL    Albumin Level 3.4 3.4 - 4.8 g/dL    Globulin 3.6 (H) 2.4 - 3.5 gm/dL    Albumin/Globulin Ratio 0.9 (L) 1.1 - 2.0 ratio    Bilirubin Total 0.6 <=1.5 mg/dL    Alkaline Phosphatase 66 40 - 150 unit/L    Alanine Aminotransferase 19 0 - 55 unit/L    Aspartate Aminotransferase 22 5 - 34 unit/L    eGFR 47 mls/min/1.73/m2   CBC with Differential    Collection Time: 07/05/23 12:42 PM   Result Value Ref Range    WBC 5.63 4.50 - 11.50 x10(3)/mcL    RBC 3.77 (L) 4.20 - 5.40 x10(6)/mcL    Hgb 11.1 (L) 12.0 - 16.0 g/dL    Hct 35.1 (L) 37.0 - 47.0 %    MCV 93.1 80.0 - 94.0 fL    MCH 29.4 27.0 - 31.0 pg    MCHC 31.6 (L) 33.0 - 36.0 g/dL    RDW 12.6 11.5 - 17.0 %    Platelet 273 130 - 400 x10(3)/mcL    MPV 9.6 7.4 - 10.4 fL    Neut % 59.8 %    Lymph %  28.6 %    Mono % 8.2 %    Eos % 2.5 %    Basophil % 0.7 %    Lymph # 1.61 0.6 - 4.6 x10(3)/mcL    Neut # 3.37 2.1 - 9.2 x10(3)/mcL    Mono # 0.46 0.1 - 1.3 x10(3)/mcL    Eos # 0.14 0 - 0.9 x10(3)/mcL    Baso # 0.04 <=0.2 x10(3)/mcL    IG# 0.01 0 - 0.04 x10(3)/mcL    IG% 0.2 %    NRBC% 0.0 %   APTT    Collection Time: 07/05/23 12:42 PM   Result Value Ref Range    PTT 29.3 23.2 - 33.7 seconds   Protime-INR    Collection Time: 07/05/23 12:42 PM   Result Value Ref Range    PT 12.3 (L) 12.5 - 14.5 seconds    INR 0.92 0.00 - 1.30   POCT glucose    Collection Time: 07/05/23  3:32 PM   Result Value Ref Range    POCT Glucose 194 (H) 70 - 110 mg/dL   Ankle Brachial Indices (ESSIE)    Collection Time: 07/05/23  4:09 PM   Result Value Ref Range    Left arm  mmHg    Left posterior tibial 120 mmHg    Right posterior tibial 130 mmHg    Left dorsalis pedis 0 mmHg    Right dorsalis pedis 140 mmHg    Left ESSIE 0.67     Right ESSIE 0.79    POCT glucose    Collection Time: 07/05/23 10:06 PM   Result Value Ref Range    POCT Glucose 373 (H) 70 - 110 mg/dL   Comprehensive Metabolic Panel (CMP)    Collection Time: 07/06/23  5:03 AM   Result Value Ref Range    Sodium Level 135 (L) 136 - 145 mmol/L    Potassium Level 4.4 3.5 - 5.1 mmol/L    Chloride 103 98 - 107 mmol/L    Carbon Dioxide 24 23 - 31 mmol/L    Glucose Level 177 (H) 82 - 115 mg/dL    Blood Urea Nitrogen 14.3 9.8 - 20.1 mg/dL    Creatinine 1.09 (H) 0.55 - 1.02 mg/dL    Calcium Level Total 8.4 8.4 - 10.2 mg/dL    Protein Total 6.3 5.8 - 7.6 gm/dL    Albumin Level 2.9 (L) 3.4 - 4.8 g/dL    Globulin 3.4 2.4 - 3.5 gm/dL    Albumin/Globulin Ratio 0.9 (L) 1.1 - 2.0 ratio    Bilirubin Total 0.5 <=1.5 mg/dL    Alkaline Phosphatase 55 40 - 150 unit/L    Alanine Aminotransferase 17 0 - 55 unit/L    Aspartate Aminotransferase 25 5 - 34 unit/L    eGFR 54 mls/min/1.73/m2   Magnesium    Collection Time: 07/06/23  5:03 AM   Result Value Ref Range    Magnesium Level 1.50 (L) 1.60 - 2.60 mg/dL    Phosphorus    Collection Time: 07/06/23  5:03 AM   Result Value Ref Range    Phosphorus Level 4.7 2.3 - 4.7 mg/dL   PTT Heparin Monitoring    Collection Time: 07/06/23  5:03 AM   Result Value Ref Range    PTT Heparin Monitor >200.0 (HH) 23.2 - 33.7 seconds   Iron and TIBC    Collection Time: 07/06/23  5:03 AM   Result Value Ref Range    Iron Binding Capacity Unsaturated 186 70 - 310 ug/dL    Iron Level 71 50 - 170 ug/dL    Transferrin 219 173 - 360 mg/dL    Iron Binding Capacity Total 257 250 - 450 ug/dL    Iron Saturation 28 20 - 50 %   Ferritin    Collection Time: 07/06/23  5:03 AM   Result Value Ref Range    Ferritin Level 31.06 4.63 - 204.00 ng/mL   Vitamin B12    Collection Time: 07/06/23  5:03 AM   Result Value Ref Range    Vitamin B12 Level 257 213 - 816 pg/mL   Folate    Collection Time: 07/06/23  5:03 AM   Result Value Ref Range    Folate Level 12.5 7.0 - 31.4 ng/mL   CBC with Differential    Collection Time: 07/06/23  5:03 AM   Result Value Ref Range    WBC 6.07 4.50 - 11.50 x10(3)/mcL    RBC 3.65 (L) 4.20 - 5.40 x10(6)/mcL    Hgb 10.5 (L) 12.0 - 16.0 g/dL    Hct 34.0 (L) 37.0 - 47.0 %    MCV 93.2 80.0 - 94.0 fL    MCH 28.8 27.0 - 31.0 pg    MCHC 30.9 (L) 33.0 - 36.0 g/dL    RDW 12.9 11.5 - 17.0 %    Platelet 249 130 - 400 x10(3)/mcL    MPV 9.4 7.4 - 10.4 fL    Neut % 58.6 %    Lymph % 26.5 %    Mono % 9.7 %    Eos % 4.4 %    Basophil % 0.5 %    Lymph # 1.61 0.6 - 4.6 x10(3)/mcL    Neut # 3.55 2.1 - 9.2 x10(3)/mcL    Mono # 0.59 0.1 - 1.3 x10(3)/mcL    Eos # 0.27 0 - 0.9 x10(3)/mcL    Baso # 0.03 <=0.2 x10(3)/mcL    IG# 0.02 0 - 0.04 x10(3)/mcL    IG% 0.3 %    NRBC% 0.0 %       Significant Imaging:  Imaging Results              CTA Runoff ABD PEL Bilat Lower Ext (Final result)  Result time 07/05/23 18:15:41      Final result by José Miguel Parmar MD (07/05/23 18:15:41)                   Impression:      Extensive atherosclerotic disease seen as outlined above but no evidence of acute occlusion  seen      Electronically signed by: José Miguel Parmar  Date:    07/05/2023  Time:    18:15               Narrative:    EXAMINATION:  CTA RUNOFF ABD PEL BILAT LOWER EXT    CLINICAL HISTORY:  Claudication or leg ischemia;    TECHNIQUE:  Multiple axial images were obtained from the abdominal aorta to the level of the feet after contrast administration.  Sagittal and coronal reconstructions and MIPS reconstruction was performed.  Precontrast source imaging was also performed.  Automatic exposure control (AEC) is utilized to reduce patient radiation exposure.    COMPARISON:  None    FINDINGS:  Vascular images: The abdominal aorta is normal in caliber.  There is some atherosclerotic plaque in the abdominal aorta and mesenteric vessels.  The celiac axis is patent.  SMA is patent.  There is a single renal artery on the left.  There is a 60% stenosis in the proximal left renal artery secondary to calcified plaque.  There is a 50 -60% stenosis in the right proximal renal artery secondary to calcified plaque.  The DEVI is patent but heavily calcified.    Distal abdominal aorta has some atherosclerotic plaque within it.  Calcified plaque is seen in both common iliac arteries with multifocal areas of stenosis measuring up to 70%.  The proximal external iliac artery on the left side has a high-grade 80% stenosis.  The distal external iliac artery on the left side has some calcifications but no significant stenosis.  External iliac artery on the right side is patent with some calcified plaque but no high-grade stenosis is seen.    The right common femoral artery has some calcified plaque within it with a 40-50% stenosis.  The superficial femoral artery on the right side has a stent within it with multifocal areas of stenosis within the stent measuring up to 70%.  Popliteal artery is patent but is heavily calcified with multifocal areas of 50-60% stenosis.  There is very sluggish  single vessel runoff in the right lower extremity  via the anterior tibial artery.  No significant flow is seen in the posterior tibial artery.    There is a stent in the superficial femoral artery on the left side.  Multifocal areas of stenosis seen within the stent.  There is flow in the popliteal artery.  It is heavily calcified.  There is sluggish 2 vessel runoff seen via the posterior tibial and anterior tibial artery.    Source images: The liver appears normal.  No liver mass or lesion is seen.  The patient is status post cholecystectomy.  The pancreas appears grossly unremarkable. Spleen appears normal.  The adrenal glands appear normal.  No renal abnormality seen. Visualized portions of the bowel appear grossly unremarkable. Urinary bladder appears normal.                                      EKG:  No results found for this visit on 07/05/23.    Telemetry:  SR    Physical Exam  Constitutional:       General: She is not in acute distress.  HENT:      Head: Normocephalic and atraumatic.   Cardiovascular:      Rate and Rhythm: Normal rate.      Comments: 1+ Right DP pulse, Left DP with significant diminished Doppler signal, Monophasic flow   Pulmonary:      Effort: Pulmonary effort is normal. No respiratory distress.   Musculoskeletal:      Right lower leg: No edema.      Left lower leg: No edema.   Skin:     General: Skin is warm and dry.   Neurological:      Mental Status: She is alert.       Home Medications:   No current facility-administered medications on file prior to encounter.     Current Outpatient Medications on File Prior to Encounter   Medication Sig Dispense Refill    aspirin (ECOTRIN) 81 MG EC tablet Take 81 mg by mouth once daily.      atorvastatin (LIPITOR) 40 MG tablet Take 40 mg by mouth every evening.      carvediloL (COREG) 3.125 MG tablet Take 3.125 mg by mouth 2 (two) times daily.      clopidogreL (PLAVIX) 75 mg tablet Take 1 tablet (75 mg total) by mouth once daily. 30 tablet 0    clopidogreL (PLAVIX) 75 mg tablet Take 75 mg by mouth  once daily.      donepeziL (ARICEPT) 5 MG tablet Take 1 tablet (5 mg total) by mouth every evening. 30 tablet 0    DULoxetine (CYMBALTA) 60 MG capsule Take 1 capsule by mouth every morning.      gabapentin (NEURONTIN) 100 MG capsule Take 100 mg by mouth.      JANUVIA 50 mg Tab Take 50 mg by mouth every morning.      JARDIANCE 10 mg tablet Take 10 mg by mouth once daily.      magnesium oxide (MAG-OX) 400 mg (241.3 mg magnesium) tablet Take 1 tablet (400 mg total) by mouth 2 (two) times daily. 60 tablet 0    pantoprazole (PROTONIX) 40 MG tablet Take 40 mg by mouth once daily.      atorvastatin (LIPITOR) 40 MG tablet Take 1 tablet by mouth every evening.      carvediloL (COREG) 12.5 MG tablet Take 1 tablet (12.5 mg total) by mouth 2 (two) times daily. 60 tablet 11    DULoxetine (CYMBALTA) 60 MG capsule TAKE 1 CAPSULE BY MOUTH ONCE DAILY DO  NOT  CRUSH  OR  CHEW 90 capsule 0    gabapentin (NEURONTIN) 300 MG capsule Take 1 capsule (300 mg total) by mouth 2 (two) times daily. 60 capsule 0    JARDIANCE 25 mg tablet Take 25 mg by mouth every morning.      metFORMIN (GLUCOPHAGE) 1000 MG tablet Take 1 tablet (1,000 mg total) by mouth 2 (two) times daily with meals. 180 tablet 3    SITagliptin (JANUVIA) 100 MG Tab Take 1 tablet (100 mg total) by mouth once daily. 30 tablet 0    [DISCONTINUED] losartan (COZAAR) 25 MG tablet Take 1 tablet (25 mg total) by mouth once daily. 90 tablet 3    [DISCONTINUED] pregabalin (LYRICA) 75 MG capsule Take 75 mg by mouth 2 (two) times daily.         Current Inpatient Medications:    Current Facility-Administered Medications:     0.9%  NaCl infusion, , Intravenous, Continuous, KEVIN Kyle, Last Rate: 75 mL/hr at 07/05/23 2238, New Bag at 07/05/23 2238    acetaminophen tablet 650 mg, 650 mg, Oral, Q6H PRN, KEVIN Kyle    aluminum-magnesium hydroxide-simethicone 200-200-20 mg/5 mL suspension 30 mL, 30 mL, Oral, QID PRN, Araceli G Triny, AGACNP-BC    dextrose 10% bolus  125 mL 125 mL, 12.5 g, Intravenous, PRN, Araceli G Triny, AGACNP-BC    dextrose 10% bolus 250 mL 250 mL, 25 g, Intravenous, PRN, Araceli G Triny, AGACNP-BC    glucagon (human recombinant) injection 1 mg, 1 mg, Intramuscular, PRN, Araceli G Triny, AGACNP-BC    heparin 25,000 units in dextrose 5% (100 units/ml) IV bolus from bag - ADDITIONAL PRN BOLUS - 30 units/kg, 30 Units/kg (Adjusted), Intravenous, PRN, Nicole Jarred, FNP    heparin 25,000 units in dextrose 5% (100 units/ml) IV bolus from bag - ADDITIONAL PRN BOLUS - 60 units/kg, 60 Units/kg (Adjusted), Intravenous, PRN, Nicole Jarred, FNP    heparin 25,000 units in dextrose 5% 250 mL (100 units/mL) infusion HIGH INTENSITY nomogram - [facility], 0-40 Units/kg/hr (Adjusted), Intravenous, Continuous, Nicole Jarred, FNP, Last Rate: 8.7 mL/hr at 07/06/23 0809, 14 Units/kg/hr at 07/06/23 0809    HYDROcodone-acetaminophen 5-325 mg per tablet 1 tablet, 1 tablet, Oral, Q6H PRN, Maine Riggs MD, 1 tablet at 07/05/23 2239    insulin aspart U-100 injection 1-10 Units, 1-10 Units, Subcutaneous, Q6H PRN, Araceli Lombardouis, AGACNP-BC, 10 Units at 07/05/23 2239    melatonin tablet 6 mg, 6 mg, Oral, Nightly PRN, Araceli G Triny, AGACNP-BC, 6 mg at 07/05/23 2238    morphine injection 4 mg, 4 mg, Intravenous, Q4H PRN, Maine Riggs MD, 4 mg at 07/06/23 0226    naloxone 0.4 mg/mL injection 0.02 mg, 0.02 mg, Intravenous, PRN, Araceli G Triny, AGACNP-BC    ondansetron injection 4 mg, 4 mg, Intravenous, Q4H PRN, Araceli Lombardouis, AGACNP-BC    polyethylene glycol packet 17 g, 17 g, Oral, BID PRN, Araceli Agosto, AGACNP-BC    prochlorperazine injection Soln 5 mg, 5 mg, Intravenous, Q6H PRN, Araceli Agosto, AGACNP-BC    simethicone chewable tablet 80 mg, 1 tablet, Oral, QID PRN, Araceli Agosto, AGACNP-BC         VTE Risk Mitigation (From admission, onward)           Ordered     Reason for No Pharmacological VTE Prophylaxis  Once        Question:  Reasons:  Answer:  Physician Provided (leave  comment)    07/05/23 2116     IP VTE HIGH RISK PATIENT  Once         07/05/23 2116     Place sequential compression device  Until discontinued         07/05/23 2116     heparin 25,000 units in dextrose 5% (100 units/ml) IV bolus from bag - ADDITIONAL PRN BOLUS - 60 units/kg  As needed (PRN)        Question:  Heparin Infusion Adjustment (DO NOT MODIFY ANSWER)  Answer:  [email or web address]\[email or web address]\heparin HIGH INTENSITY nomogram for [facility] [id number]    07/05/23 1946     heparin 25,000 units in dextrose 5% (100 units/ml) IV bolus from bag - ADDITIONAL PRN BOLUS - 30 units/kg  As needed (PRN)        Question:  Heparin Infusion Adjustment (DO NOT MODIFY ANSWER)  Answer:  [email or web address]\Images\Pharmacy\HeparinInfusions\heparin HIGH INTENSITY nomogram for [facility] [id number]    07/05/23 1946     heparin 25,000 units in dextrose 5% 250 mL (100 units/mL) infusion HIGH INTENSITY nomogram - [facility]  Continuous        Question Answer Comment   Heparin Infusion Adjustment (DO NOT MODIFY ANSWER) [email or web address]\Images\Pharmacy\HeparinInfusions\heparin HIGH INTENSITY nomogram for [facility] [id number]    Begin at (in units/kg/hr) 18        07/05/23 1946                    Assessment:   Acute lower limb ischemia, left  PAD    - Left DP occlusion on Arterial Duplex this admission, monophasic flow on bedside Doppler today    - CTA Runoff: R SFA stent patent with 70% stenosis, R Pop patent heavily calcified, L SFA stent patent with multifocal stenosis, L Pop patent heavily calcified, Right sluggish single vessel runoff without significant flow in R PTA, Left sluggish 2 vessel runoff    - Peripheral angio in 7.2022 s/p L SFA and L Pop stents    - Peripheral angio in 8.2022 s/p R SFA and R Pop stents    - Right and Left ESSIE 0.82 in 2022    - Dr. Estrella ([facility]) planning peripheral angiogram on 7.11.23  CAD    - [facility] in 5.2022: PCI/Stent to RCAx2, OM1  ICMO    - EF 55% in 5.2022  Hypertension  Normocytic anemia  T2DM  CKD2    - Cr  improved 1.09 from 1.22    Plan:   Plan for peripheral angiogram Monday 7.10.23.  Continue Heparin gtt.  Resume home meds, aspirin, plavix, statin, coreg.  Replete K>4, Mg>2.  Daily labs, BMP, Mg.    Thank you for your consult.     Xiomy Terry MD  [facility]  07/06/2023 8:32 AM      I have seen the patient, reviewed the resident's note, assessment and plan. I have personally interviewed and examined the patient at bedside and agree with the findings. Medical decision making listed above were done under my guidance.    Physical exam:  Cardiovascular system: regular rhythm, no murmur.  Lungs: CTAB.  Extremities: No leg edema. Decreased pulses in both lower extremities L > R    Plan:  Due to scheduling issues will schedule peripheral angiogram on Monday  Keep DAPT, BB and Statin  Continue IV heparin  May need to do it urgently if worsening symptoms

## 2023-07-07 LAB
ALBUMIN SERPL-MCNC: 3.1 G/DL (ref 3.4–4.8)
ALBUMIN/GLOB SERPL: 1 RATIO (ref 1.1–2)
ALP SERPL-CCNC: 53 UNIT/L (ref 40–150)
ALT SERPL-CCNC: 16 UNIT/L (ref 0–55)
APTT PPP: 102.6 SECONDS (ref 23.2–33.7)
APTT PPP: 59.4 SECONDS (ref 23.2–33.7)
AST SERPL-CCNC: 26 UNIT/L (ref 5–34)
BASOPHILS # BLD AUTO: 0.03 X10(3)/MCL
BASOPHILS NFR BLD AUTO: 0.5 %
BILIRUBIN DIRECT+TOT PNL SERPL-MCNC: 0.6 MG/DL
BUN SERPL-MCNC: 12 MG/DL (ref 9.8–20.1)
CALCIUM SERPL-MCNC: 9.1 MG/DL (ref 8.4–10.2)
CHLORIDE SERPL-SCNC: 106 MMOL/L (ref 98–107)
CO2 SERPL-SCNC: 22 MMOL/L (ref 23–31)
CREAT SERPL-MCNC: 1.07 MG/DL (ref 0.55–1.02)
EOSINOPHIL # BLD AUTO: 0.21 X10(3)/MCL (ref 0–0.9)
EOSINOPHIL NFR BLD AUTO: 3.5 %
ERYTHROCYTE [DISTWIDTH] IN BLOOD BY AUTOMATED COUNT: 12.9 % (ref 11.5–17)
EST. AVERAGE GLUCOSE BLD GHB EST-MCNC: 180 MG/DL
GFR SERPLBLD CREATININE-BSD FMLA CKD-EPI: 55 MLS/MIN/1.73/M2
GLOBULIN SER-MCNC: 3.1 GM/DL (ref 2.4–3.5)
GLUCOSE SERPL-MCNC: 154 MG/DL (ref 82–115)
HBA1C MFR BLD: 7.9 %
HCT VFR BLD AUTO: 34.4 % (ref 37–47)
HGB BLD-MCNC: 10.6 G/DL (ref 12–16)
IMM GRANULOCYTES # BLD AUTO: 0.01 X10(3)/MCL (ref 0–0.04)
IMM GRANULOCYTES NFR BLD AUTO: 0.2 %
LYMPHOCYTES # BLD AUTO: 1.51 X10(3)/MCL (ref 0.6–4.6)
LYMPHOCYTES NFR BLD AUTO: 25.2 %
MAGNESIUM SERPL-MCNC: 2.1 MG/DL (ref 1.6–2.6)
MCH RBC QN AUTO: 28.7 PG (ref 27–31)
MCHC RBC AUTO-ENTMCNC: 30.8 G/DL (ref 33–36)
MCV RBC AUTO: 93.2 FL (ref 80–94)
MONOCYTES # BLD AUTO: 0.65 X10(3)/MCL (ref 0.1–1.3)
MONOCYTES NFR BLD AUTO: 10.9 %
NEUTROPHILS # BLD AUTO: 3.58 X10(3)/MCL (ref 2.1–9.2)
NEUTROPHILS NFR BLD AUTO: 59.7 %
NRBC BLD AUTO-RTO: 0 %
PLATELET # BLD AUTO: 245 X10(3)/MCL (ref 130–400)
PMV BLD AUTO: 10 FL (ref 7.4–10.4)
POCT GLUCOSE: 286 MG/DL (ref 70–110)
POCT GLUCOSE: 322 MG/DL (ref 70–110)
POCT GLUCOSE: 347 MG/DL (ref 70–110)
POTASSIUM SERPL-SCNC: 4.9 MMOL/L (ref 3.5–5.1)
PROT SERPL-MCNC: 6.2 GM/DL (ref 5.8–7.6)
RBC # BLD AUTO: 3.69 X10(6)/MCL (ref 4.2–5.4)
SODIUM SERPL-SCNC: 138 MMOL/L (ref 136–145)
TROPONIN I SERPL-MCNC: <0.01 NG/ML (ref 0–0.04)
WBC # SPEC AUTO: 5.99 X10(3)/MCL (ref 4.5–11.5)

## 2023-07-07 PROCEDURE — 83735 ASSAY OF MAGNESIUM: CPT | Performed by: STUDENT IN AN ORGANIZED HEALTH CARE EDUCATION/TRAINING PROGRAM

## 2023-07-07 PROCEDURE — 93005 ELECTROCARDIOGRAM TRACING: CPT

## 2023-07-07 PROCEDURE — 80053 COMPREHEN METABOLIC PANEL: CPT | Performed by: INTERNAL MEDICINE

## 2023-07-07 PROCEDURE — A4216 STERILE WATER/SALINE, 10 ML: HCPCS | Performed by: INTERNAL MEDICINE

## 2023-07-07 PROCEDURE — 84484 ASSAY OF TROPONIN QUANT: CPT | Performed by: STUDENT IN AN ORGANIZED HEALTH CARE EDUCATION/TRAINING PROGRAM

## 2023-07-07 PROCEDURE — 25000003 PHARM REV CODE 250: Performed by: INTERNAL MEDICINE

## 2023-07-07 PROCEDURE — 21400001 HC TELEMETRY ROOM

## 2023-07-07 PROCEDURE — 63600175 PHARM REV CODE 636 W HCPCS: Performed by: NURSE PRACTITIONER

## 2023-07-07 PROCEDURE — C1751 CATH, INF, PER/CENT/MIDLINE: HCPCS

## 2023-07-07 PROCEDURE — 63600175 PHARM REV CODE 636 W HCPCS: Performed by: INTERNAL MEDICINE

## 2023-07-07 PROCEDURE — 36410 VNPNXR 3YR/> PHY/QHP DX/THER: CPT

## 2023-07-07 PROCEDURE — 83036 HEMOGLOBIN GLYCOSYLATED A1C: CPT | Performed by: INTERNAL MEDICINE

## 2023-07-07 PROCEDURE — 25000003 PHARM REV CODE 250: Performed by: NURSE PRACTITIONER

## 2023-07-07 PROCEDURE — 85730 THROMBOPLASTIN TIME PARTIAL: CPT | Performed by: NURSE PRACTITIONER

## 2023-07-07 PROCEDURE — 85025 COMPLETE CBC W/AUTO DIFF WBC: CPT | Performed by: NURSE PRACTITIONER

## 2023-07-07 PROCEDURE — 85730 THROMBOPLASTIN TIME PARTIAL: CPT | Performed by: INTERNAL MEDICINE

## 2023-07-07 RX ORDER — CARVEDILOL 3.12 MG/1
3.12 TABLET ORAL ONCE
Status: COMPLETED | OUTPATIENT
Start: 2023-07-07 | End: 2023-07-07

## 2023-07-07 RX ORDER — SODIUM CHLORIDE 0.9 % (FLUSH) 0.9 %
10 SYRINGE (ML) INJECTION
Status: DISCONTINUED | OUTPATIENT
Start: 2023-07-07 | End: 2023-07-13 | Stop reason: HOSPADM

## 2023-07-07 RX ORDER — SODIUM CHLORIDE 0.9 % (FLUSH) 0.9 %
10 SYRINGE (ML) INJECTION EVERY 6 HOURS
Status: DISCONTINUED | OUTPATIENT
Start: 2023-07-07 | End: 2023-07-13 | Stop reason: HOSPADM

## 2023-07-07 RX ORDER — CARVEDILOL 3.12 MG/1
6.25 TABLET ORAL 2 TIMES DAILY
Status: DISCONTINUED | OUTPATIENT
Start: 2023-07-07 | End: 2023-07-13 | Stop reason: HOSPADM

## 2023-07-07 RX ORDER — CLONIDINE HYDROCHLORIDE 0.2 MG/1
0.2 TABLET ORAL EVERY 6 HOURS PRN
Status: DISCONTINUED | OUTPATIENT
Start: 2023-07-08 | End: 2023-07-13 | Stop reason: HOSPADM

## 2023-07-07 RX ADMIN — INSULIN ASPART 8 UNITS: 100 INJECTION, SOLUTION INTRAVENOUS; SUBCUTANEOUS at 04:07

## 2023-07-07 RX ADMIN — SIMETHICONE 80 MG: 80 TABLET, CHEWABLE ORAL at 10:07

## 2023-07-07 RX ADMIN — CLOPIDOGREL BISULFATE 75 MG: 75 TABLET ORAL at 10:07

## 2023-07-07 RX ADMIN — GABAPENTIN 100 MG: 100 CAPSULE ORAL at 08:07

## 2023-07-07 RX ADMIN — Medication 400 MG: at 10:07

## 2023-07-07 RX ADMIN — ASPIRIN 81 MG: 81 TABLET, COATED ORAL at 10:07

## 2023-07-07 RX ADMIN — CARVEDILOL 6.25 MG: 3.12 TABLET, FILM COATED ORAL at 08:07

## 2023-07-07 RX ADMIN — CARVEDILOL 3.12 MG: 3.12 TABLET, FILM COATED ORAL at 02:07

## 2023-07-07 RX ADMIN — SODIUM CHLORIDE: 9 INJECTION, SOLUTION INTRAVENOUS at 12:07

## 2023-07-07 RX ADMIN — PANTOPRAZOLE SODIUM 40 MG: 40 TABLET, DELAYED RELEASE ORAL at 10:07

## 2023-07-07 RX ADMIN — DULOXETINE HYDROCHLORIDE 60 MG: 30 CAPSULE, DELAYED RELEASE ORAL at 06:07

## 2023-07-07 RX ADMIN — SODIUM CHLORIDE, PRESERVATIVE FREE 10 ML: 5 INJECTION INTRAVENOUS at 11:07

## 2023-07-07 RX ADMIN — HEPARIN SODIUM 11 UNITS/KG/HR: 10000 INJECTION, SOLUTION INTRAVENOUS at 12:07

## 2023-07-07 RX ADMIN — Medication 400 MG: at 08:07

## 2023-07-07 RX ADMIN — INSULIN DETEMIR 12 UNITS: 100 INJECTION, SOLUTION SUBCUTANEOUS at 08:07

## 2023-07-07 RX ADMIN — HEPARIN SODIUM 13 UNITS/KG/HR: 10000 INJECTION, SOLUTION INTRAVENOUS at 10:07

## 2023-07-07 RX ADMIN — ATORVASTATIN CALCIUM 40 MG: 40 TABLET, FILM COATED ORAL at 08:07

## 2023-07-07 NOTE — NURSING
Spoke to Daughter Cynthia at 0800 regarding midline placement and getting consent due to patient having some dementia. Consent was given.

## 2023-07-07 NOTE — PROCEDURES
"Mayra Parker is a 73 y.o. female patient.    Temp: 98.5 °F (36.9 °C) (07/07/23 0735)  Pulse: 78 (07/07/23 0952)  Resp: 18 (07/07/23 0952)  BP: (!) 155/76 (07/07/23 0952)  SpO2: 96 % (07/07/23 0952)  Weight: 75.3 kg (166 lb) (07/06/23 0205)  Height: 5' 4" (162.6 cm) (07/06/23 0205)    PICC  Date/Time: 7/7/2023 10:25 AM  Performed by: Josse Hernández RN  Consent Done: Yes  Time out: Immediately prior to procedure a time out was called to verify the correct patient, procedure, equipment, support staff and site/side marked as required  Indications: med administration and vascular access  Anesthesia: local infiltration  Local anesthetic: lidocaine 1% without epinephrine  Anesthetic Total (mL): 4  Preparation: skin prepped with ChloraPrep  Skin prep agent dried: skin prep agent completely dried prior to procedure  Sterile barriers: all five maximum sterile barriers used - cap, mask, sterile gown, sterile gloves, and large sterile sheet  Hand hygiene: hand hygiene performed prior to central venous catheter insertion  Location details: left basilic  Catheter type: single lumen  Catheter size: 4 Fr  Catheter Length: 15cm    Ultrasound guidance: yes  Vessel Caliber: medium and patent, compressibility normal  Needle advanced into vessel with real time Ultrasound guidance.  Guidewire confirmed in vessel.  Sterile sheath used.  Number of attempts: 1  Post-procedure: blood return through all ports, sterile dressing applied and chlorhexidine patch    Complications: none  Comments: Arm circ- 26cm        Josse Hernández RN  7/7/2023    "

## 2023-07-07 NOTE — PROGRESS NOTES
[facility]  Hospital Medicine Progress Note        Chief Complaint: Inpatient Follow-up    HPI:   73-year-old female with significant history of ischemic cardiomyopathy ejection fraction-40-50%, CAD status post PCI on aspirin, Plavix, chronic kidney disease stage 2, type 2 diabetes mellitus, HTN, HLD, CVA, chronic dementia, depression.  Patient presented to the ED with complaints of worsening bilateral lower extremity pain x1 day.  Worsening pain on walking.  Hemodynamically stable.  Lab significant for hyperglycemia mild renal insufficiency.  Arterial ultrasound concerning for acute limb ischemia.  CT angiogram with severe stenosis.  Patient was admitted to hospitalist medicine service . resumed aspirin, Plavix.  Initiated heparin GTT.  [facility] consulted for angiogram with intervention.  Angiogram with intervention planned for 7/10 .    Interval Hx:   Patient seen at bedside.  Patient is comfortably laying in bed.  Lower extremity pain is under control.  Blood pressure significantly accelerated.  Otherwise hemodynamics are stable.  No acute events overnight.    Objective/physical exam:  General: In no acute distress, afebrile  Chest: Clear to auscultation bilaterally  Heart: S1, S2, no appreciable murmur  Abdomen: Soft, nontender, BS +  MSK: Warm, no lower extremity edema, no clubbing or cyanosis  Neurologic: Alert and oriented x4, moving all extremities with good strength     VITAL SIGNS: 24 HRS MIN & MAX LAST   Temp  Min: 97.3 °F (36.3 °C)  Max: 98.5 °F (36.9 °C) 98.5 °F (36.9 °C)   BP  Min: 123/74  Max: 158/75 125/73   Pulse  Min: 78  Max: 81  78   Resp  Min: 18  Max: 18 18   SpO2  Min: 97 %  Max: 99 % 97 %       Recent Labs   Lab 07/07/23  0350   WBC 5.99   RBC 3.69*   HGB 10.6*   HCT 34.4*   MCV 93.2   MCH 28.7   MCHC 30.8*   RDW 12.9      MPV 10.0         Recent Labs   Lab 07/06/23  0503 07/07/23  0350   * 138   K 4.4 4.9   CO2 24 22*   BUN 14.3 12.0   CREATININE 1.09*  1.07*   CALCIUM 8.4 9.1   MG 1.50*  --    ALBUMIN 2.9* 3.1*   ALKPHOS 55 53   ALT 17 16   AST 25 26   BILITOT 0.5 0.6          Microbiology Results (last 7 days)       ** No results found for the last 168 hours. **             Scheduled Med:   [START ON 7/9/2023] sodium chloride 0.9%   Intravenous Once    aspirin  81 mg Oral Daily    atorvastatin  40 mg Oral QHS    carvediloL  3.125 mg Oral BID    clopidogreL  75 mg Oral Daily    DULoxetine  60 mg Oral QAM    gabapentin  100 mg Oral QHS    insulin detemir U-100  12 Units Subcutaneous QHS    magnesium oxide  400 mg Oral BID    pantoprazole  40 mg Oral Daily          Assessment/Plan:    Bilateral critical limb ischemia/severe bilateral PVD  Bilateral lower extremity claudication secondary to above  Anemia of chronic disease  CKD stage 2-stable   History of CAD status post PCI with ischemic cardiomyopathy-appears compensated  Poorly-controlled HTN  Poorly-controlled Type 2 diabetes mellitus with hyperglycemia  HLD  History of CVA  Chronic dementia  Depression  Prophylaxis    Cardiology planning for angiogram with intervention on 07/10  Continue heparin GTT   Also on aspirin, Plavix, high-intensity statin  DC normal saline for now, restart on Sunday in preparation for angiogram on Monday  Continue other home meds-Coreg, Cymbalta, gabapentin, ppi, p.o. magnesium supplementation  cbg at times in 300/200s   A1c-7.3   Still started Levemir 12 units at bedtime   Make adjustments as far as doses based on CBG  Continue sliding scale  BP poorly controlled   Increase Coreg to 6.25 mg b.i.d.  Continue to make adjustments as warranted  DVT prophylaxis-on heparin GTT    Critical care time-35 minutes  Critical care diagnosis-bilateral critical limb ischemia requiring heparin GTT  Critical care interventions: Hands-on evaluation, review of labs/radiographs/records and discussions with patient     Delia Vee MD   07/07/2023

## 2023-07-07 NOTE — PROGRESS NOTES
[facility] - 5th Floor Med Surg    Cardiology  Progress Note    Patient Name: Mayra Parker  MRN: [id number]  Admission Date: 7/5/2023  Hospital Length of Stay: 2 days  Code Status: Full Code   Attending Physician: Delia Vee MD   Primary Care Physician: Rashaun Weiss MD  Expected Discharge Date:   Principal Problem:<principal problem not specified>    Subjective:     Brief HPI: Mayra Parker is a 73 y.o. female with a pmh of hypertension, type 2 diabetes mellitus with peripheral neuropathy, CAD, CVA, PAD, and CKD2, known to [facility] (Dr. Estrella - scheduled for peripheral angiogram on 7.11.23), who presented with complaint of worsening bilateral leg pain after a recent decrease in gabapentin dose. Workup with CTA showed severe stenosis without acute occlusion and arterial ultrasound showed severe flow reduction in left with moderate flow reduction in right. Patient also found to be hyperglycemic and was admitted to hospital medicine for management. Cardiology was consulted for left leg ischemia. Per family member, patient's left lower extremity was colder than normal yesterday, and the patient was in such severe pain that she could not walk. Heparin gtt started upon admission.      Hospital Course:  7.6.23: NAD. Warm extremities. Motor function intact.   7.7.23: NAD. Chest pain this morning. Possible indigestion.      Review of Systems   Respiratory:  Negative for shortness of breath.    Cardiovascular:  Positive for chest pain. Negative for palpitations and leg swelling.      PMH: CVA, HLD, depression, dementia, T2DM, HTN, CKD2, CAD stents to RCAx2 and OM1  PSH: Right and left CEA, back surgery  Family History: Father- CAD native, Mother- CAD native, Son- CAD, MI- reason for death  Social History: Former smoker, quit 2022     Previous Cardiac Diagnostics:   CTA Runoff 7.5.23  Vascular images: The abdominal aorta is normal in caliber.  There is some atherosclerotic plaque in the abdominal  aorta and mesenteric vessels.  The celiac axis is patent.  SMA is patent.  There is a single renal artery on the left.  There is a 60% stenosis in the proximal left renal artery secondary to calcified plaque.  There is a 50 -60% stenosis in the right proximal renal artery secondary to calcified plaque.  The DEVI is patent but heavily calcified.     Distal abdominal aorta has some atherosclerotic plaque within it.  Calcified plaque is seen in both common iliac arteries with multifocal areas of stenosis measuring up to 70%.  The proximal external iliac artery on the left side has a high-grade 80% stenosis.  The distal external iliac artery on the left side has some calcifications but no significant stenosis.  External iliac artery on the right side is patent with some calcified plaque but no high-grade stenosis is seen.     The right common femoral artery has some calcified plaque within it with a 40-50% stenosis.  The superficial femoral artery on the right side has a stent within it with multifocal areas of stenosis within the stent measuring up to 70%.  Popliteal artery is patent but is heavily calcified with multifocal areas of 50-60% stenosis.  There is very sluggish  single vessel runoff in the right lower extremity via the anterior tibial artery.  No significant flow is seen in the posterior tibial artery.     There is a stent in the superficial femoral artery on the left side.  Multifocal areas of stenosis seen within the stent.  There is flow in the popliteal artery.  It is heavily calcified.  There is sluggish 2 vessel runoff seen via the posterior tibial and anterior tibial artery.      CV US Arterial & ESSIE 7.5.22: (preliminary report)  Patent right lower extremity arterial system with biphasic waveforms throughout demonstrating moderate arterial flow reduction consistent with inflow disease.   The left lower extremity arterial system demonstrated severe arterial flow reduction with a stenosis of the mid  superficial femoral and no flow identified in the dorsalis pedis artery suggestive of an occlusion.   Biphasic waveforms in the common femoral and then change to monophasic waveforms throughout are consistent with inflow and multilevel disease.   Right ESSIE 0.73  Left ESSIE 0.67     CV US Arterial & ESSIE 7.6.22:  The right lower extremity demonstrated mild arterial flow reduction consistent with multi level disease.  The left lower extremity demonstrated moderate arterial flow reduction consistent with multilevel disease.  Right ESSIE 0.82  Left ESSIE 0.82     LHC/stent 5.20.22:  There was mild left ventricular systolic dysfunction.  The left ventricular end diastolic pressure was normal.  The pre-procedure left ventricular end diastolic pressure was 12.  The ejection fraction was calculated to be 45%.  The ejection fraction was 40-50% by visual estimate.  There was no mitral valve regurgitation.  There was no aortic valve stenosis.  The Prox RCA to Dist RCA lesion was 90% stenosed with 0% stenosis post-intervention. The stents were placed. The distal stent to the RCA was a 2.25 by 38 mm drug-eluting stent. The mid and proximal stent was a 2.5 x 22 mm drug-eluting stent.  A stent was successfully placed.  A stent was successfully placed.  The estimated blood loss was none.  There was two vessel coronary artery disease.  The PCI was successful.  The 1st Mrg lesion was 90% stenosed with 0% stenosis post-intervention. This marginal stent was a 2.25 x 38 mm drug-eluting stent post dilated to 2.5 mm  A stent was successfully placed.     TTE 5.3.22:  LV normal in size. Global LVSF. LVEF 55%. LV diastolic function is impaired Grade I with normal LA pressure. Noted mild concentric LVH. Mild calcification of aortic valve noted with adequate cuspal excursion. 1+ TR. PASP 31mmHg. Mobility of anterior and posterior mitral leaflet is normal. Mild MAC with Trace MR.      PET 4.19.22:  Abnormal perfusion study consistent with large area  of moderate to severe inferior and inferoapical ischemia. Perfusion imaging suggestive of single vessel disease in distribution of RCA. LV cavity normal on stress studies. Stress LVEF 42% and LV global function is mildly reduced. Rest LV cavity normal. Rest LVEF 56% and rest LV global function is normal.     Objective:     Vital Signs (Most Recent):  Temp: 98.5 °F (36.9 °C) (07/07/23 0735)  Pulse: 78 (07/07/23 0735)  Resp: 18 (07/07/23 0735)  BP: 125/73 (07/07/23 0735)  SpO2: 97 % (07/07/23 0735) Vital Signs (24h Range):  Temp:  [97.3 °F (36.3 °C)-98.5 °F (36.9 °C)] 98.5 °F (36.9 °C)  Pulse:  [78-81] 78  Resp:  [18] 18  SpO2:  [97 %-99 %] 97 %  BP: (123-158)/(61-75) 125/73     Weight: 75.3 kg (166 lb)  Body mass index is 28.49 kg/m².    SpO2: 97 %         Intake/Output Summary (Last 24 hours) at 7/7/2023 0817  Last data filed at 7/6/2023 2312  Gross per 24 hour   Intake 0 ml   Output 1750 ml   Net -1750 ml       Lines/Drains/Airways       Drain  Duration             Female External Urinary Catheter 07/05/23 1436 1 day              Peripheral Intravenous Line  Duration                  Peripheral IV - Single Lumen 20 G Left Forearm -- days         Peripheral IV - Single Lumen 07/05/23 2231 22 G Left;Posterior Hand 1 day                    Significant Labs:   Recent Results (from the past 72 hour(s))   Comprehensive metabolic panel    Collection Time: 07/05/23 12:42 PM   Result Value Ref Range    Sodium Level 135 (L) 136 - 145 mmol/L    Potassium Level 4.4 3.5 - 5.1 mmol/L    Chloride 102 98 - 107 mmol/L    Carbon Dioxide 22 (L) 23 - 31 mmol/L    Glucose Level 326 (H) 82 - 115 mg/dL    Blood Urea Nitrogen 13.9 9.8 - 20.1 mg/dL    Creatinine 1.22 (H) 0.55 - 1.02 mg/dL    Calcium Level Total 9.2 8.4 - 10.2 mg/dL    Protein Total 7.0 5.8 - 7.6 gm/dL    Albumin Level 3.4 3.4 - 4.8 g/dL    Globulin 3.6 (H) 2.4 - 3.5 gm/dL    Albumin/Globulin Ratio 0.9 (L) 1.1 - 2.0 ratio    Bilirubin Total 0.6 <=1.5 mg/dL    Alkaline  Phosphatase 66 40 - 150 unit/L    Alanine Aminotransferase 19 0 - 55 unit/L    Aspartate Aminotransferase 22 5 - 34 unit/L    eGFR 47 mls/min/1.73/m2   CBC with Differential    Collection Time: 07/05/23 12:42 PM   Result Value Ref Range    WBC 5.63 4.50 - 11.50 x10(3)/mcL    RBC 3.77 (L) 4.20 - 5.40 x10(6)/mcL    Hgb 11.1 (L) 12.0 - 16.0 g/dL    Hct 35.1 (L) 37.0 - 47.0 %    MCV 93.1 80.0 - 94.0 fL    MCH 29.4 27.0 - 31.0 pg    MCHC 31.6 (L) 33.0 - 36.0 g/dL    RDW 12.6 11.5 - 17.0 %    Platelet 273 130 - 400 x10(3)/mcL    MPV 9.6 7.4 - 10.4 fL    Neut % 59.8 %    Lymph % 28.6 %    Mono % 8.2 %    Eos % 2.5 %    Basophil % 0.7 %    Lymph # 1.61 0.6 - 4.6 x10(3)/mcL    Neut # 3.37 2.1 - 9.2 x10(3)/mcL    Mono # 0.46 0.1 - 1.3 x10(3)/mcL    Eos # 0.14 0 - 0.9 x10(3)/mcL    Baso # 0.04 <=0.2 x10(3)/mcL    IG# 0.01 0 - 0.04 x10(3)/mcL    IG% 0.2 %    NRBC% 0.0 %   APTT    Collection Time: 07/05/23 12:42 PM   Result Value Ref Range    PTT 29.3 23.2 - 33.7 seconds   Protime-INR    Collection Time: 07/05/23 12:42 PM   Result Value Ref Range    PT 12.3 (L) 12.5 - 14.5 seconds    INR 0.92 0.00 - 1.30   POCT glucose    Collection Time: 07/05/23  3:32 PM   Result Value Ref Range    POCT Glucose 194 (H) 70 - 110 mg/dL   Ankle Brachial Indices (ESSIE)    Collection Time: 07/05/23  4:09 PM   Result Value Ref Range    Left arm  mmHg    Left posterior tibial 120 mmHg    Right posterior tibial 130 mmHg    Left dorsalis pedis 0 mmHg    Right dorsalis pedis 140 mmHg    Left ESSIE 0.67     Right ESSIE 0.79    POCT glucose    Collection Time: 07/05/23 10:06 PM   Result Value Ref Range    POCT Glucose 373 (H) 70 - 110 mg/dL   Comprehensive Metabolic Panel (CMP)    Collection Time: 07/06/23  5:03 AM   Result Value Ref Range    Sodium Level 135 (L) 136 - 145 mmol/L    Potassium Level 4.4 3.5 - 5.1 mmol/L    Chloride 103 98 - 107 mmol/L    Carbon Dioxide 24 23 - 31 mmol/L    Glucose Level 177 (H) 82 - 115 mg/dL    Blood Urea Nitrogen 14.3  9.8 - 20.1 mg/dL    Creatinine 1.09 (H) 0.55 - 1.02 mg/dL    Calcium Level Total 8.4 8.4 - 10.2 mg/dL    Protein Total 6.3 5.8 - 7.6 gm/dL    Albumin Level 2.9 (L) 3.4 - 4.8 g/dL    Globulin 3.4 2.4 - 3.5 gm/dL    Albumin/Globulin Ratio 0.9 (L) 1.1 - 2.0 ratio    Bilirubin Total 0.5 <=1.5 mg/dL    Alkaline Phosphatase 55 40 - 150 unit/L    Alanine Aminotransferase 17 0 - 55 unit/L    Aspartate Aminotransferase 25 5 - 34 unit/L    eGFR 54 mls/min/1.73/m2   Magnesium    Collection Time: 07/06/23  5:03 AM   Result Value Ref Range    Magnesium Level 1.50 (L) 1.60 - 2.60 mg/dL   Phosphorus    Collection Time: 07/06/23  5:03 AM   Result Value Ref Range    Phosphorus Level 4.7 2.3 - 4.7 mg/dL   PTT Heparin Monitoring    Collection Time: 07/06/23  5:03 AM   Result Value Ref Range    PTT Heparin Monitor >200.0 (HH) 23.2 - 33.7 seconds   Iron and TIBC    Collection Time: 07/06/23  5:03 AM   Result Value Ref Range    Iron Binding Capacity Unsaturated 186 70 - 310 ug/dL    Iron Level 71 50 - 170 ug/dL    Transferrin 219 173 - 360 mg/dL    Iron Binding Capacity Total 257 250 - 450 ug/dL    Iron Saturation 28 20 - 50 %   Ferritin    Collection Time: 07/06/23  5:03 AM   Result Value Ref Range    Ferritin Level 31.06 4.63 - 204.00 ng/mL   Vitamin B12    Collection Time: 07/06/23  5:03 AM   Result Value Ref Range    Vitamin B12 Level 257 213 - 816 pg/mL   Folate    Collection Time: 07/06/23  5:03 AM   Result Value Ref Range    Folate Level 12.5 7.0 - 31.4 ng/mL   CBC with Differential    Collection Time: 07/06/23  5:03 AM   Result Value Ref Range    WBC 6.07 4.50 - 11.50 x10(3)/mcL    RBC 3.65 (L) 4.20 - 5.40 x10(6)/mcL    Hgb 10.5 (L) 12.0 - 16.0 g/dL    Hct 34.0 (L) 37.0 - 47.0 %    MCV 93.2 80.0 - 94.0 fL    MCH 28.8 27.0 - 31.0 pg    MCHC 30.9 (L) 33.0 - 36.0 g/dL    RDW 12.9 11.5 - 17.0 %    Platelet 249 130 - 400 x10(3)/mcL    MPV 9.4 7.4 - 10.4 fL    Neut % 58.6 %    Lymph % 26.5 %    Mono % 9.7 %    Eos % 4.4 %    Basophil %  0.5 %    Lymph # 1.61 0.6 - 4.6 x10(3)/mcL    Neut # 3.55 2.1 - 9.2 x10(3)/mcL    Mono # 0.59 0.1 - 1.3 x10(3)/mcL    Eos # 0.27 0 - 0.9 x10(3)/mcL    Baso # 0.03 <=0.2 x10(3)/mcL    IG# 0.02 0 - 0.04 x10(3)/mcL    IG% 0.3 %    NRBC% 0.0 %   POCT glucose    Collection Time: 07/06/23 11:26 AM   Result Value Ref Range    POCT Glucose 207 (H) 70 - 110 mg/dL   PTT Heparin Monitoring    Collection Time: 07/06/23  2:00 PM   Result Value Ref Range    PTT Heparin Monitor 124.6 (H) 23.2 - 33.7 seconds   POCT glucose    Collection Time: 07/06/23  4:02 PM   Result Value Ref Range    POCT Glucose 202 (H) 70 - 110 mg/dL   PTT Heparin Monitoring    Collection Time: 07/06/23  9:32 PM   Result Value Ref Range    PTT Heparin Monitor 71.6 (H) 23.2 - 33.7 seconds   POCT glucose    Collection Time: 07/06/23 10:09 PM   Result Value Ref Range    POCT Glucose 338 (H) 70 - 110 mg/dL   APTT    Collection Time: 07/07/23  3:50 AM   Result Value Ref Range    PTT 59.4 (H) 23.2 - 33.7 seconds   Comprehensive Metabolic Panel    Collection Time: 07/07/23  3:50 AM   Result Value Ref Range    Sodium Level 138 136 - 145 mmol/L    Potassium Level 4.9 3.5 - 5.1 mmol/L    Chloride 106 98 - 107 mmol/L    Carbon Dioxide 22 (L) 23 - 31 mmol/L    Glucose Level 154 (H) 82 - 115 mg/dL    Blood Urea Nitrogen 12.0 9.8 - 20.1 mg/dL    Creatinine 1.07 (H) 0.55 - 1.02 mg/dL    Calcium Level Total 9.1 8.4 - 10.2 mg/dL    Protein Total 6.2 5.8 - 7.6 gm/dL    Albumin Level 3.1 (L) 3.4 - 4.8 g/dL    Globulin 3.1 2.4 - 3.5 gm/dL    Albumin/Globulin Ratio 1.0 (L) 1.1 - 2.0 ratio    Bilirubin Total 0.6 <=1.5 mg/dL    Alkaline Phosphatase 53 40 - 150 unit/L    Alanine Aminotransferase 16 0 - 55 unit/L    Aspartate Aminotransferase 26 5 - 34 unit/L    eGFR 55 mls/min/1.73/m2   Hemoglobin A1C    Collection Time: 07/07/23  3:50 AM   Result Value Ref Range    Hemoglobin A1c 7.9 (H) <=7.0 %    Estimated Average Glucose 180.0 mg/dL   CBC with Differential    Collection Time:  07/07/23  3:50 AM   Result Value Ref Range    WBC 5.99 4.50 - 11.50 x10(3)/mcL    RBC 3.69 (L) 4.20 - 5.40 x10(6)/mcL    Hgb 10.6 (L) 12.0 - 16.0 g/dL    Hct 34.4 (L) 37.0 - 47.0 %    MCV 93.2 80.0 - 94.0 fL    MCH 28.7 27.0 - 31.0 pg    MCHC 30.8 (L) 33.0 - 36.0 g/dL    RDW 12.9 11.5 - 17.0 %    Platelet 245 130 - 400 x10(3)/mcL    MPV 10.0 7.4 - 10.4 fL    Neut % 59.7 %    Lymph % 25.2 %    Mono % 10.9 %    Eos % 3.5 %    Basophil % 0.5 %    Lymph # 1.51 0.6 - 4.6 x10(3)/mcL    Neut # 3.58 2.1 - 9.2 x10(3)/mcL    Mono # 0.65 0.1 - 1.3 x10(3)/mcL    Eos # 0.21 0 - 0.9 x10(3)/mcL    Baso # 0.03 <=0.2 x10(3)/mcL    IG# 0.01 0 - 0.04 x10(3)/mcL    IG% 0.2 %    NRBC% 0.0 %       Telemetry:  SR    Physical Exam  Constitutional:       General: She is not in acute distress.  Cardiovascular:      Rate and Rhythm: Normal rate.      Heart sounds: No murmur heard.     Comments: Non palpable Left DP; 1+ Right DP   Pulmonary:      Effort: Pulmonary effort is normal. No respiratory distress.   Musculoskeletal:      Right lower leg: No edema.      Left lower leg: No edema.   Skin:     General: Skin is warm and dry.   Neurological:      Mental Status: She is alert.       Current Inpatient Medications:    Current Facility-Administered Medications:     0.9%  NaCl infusion, , Intravenous, Continuous, Delia Vee MD, Last Rate: 75 mL/hr at 07/06/23 1251, New Bag at 07/06/23 1251    [START ON 7/9/2023] 0.9%  NaCl infusion, , Intravenous, Once, Xiomy Terry MD    acetaminophen tablet 650 mg, 650 mg, Oral, Q6H PRN, SHERRY Kyle-BC    aluminum-magnesium hydroxide-simethicone 200-200-20 mg/5 mL suspension 30 mL, 30 mL, Oral, QID PRN, KEVIN Kyle    aspirin EC tablet 81 mg, 81 mg, Oral, Daily, Maine Riggs MD, 81 mg at 07/06/23 1042    atorvastatin tablet 40 mg, 40 mg, Oral, QHS, Maine Riggs MD, 40 mg at 07/06/23 2212    carvediloL tablet 3.125 mg, 3.125 mg, Oral, BID, Maine Riggs MD, 3.125 mg at 07/06/23  2212    clopidogreL tablet 75 mg, 75 mg, Oral, Daily, Maine Riggs MD, 75 mg at 07/06/23 1055    dextrose 10% bolus 125 mL 125 mL, 12.5 g, Intravenous, PRN, LOUIE KyleCNP-BC    dextrose 10% bolus 250 mL 250 mL, 25 g, Intravenous, PRN, Araceli Agosto AGACNP-BC    DULoxetine DR capsule 60 mg, 60 mg, Oral, QAM, Maine Riggs MD, 60 mg at 07/07/23 0603    gabapentin capsule 100 mg, 100 mg, Oral, QHS, Delia Vee MD    glucagon (human recombinant) injection 1 mg, 1 mg, Intramuscular, PRN, LOUIE KyleCNP-BC    heparin 25,000 units in dextrose 5% (100 units/ml) IV bolus from bag - ADDITIONAL PRN BOLUS - 30 units/kg, 30 Units/kg (Adjusted), Intravenous, PRN, Nicole Jarred, FNP    heparin 25,000 units in dextrose 5% (100 units/ml) IV bolus from bag - ADDITIONAL PRN BOLUS - 60 units/kg, 60 Units/kg (Adjusted), Intravenous, PRN, Nicole Jarred, FNP    heparin 25,000 units in dextrose 5% 250 mL (100 units/mL) infusion HIGH INTENSITY nomogram - [facility], 0-40 Units/kg/hr (Adjusted), Intravenous, Continuous, Nicole Jarred, FNP, Last Rate: 6.8 mL/hr at 07/07/23 0024, 11 Units/kg/hr at 07/07/23 0024    HYDROcodone-acetaminophen 5-325 mg per tablet 1 tablet, 1 tablet, Oral, Q6H PRN, Maine Riggs MD, 1 tablet at 07/05/23 2239    insulin aspart U-100 injection 1-10 Units, 1-10 Units, Subcutaneous, Q6H PRN, SHERRY Kyle-BC, 4 Units at 07/06/23 2213    insulin detemir U-100 injection 12 Units, 12 Units, Subcutaneous, QHS, Delia Vee MD    magnesium oxide tablet 400 mg, 400 mg, Oral, BID, Maine Riggs MD, 400 mg at 07/06/23 2212    melatonin tablet 6 mg, 6 mg, Oral, Nightly PRN, SHERRY Kyle-BC, 6 mg at 07/06/23 2212    morphine injection 4 mg, 4 mg, Intravenous, Q4H PRN, Maine Riggs MD, 4 mg at 07/06/23 0226    naloxone 0.4 mg/mL injection 0.02 mg, 0.02 mg, Intravenous, PRN, SHERRY Kyle-BC    ondansetron injection 4 mg, 4 mg, Intravenous, Q4H PRN, SHERRY Kyle-BC     pantoprazole EC tablet 40 mg, 40 mg, Oral, Daily, Maine Riggs MD, 40 mg at 07/06/23 1043    polyethylene glycol packet 17 g, 17 g, Oral, BID PRN, Araceli Agosto, AGACNP-BC    prochlorperazine injection Soln 5 mg, 5 mg, Intravenous, Q6H PRN, Araceli Agosto, AGACNP-BC    simethicone chewable tablet 80 mg, 1 tablet, Oral, QID PRN, Araceli [place], AGACNP-BC    sodium chloride 0.9% flush 10 mL, 10 mL, Intravenous, PRN, Xiomy Terry MD    VTE Risk Mitigation (From admission, onward)           Ordered     Reason for No Pharmacological VTE Prophylaxis  Once        Question:  Reasons:  Answer:  Physician Provided (leave comment)    07/05/23 2116     IP VTE HIGH RISK PATIENT  Once         07/05/23 2116     Place sequential compression device  Until discontinued         07/05/23 2116     heparin 25,000 units in dextrose 5% (100 units/ml) IV bolus from bag - ADDITIONAL PRN BOLUS - 60 units/kg  As needed (PRN)        Question:  Heparin Infusion Adjustment (DO NOT MODIFY ANSWER)  Answer:  \\[email or web address]\Images\Pharmacy\HeparinInfusions\heparin HIGH INTENSITY nomogram for [facility] [id number]    07/05/23 1946     heparin 25,000 units in dextrose 5% (100 units/ml) IV bolus from bag - ADDITIONAL PRN BOLUS - 30 units/kg  As needed (PRN)        Question:  Heparin Infusion Adjustment (DO NOT MODIFY ANSWER)  Answer:  [email or web address]\Images\Pharmacy\HeparinInfusions\heparin HIGH INTENSITY nomogram for [facility] [id number]    07/05/23 1946     heparin 25,000 units in dextrose 5% 250 mL (100 units/mL) infusion HIGH INTENSITY nomogram - [facility]  Continuous        Question Answer Comment   Heparin Infusion Adjustment (DO NOT MODIFY ANSWER) [email or web address]\[email or web address]\Images\Pharmacy\HeparinInfusions\heparin HIGH INTENSITY nomogram for [facility] [id number].pdf    Begin at (in units/kg/hr) 18        07/05/23 1946                    Assessment:   Acute lower limb ischemia, left  PAD    - Left DP occlusion on Arterial Duplex this admission, monophasic flow on bedside  Doppler today    - CTA Runoff: R SFA stent patent with 70% stenosis, R Pop patent heavily calcified, L SFA stent patent with multifocal stenosis, L Pop patent heavily calcified, Right sluggish single vessel runoff without significant flow in R PTA, Left sluggish 2 vessel runoff    - Peripheral angio in 7.2022 s/p L SFA and L Pop stents    - Peripheral angio in 8.2022 s/p R SFA and R Pop stents    - Right and Left ESSIE 0.82 in 2022    - Dr. Estrella ([facility]) planning peripheral angiogram on 7.11.23  CAD    - [facility] in 5.2022: PCI/Stent to RCAx2, OM1  ICMO    - EF 55% in 5.2022  Hypertension  Normocytic anemia  T2DM  CKD2    - Cr improved 1.07 from 1.09 (1.22 on admission)      Plan:   New c/o chest pain. Check EKG, Trop .  Plan for peripheral angiogram Monday 7.10.23.  Continue Heparin gtt.  Continue home meds, aspirin, plavix, statin, coreg.  Replete K>4, Mg>2.  Daily labs, BMP, Mg.      Xiomy Terry MD  [facility]  07/07/2023        I have seen the patient, reviewed the resident's note, assessment and plan. I have personally interviewed and examined the patient at bedside and agree with the findings. Medical decision making listed above were done under my guidance.    Physical exam:  Cardiovascular system: regular rhythm, no murmur.  Lungs: CTAB.  Extremities: No leg edema. Reduced pulses of bilateral LE (L>R)    Plan:  Cont heparin drip, DAPT, statin and bb  NPO after MN for peripheral angio on monday

## 2023-07-08 LAB
ALBUMIN SERPL-MCNC: 3 G/DL (ref 3.4–4.8)
ALBUMIN/GLOB SERPL: 0.9 RATIO (ref 1.1–2)
ALP SERPL-CCNC: 51 UNIT/L (ref 40–150)
ALT SERPL-CCNC: 16 UNIT/L (ref 0–55)
APTT PPP: 86.7 SECONDS (ref 23.2–33.7)
APTT PPP: 87.2 SECONDS (ref 23.2–33.7)
APTT PPP: 99.5 SECONDS (ref 23.2–33.7)
AST SERPL-CCNC: 26 UNIT/L (ref 5–34)
BASOPHILS # BLD AUTO: 0.02 X10(3)/MCL
BASOPHILS NFR BLD AUTO: 0.3 %
BILIRUBIN DIRECT+TOT PNL SERPL-MCNC: 0.6 MG/DL
BUN SERPL-MCNC: 10 MG/DL (ref 9.8–20.1)
CALCIUM SERPL-MCNC: 9 MG/DL (ref 8.4–10.2)
CHLORIDE SERPL-SCNC: 103 MMOL/L (ref 98–107)
CO2 SERPL-SCNC: 23 MMOL/L (ref 23–31)
CREAT SERPL-MCNC: 1.1 MG/DL (ref 0.55–1.02)
EOSINOPHIL # BLD AUTO: 0.15 X10(3)/MCL (ref 0–0.9)
EOSINOPHIL NFR BLD AUTO: 2.6 %
ERYTHROCYTE [DISTWIDTH] IN BLOOD BY AUTOMATED COUNT: 13.1 % (ref 11.5–17)
GFR SERPLBLD CREATININE-BSD FMLA CKD-EPI: 53 MLS/MIN/1.73/M2
GLOBULIN SER-MCNC: 3.2 GM/DL (ref 2.4–3.5)
GLUCOSE SERPL-MCNC: 214 MG/DL (ref 82–115)
HCT VFR BLD AUTO: 31.4 % (ref 37–47)
HGB BLD-MCNC: 9.9 G/DL (ref 12–16)
IMM GRANULOCYTES # BLD AUTO: 0.01 X10(3)/MCL (ref 0–0.04)
IMM GRANULOCYTES NFR BLD AUTO: 0.2 %
LYMPHOCYTES # BLD AUTO: 1.86 X10(3)/MCL (ref 0.6–4.6)
LYMPHOCYTES NFR BLD AUTO: 32.2 %
MCH RBC QN AUTO: 28.2 PG (ref 27–31)
MCHC RBC AUTO-ENTMCNC: 31.5 G/DL (ref 33–36)
MCV RBC AUTO: 89.5 FL (ref 80–94)
MONOCYTES # BLD AUTO: 0.6 X10(3)/MCL (ref 0.1–1.3)
MONOCYTES NFR BLD AUTO: 10.4 %
NEUTROPHILS # BLD AUTO: 3.13 X10(3)/MCL (ref 2.1–9.2)
NEUTROPHILS NFR BLD AUTO: 54.3 %
NRBC BLD AUTO-RTO: 0 %
PLATELET # BLD AUTO: 220 X10(3)/MCL (ref 130–400)
PMV BLD AUTO: 9.8 FL (ref 7.4–10.4)
POCT GLUCOSE: 181 MG/DL (ref 70–110)
POCT GLUCOSE: 184 MG/DL (ref 70–110)
POCT GLUCOSE: 279 MG/DL (ref 70–110)
POCT GLUCOSE: 361 MG/DL (ref 70–110)
POTASSIUM SERPL-SCNC: 4.4 MMOL/L (ref 3.5–5.1)
PROT SERPL-MCNC: 6.2 GM/DL (ref 5.8–7.6)
RBC # BLD AUTO: 3.51 X10(6)/MCL (ref 4.2–5.4)
SODIUM SERPL-SCNC: 136 MMOL/L (ref 136–145)
WBC # SPEC AUTO: 5.77 X10(3)/MCL (ref 4.5–11.5)

## 2023-07-08 PROCEDURE — 25000003 PHARM REV CODE 250: Performed by: NURSE PRACTITIONER

## 2023-07-08 PROCEDURE — 25000003 PHARM REV CODE 250: Performed by: INTERNAL MEDICINE

## 2023-07-08 PROCEDURE — 85025 COMPLETE CBC W/AUTO DIFF WBC: CPT | Performed by: NURSE PRACTITIONER

## 2023-07-08 PROCEDURE — 21400001 HC TELEMETRY ROOM

## 2023-07-08 PROCEDURE — 63600175 PHARM REV CODE 636 W HCPCS: Performed by: NURSE PRACTITIONER

## 2023-07-08 PROCEDURE — A4216 STERILE WATER/SALINE, 10 ML: HCPCS | Performed by: INTERNAL MEDICINE

## 2023-07-08 PROCEDURE — 80053 COMPREHEN METABOLIC PANEL: CPT | Performed by: INTERNAL MEDICINE

## 2023-07-08 PROCEDURE — 63600175 PHARM REV CODE 636 W HCPCS: Performed by: INTERNAL MEDICINE

## 2023-07-08 PROCEDURE — 85730 THROMBOPLASTIN TIME PARTIAL: CPT | Performed by: INTERNAL MEDICINE

## 2023-07-08 PROCEDURE — 85730 THROMBOPLASTIN TIME PARTIAL: CPT | Performed by: NURSE PRACTITIONER

## 2023-07-08 PROCEDURE — 93005 ELECTROCARDIOGRAM TRACING: CPT

## 2023-07-08 PROCEDURE — 93010 EKG 12-LEAD: ICD-10-PCS | Mod: ,,, | Performed by: INTERNAL MEDICINE

## 2023-07-08 PROCEDURE — 93010 ELECTROCARDIOGRAM REPORT: CPT | Mod: ,,, | Performed by: INTERNAL MEDICINE

## 2023-07-08 RX ADMIN — PANTOPRAZOLE SODIUM 40 MG: 40 TABLET, DELAYED RELEASE ORAL at 08:07

## 2023-07-08 RX ADMIN — CLOPIDOGREL BISULFATE 75 MG: 75 TABLET ORAL at 08:07

## 2023-07-08 RX ADMIN — CARVEDILOL 6.25 MG: 3.12 TABLET, FILM COATED ORAL at 08:07

## 2023-07-08 RX ADMIN — INSULIN ASPART 2 UNITS: 100 INJECTION, SOLUTION INTRAVENOUS; SUBCUTANEOUS at 04:07

## 2023-07-08 RX ADMIN — SODIUM CHLORIDE, PRESERVATIVE FREE 10 ML: 5 INJECTION INTRAVENOUS at 11:07

## 2023-07-08 RX ADMIN — POLYETHYLENE GLYCOL 3350 17 G: 17 POWDER, FOR SOLUTION ORAL at 06:07

## 2023-07-08 RX ADMIN — ASPIRIN 81 MG: 81 TABLET, COATED ORAL at 08:07

## 2023-07-08 RX ADMIN — INSULIN DETEMIR 15 UNITS: 100 INJECTION, SOLUTION SUBCUTANEOUS at 08:07

## 2023-07-08 RX ADMIN — GABAPENTIN 100 MG: 100 CAPSULE ORAL at 08:07

## 2023-07-08 RX ADMIN — SODIUM CHLORIDE, PRESERVATIVE FREE 10 ML: 5 INJECTION INTRAVENOUS at 04:07

## 2023-07-08 RX ADMIN — SODIUM CHLORIDE, PRESERVATIVE FREE 10 ML: 5 INJECTION INTRAVENOUS at 12:07

## 2023-07-08 RX ADMIN — SODIUM CHLORIDE, PRESERVATIVE FREE 10 ML: 5 INJECTION INTRAVENOUS at 06:07

## 2023-07-08 RX ADMIN — DULOXETINE HYDROCHLORIDE 60 MG: 30 CAPSULE, DELAYED RELEASE ORAL at 04:07

## 2023-07-08 RX ADMIN — INSULIN ASPART 6 UNITS: 100 INJECTION, SOLUTION INTRAVENOUS; SUBCUTANEOUS at 10:07

## 2023-07-08 RX ADMIN — Medication 400 MG: at 08:07

## 2023-07-08 RX ADMIN — HYDROCODONE BITARTRATE AND ACETAMINOPHEN 1 TABLET: 5; 325 TABLET ORAL at 08:07

## 2023-07-08 RX ADMIN — HYDROCODONE BITARTRATE AND ACETAMINOPHEN 1 TABLET: 5; 325 TABLET ORAL at 04:07

## 2023-07-08 RX ADMIN — ATORVASTATIN CALCIUM 40 MG: 40 TABLET, FILM COATED ORAL at 08:07

## 2023-07-08 RX ADMIN — CLONIDINE HYDROCHLORIDE 0.2 MG: 0.2 TABLET ORAL at 12:07

## 2023-07-08 RX ADMIN — HEPARIN SODIUM 13 UNITS/KG/HR: 10000 INJECTION, SOLUTION INTRAVENOUS at 05:07

## 2023-07-08 NOTE — PROGRESS NOTES
[facility]    Cardiology  Progress Note    Patient Name: Mayra Parker  MRN: [id number]  Admission Date: 7/5/2023  Hospital Length of Stay: 3 days  Code Status: Full Code   Attending Physician: Goyo Ramirez MD   Primary Care Physician: Rashaun Weiss MD  Expected Discharge Date:   Principal Problem:<principal problem not specified>    Subjective:     Brief HPI: Marya Parker is a 73 y.o. female with a pmh of hypertension, type 2 diabetes mellitus with peripheral neuropathy, CAD, CVA, PAD, and CKD2, known to [facility] (Dr. Estrella - scheduled for peripheral angiogram on 7.11.23), who presented with complaint of worsening bilateral leg pain after a recent decrease in gabapentin dose. Workup with CTA showed severe stenosis without acute occlusion and arterial ultrasound showed severe flow reduction in left with moderate flow reduction in right. Patient also found to be hyperglycemic and was admitted to hospital medicine for management. Cardiology was consulted for left leg ischemia. Per family member, patient's left lower extremity was colder than normal yesterday, and the patient was in such severe pain that she could not walk. Heparin gtt started upon admission.      Hospital Course:  7.6.23: NAD. Warm extremities. Motor function intact.   7.7.23: NAD. Chest pain this morning. Possible indigestion.   7.8.23: NAD. Denies CP or SOB. Family at bedside      Review of Systems   Respiratory:  Negative for shortness of breath.    Cardiovascular:  Negative for chest pain, palpitations and leg swelling.      PMH: CVA, HLD, depression, dementia, T2DM, HTN, CKD2, CAD stents to RCAx2 and OM1  PSH: Right and left CEA, back surgery  Family History: Father- CAD native, Mother- CAD native, Son- CAD, MI- reason for death  Social History: Former smoker, quit 2022     Previous Cardiac Diagnostics:   CTA Runoff 7.5.23  Vascular images: The abdominal aorta is normal in caliber.  There is  some atherosclerotic plaque in the abdominal aorta and mesenteric vessels.  The celiac axis is patent.  SMA is patent.  There is a single renal artery on the left.  There is a 60% stenosis in the proximal left renal artery secondary to calcified plaque.  There is a 50 -60% stenosis in the right proximal renal artery secondary to calcified plaque.  The DEVI is patent but heavily calcified.     Distal abdominal aorta has some atherosclerotic plaque within it.  Calcified plaque is seen in both common iliac arteries with multifocal areas of stenosis measuring up to 70%.  The proximal external iliac artery on the left side has a high-grade 80% stenosis.  The distal external iliac artery on the left side has some calcifications but no significant stenosis.  External iliac artery on the right side is patent with some calcified plaque but no high-grade stenosis is seen.     The right common femoral artery has some calcified plaque within it with a 40-50% stenosis.  The superficial femoral artery on the right side has a stent within it with multifocal areas of stenosis within the stent measuring up to 70%.  Popliteal artery is patent but is heavily calcified with multifocal areas of 50-60% stenosis.  There is very sluggish  single vessel runoff in the right lower extremity via the anterior tibial artery.  No significant flow is seen in the posterior tibial artery.     There is a stent in the superficial femoral artery on the left side.  Multifocal areas of stenosis seen within the stent.  There is flow in the popliteal artery.  It is heavily calcified.  There is sluggish 2 vessel runoff seen via the posterior tibial and anterior tibial artery.      CV US Arterial & ESSIE 7.5.22: (preliminary report)  Patent right lower extremity arterial system with biphasic waveforms throughout demonstrating moderate arterial flow reduction consistent with inflow disease.   The left lower extremity arterial system demonstrated severe arterial  flow reduction with a stenosis of the mid superficial femoral and no flow identified in the dorsalis pedis artery suggestive of an occlusion.   Biphasic waveforms in the common femoral and then change to monophasic waveforms throughout are consistent with inflow and multilevel disease.   Right ESSIE 0.73  Left ESSIE 0.67     CV US Arterial & ESSIE 7.6.22:  The right lower extremity demonstrated mild arterial flow reduction consistent with multi level disease.  The left lower extremity demonstrated moderate arterial flow reduction consistent with multilevel disease.  Right ESSIE 0.82  Left ESSIE 0.82     LHC/stent 5.20.22:  There was mild left ventricular systolic dysfunction.  The left ventricular end diastolic pressure was normal.  The pre-procedure left ventricular end diastolic pressure was 12.  The ejection fraction was calculated to be 45%.  The ejection fraction was 40-50% by visual estimate.  There was no mitral valve regurgitation.  There was no aortic valve stenosis.  The Prox RCA to Dist RCA lesion was 90% stenosed with 0% stenosis post-intervention. The stents were placed. The distal stent to the RCA was a 2.25 by 38 mm drug-eluting stent. The mid and proximal stent was a 2.5 x 22 mm drug-eluting stent.  A stent was successfully placed.  A stent was successfully placed.  The estimated blood loss was none.  There was two vessel coronary artery disease.  The PCI was successful.  The 1st Mrg lesion was 90% stenosed with 0% stenosis post-intervention. This marginal stent was a 2.25 x 38 mm drug-eluting stent post dilated to 2.5 mm  A stent was successfully placed.     TTE 5.3.22:  LV normal in size. Global LVSF. LVEF 55%. LV diastolic function is impaired Grade I with normal LA pressure. Noted mild concentric LVH. Mild calcification of aortic valve noted with adequate cuspal excursion. 1+ TR. PASP 31mmHg. Mobility of anterior and posterior mitral leaflet is normal. Mild MAC with Trace MR.      PET 4.19.22:  Abnormal  perfusion study consistent with large area of moderate to severe inferior and inferoapical ischemia. Perfusion imaging suggestive of single vessel disease in distribution of RCA. LV cavity normal on stress studies. Stress LVEF 42% and LV global function is mildly reduced. Rest LV cavity normal. Rest LVEF 56% and rest LV global function is normal.     Objective:     Vital Signs (Most Recent):  Temp: 97.8 °F (36.6 °C) (07/08/23 0700)  Pulse: 69 (07/08/23 0700)  Resp: 18 (07/08/23 0857)  BP: (!) 102/56 (07/08/23 0857)  SpO2: 98 % (07/08/23 0700) Vital Signs (24h Range):  Temp:  [97.7 °F (36.5 °C)-98.1 °F (36.7 °C)] 97.8 °F (36.6 °C)  Pulse:  [69-84] 69  Resp:  [17-18] 18  SpO2:  [96 %-98 %] 98 %  BP: (102-194)/(56-91) 102/56     Weight: 75.3 kg (166 lb)  Body mass index is 28.49 kg/m².    SpO2: 98 %         Intake/Output Summary (Last 24 hours) at 7/8/2023 1047  Last data filed at 7/8/2023 0603  Gross per 24 hour   Intake 820 ml   Output 3700 ml   Net -2880 ml         Lines/Drains/Airways       Peripheral Intravenous Line  Duration                  Peripheral IV - Single Lumen 07/05/23 2231 22 G Left;Posterior Hand 2 days         Midline Catheter Insertion/Assessment  - Single Lumen 07/07/23 1026 Left basilic vein (medial side of arm) 1 day                    Significant Labs:   Recent Results (from the past 72 hour(s))   Comprehensive metabolic panel    Collection Time: 07/05/23 12:42 PM   Result Value Ref Range    Sodium Level 135 (L) 136 - 145 mmol/L    Potassium Level 4.4 3.5 - 5.1 mmol/L    Chloride 102 98 - 107 mmol/L    Carbon Dioxide 22 (L) 23 - 31 mmol/L    Glucose Level 326 (H) 82 - 115 mg/dL    Blood Urea Nitrogen 13.9 9.8 - 20.1 mg/dL    Creatinine 1.22 (H) 0.55 - 1.02 mg/dL    Calcium Level Total 9.2 8.4 - 10.2 mg/dL    Protein Total 7.0 5.8 - 7.6 gm/dL    Albumin Level 3.4 3.4 - 4.8 g/dL    Globulin 3.6 (H) 2.4 - 3.5 gm/dL    Albumin/Globulin Ratio 0.9 (L) 1.1 - 2.0 ratio    Bilirubin Total 0.6 <=1.5  mg/dL    Alkaline Phosphatase 66 40 - 150 unit/L    Alanine Aminotransferase 19 0 - 55 unit/L    Aspartate Aminotransferase 22 5 - 34 unit/L    eGFR 47 mls/min/1.73/m2   CBC with Differential    Collection Time: 07/05/23 12:42 PM   Result Value Ref Range    WBC 5.63 4.50 - 11.50 x10(3)/mcL    RBC 3.77 (L) 4.20 - 5.40 x10(6)/mcL    Hgb 11.1 (L) 12.0 - 16.0 g/dL    Hct 35.1 (L) 37.0 - 47.0 %    MCV 93.1 80.0 - 94.0 fL    MCH 29.4 27.0 - 31.0 pg    MCHC 31.6 (L) 33.0 - 36.0 g/dL    RDW 12.6 11.5 - 17.0 %    Platelet 273 130 - 400 x10(3)/mcL    MPV 9.6 7.4 - 10.4 fL    Neut % 59.8 %    Lymph % 28.6 %    Mono % 8.2 %    Eos % 2.5 %    Basophil % 0.7 %    Lymph # 1.61 0.6 - 4.6 x10(3)/mcL    Neut # 3.37 2.1 - 9.2 x10(3)/mcL    Mono # 0.46 0.1 - 1.3 x10(3)/mcL    Eos # 0.14 0 - 0.9 x10(3)/mcL    Baso # 0.04 <=0.2 x10(3)/mcL    IG# 0.01 0 - 0.04 x10(3)/mcL    IG% 0.2 %    NRBC% 0.0 %   APTT    Collection Time: 07/05/23 12:42 PM   Result Value Ref Range    PTT 29.3 23.2 - 33.7 seconds   Protime-INR    Collection Time: 07/05/23 12:42 PM   Result Value Ref Range    PT 12.3 (L) 12.5 - 14.5 seconds    INR 0.92 0.00 - 1.30   POCT glucose    Collection Time: 07/05/23  3:32 PM   Result Value Ref Range    POCT Glucose 194 (H) 70 - 110 mg/dL   Ankle Brachial Indices (ESSIE)    Collection Time: 07/05/23  4:09 PM   Result Value Ref Range    Left arm  mmHg    Left posterior tibial 120 mmHg    Right posterior tibial 130 mmHg    Left dorsalis pedis 0 mmHg    Right dorsalis pedis 140 mmHg    Left ESSIE 0.67     Right ESSIE 0.79    POCT glucose    Collection Time: 07/05/23 10:06 PM   Result Value Ref Range    POCT Glucose 373 (H) 70 - 110 mg/dL   Comprehensive Metabolic Panel (CMP)    Collection Time: 07/06/23  5:03 AM   Result Value Ref Range    Sodium Level 135 (L) 136 - 145 mmol/L    Potassium Level 4.4 3.5 - 5.1 mmol/L    Chloride 103 98 - 107 mmol/L    Carbon Dioxide 24 23 - 31 mmol/L    Glucose Level 177 (H) 82 - 115 mg/dL    Blood  Urea Nitrogen 14.3 9.8 - 20.1 mg/dL    Creatinine 1.09 (H) 0.55 - 1.02 mg/dL    Calcium Level Total 8.4 8.4 - 10.2 mg/dL    Protein Total 6.3 5.8 - 7.6 gm/dL    Albumin Level 2.9 (L) 3.4 - 4.8 g/dL    Globulin 3.4 2.4 - 3.5 gm/dL    Albumin/Globulin Ratio 0.9 (L) 1.1 - 2.0 ratio    Bilirubin Total 0.5 <=1.5 mg/dL    Alkaline Phosphatase 55 40 - 150 unit/L    Alanine Aminotransferase 17 0 - 55 unit/L    Aspartate Aminotransferase 25 5 - 34 unit/L    eGFR 54 mls/min/1.73/m2   Magnesium    Collection Time: 07/06/23  5:03 AM   Result Value Ref Range    Magnesium Level 1.50 (L) 1.60 - 2.60 mg/dL   Phosphorus    Collection Time: 07/06/23  5:03 AM   Result Value Ref Range    Phosphorus Level 4.7 2.3 - 4.7 mg/dL   PTT Heparin Monitoring    Collection Time: 07/06/23  5:03 AM   Result Value Ref Range    PTT Heparin Monitor >200.0 (HH) 23.2 - 33.7 seconds   Iron and TIBC    Collection Time: 07/06/23  5:03 AM   Result Value Ref Range    Iron Binding Capacity Unsaturated 186 70 - 310 ug/dL    Iron Level 71 50 - 170 ug/dL    Transferrin 219 173 - 360 mg/dL    Iron Binding Capacity Total 257 250 - 450 ug/dL    Iron Saturation 28 20 - 50 %   Ferritin    Collection Time: 07/06/23  5:03 AM   Result Value Ref Range    Ferritin Level 31.06 4.63 - 204.00 ng/mL   Vitamin B12    Collection Time: 07/06/23  5:03 AM   Result Value Ref Range    Vitamin B12 Level 257 213 - 816 pg/mL   Folate    Collection Time: 07/06/23  5:03 AM   Result Value Ref Range    Folate Level 12.5 7.0 - 31.4 ng/mL   CBC with Differential    Collection Time: 07/06/23  5:03 AM   Result Value Ref Range    WBC 6.07 4.50 - 11.50 x10(3)/mcL    RBC 3.65 (L) 4.20 - 5.40 x10(6)/mcL    Hgb 10.5 (L) 12.0 - 16.0 g/dL    Hct 34.0 (L) 37.0 - 47.0 %    MCV 93.2 80.0 - 94.0 fL    MCH 28.8 27.0 - 31.0 pg    MCHC 30.9 (L) 33.0 - 36.0 g/dL    RDW 12.9 11.5 - 17.0 %    Platelet 249 130 - 400 x10(3)/mcL    MPV 9.4 7.4 - 10.4 fL    Neut % 58.6 %    Lymph % 26.5 %    Mono % 9.7 %    Eos %  4.4 %    Basophil % 0.5 %    Lymph # 1.61 0.6 - 4.6 x10(3)/mcL    Neut # 3.55 2.1 - 9.2 x10(3)/mcL    Mono # 0.59 0.1 - 1.3 x10(3)/mcL    Eos # 0.27 0 - 0.9 x10(3)/mcL    Baso # 0.03 <=0.2 x10(3)/mcL    IG# 0.02 0 - 0.04 x10(3)/mcL    IG% 0.3 %    NRBC% 0.0 %   POCT glucose    Collection Time: 07/06/23 11:26 AM   Result Value Ref Range    POCT Glucose 207 (H) 70 - 110 mg/dL   PTT Heparin Monitoring    Collection Time: 07/06/23  2:00 PM   Result Value Ref Range    PTT Heparin Monitor 124.6 (H) 23.2 - 33.7 seconds   POCT glucose    Collection Time: 07/06/23  4:02 PM   Result Value Ref Range    POCT Glucose 202 (H) 70 - 110 mg/dL   PTT Heparin Monitoring    Collection Time: 07/06/23  9:32 PM   Result Value Ref Range    PTT Heparin Monitor 71.6 (H) 23.2 - 33.7 seconds   POCT glucose    Collection Time: 07/06/23 10:09 PM   Result Value Ref Range    POCT Glucose 338 (H) 70 - 110 mg/dL   APTT    Collection Time: 07/07/23  3:50 AM   Result Value Ref Range    PTT 59.4 (H) 23.2 - 33.7 seconds   Comprehensive Metabolic Panel    Collection Time: 07/07/23  3:50 AM   Result Value Ref Range    Sodium Level 138 136 - 145 mmol/L    Potassium Level 4.9 3.5 - 5.1 mmol/L    Chloride 106 98 - 107 mmol/L    Carbon Dioxide 22 (L) 23 - 31 mmol/L    Glucose Level 154 (H) 82 - 115 mg/dL    Blood Urea Nitrogen 12.0 9.8 - 20.1 mg/dL    Creatinine 1.07 (H) 0.55 - 1.02 mg/dL    Calcium Level Total 9.1 8.4 - 10.2 mg/dL    Protein Total 6.2 5.8 - 7.6 gm/dL    Albumin Level 3.1 (L) 3.4 - 4.8 g/dL    Globulin 3.1 2.4 - 3.5 gm/dL    Albumin/Globulin Ratio 1.0 (L) 1.1 - 2.0 ratio    Bilirubin Total 0.6 <=1.5 mg/dL    Alkaline Phosphatase 53 40 - 150 unit/L    Alanine Aminotransferase 16 0 - 55 unit/L    Aspartate Aminotransferase 26 5 - 34 unit/L    eGFR 55 mls/min/1.73/m2   Hemoglobin A1C    Collection Time: 07/07/23  3:50 AM   Result Value Ref Range    Hemoglobin A1c 7.9 (H) <=7.0 %    Estimated Average Glucose 180.0 mg/dL   CBC with Differential     Collection Time: 07/07/23  3:50 AM   Result Value Ref Range    WBC 5.99 4.50 - 11.50 x10(3)/mcL    RBC 3.69 (L) 4.20 - 5.40 x10(6)/mcL    Hgb 10.6 (L) 12.0 - 16.0 g/dL    Hct 34.4 (L) 37.0 - 47.0 %    MCV 93.2 80.0 - 94.0 fL    MCH 28.7 27.0 - 31.0 pg    MCHC 30.8 (L) 33.0 - 36.0 g/dL    RDW 12.9 11.5 - 17.0 %    Platelet 245 130 - 400 x10(3)/mcL    MPV 10.0 7.4 - 10.4 fL    Neut % 59.7 %    Lymph % 25.2 %    Mono % 10.9 %    Eos % 3.5 %    Basophil % 0.5 %    Lymph # 1.51 0.6 - 4.6 x10(3)/mcL    Neut # 3.58 2.1 - 9.2 x10(3)/mcL    Mono # 0.65 0.1 - 1.3 x10(3)/mcL    Eos # 0.21 0 - 0.9 x10(3)/mcL    Baso # 0.03 <=0.2 x10(3)/mcL    IG# 0.01 0 - 0.04 x10(3)/mcL    IG% 0.2 %    NRBC% 0.0 %   Magnesium    Collection Time: 07/07/23  3:50 AM   Result Value Ref Range    Magnesium Level 2.10 1.60 - 2.60 mg/dL   POCT glucose    Collection Time: 07/07/23 11:31 AM   Result Value Ref Range    POCT Glucose 322 (H) 70 - 110 mg/dL   Troponin I    Collection Time: 07/07/23 12:31 PM   Result Value Ref Range    Troponin-I <0.010 0.000 - 0.045 ng/mL   POCT glucose    Collection Time: 07/07/23  4:27 PM   Result Value Ref Range    POCT Glucose 347 (H) 70 - 110 mg/dL   PTT Heparin Monitoring    Collection Time: 07/07/23  4:40 PM   Result Value Ref Range    PTT Heparin Monitor 102.6 (H) 23.2 - 33.7 seconds   POCT glucose    Collection Time: 07/07/23  8:28 PM   Result Value Ref Range    POCT Glucose 286 (H) 70 - 110 mg/dL   PTT Heparin Monitoring    Collection Time: 07/07/23 11:54 PM   Result Value Ref Range    PTT Heparin Monitor 86.7 (H) 23.2 - 33.7 seconds   POCT glucose    Collection Time: 07/08/23  4:40 AM   Result Value Ref Range    POCT Glucose 184 (H) 70 - 110 mg/dL   Comprehensive Metabolic Panel    Collection Time: 07/08/23  6:22 AM   Result Value Ref Range    Sodium Level 136 136 - 145 mmol/L    Potassium Level 4.4 3.5 - 5.1 mmol/L    Chloride 103 98 - 107 mmol/L    Carbon Dioxide 23 23 - 31 mmol/L    Glucose Level 214 (H) 82 -  115 mg/dL    Blood Urea Nitrogen 10.0 9.8 - 20.1 mg/dL    Creatinine 1.10 (H) 0.55 - 1.02 mg/dL    Calcium Level Total 9.0 8.4 - 10.2 mg/dL    Protein Total 6.2 5.8 - 7.6 gm/dL    Albumin Level 3.0 (L) 3.4 - 4.8 g/dL    Globulin 3.2 2.4 - 3.5 gm/dL    Albumin/Globulin Ratio 0.9 (L) 1.1 - 2.0 ratio    Bilirubin Total 0.6 <=1.5 mg/dL    Alkaline Phosphatase 51 40 - 150 unit/L    Alanine Aminotransferase 16 0 - 55 unit/L    Aspartate Aminotransferase 26 5 - 34 unit/L    eGFR 53 mls/min/1.73/m2   CBC with Differential    Collection Time: 07/08/23  6:22 AM   Result Value Ref Range    WBC 5.77 4.50 - 11.50 x10(3)/mcL    RBC 3.51 (L) 4.20 - 5.40 x10(6)/mcL    Hgb 9.9 (L) 12.0 - 16.0 g/dL    Hct 31.4 (L) 37.0 - 47.0 %    MCV 89.5 80.0 - 94.0 fL    MCH 28.2 27.0 - 31.0 pg    MCHC 31.5 (L) 33.0 - 36.0 g/dL    RDW 13.1 11.5 - 17.0 %    Platelet 220 130 - 400 x10(3)/mcL    MPV 9.8 7.4 - 10.4 fL    Neut % 54.3 %    Lymph % 32.2 %    Mono % 10.4 %    Eos % 2.6 %    Basophil % 0.3 %    Lymph # 1.86 0.6 - 4.6 x10(3)/mcL    Neut # 3.13 2.1 - 9.2 x10(3)/mcL    Mono # 0.60 0.1 - 1.3 x10(3)/mcL    Eos # 0.15 0 - 0.9 x10(3)/mcL    Baso # 0.02 <=0.2 x10(3)/mcL    IG# 0.01 0 - 0.04 x10(3)/mcL    IG% 0.2 %    NRBC% 0.0 %   PTT Heparin Monitoring    Collection Time: 07/08/23  6:22 AM   Result Value Ref Range    PTT Heparin Monitor 87.2 (H) 23.2 - 33.7 seconds       Telemetry:  SR    Physical Exam  Constitutional:       General: She is not in acute distress.  Cardiovascular:      Rate and Rhythm: Normal rate.      Heart sounds: No murmur heard.     Comments: Non palpable Left DP; 1+ Right DP   Pulmonary:      Effort: Pulmonary effort is normal. No respiratory distress.   Musculoskeletal:      Right lower leg: No edema.      Left lower leg: No edema.   Skin:     General: Skin is warm and dry.   Neurological:      Mental Status: She is alert.       Current Inpatient Medications:    Current Facility-Administered Medications:     [START ON  7/9/2023] 0.9%  NaCl infusion, , Intravenous, Once, Xiomy Terry MD    acetaminophen tablet 650 mg, 650 mg, Oral, Q6H PRN, OPAL KyleP-BC    aluminum-magnesium hydroxide-simethicone 200-200-20 mg/5 mL suspension 30 mL, 30 mL, Oral, QID PRN, LOUIE KyleCNP-BC    aspirin EC tablet 81 mg, 81 mg, Oral, Daily, Maine Riggs MD, 81 mg at 07/08/23 0857    atorvastatin tablet 40 mg, 40 mg, Oral, QHS, Maine Riggs MD, 40 mg at 07/07/23 2023    carvediloL tablet 6.25 mg, 6.25 mg, Oral, BID, Delia Vee MD, 6.25 mg at 07/08/23 0857    cloNIDine tablet 0.2 mg, 0.2 mg, Oral, Q6H PRN, Delia Vee MD, 0.2 mg at 07/08/23 0009    clopidogreL tablet 75 mg, 75 mg, Oral, Daily, Maine Riggs MD, 75 mg at 07/08/23 0857    dextrose 10% bolus 125 mL 125 mL, 12.5 g, Intravenous, PRN, OPAL KyleP-BC    dextrose 10% bolus 250 mL 250 mL, 25 g, Intravenous, PRN, OPAL KyleP-BC    DULoxetine DR capsule 60 mg, 60 mg, Oral, QAM, Maine Riggs MD, 60 mg at 07/08/23 0441    gabapentin capsule 100 mg, 100 mg, Oral, QHS, Delia Vee MD, 100 mg at 07/07/23 2023    glucagon (human recombinant) injection 1 mg, 1 mg, Intramuscular, PRN, Araceli Agosto AGACNP-BC    heparin 25,000 units in dextrose 5% (100 units/ml) IV bolus from bag - ADDITIONAL PRN BOLUS - 30 units/kg, 30 Units/kg (Adjusted), Intravenous, PRN, Nicole Stern, FNP, 1,866 Units at 07/07/23 0845    heparin 25,000 units in dextrose 5% (100 units/ml) IV bolus from bag - ADDITIONAL PRN BOLUS - 60 units/kg, 60 Units/kg (Adjusted), Intravenous, PRN, Nicole Jarred, FNP    heparin 25,000 units in dextrose 5% 250 mL (100 units/mL) infusion HIGH INTENSITY nomogram - [facility], 0-40 Units/kg/hr (Adjusted), Intravenous, Continuous, Nicole Jarred, FNP, Last Rate: 8.1 mL/hr at 07/08/23 0509, 13 Units/kg/hr at 07/08/23 0509    HYDROcodone-acetaminophen 5-325 mg per tablet 1 tablet, 1 tablet, Oral, Q6H PRN, Maine Riggs MD, 1 tablet at 07/08/23 0857     insulin aspart U-100 injection 1-10 Units, 1-10 Units, Subcutaneous, Q6H PRN, Araceli Agosto, AGACNP-BC, 8 Units at 07/07/23 1649    insulin detemir U-100 injection 15 Units, 15 Units, Subcutaneous, QHS, Goyo Ramirez MD    magnesium oxide tablet 400 mg, 400 mg, Oral, BID, Maine Riggs MD, 400 mg at 07/08/23 0857    melatonin tablet 6 mg, 6 mg, Oral, Nightly PRN, Araceli Lombardouis, AGACNP-BC, 6 mg at 07/06/23 2212    morphine injection 4 mg, 4 mg, Intravenous, Q4H PRN, Maine Riggs MD, 4 mg at 07/06/23 0226    naloxone 0.4 mg/mL injection 0.02 mg, 0.02 mg, Intravenous, PRN, Araceli Agosto, AGACNP-BC    ondansetron injection 4 mg, 4 mg, Intravenous, Q4H PRN, Araceli [place]ardouis, AGACNP-BC    pantoprazole EC tablet 40 mg, 40 mg, Oral, Daily, Maine Riggs MD, 40 mg at 07/08/23 0857    polyethylene glycol packet 17 g, 17 g, Oral, BID PRN, Araceli G Triny, AGACNP-BC    prochlorperazine injection Soln 5 mg, 5 mg, Intravenous, Q6H PRN, Araceli BASILIO Triny, AGACNP-BC    simethicone chewable tablet 80 mg, 1 tablet, Oral, QID PRN, Araceli G Triny, AGACNP-BC, 80 mg at 07/07/23 1021    sodium chloride 0.9% flush 10 mL, 10 mL, Intravenous, PRN, Xiomy Terry MD    Flushing PICC Protocol, , , Until Discontinued **AND** sodium chloride 0.9% flush 10 mL, 10 mL, Intravenous, Q6H, 10 mL at 07/08/23 0441 **AND** sodium chloride 0.9% flush 10 mL, 10 mL, Intravenous, PRN, Delia Vee MD    VTE Risk Mitigation (From admission, onward)           Ordered     Reason for No Pharmacological VTE Prophylaxis  Once        Question:  Reasons:  Answer:  Physician Provided (leave comment)    07/05/23 2116     IP VTE HIGH RISK PATIENT  Once         07/05/23 2116     Place sequential compression device  Until discontinued         07/05/23 2116     heparin 25,000 units in dextrose 5% (100 units/ml) IV bolus from bag - ADDITIONAL PRN BOLUS - 60 units/kg  As needed (PRN)        Question:  Heparin Infusion Adjustment (DO NOT MODIFY ANSWER)  Answer:   [email or web address]\Images\Pharmacy\HeparinInfusions\heparin HIGH INTENSITY nomogram for [facility] [id number]    07/05/23 1946     heparin 25,000 units in dextrose 5% (100 units/ml) IV bolus from bag - ADDITIONAL PRN BOLUS - 30 units/kg  As needed (PRN)        Question:  Heparin Infusion Adjustment (DO NOT MODIFY ANSWER)  Answer:  [email or web address]\Images\Pharmacy\HeparinInfusions\heparin HIGH INTENSITY nomogram for [facility] [id number]    07/05/23 1946     heparin 25,000 units in dextrose 5% 250 mL (100 units/mL) infusion HIGH INTENSITY nomogram - [facility]  Continuous        Question Answer Comment   Heparin Infusion Adjustment (DO NOT MODIFY ANSWER) [email or web address]\Images\Pharmacy\HeparinInfusions\heparin HIGH INTENSITY nomogram for [facility] [id number]    Begin at (in units/kg/hr) 18        07/05/23 1946                    Assessment:   Acute lower limb ischemia, left  PAD    - Left DP occlusion on Arterial Duplex this admission, monophasic flow on bedside Doppler today    - CTA Runoff: R SFA stent patent with 70% stenosis, R Pop patent heavily calcified, L SFA stent patent with multifocal stenosis, L Pop patent heavily calcified, Right sluggish single vessel runoff without significant flow in R PTA, Left sluggish 2 vessel runoff    - Peripheral angio in 7.2022 s/p L SFA and L Pop stents    - Peripheral angio in 8.2022 s/p R SFA and R Pop stents    - Right and Left ESSIE 0.82 in 2022    - Dr. Estrella ([facility]) planning peripheral angiogram on 7.11.23  CAD    - [facility] in 5.2022: PCI/Stent to RCAx2, OM1  ICMO    - EF 55% in 5.2022  Hypertension  Normocytic anemia  T2DM  CKD2    - Cr improved 1.07 from 1.09 (1.22 on admission)      Plan:   Plan for peripheral angiogram Monday 7.10.23.  - EKG-no acute ischemic changes. Troponin -negative   Continue Heparin gtt.  Continue home meds, aspirin, plavix, statin, coreg.  Replete K>4, Mg>2.  Daily labs, BMP, Mg.      Laterica VAN Esposito  [facility]  07/08/2023

## 2023-07-08 NOTE — PROGRESS NOTES
[facility]  Hospital Medicine Progress Note        Chief Complaint: Inpatient Follow-up for PAD    HPI:   73-year-old female with significant history of ischemic cardiomyopathy ejection fraction-40-50%, CAD status post PCI on aspirin, Plavix, chronic kidney disease stage 2, type 2 diabetes mellitus, HTN, HLD, CVA, chronic dementia, depression.  Patient presented to the ED with complaints of worsening bilateral lower extremity pain x1 day.  Worsening pain on walking.  Hemodynamically stable.  Lab significant for hyperglycemia mild renal insufficiency.  Arterial ultrasound concerning for acute limb ischemia.  CT angiogram with severe stenosis.  Patient was admitted to hospitalist medicine service . resumed aspirin, Plavix.  Initiated heparin GTT.  CIS consulted for angiogram with intervention.  Angiogram with intervention planned for 7/10 .  Interval Hx:   Patient awake and comfortable. Denies any leg pain or chest pain. Has been afebrile.   Eating well.   No family at bedside.     Objective/physical exam:  General: In no acute distress  Chest: Clear to auscultation bilaterally  Heart: RRR, +S1, S2, no appreciable murmur  Abdomen: Soft, nontender, BS +  MSK: Warm, no lower extremity edema, no clubbing or cyanosis  Neurologic: Cranial nerve II-XII intact, Strength 5/5 in all 4 extremities    VITAL SIGNS: 24 HRS MIN & MAX LAST   Temp  Min: 97.7 °F (36.5 °C)  Max: 98.1 °F (36.7 °C) 97.8 °F (36.6 °C)   BP  Min: 102/56  Max: 194/79 (!) 102/56   Pulse  Min: 69  Max: 84  69   Resp  Min: 17  Max: 18 18   SpO2  Min: 96 %  Max: 98 % 98 %     I have reviewed the following labs:    Recent Labs   Lab 07/06/23  0503 07/07/23  0350 07/08/23  0622   WBC 6.07 5.99 5.77   RBC 3.65* 3.69* 3.51*   HGB 10.5* 10.6* 9.9*   HCT 34.0* 34.4* 31.4*   MCV 93.2 93.2 89.5   MCH 28.8 28.7 28.2   MCHC 30.9* 30.8* 31.5*   RDW 12.9 12.9 13.1    245 220   MPV 9.4 10.0 9.8       Recent Labs   Lab 07/06/23  050  07/07/23  0350 07/08/23  0622   * 138 136   K 4.4 4.9 4.4   CO2 24 22* 23   BUN 14.3 12.0 10.0   CREATININE 1.09* 1.07* 1.10*   CALCIUM 8.4 9.1 9.0   MG 1.50* 2.10  --    ALBUMIN 2.9* 3.1* 3.0*   ALKPHOS 55 53 51   ALT 17 16 16   AST 25 26 26   BILITOT 0.5 0.6 0.6          Microbiology Results (last 7 days)       ** No results found for the last 168 hours. **             See below for Radiology    Scheduled Med:   [START ON 7/9/2023] sodium chloride 0.9%   Intravenous Once    aspirin  81 mg Oral Daily    atorvastatin  40 mg Oral QHS    carvediloL  6.25 mg Oral BID    clopidogreL  75 mg Oral Daily    DULoxetine  60 mg Oral QAM    gabapentin  100 mg Oral QHS    insulin detemir U-100  12 Units Subcutaneous QHS    magnesium oxide  400 mg Oral BID    pantoprazole  40 mg Oral Daily    sodium chloride 0.9%  10 mL Intravenous Q6H        Continuous Infusions:   heparin (porcine) in D5W 13 Units/kg/hr (07/08/23 050)        PRN Meds:  acetaminophen, aluminum-magnesium hydroxide-simethicone, cloNIDine, dextrose 10%, dextrose 10%, glucagon (human recombinant), heparin (PORCINE), heparin (PORCINE), HYDROcodone-acetaminophen, insulin aspart U-100, melatonin, morphine, naloxone, ondansetron, polyethylene glycol, prochlorperazine, simethicone, sodium chloride 0.9%, Flushing PICC Protocol **AND** sodium chloride 0.9% **AND** sodium chloride 0.9%       Assessment/Plan:  Bilateral critical limb ischemia/severe bilateral PVD  Bilateral lower extremity claudication secondary to above  Anemia of chronic disease  CKD stage 2-stable   History of CAD status post PCI with ischemic cardiomyopathy-appears compensated  Poorly-controlled HTN  Poorly-controlled Type 2 diabetes mellitus with hyperglycemia  HLD  History of CVA  Chronic dementia  Depression      Plan:  Patient doing well and leg pain is now well controlled  Cont IV Heparin for severe PAD  Cont ASA, Plavix and statin   Cont Levemir for glycemic control.   BP now better  controlled  Current meds reviewed     Continue strict aspiration, fall and decubitus precautions    Critical care note:  Critical care diagnosis: Severe PAD needing iv heparin drip   Critical care interventions: Hands-on evaluation, review of labs/radiographs/records and discussion with patient and family if present  Critical care time spent: 35 minutes        VTE prophylaxis: Heparin     Patient condition:  Fair    Anticipated discharge and Disposition:   Home       All diagnosis and differential diagnosis have been reviewed; assessment and plan has been documented; I have personally reviewed the labs and test results that are presently available; I have reviewed the patients medication list; I have reviewed the consulting providers response and recommendations. I have reviewed or attempted to review medical records based upon their availability    All of the patient's questions have been  addressed and answered. Patient's is agreeable to the above stated plan. I will continue to monitor closely and make adjustments to medical management as needed.  _____________________________________________________________________    Nutrition Status:    Radiology:  I have personally reviewed the following imaging and agree with the radiologist.     Ankle Brachial Indices (ESSIE)  The resting ankle-brachial indices for the right lower extremity   demonstrated moderate arterial flow reduction in the posterior tibial and   dorsalis pedis arteries.  PTA .73 DPA .79    The resting ankle-brachial indices for the left lower extremity   demonstrated severe arterial flow reduction in the posterior tibial artery   and no flow in the dorsalis pedis artery, suggestive of an occlusion.   PTA .67 DPA 0  CV Ultrasound doppler arterial legs bilat  Patent right lower extremity arterial system with biphasic waveforms   throughout demonstrating moderate arterial flow reduction consistent with   inflow disease.     The left lower extremity arterial  system demonstrated severe arterial flow   reduction with a stenosis of the mid superficial femoral and no flow   identified in the dorsalis pedis artery suggestive of an occlusion.   Biphasic waveforms in the common femoral and then change to monophasic   waveforms throughout are consistent with inflow and multilevel disease.     The resting ankle-brachial indices for the right lower extremity   demonstrated moderate arterial flow reduction in the posterior tibial and   dorsalis pedis arteries.  PTA .73 DPA .79    The resting ankle-brachial indices for the left lower extremity   demonstrated severe arterial flow reduction in the posterior tibial artery   and no flow in the dorsalis pedis artery, suggestive of an occlusion.   PTA .67 DPA 0      Goyo Ramirez MD   07/08/2023

## 2023-07-09 LAB
APTT PPP: 96.2 SECONDS (ref 23.2–33.7)
BASOPHILS # BLD AUTO: 0.03 X10(3)/MCL
BASOPHILS NFR BLD AUTO: 0.6 %
EOSINOPHIL # BLD AUTO: 0.18 X10(3)/MCL (ref 0–0.9)
EOSINOPHIL NFR BLD AUTO: 3.8 %
ERYTHROCYTE [DISTWIDTH] IN BLOOD BY AUTOMATED COUNT: 13.2 % (ref 11.5–17)
HCT VFR BLD AUTO: 34.2 % (ref 37–47)
HGB BLD-MCNC: 10.6 G/DL (ref 12–16)
IMM GRANULOCYTES # BLD AUTO: 0.01 X10(3)/MCL (ref 0–0.04)
IMM GRANULOCYTES NFR BLD AUTO: 0.2 %
LYMPHOCYTES # BLD AUTO: 1.7 X10(3)/MCL (ref 0.6–4.6)
LYMPHOCYTES NFR BLD AUTO: 35.6 %
MCH RBC QN AUTO: 28.3 PG (ref 27–31)
MCHC RBC AUTO-ENTMCNC: 31 G/DL (ref 33–36)
MCV RBC AUTO: 91.2 FL (ref 80–94)
MONOCYTES # BLD AUTO: 0.56 X10(3)/MCL (ref 0.1–1.3)
MONOCYTES NFR BLD AUTO: 11.7 %
NEUTROPHILS # BLD AUTO: 2.29 X10(3)/MCL (ref 2.1–9.2)
NEUTROPHILS NFR BLD AUTO: 48.1 %
NRBC BLD AUTO-RTO: 0 %
PLATELET # BLD AUTO: 245 X10(3)/MCL (ref 130–400)
PMV BLD AUTO: 9.8 FL (ref 7.4–10.4)
POCT GLUCOSE: 163 MG/DL (ref 70–110)
POCT GLUCOSE: 164 MG/DL (ref 70–110)
POCT GLUCOSE: 257 MG/DL (ref 70–110)
POCT GLUCOSE: 260 MG/DL (ref 70–110)
RBC # BLD AUTO: 3.75 X10(6)/MCL (ref 4.2–5.4)
WBC # SPEC AUTO: 4.77 X10(3)/MCL (ref 4.5–11.5)

## 2023-07-09 PROCEDURE — 63600175 PHARM REV CODE 636 W HCPCS: Performed by: INTERNAL MEDICINE

## 2023-07-09 PROCEDURE — 25000003 PHARM REV CODE 250: Performed by: INTERNAL MEDICINE

## 2023-07-09 PROCEDURE — 85025 COMPLETE CBC W/AUTO DIFF WBC: CPT | Performed by: NURSE PRACTITIONER

## 2023-07-09 PROCEDURE — 63600175 PHARM REV CODE 636 W HCPCS: Performed by: NURSE PRACTITIONER

## 2023-07-09 PROCEDURE — 85730 THROMBOPLASTIN TIME PARTIAL: CPT | Performed by: NURSE PRACTITIONER

## 2023-07-09 PROCEDURE — 21400001 HC TELEMETRY ROOM

## 2023-07-09 PROCEDURE — A4216 STERILE WATER/SALINE, 10 ML: HCPCS | Performed by: INTERNAL MEDICINE

## 2023-07-09 RX ORDER — SODIUM CHLORIDE 9 MG/ML
INJECTION, SOLUTION INTRAVENOUS ONCE
Status: DISCONTINUED | OUTPATIENT
Start: 2023-07-10 | End: 2023-07-11 | Stop reason: HOSPADM

## 2023-07-09 RX ORDER — ALPRAZOLAM 0.5 MG/1
0.5 TABLET ORAL 3 TIMES DAILY PRN
Status: DISCONTINUED | OUTPATIENT
Start: 2023-07-09 | End: 2023-07-13 | Stop reason: HOSPADM

## 2023-07-09 RX ORDER — SODIUM CHLORIDE 9 MG/ML
INJECTION, SOLUTION INTRAVENOUS ONCE
Status: DISCONTINUED | OUTPATIENT
Start: 2023-07-10 | End: 2023-07-09

## 2023-07-09 RX ADMIN — INSULIN ASPART 6 UNITS: 100 INJECTION, SOLUTION INTRAVENOUS; SUBCUTANEOUS at 11:07

## 2023-07-09 RX ADMIN — SODIUM CHLORIDE, PRESERVATIVE FREE 10 ML: 5 INJECTION INTRAVENOUS at 12:07

## 2023-07-09 RX ADMIN — PANTOPRAZOLE SODIUM 40 MG: 40 TABLET, DELAYED RELEASE ORAL at 08:07

## 2023-07-09 RX ADMIN — ATORVASTATIN CALCIUM 40 MG: 40 TABLET, FILM COATED ORAL at 08:07

## 2023-07-09 RX ADMIN — DULOXETINE HYDROCHLORIDE 60 MG: 30 CAPSULE, DELAYED RELEASE ORAL at 05:07

## 2023-07-09 RX ADMIN — CARVEDILOL 6.25 MG: 3.12 TABLET, FILM COATED ORAL at 08:07

## 2023-07-09 RX ADMIN — CLOPIDOGREL BISULFATE 75 MG: 75 TABLET ORAL at 08:07

## 2023-07-09 RX ADMIN — Medication 400 MG: at 08:07

## 2023-07-09 RX ADMIN — GABAPENTIN 100 MG: 100 CAPSULE ORAL at 08:07

## 2023-07-09 RX ADMIN — SODIUM CHLORIDE, PRESERVATIVE FREE 10 ML: 5 INJECTION INTRAVENOUS at 05:07

## 2023-07-09 RX ADMIN — SODIUM CHLORIDE, PRESERVATIVE FREE 10 ML: 5 INJECTION INTRAVENOUS at 11:07

## 2023-07-09 RX ADMIN — SODIUM CHLORIDE, PRESERVATIVE FREE 10 ML: 5 INJECTION INTRAVENOUS at 06:07

## 2023-07-09 RX ADMIN — INSULIN ASPART 6 UNITS: 100 INJECTION, SOLUTION INTRAVENOUS; SUBCUTANEOUS at 05:07

## 2023-07-09 RX ADMIN — HYDROCODONE BITARTRATE AND ACETAMINOPHEN 1 TABLET: 5; 325 TABLET ORAL at 08:07

## 2023-07-09 RX ADMIN — INSULIN DETEMIR 15 UNITS: 100 INJECTION, SOLUTION SUBCUTANEOUS at 08:07

## 2023-07-09 RX ADMIN — ASPIRIN 81 MG: 81 TABLET, COATED ORAL at 08:07

## 2023-07-09 NOTE — PROGRESS NOTES
[facility]  Hospital [facility] Progress Note        Chief Complaint: Inpatient Follow-up for PAD    HPI:   73-year-old female with significant history of ischemic cardiomyopathy ejection fraction-40-50%, CAD status post PCI on aspirin, Plavix, chronic kidney disease stage 2, type 2 diabetes mellitus, HTN, HLD, CVA, chronic dementia, depression.  Patient presented to the ED with complaints of worsening bilateral lower extremity pain x1 day.  Worsening pain on walking.  Hemodynamically stable.  Lab significant for hyperglycemia mild renal insufficiency.  Arterial ultrasound concerning for acute limb ischemia.  CT angiogram with severe stenosis.  Patient was admitted to hospitalist medicine service . resumed aspirin, Plavix.  Initiated heparin GTT.  CIS consulted for angiogram with intervention.  Angiogram with intervention planned for 7/10 .  Interval Hx:   Patient awake and comfortable. Has occ leg pain, L>R. No fever or  chills. Anxious about tomorrows procedure.     Eating well.   No family at bedside.     Objective/physical exam:  General: In no acute distress  Chest: Clear to auscultation bilaterally  Heart: RRR, +S1, S2, no appreciable murmur  Abdomen: Soft, nontender, BS +  MSK: Warm, no lower extremity edema, no clubbing or cyanosis  Neurologic: Cranial nerve II-XII intact, Strength 5/5 in all 4 extremities    VITAL SIGNS: 24 HRS MIN & MAX LAST   Temp  Min: 97.4 °F (36.3 °C)  Max: 98.4 °F (36.9 °C) 98.3 °F (36.8 °C)   BP  Min: 100/57  Max: 115/66 115/66   Pulse  Min: 68  Max: 77  70   Resp  Min: 17  Max: 18 18   SpO2  Min: 95 %  Max: 100 % 97 %     I have reviewed the following labs:    Recent Labs   Lab 07/07/23  0350 07/08/23  0622 07/09/23  0439   WBC 5.99 5.77 4.77   RBC 3.69* 3.51* 3.75*   HGB 10.6* 9.9* 10.6*   HCT 34.4* 31.4* 34.2*   MCV 93.2 89.5 91.2   MCH 28.7 28.2 28.3   MCHC 30.8* 31.5* 31.0*   RDW 12.9 13.1 13.2    220 245   MPV 10.0 9.8 9.8       Recent Labs    Lab 07/06/23  0503 07/07/23  0350 07/08/23  0622   * 138 136   K 4.4 4.9 4.4   CO2 24 22* 23   BUN 14.3 12.0 10.0   CREATININE 1.09* 1.07* 1.10*   CALCIUM 8.4 9.1 9.0   MG 1.50* 2.10  --    ALBUMIN 2.9* 3.1* 3.0*   ALKPHOS 55 53 51   ALT 17 16 16   AST 25 26 26   BILITOT 0.5 0.6 0.6          Microbiology Results (last 7 days)       ** No results found for the last 168 hours. **             See below for Radiology    Scheduled Med:   sodium chloride 0.9%   Intravenous Once    aspirin  81 mg Oral Daily    atorvastatin  40 mg Oral QHS    carvediloL  6.25 mg Oral BID    clopidogreL  75 mg Oral Daily    DULoxetine  60 mg Oral QAM    gabapentin  100 mg Oral QHS    insulin detemir U-100  15 Units Subcutaneous QHS    magnesium oxide  400 mg Oral BID    pantoprazole  40 mg Oral Daily    sodium chloride 0.9%  10 mL Intravenous Q6H        Continuous Infusions:   heparin (porcine) in D5W 13 Units/kg/hr (07/08/23 0509)        PRN Meds:  acetaminophen, aluminum-magnesium hydroxide-simethicone, cloNIDine, dextrose 10%, dextrose 10%, glucagon (human recombinant), heparin (PORCINE), heparin (PORCINE), HYDROcodone-acetaminophen, insulin aspart U-100, melatonin, morphine, naloxone, ondansetron, polyethylene glycol, prochlorperazine, simethicone, sodium chloride 0.9%, Flushing PICC Protocol **AND** sodium chloride 0.9% **AND** sodium chloride 0.9%       Assessment/Plan:  Bilateral critical limb ischemia/severe bilateral PVD  Bilateral lower extremity claudication secondary to above  Anemia of chronic disease  CKD stage 2-stable   History of CAD status post PCI with ischemic cardiomyopathy-appears compensated  Poorly-controlled HTN  Poorly-controlled Type 2 diabetes mellitus with hyperglycemia  HLD  History of CVA  Chronic dementia  Depression      Plan:  Patient anxious about her procedure for tomorrow, Reassured her and will use prn xanax if needed  Cont po prn pain meds   Cont IV Heparin for severe PAD  Cont ASA, Plavix and  statin   Cont Levemir for glycemic control.   BP now better controlled  Current meds reviewed     Continue strict aspiration, fall and decubitus precautions    Critical care note:  Critical care diagnosis: Severe PAD needing iv heparin drip   Critical care interventions: Hands-on evaluation, review of labs/radiographs/records and discussion with patient and family if present  Critical care time spent: 35 minutes        VTE prophylaxis: Heparin     Patient condition:  Fair    Anticipated discharge and Disposition:   Home       All diagnosis and differential diagnosis have been reviewed; assessment and plan has been documented; I have personally reviewed the labs and test results that are presently available; I have reviewed the patients medication list; I have reviewed the consulting providers response and recommendations. I have reviewed or attempted to review medical records based upon their availability    All of the patient's questions have been  addressed and answered. Patient's is agreeable to the above stated plan. I will continue to monitor closely and make adjustments to medical management as needed.  _____________________________________________________________________    Nutrition Status:    Radiology:  I have personally reviewed the following imaging and agree with the radiologist.     Ankle Brachial Indices (ESSIE)  The resting ankle-brachial indices for the right lower extremity   demonstrated moderate arterial flow reduction in the posterior tibial and   dorsalis pedis arteries.  PTA .73 DPA .79    The resting ankle-brachial indices for the left lower extremity   demonstrated severe arterial flow reduction in the posterior tibial artery   and no flow in the dorsalis pedis artery, suggestive of an occlusion.   PTA .67 DPA 0  CV Ultrasound doppler arterial legs bilat  Patent right lower extremity arterial system with biphasic waveforms   throughout demonstrating moderate arterial flow reduction consistent  with   inflow disease.     The left lower extremity arterial system demonstrated severe arterial flow   reduction with a stenosis of the mid superficial femoral and no flow   identified in the dorsalis pedis artery suggestive of an occlusion.   Biphasic waveforms in the common femoral and then change to monophasic   waveforms throughout are consistent with inflow and multilevel disease.     The resting ankle-brachial indices for the right lower extremity   demonstrated moderate arterial flow reduction in the posterior tibial and   dorsalis pedis arteries.  PTA .73 DPA .79    The resting ankle-brachial indices for the left lower extremity   demonstrated severe arterial flow reduction in the posterior tibial artery   and no flow in the dorsalis pedis artery, suggestive of an occlusion.   PTA .67 DPA 0      Goyo Ramirez MD   07/09/2023

## 2023-07-09 NOTE — PLAN OF CARE
Problem: Adult Inpatient Plan of Care  Goal: Plan of Care Review  Outcome: Ongoing, Progressing  Goal: Patient-Specific Goal (Individualized)  Outcome: Ongoing, Progressing  Goal: Optimal Comfort and Wellbeing  Outcome: Ongoing, Progressing  Goal: Readiness for Transition of Care  Outcome: Ongoing, Progressing     Problem: Skin Injury Risk Increased  Goal: Skin Health and Integrity  Outcome: Ongoing, Progressing     Problem: Infection  Goal: Absence of Infection Signs and Symptoms  Outcome: Ongoing, Progressing

## 2023-07-09 NOTE — PROGRESS NOTES
[facility] - 5th Floor Med Surg    Cardiology  Progress Note    Patient Name: Mayra Parker  MRN: [id number]  Admission Date: 7/5/2023  Hospital Length of Stay: 4 days  Code Status: Full Code   Attending Physician: Goyo Ramirez MD   Primary Care Physician: Rashaun Weiss MD  Expected Discharge Date:   Principal Problem:<principal problem not specified>    Subjective:     Brief HPI: Mayra Parker is a 73 y.o. female with a pmh of hypertension, type 2 diabetes mellitus with peripheral neuropathy, CAD, CVA, PAD, and CKD2, known to [facility] (Dr. Estrella - scheduled for peripheral angiogram on 7.11.23), who presented with complaint of worsening bilateral leg pain after a recent decrease in gabapentin dose. Workup with CTA showed severe stenosis without acute occlusion and arterial ultrasound showed severe flow reduction in left with moderate flow reduction in right. Patient also found to be hyperglycemic and was admitted to hospital medicine for management. Cardiology was consulted for left leg ischemia. Per family member, patient's left lower extremity was colder than normal yesterday, and the patient was in such severe pain that she could not walk. Heparin gtt started upon admission.      Hospital Course:  7.6.23: NAD. Warm extremities. Motor function intact.   7.7.23: NAD. Chest pain this morning. Possible indigestion.   7.8.23: NAD. Denies CP or SOB. Family at bedside   7.9.23: NAD. Denies CP or SOB. BLE warm. Denies rest pain. Remains on Heparin.VSS     Review of Systems   Respiratory:  Negative for shortness of breath.    Cardiovascular:  Negative for chest pain, palpitations and leg swelling.      PMH: CVA, HLD, depression, dementia, T2DM, HTN, CKD2, CAD stents to RCAx2 and OM1  PSH: Right and left CEA, back surgery  Family History: Father- CAD native, Mother- CAD native, Son- CAD, MI- reason for death  Social History: Former smoker, quit 2022     Previous Cardiac Diagnostics:   CTA  Runoff 7.5.23  Vascular images: The abdominal aorta is normal in caliber.  There is some atherosclerotic plaque in the abdominal aorta and mesenteric vessels.  The celiac axis is patent.  SMA is patent.  There is a single renal artery on the left.  There is a 60% stenosis in the proximal left renal artery secondary to calcified plaque.  There is a 50 -60% stenosis in the right proximal renal artery secondary to calcified plaque.  The DEVI is patent but heavily calcified.     Distal abdominal aorta has some atherosclerotic plaque within it.  Calcified plaque is seen in both common iliac arteries with multifocal areas of stenosis measuring up to 70%.  The proximal external iliac artery on the left side has a high-grade 80% stenosis.  The distal external iliac artery on the left side has some calcifications but no significant stenosis.  External iliac artery on the right side is patent with some calcified plaque but no high-grade stenosis is seen.     The right common femoral artery has some calcified plaque within it with a 40-50% stenosis.  The superficial femoral artery on the right side has a stent within it with multifocal areas of stenosis within the stent measuring up to 70%.  Popliteal artery is patent but is heavily calcified with multifocal areas of 50-60% stenosis.  There is very sluggish  single vessel runoff in the right lower extremity via the anterior tibial artery.  No significant flow is seen in the posterior tibial artery.     There is a stent in the superficial femoral artery on the left side.  Multifocal areas of stenosis seen within the stent.  There is flow in the popliteal artery.  It is heavily calcified.  There is sluggish 2 vessel runoff seen via the posterior tibial and anterior tibial artery.      CV US Arterial & ESSIE 7.5.22: (preliminary report)  Patent right lower extremity arterial system with biphasic waveforms throughout demonstrating moderate arterial flow reduction consistent with  inflow disease.   The left lower extremity arterial system demonstrated severe arterial flow reduction with a stenosis of the mid superficial femoral and no flow identified in the dorsalis pedis artery suggestive of an occlusion.   Biphasic waveforms in the common femoral and then change to monophasic waveforms throughout are consistent with inflow and multilevel disease.   Right ESSIE 0.73  Left ESSIE 0.67     CV US Arterial & ESSIE 7.6.22:  The right lower extremity demonstrated mild arterial flow reduction consistent with multi level disease.  The left lower extremity demonstrated moderate arterial flow reduction consistent with multilevel disease.  Right ESSIE 0.82  Left ESSIE 0.82     LHC/stent 5.20.22:  There was mild left ventricular systolic dysfunction.  The left ventricular end diastolic pressure was normal.  The pre-procedure left ventricular end diastolic pressure was 12.  The ejection fraction was calculated to be 45%.  The ejection fraction was 40-50% by visual estimate.  There was no mitral valve regurgitation.  There was no aortic valve stenosis.  The Prox RCA to Dist RCA lesion was 90% stenosed with 0% stenosis post-intervention. The stents were placed. The distal stent to the RCA was a 2.25 by 38 mm drug-eluting stent. The mid and proximal stent was a 2.5 x 22 mm drug-eluting stent.  A stent was successfully placed.  A stent was successfully placed.  The estimated blood loss was none.  There was two vessel coronary artery disease.  The PCI was successful.  The 1st Mrg lesion was 90% stenosed with 0% stenosis post-intervention. This marginal stent was a 2.25 x 38 mm drug-eluting stent post dilated to 2.5 mm  A stent was successfully placed.     TTE 5.3.22:  LV normal in size. Global LVSF. LVEF 55%. LV diastolic function is impaired Grade I with normal LA pressure. Noted mild concentric LVH. Mild calcification of aortic valve noted with adequate cuspal excursion. 1+ TR. PASP 31mmHg. Mobility of anterior and  posterior mitral leaflet is normal. Mild MAC with Trace MR.      PET 4.19.22:  Abnormal perfusion study consistent with large area of moderate to severe inferior and inferoapical ischemia. Perfusion imaging suggestive of single vessel disease in distribution of RCA. LV cavity normal on stress studies. Stress LVEF 42% and LV global function is mildly reduced. Rest LV cavity normal. Rest LVEF 56% and rest LV global function is normal.     Objective:     Vital Signs (Most Recent):  Temp: 98.3 °F (36.8 °C) (07/09/23 0819)  Pulse: 70 (07/09/23 0819)  Resp: 18 (07/09/23 0824)  BP: 115/66 (07/09/23 0819)  SpO2: 97 % (07/09/23 0819) Vital Signs (24h Range):  Temp:  [97.4 °F (36.3 °C)-98.4 °F (36.9 °C)] 98.3 °F (36.8 °C)  Pulse:  [68-77] 70  Resp:  [17-18] 18  SpO2:  [95 %-100 %] 97 %  BP: (100-115)/(57-68) 115/66     Weight: 75.3 kg (166 lb)  Body mass index is 28.49 kg/m².    SpO2: 97 %         Intake/Output Summary (Last 24 hours) at 7/9/2023 1054  Last data filed at 7/9/2023 0500  Gross per 24 hour   Intake --   Output 1150 ml   Net -1150 ml         Lines/Drains/Airways       Peripheral Intravenous Line  Duration                  Peripheral IV - Single Lumen 07/05/23 2231 22 G Left;Posterior Hand 3 days         Midline Catheter Insertion/Assessment  - Single Lumen 07/07/23 1026 Left basilic vein (medial side of arm) 2 days                    Significant Labs:   Recent Results (from the past 72 hour(s))   POCT glucose    Collection Time: 07/06/23 11:26 AM   Result Value Ref Range    POCT Glucose 207 (H) 70 - 110 mg/dL   PTT Heparin Monitoring    Collection Time: 07/06/23  2:00 PM   Result Value Ref Range    PTT Heparin Monitor 124.6 (H) 23.2 - 33.7 seconds   POCT glucose    Collection Time: 07/06/23  4:02 PM   Result Value Ref Range    POCT Glucose 202 (H) 70 - 110 mg/dL   PTT Heparin Monitoring    Collection Time: 07/06/23  9:32 PM   Result Value Ref Range    PTT Heparin Monitor 71.6 (H) 23.2 - 33.7 seconds   POCT  glucose    Collection Time: 07/06/23 10:09 PM   Result Value Ref Range    POCT Glucose 338 (H) 70 - 110 mg/dL   APTT    Collection Time: 07/07/23  3:50 AM   Result Value Ref Range    PTT 59.4 (H) 23.2 - 33.7 seconds   Comprehensive Metabolic Panel    Collection Time: 07/07/23  3:50 AM   Result Value Ref Range    Sodium Level 138 136 - 145 mmol/L    Potassium Level 4.9 3.5 - 5.1 mmol/L    Chloride 106 98 - 107 mmol/L    Carbon Dioxide 22 (L) 23 - 31 mmol/L    Glucose Level 154 (H) 82 - 115 mg/dL    Blood Urea Nitrogen 12.0 9.8 - 20.1 mg/dL    Creatinine 1.07 (H) 0.55 - 1.02 mg/dL    Calcium Level Total 9.1 8.4 - 10.2 mg/dL    Protein Total 6.2 5.8 - 7.6 gm/dL    Albumin Level 3.1 (L) 3.4 - 4.8 g/dL    Globulin 3.1 2.4 - 3.5 gm/dL    Albumin/Globulin Ratio 1.0 (L) 1.1 - 2.0 ratio    Bilirubin Total 0.6 <=1.5 mg/dL    Alkaline Phosphatase 53 40 - 150 unit/L    Alanine Aminotransferase 16 0 - 55 unit/L    Aspartate Aminotransferase 26 5 - 34 unit/L    eGFR 55 mls/min/1.73/m2   Hemoglobin A1C    Collection Time: 07/07/23  3:50 AM   Result Value Ref Range    Hemoglobin A1c 7.9 (H) <=7.0 %    Estimated Average Glucose 180.0 mg/dL   CBC with Differential    Collection Time: 07/07/23  3:50 AM   Result Value Ref Range    WBC 5.99 4.50 - 11.50 x10(3)/mcL    RBC 3.69 (L) 4.20 - 5.40 x10(6)/mcL    Hgb 10.6 (L) 12.0 - 16.0 g/dL    Hct 34.4 (L) 37.0 - 47.0 %    MCV 93.2 80.0 - 94.0 fL    MCH 28.7 27.0 - 31.0 pg    MCHC 30.8 (L) 33.0 - 36.0 g/dL    RDW 12.9 11.5 - 17.0 %    Platelet 245 130 - 400 x10(3)/mcL    MPV 10.0 7.4 - 10.4 fL    Neut % 59.7 %    Lymph % 25.2 %    Mono % 10.9 %    Eos % 3.5 %    Basophil % 0.5 %    Lymph # 1.51 0.6 - 4.6 x10(3)/mcL    Neut # 3.58 2.1 - 9.2 x10(3)/mcL    Mono # 0.65 0.1 - 1.3 x10(3)/mcL    Eos # 0.21 0 - 0.9 x10(3)/mcL    Baso # 0.03 <=0.2 x10(3)/mcL    IG# 0.01 0 - 0.04 x10(3)/mcL    IG% 0.2 %    NRBC% 0.0 %   Magnesium    Collection Time: 07/07/23  3:50 AM   Result Value Ref Range     Magnesium Level 2.10 1.60 - 2.60 mg/dL   POCT glucose    Collection Time: 07/07/23 11:31 AM   Result Value Ref Range    POCT Glucose 322 (H) 70 - 110 mg/dL   Troponin I    Collection Time: 07/07/23 12:31 PM   Result Value Ref Range    Troponin-I <0.010 0.000 - 0.045 ng/mL   POCT glucose    Collection Time: 07/07/23  4:27 PM   Result Value Ref Range    POCT Glucose 347 (H) 70 - 110 mg/dL   PTT Heparin Monitoring    Collection Time: 07/07/23  4:40 PM   Result Value Ref Range    PTT Heparin Monitor 102.6 (H) 23.2 - 33.7 seconds   POCT glucose    Collection Time: 07/07/23  8:28 PM   Result Value Ref Range    POCT Glucose 286 (H) 70 - 110 mg/dL   PTT Heparin Monitoring    Collection Time: 07/07/23 11:54 PM   Result Value Ref Range    PTT Heparin Monitor 86.7 (H) 23.2 - 33.7 seconds   POCT glucose    Collection Time: 07/08/23  4:40 AM   Result Value Ref Range    POCT Glucose 184 (H) 70 - 110 mg/dL   Comprehensive Metabolic Panel    Collection Time: 07/08/23  6:22 AM   Result Value Ref Range    Sodium Level 136 136 - 145 mmol/L    Potassium Level 4.4 3.5 - 5.1 mmol/L    Chloride 103 98 - 107 mmol/L    Carbon Dioxide 23 23 - 31 mmol/L    Glucose Level 214 (H) 82 - 115 mg/dL    Blood Urea Nitrogen 10.0 9.8 - 20.1 mg/dL    Creatinine 1.10 (H) 0.55 - 1.02 mg/dL    Calcium Level Total 9.0 8.4 - 10.2 mg/dL    Protein Total 6.2 5.8 - 7.6 gm/dL    Albumin Level 3.0 (L) 3.4 - 4.8 g/dL    Globulin 3.2 2.4 - 3.5 gm/dL    Albumin/Globulin Ratio 0.9 (L) 1.1 - 2.0 ratio    Bilirubin Total 0.6 <=1.5 mg/dL    Alkaline Phosphatase 51 40 - 150 unit/L    Alanine Aminotransferase 16 0 - 55 unit/L    Aspartate Aminotransferase 26 5 - 34 unit/L    eGFR 53 mls/min/1.73/m2   CBC with Differential    Collection Time: 07/08/23  6:22 AM   Result Value Ref Range    WBC 5.77 4.50 - 11.50 x10(3)/mcL    RBC 3.51 (L) 4.20 - 5.40 x10(6)/mcL    Hgb 9.9 (L) 12.0 - 16.0 g/dL    Hct 31.4 (L) 37.0 - 47.0 %    MCV 89.5 80.0 - 94.0 fL    MCH 28.2 27.0 - 31.0 pg     MCHC 31.5 (L) 33.0 - 36.0 g/dL    RDW 13.1 11.5 - 17.0 %    Platelet 220 130 - 400 x10(3)/mcL    MPV 9.8 7.4 - 10.4 fL    Neut % 54.3 %    Lymph % 32.2 %    Mono % 10.4 %    Eos % 2.6 %    Basophil % 0.3 %    Lymph # 1.86 0.6 - 4.6 x10(3)/mcL    Neut # 3.13 2.1 - 9.2 x10(3)/mcL    Mono # 0.60 0.1 - 1.3 x10(3)/mcL    Eos # 0.15 0 - 0.9 x10(3)/mcL    Baso # 0.02 <=0.2 x10(3)/mcL    IG# 0.01 0 - 0.04 x10(3)/mcL    IG% 0.2 %    NRBC% 0.0 %   PTT Heparin Monitoring    Collection Time: 07/08/23  6:22 AM   Result Value Ref Range    PTT Heparin Monitor 87.2 (H) 23.2 - 33.7 seconds   APTT    Collection Time: 07/08/23 10:05 AM   Result Value Ref Range    PTT 99.5 (H) 23.2 - 33.7 seconds   POCT glucose    Collection Time: 07/08/23 10:50 AM   Result Value Ref Range    POCT Glucose 279 (H) 70 - 110 mg/dL   POCT glucose    Collection Time: 07/08/23  4:23 PM   Result Value Ref Range    POCT Glucose 181 (H) 70 - 110 mg/dL   POCT glucose    Collection Time: 07/08/23  8:08 PM   Result Value Ref Range    POCT Glucose 361 (H) 70 - 110 mg/dL   APTT    Collection Time: 07/09/23  4:39 AM   Result Value Ref Range    PTT 96.2 (H) 23.2 - 33.7 seconds   CBC with Differential    Collection Time: 07/09/23  4:39 AM   Result Value Ref Range    WBC 4.77 4.50 - 11.50 x10(3)/mcL    RBC 3.75 (L) 4.20 - 5.40 x10(6)/mcL    Hgb 10.6 (L) 12.0 - 16.0 g/dL    Hct 34.2 (L) 37.0 - 47.0 %    MCV 91.2 80.0 - 94.0 fL    MCH 28.3 27.0 - 31.0 pg    MCHC 31.0 (L) 33.0 - 36.0 g/dL    RDW 13.2 11.5 - 17.0 %    Platelet 245 130 - 400 x10(3)/mcL    MPV 9.8 7.4 - 10.4 fL    Neut % 48.1 %    Lymph % 35.6 %    Mono % 11.7 %    Eos % 3.8 %    Basophil % 0.6 %    Lymph # 1.70 0.6 - 4.6 x10(3)/mcL    Neut # 2.29 2.1 - 9.2 x10(3)/mcL    Mono # 0.56 0.1 - 1.3 x10(3)/mcL    Eos # 0.18 0 - 0.9 x10(3)/mcL    Baso # 0.03 <=0.2 x10(3)/mcL    IG# 0.01 0 - 0.04 x10(3)/mcL    IG% 0.2 %    NRBC% 0.0 %   POCT glucose    Collection Time: 07/09/23  5:13 AM   Result Value Ref Range     POCT Glucose 164 (H) 70 - 110 mg/dL   POCT glucose    Collection Time: 07/09/23 10:37 AM   Result Value Ref Range    POCT Glucose 257 (H) 70 - 110 mg/dL       Telemetry:  SR    Physical Exam  Constitutional:       General: She is not in acute distress.  Cardiovascular:      Rate and Rhythm: Normal rate.      Heart sounds: No murmur heard.     Comments: Non palpable Left DP; 1+ Right DP   Pulmonary:      Effort: Pulmonary effort is normal. No respiratory distress.   Musculoskeletal:      Right lower leg: No edema.      Left lower leg: No edema.   Skin:     General: Skin is warm and dry.   Neurological:      Mental Status: She is alert.       Current Inpatient Medications:    Current Facility-Administered Medications:     0.9%  NaCl infusion, , Intravenous, Once, Xiomy Terry MD    acetaminophen tablet 650 mg, 650 mg, Oral, Q6H PRN, OPAL KyleP-BC    ALPRAZolam tablet 0.5 mg, 0.5 mg, Oral, TID PRN, Goyo Ramirez MD    aluminum-magnesium hydroxide-simethicone 200-200-20 mg/5 mL suspension 30 mL, 30 mL, Oral, QID PRN, LOUIE KyleCNP-BC    aspirin EC tablet 81 mg, 81 mg, Oral, Daily, Maine Riggs MD, 81 mg at 07/09/23 0820    atorvastatin tablet 40 mg, 40 mg, Oral, QHS, Maine Riggs MD, 40 mg at 07/08/23 2004    carvediloL tablet 6.25 mg, 6.25 mg, Oral, BID, Delia Vee MD, 6.25 mg at 07/09/23 0819    cloNIDine tablet 0.2 mg, 0.2 mg, Oral, Q6H PRN, Delia Vee MD, 0.2 mg at 07/08/23 0009    clopidogreL tablet 75 mg, 75 mg, Oral, Daily, Maine Riggs MD, 75 mg at 07/09/23 0820    dextrose 10% bolus 125 mL 125 mL, 12.5 g, Intravenous, PRN, OPAL KyleP-BC    dextrose 10% bolus 250 mL 250 mL, 25 g, Intravenous, PRN, OPAL KyleP-BC    DULoxetine DR capsule 60 mg, 60 mg, Oral, QAMMaine MD, 60 mg at 07/09/23 0510    gabapentin capsule 100 mg, 100 mg, Oral, QHS, Delia Vee MD, 100 mg at 07/08/23 2004    glucagon (human recombinant) injection 1 mg, 1 mg,  Intramuscular, PRN, Araceli Agosto AGACNP-BC    heparin 25,000 units in dextrose 5% (100 units/ml) IV bolus from bag - ADDITIONAL PRN BOLUS - 30 units/kg, 30 Units/kg (Adjusted), Intravenous, PRN, Nicole Stern, FNP, 1,866 Units at 07/07/23 0845    heparin 25,000 units in dextrose 5% (100 units/ml) IV bolus from bag - ADDITIONAL PRN BOLUS - 60 units/kg, 60 Units/kg (Adjusted), Intravenous, PRN, Nicole Jarred, FNP    heparin 25,000 units in dextrose 5% 250 mL (100 units/mL) infusion HIGH INTENSITY nomogram - [facility], 0-40 Units/kg/hr (Adjusted), Intravenous, Continuous, Nicole Stern, FNP, Last Rate: 8.1 mL/hr at 07/08/23 0509, 13 Units/kg/hr at 07/08/23 0509    HYDROcodone-acetaminophen 5-325 mg per tablet 1 tablet, 1 tablet, Oral, Q6H PRN, Maine Riggs MD, 1 tablet at 07/09/23 0824    insulin aspart U-100 injection 1-10 Units, 1-10 Units, Subcutaneous, Q6H PRN, Araceli Agosto AGACNP-BC, 2 Units at 07/08/23 1632    insulin detemir U-100 injection 15 Units, 15 Units, Subcutaneous, QHS, Goyo Ramirez MD, 15 Units at 07/08/23 2004    magnesium oxide tablet 400 mg, 400 mg, Oral, BID, Maine Riggs MD, 400 mg at 07/09/23 0820    melatonin tablet 6 mg, 6 mg, Oral, Nightly PRN, Araceli Agosto AGACNP-BC, 6 mg at 07/06/23 2212    morphine injection 4 mg, 4 mg, Intravenous, Q4H PRN, Maine Riggs MD, 4 mg at 07/06/23 0226    naloxone 0.4 mg/mL injection 0.02 mg, 0.02 mg, Intravenous, PRN, Araceli Agosto AGACNP-BC    ondansetron injection 4 mg, 4 mg, Intravenous, Q4H PRN, Araceli G Triny, AGACNP-BC    pantoprazole EC tablet 40 mg, 40 mg, Oral, Daily, Maine Riggs MD, 40 mg at 07/09/23 0820    polyethylene glycol packet 17 g, 17 g, Oral, BID PRN, SHERRY Kyle-BC, 17 g at 07/08/23 1821    prochlorperazine injection Soln 5 mg, 5 mg, Intravenous, Q6H PRN, KEVIN Kyle    simethicone chewable tablet 80 mg, 1 tablet, Oral, QID PRN, KEVIN Kyle, 80 mg at 07/07/23 1021    sodium  chloride 0.9% flush 10 mL, 10 mL, Intravenous, PRN, Xiomy Terry MD    Flushing PICC Protocol, , , Until Discontinued **AND** sodium chloride 0.9% flush 10 mL, 10 mL, Intravenous, Q6H, 10 mL at 07/09/23 0511 **AND** sodium chloride 0.9% flush 10 mL, 10 mL, Intravenous, PRN, Delia Vee MD    VTE Risk Mitigation (From admission, onward)           Ordered     Reason for No Pharmacological VTE Prophylaxis  Once        Question:  Reasons:  Answer:  Physician Provided (leave comment)    07/05/23 2116     IP VTE HIGH RISK PATIENT  Once         07/05/23 2116     Place sequential compression device  Until discontinued         07/05/23 2116     heparin 25,000 units in dextrose 5% (100 units/ml) IV bolus from bag - ADDITIONAL PRN BOLUS - 60 units/kg  As needed (PRN)        Question:  Heparin Infusion Adjustment (DO NOT MODIFY ANSWER)  Answer:  \[email or web address]\epic\Images\Pharmacy\HeparinInfusions\heparin HIGH INTENSITY nomogram for [facility] [id number]    07/05/23 1946     heparin 25,000 units in dextrose 5% (100 units/ml) IV bolus from bag - ADDITIONAL PRN BOLUS - 30 units/kg  As needed (PRN)        Question:  Heparin Infusion Adjustment (DO NOT MODIFY ANSWER)  Answer:  \\[email or web address]\epic\Images\Pharmacy\HeparinInfusions\heparin HIGH INTENSITY nomogram for [facility] [id number]    07/05/23 1946     heparin 25,000 units in dextrose 5% 250 mL (100 units/mL) infusion HIGH INTENSITY nomogram - [facility]  Continuous        Question Answer Comment   Heparin Infusion Adjustment (DO NOT MODIFY ANSWER) \\[email or web address]\epic\Images\Pharmacy\HeparinInfusions\heparin HIGH INTENSITY nomogram for [facility] [id number].pdf    Begin at (in units/kg/hr) 18        07/05/23 1946                    Assessment:   Acute lower limb ischemia, left- Patient denies rest pain   PAD    - Left DP occlusion on Arterial Duplex this admission, monophasic flow on bedside Doppler today    - CTA Runoff: R SFA stent patent with 70% stenosis, R Pop patent heavily calcified, L SFA stent patent  with multifocal stenosis, L Pop patent heavily calcified, Right sluggish single vessel runoff without significant flow in R PTA, Left sluggish 2 vessel runoff    - Peripheral angio in 7.2022 s/p L SFA and L Pop stents    - Peripheral angio in 8.2022 s/p R SFA and R Pop stents    - Right and Left ESSIE 0.82 in 2022    - Dr. Estrella ([facility]) planning peripheral angiogram on 7.11.23  CAD    - [facility] in 5.2022: PCI/Stent to RCAx2, OM1  ICMO    - EF 55% in 5.2022  Hypertension  Normocytic anemia  T2DM  CKD2    - Cr improved 1.07 from 1.09 (1.22 on admission)      Plan:   Plan for peripheral angiogram Tuesday 7.11.23. NPO p MN on Monday   Continue Heparin gtt. H/H stable   Continue home meds, aspirin, plavix, statin, coreg.  Routine labs      VAN Christensen  [facility]  07/09/2023

## 2023-07-10 LAB
ALBUMIN SERPL-MCNC: 3.3 G/DL (ref 3.4–4.8)
ALBUMIN/GLOB SERPL: 0.9 RATIO (ref 1.1–2)
ALP SERPL-CCNC: 68 UNIT/L (ref 40–150)
ALT SERPL-CCNC: 28 UNIT/L (ref 0–55)
APTT PPP: 99.4 SECONDS (ref 23.2–33.7)
AST SERPL-CCNC: 53 UNIT/L (ref 5–34)
BASOPHILS # BLD AUTO: 0.04 X10(3)/MCL
BASOPHILS NFR BLD AUTO: 0.7 %
BILIRUBIN DIRECT+TOT PNL SERPL-MCNC: 0.7 MG/DL
BUN SERPL-MCNC: 12.1 MG/DL (ref 9.8–20.1)
CALCIUM SERPL-MCNC: 9.5 MG/DL (ref 8.4–10.2)
CHLORIDE SERPL-SCNC: 104 MMOL/L (ref 98–107)
CO2 SERPL-SCNC: 26 MMOL/L (ref 23–31)
CREAT SERPL-MCNC: 1.06 MG/DL (ref 0.55–1.02)
EOSINOPHIL # BLD AUTO: 0.24 X10(3)/MCL (ref 0–0.9)
EOSINOPHIL NFR BLD AUTO: 4.1 %
ERYTHROCYTE [DISTWIDTH] IN BLOOD BY AUTOMATED COUNT: 13.2 % (ref 11.5–17)
GFR SERPLBLD CREATININE-BSD FMLA CKD-EPI: 56 MLS/MIN/1.73/M2
GLOBULIN SER-MCNC: 3.6 GM/DL (ref 2.4–3.5)
GLUCOSE SERPL-MCNC: 140 MG/DL (ref 82–115)
HCT VFR BLD AUTO: 36.3 % (ref 37–47)
HGB BLD-MCNC: 11.4 G/DL (ref 12–16)
IMM GRANULOCYTES # BLD AUTO: 0.02 X10(3)/MCL (ref 0–0.04)
IMM GRANULOCYTES NFR BLD AUTO: 0.3 %
LYMPHOCYTES # BLD AUTO: 1.74 X10(3)/MCL (ref 0.6–4.6)
LYMPHOCYTES NFR BLD AUTO: 30.1 %
MAGNESIUM SERPL-MCNC: 2.1 MG/DL (ref 1.6–2.6)
MCH RBC QN AUTO: 28.4 PG (ref 27–31)
MCHC RBC AUTO-ENTMCNC: 31.4 G/DL (ref 33–36)
MCV RBC AUTO: 90.5 FL (ref 80–94)
MONOCYTES # BLD AUTO: 0.61 X10(3)/MCL (ref 0.1–1.3)
MONOCYTES NFR BLD AUTO: 10.5 %
NEUTROPHILS # BLD AUTO: 3.14 X10(3)/MCL (ref 2.1–9.2)
NEUTROPHILS NFR BLD AUTO: 54.3 %
NRBC BLD AUTO-RTO: 0 %
PLATELET # BLD AUTO: 277 X10(3)/MCL (ref 130–400)
PMV BLD AUTO: 9.8 FL (ref 7.4–10.4)
POCT GLUCOSE: 138 MG/DL (ref 70–110)
POCT GLUCOSE: 157 MG/DL (ref 70–110)
POCT GLUCOSE: 279 MG/DL (ref 70–110)
POCT GLUCOSE: 300 MG/DL (ref 70–110)
POTASSIUM SERPL-SCNC: 4.9 MMOL/L (ref 3.5–5.1)
PROT SERPL-MCNC: 6.9 GM/DL (ref 5.8–7.6)
RBC # BLD AUTO: 4.01 X10(6)/MCL (ref 4.2–5.4)
SODIUM SERPL-SCNC: 139 MMOL/L (ref 136–145)
WBC # SPEC AUTO: 5.79 X10(3)/MCL (ref 4.5–11.5)

## 2023-07-10 PROCEDURE — 83735 ASSAY OF MAGNESIUM: CPT | Performed by: NURSE PRACTITIONER

## 2023-07-10 PROCEDURE — 80053 COMPREHEN METABOLIC PANEL: CPT | Performed by: NURSE PRACTITIONER

## 2023-07-10 PROCEDURE — 25000003 PHARM REV CODE 250: Performed by: INTERNAL MEDICINE

## 2023-07-10 PROCEDURE — A4216 STERILE WATER/SALINE, 10 ML: HCPCS | Performed by: INTERNAL MEDICINE

## 2023-07-10 PROCEDURE — 94761 N-INVAS EAR/PLS OXIMETRY MLT: CPT

## 2023-07-10 PROCEDURE — 63600175 PHARM REV CODE 636 W HCPCS: Performed by: NURSE PRACTITIONER

## 2023-07-10 PROCEDURE — 21400001 HC TELEMETRY ROOM

## 2023-07-10 PROCEDURE — 85730 THROMBOPLASTIN TIME PARTIAL: CPT | Performed by: NURSE PRACTITIONER

## 2023-07-10 PROCEDURE — 63600175 PHARM REV CODE 636 W HCPCS: Performed by: INTERNAL MEDICINE

## 2023-07-10 PROCEDURE — 85025 COMPLETE CBC W/AUTO DIFF WBC: CPT | Performed by: NURSE PRACTITIONER

## 2023-07-10 RX ORDER — INSULIN ASPART 100 [IU]/ML
5 INJECTION, SOLUTION INTRAVENOUS; SUBCUTANEOUS
Status: DISCONTINUED | OUTPATIENT
Start: 2023-07-10 | End: 2023-07-13 | Stop reason: HOSPADM

## 2023-07-10 RX ADMIN — CLOPIDOGREL BISULFATE 75 MG: 75 TABLET ORAL at 08:07

## 2023-07-10 RX ADMIN — SODIUM CHLORIDE, PRESERVATIVE FREE 10 ML: 5 INJECTION INTRAVENOUS at 12:07

## 2023-07-10 RX ADMIN — HYDROCODONE BITARTRATE AND ACETAMINOPHEN 1 TABLET: 5; 325 TABLET ORAL at 02:07

## 2023-07-10 RX ADMIN — CARVEDILOL 6.25 MG: 3.12 TABLET, FILM COATED ORAL at 08:07

## 2023-07-10 RX ADMIN — PANTOPRAZOLE SODIUM 40 MG: 40 TABLET, DELAYED RELEASE ORAL at 08:07

## 2023-07-10 RX ADMIN — INSULIN ASPART 6 UNITS: 100 INJECTION, SOLUTION INTRAVENOUS; SUBCUTANEOUS at 11:07

## 2023-07-10 RX ADMIN — ATORVASTATIN CALCIUM 40 MG: 40 TABLET, FILM COATED ORAL at 08:07

## 2023-07-10 RX ADMIN — INSULIN ASPART 6 UNITS: 100 INJECTION, SOLUTION INTRAVENOUS; SUBCUTANEOUS at 04:07

## 2023-07-10 RX ADMIN — Medication 400 MG: at 08:07

## 2023-07-10 RX ADMIN — DULOXETINE HYDROCHLORIDE 60 MG: 30 CAPSULE, DELAYED RELEASE ORAL at 05:07

## 2023-07-10 RX ADMIN — ASPIRIN 81 MG: 81 TABLET, COATED ORAL at 08:07

## 2023-07-10 RX ADMIN — SODIUM CHLORIDE, PRESERVATIVE FREE 10 ML: 5 INJECTION INTRAVENOUS at 05:07

## 2023-07-10 RX ADMIN — GABAPENTIN 100 MG: 100 CAPSULE ORAL at 08:07

## 2023-07-10 RX ADMIN — INSULIN DETEMIR 15 UNITS: 100 INJECTION, SOLUTION SUBCUTANEOUS at 08:07

## 2023-07-10 RX ADMIN — SODIUM CHLORIDE, PRESERVATIVE FREE 10 ML: 5 INJECTION INTRAVENOUS at 06:07

## 2023-07-10 RX ADMIN — HEPARIN SODIUM 13 UNITS/KG/HR: 10000 INJECTION, SOLUTION INTRAVENOUS at 09:07

## 2023-07-10 RX ADMIN — INSULIN ASPART 5 UNITS: 100 INJECTION, SOLUTION INTRAVENOUS; SUBCUTANEOUS at 04:07

## 2023-07-10 NOTE — PROGRESS NOTES
[facility]    Cardiology  Progress Note    Patient Name: Mayra Parker  MRN: [id number]  Admission Date: 7/5/2023  Hospital Length of Stay: 5 days  Code Status: Full Code   Attending Physician: Goyo Ramirez MD   Primary Care Physician: Rashaun Weiss MD  Expected Discharge Date:   Principal Problem:<principal problem not specified>    Subjective:     Brief HPI: Mayra Parker is a 73 y.o. female with a pmh of hypertension, type 2 diabetes mellitus with peripheral neuropathy, CAD, CVA, PAD, and CKD2, known to [facility] (Dr. Estrella - scheduled for peripheral angiogram on 7.11.23), who presented with complaint of worsening bilateral leg pain after a recent decrease in gabapentin dose. Workup with CTA showed severe stenosis without acute occlusion and arterial ultrasound showed severe flow reduction in left with moderate flow reduction in right. Patient also found to be hyperglycemic and was admitted to hospital medicine for management. Cardiology was consulted for left leg ischemia. Per family member, patient's left lower extremity was colder than normal yesterday, and the patient was in such severe pain that she could not walk. Heparin gtt started upon admission.      Hospital Course:  7.6.23: NAD. Warm extremities. Motor function intact.   7.7.23: NAD. Chest pain this morning. Possible indigestion.   7.8.23: NAD. Denies CP or SOB. Family at bedside   7.9.23: NAD. Denies CP or SOB. BLE warm. Denies rest pain. Remains on Heparin.VSS.  7.10.23: NAD Noted. Vitals Stable. On Heparin Infusion. On DAPT. No bleeding noted. Peripheral Angiogram tomorrow.    PMH: CVA, HLD, depression, dementia, T2DM, HTN, CKD2, CAD stents to RCAx2 and OM1  PSH: Right and left CEA, back surgery  Family History: Father- CAD native, Mother- CAD native, Son- CAD, MI- reason for death  Social History: Former smoker, quit 2022    Previous Cardiac Diagnostics:   CTA Runoff  (7.5.23):  Vascular  images: The abdominal aorta is normal in caliber.  There is some atherosclerotic plaque in the abdominal aorta and mesenteric vessels.  The celiac axis is patent.  SMA is patent.  There is a single renal artery on the left.  There is a 60% stenosis in the proximal left renal artery secondary to calcified plaque.  There is a 50 -60% stenosis in the right proximal renal artery secondary to calcified plaque. The DEVI is patent but heavily calcified.   Distal abdominal aorta has some atherosclerotic plaque within it.  Calcified plaque is seen in both common iliac arteries with multifocal areas of stenosis measuring up to 70%.  The proximal external iliac artery on the left side has a high-grade 80% stenosis.  The distal external iliac artery on the left side has some calcifications but no significant stenosis.  External iliac artery on the right side is patent with some calcified plaque but no high-grade stenosis is seen.   The right common femoral artery has some calcified plaque within it with a 40-50% stenosis.  The superficial femoral artery on the right side has a stent within it with multifocal areas of stenosis within the stent measuring up to 70%.  Popliteal artery is patent but is heavily calcified with multifocal areas of 50-60% stenosis.  There is very sluggish  single vessel runoff in the right lower extremity via the anterior tibial artery.  No significant flow is seen in the posterior tibial artery.   There is a stent in the superficial femoral artery on the left side.  Multifocal areas of stenosis seen within the stent.  There is flow in the popliteal artery.  It is heavily calcified.  There is sluggish 2 vessel runoff seen via the posterior tibial and anterior tibial artery.      CV US Arterial & ESSIE (7.5.23):  Patent right lower extremity arterial system with biphasic waveforms throughout demonstrating moderate arterial flow reduction consistent with inflow disease.   The left lower extremity arterial  system demonstrated severe arterial flow reduction with a stenosis of the mid superficial femoral and no flow identified in the dorsalis pedis artery suggestive of an occlusion.   Biphasic waveforms in the common femoral and then change to monophasic waveforms throughout are consistent with inflow and multilevel disease.   The resting ankle-brachial indices for the right lower extremity demonstrated moderate arterial flow reduction in the posterior tibial and dorsalis pedis arteries.  PTA .73 DPA .79  The resting ankle-brachial indices for the left lower extremity demonstrated severe arterial flow reduction in the posterior tibial artery and no flow in the dorsalis pedis artery, suggestive of an occlusion.   PTA .67 DPA 0.    Peripheral Angiogram with Intervention (8.9.22):  Successful Peripheral Intervention to the Right SFA and Right Popliteal Arteries CSI Atherectomy & Balloon Angioplasty & Placement of 2 Bare-Metal Stents  Left Lower Extremity: LCVA Calcified/Patent. Left SFA and Left Popliteal have been recently Stented (They Remain Widely Patent with no Significant Disease) Below the knee there is a two vessel runoff of the posterior tibial and peroneal arteries.     LHC/Stent (5.20.22):  There was mild left ventricular systolic dysfunction.  The left ventricular end diastolic pressure was normal.  The pre-procedure left ventricular end diastolic pressure was 12.  The ejection fraction was calculated to be 45%.  The ejection fraction was 40-50% by visual estimate.  There was no mitral valve regurgitation.  There was no aortic valve stenosis.  The Prox RCA to Dist RCA lesion was 90% stenosed with 0% stenosis post-intervention. The stents were placed. The distal stent to the RCA was a 2.25 by 38 mm drug-eluting stent. The mid and proximal stent was a 2.5 x 22 mm drug-eluting stent.  A stent was successfully placed.  A stent was successfully placed.  The estimated blood loss was none.  There was two vessel  coronary artery disease.  The PCI was successful.  The 1st Mrg lesion was 90% stenosed with 0% stenosis post-intervention. This marginal stent was a 2.25 x 38 mm drug-eluting stent post dilated to 2.5 mm  A stent was successfully placed.     TTE (5.3.22):  LV normal in size. Global LVSF. LVEF 55%. LV diastolic function is impaired Grade I with normal LA pressure. Noted mild concentric LVH. Mild calcification of aortic valve noted with adequate cuspal excursion. 1+ TR. PASP 31mmHg. Mobility of anterior and posterior mitral leaflet is normal. Mild MAC with Trace MR.      PET (4.19.22):  Abnormal perfusion study consistent with large area of moderate to severe inferior and inferoapical ischemia. Perfusion imaging suggestive of single vessel disease in distribution of RCA. LV cavity normal on stress studies. Stress LVEF 42% and LV global function is mildly reduced. Rest LV cavity normal. Rest LVEF 56% and rest LV global function is normal.      Review of Systems   Respiratory:  Negative for shortness of breath.    Cardiovascular:  Negative for chest pain, palpitations and leg swelling.      Objective:     Vital Signs (Most Recent):  Temp: 98.6 °F (37 °C) (07/10/23 0721)  Pulse: 78 (07/10/23 0721)  Resp: 18 (07/10/23 0721)  BP: (!) 143/80 (07/10/23 0819)  SpO2: 96 % (07/10/23 0721) Vital Signs (24h Range):  Temp:  [97.2 °F (36.2 °C)-98.6 °F (37 °C)] 98.6 °F (37 °C)  Pulse:  [68-78] 78  Resp:  [16-18] 18  SpO2:  [96 %] 96 %  BP: (113-143)/(63-80) 143/80     Weight: 75.3 kg (166 lb)  Body mass index is 28.49 kg/m².    SpO2: 96 %         Intake/Output Summary (Last 24 hours) at 7/10/2023 1056  Last data filed at 7/10/2023 0200  Gross per 24 hour   Intake 360 ml   Output 1200 ml   Net -840 ml         Lines/Drains/Airways       Peripheral Intravenous Line  Duration                  Peripheral IV - Single Lumen 07/05/23 2231 22 G Left;Posterior Hand 4 days         Midline Catheter Insertion/Assessment  - Single Lumen 07/07/23  1026 Left basilic vein (medial side of arm) 3 days                    Significant Labs:   Recent Results (from the past 72 hour(s))   POCT glucose    Collection Time: 07/07/23 11:31 AM   Result Value Ref Range    POCT Glucose 322 (H) 70 - 110 mg/dL   Troponin I    Collection Time: 07/07/23 12:31 PM   Result Value Ref Range    Troponin-I <0.010 0.000 - 0.045 ng/mL   POCT glucose    Collection Time: 07/07/23  4:27 PM   Result Value Ref Range    POCT Glucose 347 (H) 70 - 110 mg/dL   PTT Heparin Monitoring    Collection Time: 07/07/23  4:40 PM   Result Value Ref Range    PTT Heparin Monitor 102.6 (H) 23.2 - 33.7 seconds   POCT glucose    Collection Time: 07/07/23  8:28 PM   Result Value Ref Range    POCT Glucose 286 (H) 70 - 110 mg/dL   PTT Heparin Monitoring    Collection Time: 07/07/23 11:54 PM   Result Value Ref Range    PTT Heparin Monitor 86.7 (H) 23.2 - 33.7 seconds   POCT glucose    Collection Time: 07/08/23  4:40 AM   Result Value Ref Range    POCT Glucose 184 (H) 70 - 110 mg/dL   Comprehensive Metabolic Panel    Collection Time: 07/08/23  6:22 AM   Result Value Ref Range    Sodium Level 136 136 - 145 mmol/L    Potassium Level 4.4 3.5 - 5.1 mmol/L    Chloride 103 98 - 107 mmol/L    Carbon Dioxide 23 23 - 31 mmol/L    Glucose Level 214 (H) 82 - 115 mg/dL    Blood Urea Nitrogen 10.0 9.8 - 20.1 mg/dL    Creatinine 1.10 (H) 0.55 - 1.02 mg/dL    Calcium Level Total 9.0 8.4 - 10.2 mg/dL    Protein Total 6.2 5.8 - 7.6 gm/dL    Albumin Level 3.0 (L) 3.4 - 4.8 g/dL    Globulin 3.2 2.4 - 3.5 gm/dL    Albumin/Globulin Ratio 0.9 (L) 1.1 - 2.0 ratio    Bilirubin Total 0.6 <=1.5 mg/dL    Alkaline Phosphatase 51 40 - 150 unit/L    Alanine Aminotransferase 16 0 - 55 unit/L    Aspartate Aminotransferase 26 5 - 34 unit/L    eGFR 53 mls/min/1.73/m2   CBC with Differential    Collection Time: 07/08/23  6:22 AM   Result Value Ref Range    WBC 5.77 4.50 - 11.50 x10(3)/mcL    RBC 3.51 (L) 4.20 - 5.40 x10(6)/mcL    Hgb 9.9 (L) 12.0 -  16.0 g/dL    Hct 31.4 (L) 37.0 - 47.0 %    MCV 89.5 80.0 - 94.0 fL    MCH 28.2 27.0 - 31.0 pg    MCHC 31.5 (L) 33.0 - 36.0 g/dL    RDW 13.1 11.5 - 17.0 %    Platelet 220 130 - 400 x10(3)/mcL    MPV 9.8 7.4 - 10.4 fL    Neut % 54.3 %    Lymph % 32.2 %    Mono % 10.4 %    Eos % 2.6 %    Basophil % 0.3 %    Lymph # 1.86 0.6 - 4.6 x10(3)/mcL    Neut # 3.13 2.1 - 9.2 x10(3)/mcL    Mono # 0.60 0.1 - 1.3 x10(3)/mcL    Eos # 0.15 0 - 0.9 x10(3)/mcL    Baso # 0.02 <=0.2 x10(3)/mcL    IG# 0.01 0 - 0.04 x10(3)/mcL    IG% 0.2 %    NRBC% 0.0 %   PTT Heparin Monitoring    Collection Time: 07/08/23  6:22 AM   Result Value Ref Range    PTT Heparin Monitor 87.2 (H) 23.2 - 33.7 seconds   APTT    Collection Time: 07/08/23 10:05 AM   Result Value Ref Range    PTT 99.5 (H) 23.2 - 33.7 seconds   POCT glucose    Collection Time: 07/08/23 10:50 AM   Result Value Ref Range    POCT Glucose 279 (H) 70 - 110 mg/dL   POCT glucose    Collection Time: 07/08/23  4:23 PM   Result Value Ref Range    POCT Glucose 181 (H) 70 - 110 mg/dL   POCT glucose    Collection Time: 07/08/23  8:08 PM   Result Value Ref Range    POCT Glucose 361 (H) 70 - 110 mg/dL   APTT    Collection Time: 07/09/23  4:39 AM   Result Value Ref Range    PTT 96.2 (H) 23.2 - 33.7 seconds   CBC with Differential    Collection Time: 07/09/23  4:39 AM   Result Value Ref Range    WBC 4.77 4.50 - 11.50 x10(3)/mcL    RBC 3.75 (L) 4.20 - 5.40 x10(6)/mcL    Hgb 10.6 (L) 12.0 - 16.0 g/dL    Hct 34.2 (L) 37.0 - 47.0 %    MCV 91.2 80.0 - 94.0 fL    MCH 28.3 27.0 - 31.0 pg    MCHC 31.0 (L) 33.0 - 36.0 g/dL    RDW 13.2 11.5 - 17.0 %    Platelet 245 130 - 400 x10(3)/mcL    MPV 9.8 7.4 - 10.4 fL    Neut % 48.1 %    Lymph % 35.6 %    Mono % 11.7 %    Eos % 3.8 %    Basophil % 0.6 %    Lymph # 1.70 0.6 - 4.6 x10(3)/mcL    Neut # 2.29 2.1 - 9.2 x10(3)/mcL    Mono # 0.56 0.1 - 1.3 x10(3)/mcL    Eos # 0.18 0 - 0.9 x10(3)/mcL    Baso # 0.03 <=0.2 x10(3)/mcL    IG# 0.01 0 - 0.04 x10(3)/mcL    IG% 0.2 %     NRBC% 0.0 %   POCT glucose    Collection Time: 07/09/23  5:13 AM   Result Value Ref Range    POCT Glucose 164 (H) 70 - 110 mg/dL   POCT glucose    Collection Time: 07/09/23 10:37 AM   Result Value Ref Range    POCT Glucose 257 (H) 70 - 110 mg/dL   POCT glucose    Collection Time: 07/09/23  4:40 PM   Result Value Ref Range    POCT Glucose 260 (H) 70 - 110 mg/dL   POCT glucose    Collection Time: 07/09/23  8:15 PM   Result Value Ref Range    POCT Glucose 163 (H) 70 - 110 mg/dL   POCT glucose    Collection Time: 07/10/23  5:21 AM   Result Value Ref Range    POCT Glucose 138 (H) 70 - 110 mg/dL   APTT    Collection Time: 07/10/23  5:35 AM   Result Value Ref Range    PTT 99.4 (H) 23.2 - 33.7 seconds   Comprehensive Metabolic Panel    Collection Time: 07/10/23  5:35 AM   Result Value Ref Range    Sodium Level 139 136 - 145 mmol/L    Potassium Level 4.9 3.5 - 5.1 mmol/L    Chloride 104 98 - 107 mmol/L    Carbon Dioxide 26 23 - 31 mmol/L    Glucose Level 140 (H) 82 - 115 mg/dL    Blood Urea Nitrogen 12.1 9.8 - 20.1 mg/dL    Creatinine 1.06 (H) 0.55 - 1.02 mg/dL    Calcium Level Total 9.5 8.4 - 10.2 mg/dL    Protein Total 6.9 5.8 - 7.6 gm/dL    Albumin Level 3.3 (L) 3.4 - 4.8 g/dL    Globulin 3.6 (H) 2.4 - 3.5 gm/dL    Albumin/Globulin Ratio 0.9 (L) 1.1 - 2.0 ratio    Bilirubin Total 0.7 <=1.5 mg/dL    Alkaline Phosphatase 68 40 - 150 unit/L    Alanine Aminotransferase 28 0 - 55 unit/L    Aspartate Aminotransferase 53 (H) 5 - 34 unit/L    eGFR 56 mls/min/1.73/m2   Magnesium    Collection Time: 07/10/23  5:35 AM   Result Value Ref Range    Magnesium Level 2.10 1.60 - 2.60 mg/dL   CBC with Differential    Collection Time: 07/10/23  5:35 AM   Result Value Ref Range    WBC 5.79 4.50 - 11.50 x10(3)/mcL    RBC 4.01 (L) 4.20 - 5.40 x10(6)/mcL    Hgb 11.4 (L) 12.0 - 16.0 g/dL    Hct 36.3 (L) 37.0 - 47.0 %    MCV 90.5 80.0 - 94.0 fL    MCH 28.4 27.0 - 31.0 pg    MCHC 31.4 (L) 33.0 - 36.0 g/dL    RDW 13.2 11.5 - 17.0 %    Platelet 277  130 - 400 x10(3)/mcL    MPV 9.8 7.4 - 10.4 fL    Neut % 54.3 %    Lymph % 30.1 %    Mono % 10.5 %    Eos % 4.1 %    Basophil % 0.7 %    Lymph # 1.74 0.6 - 4.6 x10(3)/mcL    Neut # 3.14 2.1 - 9.2 x10(3)/mcL    Mono # 0.61 0.1 - 1.3 x10(3)/mcL    Eos # 0.24 0 - 0.9 x10(3)/mcL    Baso # 0.04 <=0.2 x10(3)/mcL    IG# 0.02 0 - 0.04 x10(3)/mcL    IG% 0.3 %    NRBC% 0.0 %   POCT glucose    Collection Time: 07/10/23 10:17 AM   Result Value Ref Range    POCT Glucose 279 (H) 70 - 110 mg/dL       Telemetry:  SR    Physical Exam  Constitutional:       General: She is not in acute distress.  Cardiovascular:      Rate and Rhythm: Normal rate.      Heart sounds: No murmur heard.     Comments: Non palpable Left DP; 1+ Right DP   Pulmonary:      Effort: Pulmonary effort is normal. No respiratory distress.   Musculoskeletal:      Right lower leg: No edema.      Left lower leg: No edema.   Skin:     General: Skin is warm and dry.   Neurological:      Mental Status: She is alert.       Current Inpatient Medications:    Current Facility-Administered Medications:     0.9%  NaCl infusion, , Intravenous, Once, Goyo Ramirez MD    acetaminophen tablet 650 mg, 650 mg, Oral, Q6H PRN, [facility]    ALPRAZolam tablet 0.5 mg, 0.5 mg, Oral, TID PRN, Goyo Ramirez MD    aluminum-magnesium hydroxide-simethicone 200-200-20 mg/5 mL suspension 30 mL, 30 mL, Oral, QID PRN, SHERRY KyleBC    aspirin EC tablet 81 mg, 81 mg, Oral, Daily, Maine Riggs MD, 81 mg at 07/10/23 0819    atorvastatin tablet 40 mg, 40 mg, Oral, QHS, Maine Riggs MD, 40 mg at 07/09/23 2012    carvediloL tablet 6.25 mg, 6.25 mg, Oral, BID, Delia Vee MD, 6.25 mg at 07/10/23 0819    cloNIDine tablet 0.2 mg, 0.2 mg, Oral, Q6H PRN, Delia Vee MD, 0.2 mg at 07/08/23 0009    clopidogreL tablet 75 mg, 75 mg, Oral, Daily, Maine Riggs MD, 75 mg at 07/10/23 0819    dextrose 10% bolus 125 mL 125 mL, 12.5 g, Intravenous, PRN, Araceli Agosto,  AGACNP-BC    dextrose 10% bolus 250 mL 250 mL, 25 g, Intravenous, PRN, Araceli Agosto, AGACNP-BC    DULoxetine DR capsule 60 mg, 60 mg, Oral, QAM, Maine Riggs MD, 60 mg at 07/10/23 0520    gabapentin capsule 100 mg, 100 mg, Oral, QHS, Delia Vee MD, 100 mg at 07/09/23 2012    glucagon (human recombinant) injection 1 mg, 1 mg, Intramuscular, PRN, Araceli Agosto, AGACNP-BC    heparin 25,000 units in dextrose 5% (100 units/ml) IV bolus from bag - ADDITIONAL PRN BOLUS - 30 units/kg, 30 Units/kg (Adjusted), Intravenous, PRN, Nicolearis Stern, FNP, 1,866 Units at 07/07/23 0845    heparin 25,000 units in dextrose 5% (100 units/ml) IV bolus from bag - ADDITIONAL PRN BOLUS - 60 units/kg, 60 Units/kg (Adjusted), Intravenous, PRN, Nicole Jarred, FNP    heparin 25,000 units in dextrose 5% 250 mL (100 units/mL) infusion HIGH INTENSITY nomogram - [facility], 0-40 Units/kg/hr (Adjusted), Intravenous, Continuous, Nicole Jarred, FNP, Last Rate: 8.1 mL/hr at 07/08/23 0509, 13 Units/kg/hr at 07/08/23 0509    HYDROcodone-acetaminophen 5-325 mg per tablet 1 tablet, 1 tablet, Oral, Q6H PRN, Maine Riggs MD, 1 tablet at 07/09/23 0824    insulin aspart U-100 injection 1-10 Units, 1-10 Units, Subcutaneous, Q6H PRN, Araceli Agosto AGACNP-BC, 6 Units at 07/09/23 1706    insulin detemir U-100 injection 15 Units, 15 Units, Subcutaneous, QHS, Goyo Ramirez MD, 15 Units at 07/09/23 2012    magnesium oxide tablet 400 mg, 400 mg, Oral, BID, Maine Riggs MD, 400 mg at 07/10/23 0819    melatonin tablet 6 mg, 6 mg, Oral, Nightly PRN, Araceli Agosto AGACNP-BC, 6 mg at 07/06/23 2212    morphine injection 4 mg, 4 mg, Intravenous, Q4H PRN, Maine Riggs MD, 4 mg at 07/06/23 0226    naloxone 0.4 mg/mL injection 0.02 mg, 0.02 mg, Intravenous, PRN, KEVIN Kyle    ondansetron injection 4 mg, 4 mg, Intravenous, Q4H PRN, SHERRY Kyle-BC    pantoprazole EC tablet 40 mg, 40 mg, Oral, Daily, Maine Riggs MD, 40 mg at 07/10/23  0819    polyethylene glycol packet 17 g, 17 g, Oral, BID PRN, Araceli Agosto, AGACNP-BC, 17 g at 07/08/23 1821    prochlorperazine injection Soln 5 mg, 5 mg, Intravenous, Q6H PRN, Araceli Agosto, AGACNP-BC    simethicone chewable tablet 80 mg, 1 tablet, Oral, QID PRN, Araceli Agosto, AGACNP-BC, 80 mg at 07/07/23 1021    sodium chloride 0.9% flush 10 mL, 10 mL, Intravenous, PRN, Xiomy Terry MD    Flushing PICC Protocol, , , Until Discontinued **AND** sodium chloride 0.9% flush 10 mL, 10 mL, Intravenous, Q6H, 10 mL at 07/10/23 0522 **AND** sodium chloride 0.9% flush 10 mL, 10 mL, Intravenous, PRN, Delia Vee MD    VTE Risk Mitigation (From admission, onward)           Ordered     Reason for No Pharmacological VTE Prophylaxis  Once        Question:  Reasons:  Answer:  Physician Provided (leave comment)    07/05/23 2116     IP VTE HIGH RISK PATIENT  Once         07/05/23 2116     Place sequential compression device  Until discontinued         07/05/23 2116     heparin 25,000 units in dextrose 5% (100 units/ml) IV bolus from bag - ADDITIONAL PRN BOLUS - 60 units/kg  As needed (PRN)        Question:  Heparin Infusion Adjustment (DO NOT MODIFY ANSWER)  Answer:  \[email or web address]\Images\Pharmacy\HeparinInfusions\heparin HIGH INTENSITY nomogram for [facility] [id number]    07/05/23 1946     heparin 25,000 units in dextrose 5% (100 units/ml) IV bolus from bag - ADDITIONAL PRN BOLUS - 30 units/kg  As needed (PRN)        Question:  Heparin Infusion Adjustment (DO NOT MODIFY ANSWER)  Answer:  \\[email or web address]\Images\Pharmacy\HeparinInfusions\heparin HIGH INTENSITY nomogram for [facility] [id number]    07/05/23 1946     heparin 25,000 units in dextrose 5% 250 mL (100 units/mL) infusion HIGH INTENSITY nomogram - [facility]  Continuous        Question Answer Comment   Heparin Infusion Adjustment (DO NOT MODIFY ANSWER) \\[email or web address]\Images\Pharmacy\HeparinInfusions\heparin HIGH INTENSITY nomogram for [facility] [id number]    Begin at (in  units/kg/hr) 18        07/05/23 1946                    Assessment:   Acute Limb Ischemia (Denies Rest Pain Currently, Reports Significant Claudication- Chronic in Nature)    - On DAPT & Heparin Infusion  PAD    - Left DP occlusion on Arterial Duplex this admission    - CTA Runoff: R SFA stent patent with 70% stenosis, R Pop patent heavily calcified, L SFA stent patent with multifocal stenosis, L Pop patent heavily calcified, Right sluggish single vessel runoff without significant flow in R PTA, Left sluggish 2 vessel runoff    - Peripheral Angio in 7.2022 s/p L SFA and L Pop Stents    - Peripheral Angio in 8.2022 s/p R SFA and R Pop Stents  CAD    - LHC in 5.2022: PCI/Stent to RCAx2, OM1  ICMO with Recovered LV Function    - EF 55% (5.2022)  Hypertension  Anemia (Stable)    - No Bleeding Noted  DM II  Chronic Kidney Stage II (Renal Function Stable)  Dementia    Plan:   Continue Current Cardiac Medications Including DAPT & Heparin Infusion Re: Obstructive PAD/ALI.  NPO Past Midnight.  Plan Peripheral Angiogram with Possible Intervention Tomorrow (7.11.23)  Risks/Benefits/Alternatives of the Peripheral Angiogram Discussed with the patient and her Daughter. She elects to Proceed. Consent Signed by Daughter given patient's history of Dementia.  Routine Labs in AM    VAN Card  [facility]  07/10/2023    I have seen the patient, reviewed the Nurse Practitioner's note, assessment and plan. I have personally interviewed and examined the patient at bedside and agree with the findings. Medical decision making listed above were done under my guidance.    Physical exam:  Cardiovascular system: regular rhythm, no murmur.  Lungs: CTAB.  Extremities: No leg edema. Reduces pulses L>R    Plan:  For peripheral angio tomorrow  Cont DAPT and IV heparin  NPO after MN

## 2023-07-10 NOTE — PROGRESS NOTES
[facility]  Hospital Medicine Progress Note        Chief Complaint: Inpatient Follow-up for PAD    HPI:   73-year-old female with significant history of ischemic cardiomyopathy ejection fraction-40-50%, CAD status post PCI on aspirin, Plavix, chronic kidney disease stage 2, type 2 diabetes mellitus, HTN, HLD, CVA, chronic dementia, depression.  Patient presented to the ED with complaints of worsening bilateral lower extremity pain x1 day.  Worsening pain on walking.  Hemodynamically stable.  Lab significant for hyperglycemia mild renal insufficiency.  Arterial ultrasound concerning for acute limb ischemia.  CT angiogram with severe stenosis.  Patient was admitted to hospitalist medicine service . resumed aspirin, Plavix.  Initiated heparin GTT.  [facility] consulted for angiogram with intervention.  Angiogram with intervention planned for 7/10 .  Interval Hx:   Patient awake and comfortable. Pain well controlled. Angio moved to tomorrow. Patient has been afebrile and in a good mood.   Eating well.   No family at bedside.     Objective/physical exam:  General: In no acute distress  Chest: Clear to auscultation bilaterally  Heart: RRR, +S1, S2, no appreciable murmur  Abdomen: Soft, nontender, BS +  MSK: Warm, no lower extremity edema, no clubbing or cyanosis  Neurologic: Cranial nerve II-XII intact, Strength 5/5 in all 4 extremities    VITAL SIGNS: 24 HRS MIN & MAX LAST   Temp  Min: 97.2 °F (36.2 °C)  Max: 98.6 °F (37 °C) 97.3 °F (36.3 °C)   BP  Min: 113/75  Max: 158/81 (!) 158/81   Pulse  Min: 68  Max: 78  68   Resp  Min: 16  Max: 18 18   SpO2  Min: 96 %  Max: 97 % 97 %     I have reviewed the following labs:    Recent Labs   Lab 07/08/23  0622 07/09/23  0439 07/10/23  0535   WBC 5.77 4.77 5.79   RBC 3.51* 3.75* 4.01*   HGB 9.9* 10.6* 11.4*   HCT 31.4* 34.2* 36.3*   MCV 89.5 91.2 90.5   MCH 28.2 28.3 28.4   MCHC 31.5* 31.0* 31.4*   RDW 13.1 13.2 13.2    245 277   MPV 9.8 9.8 9.8        Recent Labs   Lab 07/06/23  0503 07/07/23  0350 07/08/23  0622 07/10/23  0535   * 138 136 139   K 4.4 4.9 4.4 4.9   CO2 24 22* 23 26   BUN 14.3 12.0 10.0 12.1   CREATININE 1.09* 1.07* 1.10* 1.06*   CALCIUM 8.4 9.1 9.0 9.5   MG 1.50* 2.10  --  2.10   ALBUMIN 2.9* 3.1* 3.0* 3.3*   ALKPHOS 55 53 51 68   ALT 17 16 16 28   AST 25 26 26 53*   BILITOT 0.5 0.6 0.6 0.7          Microbiology Results (last 7 days)       ** No results found for the last 168 hours. **             See below for Radiology    Scheduled Med:   sodium chloride 0.9%   Intravenous Once    aspirin  81 mg Oral Daily    atorvastatin  40 mg Oral QHS    carvediloL  6.25 mg Oral BID    clopidogreL  75 mg Oral Daily    DULoxetine  60 mg Oral QAM    gabapentin  100 mg Oral QHS    insulin detemir U-100  15 Units Subcutaneous QHS    magnesium oxide  400 mg Oral BID    pantoprazole  40 mg Oral Daily    sodium chloride 0.9%  10 mL Intravenous Q6H        Continuous Infusions:   heparin (porcine) in D5W 13 Units/kg/hr (07/08/23 0509)        PRN Meds:  acetaminophen, ALPRAZolam, aluminum-magnesium hydroxide-simethicone, cloNIDine, dextrose 10%, dextrose 10%, glucagon (human recombinant), heparin (PORCINE), heparin (PORCINE), HYDROcodone-acetaminophen, insulin aspart U-100, melatonin, morphine, naloxone, ondansetron, polyethylene glycol, prochlorperazine, simethicone, sodium chloride 0.9%, Flushing PICC Protocol **AND** sodium chloride 0.9% **AND** sodium chloride 0.9%       Assessment/Plan:  Bilateral critical limb ischemia/severe bilateral PVD  Bilateral lower extremity claudication secondary to above  Anemia of chronic disease  CKD stage 2-stable   History of CAD status post PCI with ischemic cardiomyopathy-appears compensated  Poorly-controlled HTN  Poorly-controlled Type 2 diabetes mellitus with hyperglycemia  HLD  History of CVA  Chronic dementia  Depression      Plan:  Patient had no acute issues overnight. Pain now well controlled.   Cont po prn  pain meds   Cont IV Heparin for severe PAD  Cont ASA, Plavix and statin   Cont Levemir for glycemic control. Added aspart before each meal  BP now better controlled  Current meds reviewed     F/U by [facility] team, Angiogram in am     Continue strict aspiration, fall and decubitus precautions    Critical care note:  Critical care diagnosis: Severe PAD needing iv heparin drip   Critical care interventions: Hands-on evaluation, review of labs/radiographs/records and discussion with patient and family if present  Critical care time spent: 35 minutes        VTE prophylaxis: Heparin     Patient condition:  Fair    Anticipated discharge and Disposition:   Home       All diagnosis and differential diagnosis have been reviewed; assessment and plan has been documented; I have personally reviewed the labs and test results that are presently available; I have reviewed the patients medication list; I have reviewed the consulting providers response and recommendations. I have reviewed or attempted to review medical records based upon their availability    All of the patient's questions have been  addressed and answered. Patient's is agreeable to the above stated plan. I will continue to monitor closely and make adjustments to medical management as needed.  _____________________________________________________________________    Nutrition Status:    Radiology:  I have personally reviewed the following imaging and agree with the radiologist.     Ankle Brachial Indices (ESSIE)  The resting ankle-brachial indices for the right lower extremity   demonstrated moderate arterial flow reduction in the posterior tibial and   dorsalis pedis arteries.  PTA .73 DPA .79    The resting ankle-brachial indices for the left lower extremity   demonstrated severe arterial flow reduction in the posterior tibial artery   and no flow in the dorsalis pedis artery, suggestive of an occlusion.   PTA .67 DPA 0  CV Ultrasound doppler arterial legs bilat  Patent  right lower extremity arterial system with biphasic waveforms   throughout demonstrating moderate arterial flow reduction consistent with   inflow disease.     The left lower extremity arterial system demonstrated severe arterial flow   reduction with a stenosis of the mid superficial femoral and no flow   identified in the dorsalis pedis artery suggestive of an occlusion.   Biphasic waveforms in the common femoral and then change to monophasic   waveforms throughout are consistent with inflow and multilevel disease.     The resting ankle-brachial indices for the right lower extremity   demonstrated moderate arterial flow reduction in the posterior tibial and   dorsalis pedis arteries.  PTA .73 DPA .79    The resting ankle-brachial indices for the left lower extremity   demonstrated severe arterial flow reduction in the posterior tibial artery   and no flow in the dorsalis pedis artery, suggestive of an occlusion.   PTA .67 DPA 0      Goyo Ramirez MD   07/10/2023

## 2023-07-11 LAB
ANION GAP SERPL CALC-SCNC: 8 MEQ/L
APTT PPP: 96.2 SECONDS (ref 23.2–33.7)
BASOPHILS # BLD AUTO: 0.04 X10(3)/MCL
BASOPHILS NFR BLD AUTO: 0.7 %
BUN SERPL-MCNC: 12.6 MG/DL (ref 9.8–20.1)
CALCIUM SERPL-MCNC: 9.3 MG/DL (ref 8.4–10.2)
CHLORIDE SERPL-SCNC: 101 MMOL/L (ref 98–107)
CO2 SERPL-SCNC: 27 MMOL/L (ref 23–31)
CREAT SERPL-MCNC: 1.19 MG/DL (ref 0.55–1.02)
CREAT/UREA NIT SERPL: 11
EOSINOPHIL # BLD AUTO: 0.25 X10(3)/MCL (ref 0–0.9)
EOSINOPHIL NFR BLD AUTO: 4.1 %
ERYTHROCYTE [DISTWIDTH] IN BLOOD BY AUTOMATED COUNT: 13.3 % (ref 11.5–17)
GFR SERPLBLD CREATININE-BSD FMLA CKD-EPI: 49 MLS/MIN/1.73/M2
GLUCOSE SERPL-MCNC: 228 MG/DL (ref 82–115)
HCT VFR BLD AUTO: 34.1 % (ref 37–47)
HGB BLD-MCNC: 10.6 G/DL (ref 12–16)
IMM GRANULOCYTES # BLD AUTO: 0.02 X10(3)/MCL (ref 0–0.04)
IMM GRANULOCYTES NFR BLD AUTO: 0.3 %
LYMPHOCYTES # BLD AUTO: 2.25 X10(3)/MCL (ref 0.6–4.6)
LYMPHOCYTES NFR BLD AUTO: 37 %
MAGNESIUM SERPL-MCNC: 2.1 MG/DL (ref 1.6–2.6)
MCH RBC QN AUTO: 28.3 PG (ref 27–31)
MCHC RBC AUTO-ENTMCNC: 31.1 G/DL (ref 33–36)
MCV RBC AUTO: 90.9 FL (ref 80–94)
MONOCYTES # BLD AUTO: 0.77 X10(3)/MCL (ref 0.1–1.3)
MONOCYTES NFR BLD AUTO: 12.7 %
NEUTROPHILS # BLD AUTO: 2.75 X10(3)/MCL (ref 2.1–9.2)
NEUTROPHILS NFR BLD AUTO: 45.2 %
NRBC BLD AUTO-RTO: 0 %
PLATELET # BLD AUTO: 257 X10(3)/MCL (ref 130–400)
PMV BLD AUTO: 10.1 FL (ref 7.4–10.4)
POCT GLUCOSE: 176 MG/DL (ref 70–110)
POCT GLUCOSE: 261 MG/DL (ref 70–110)
POCT GLUCOSE: 307 MG/DL (ref 70–110)
POTASSIUM SERPL-SCNC: 5.1 MMOL/L (ref 3.5–5.1)
RBC # BLD AUTO: 3.75 X10(6)/MCL (ref 4.2–5.4)
SODIUM SERPL-SCNC: 136 MMOL/L (ref 136–145)
WBC # SPEC AUTO: 6.08 X10(3)/MCL (ref 4.5–11.5)

## 2023-07-11 PROCEDURE — 25000003 PHARM REV CODE 250: Performed by: INTERNAL MEDICINE

## 2023-07-11 PROCEDURE — 80048 BASIC METABOLIC PNL TOTAL CA: CPT | Performed by: NURSE PRACTITIONER

## 2023-07-11 PROCEDURE — 85025 COMPLETE CBC W/AUTO DIFF WBC: CPT | Performed by: NURSE PRACTITIONER

## 2023-07-11 PROCEDURE — 25500020 PHARM REV CODE 255: Performed by: INTERNAL MEDICINE

## 2023-07-11 PROCEDURE — 36246 INS CATH ABD/L-EXT ART 2ND: CPT | Mod: 59,RT | Performed by: INTERNAL MEDICINE

## 2023-07-11 PROCEDURE — A4216 STERILE WATER/SALINE, 10 ML: HCPCS | Performed by: INTERNAL MEDICINE

## 2023-07-11 PROCEDURE — 21400001 HC TELEMETRY ROOM

## 2023-07-11 PROCEDURE — 99152 MOD SED SAME PHYS/QHP 5/>YRS: CPT | Performed by: INTERNAL MEDICINE

## 2023-07-11 PROCEDURE — 27201423 OPTIME MED/SURG SUP & DEVICES STERILE SUPPLY: Performed by: INTERNAL MEDICINE

## 2023-07-11 PROCEDURE — 63600175 PHARM REV CODE 636 W HCPCS: Performed by: INTERNAL MEDICINE

## 2023-07-11 PROCEDURE — 83735 ASSAY OF MAGNESIUM: CPT | Performed by: NURSE PRACTITIONER

## 2023-07-11 PROCEDURE — C1769 GUIDE WIRE: HCPCS | Performed by: INTERNAL MEDICINE

## 2023-07-11 PROCEDURE — C1894 INTRO/SHEATH, NON-LASER: HCPCS | Performed by: INTERNAL MEDICINE

## 2023-07-11 PROCEDURE — C1714 CATH, TRANS ATHERECTOMY, DIR: HCPCS | Performed by: INTERNAL MEDICINE

## 2023-07-11 PROCEDURE — 85730 THROMBOPLASTIN TIME PARTIAL: CPT | Performed by: NURSE PRACTITIONER

## 2023-07-11 PROCEDURE — 37225 HC FEM/POPL REVAS W/ATHER: CPT | Mod: LT | Performed by: INTERNAL MEDICINE

## 2023-07-11 PROCEDURE — C1725 CATH, TRANSLUMIN NON-LASER: HCPCS | Performed by: INTERNAL MEDICINE

## 2023-07-11 PROCEDURE — C1887 CATHETER, GUIDING: HCPCS | Performed by: INTERNAL MEDICINE

## 2023-07-11 PROCEDURE — 99153 MOD SED SAME PHYS/QHP EA: CPT | Performed by: INTERNAL MEDICINE

## 2023-07-11 RX ORDER — MAG HYDROX/ALUMINUM HYD/SIMETH 200-200-20
SUSPENSION, ORAL (FINAL DOSE FORM) ORAL
Status: DISCONTINUED | OUTPATIENT
Start: 2023-07-11 | End: 2023-07-11 | Stop reason: HOSPADM

## 2023-07-11 RX ORDER — SODIUM CHLORIDE 9 MG/ML
INJECTION, SOLUTION INTRAVENOUS CONTINUOUS
Status: ACTIVE | OUTPATIENT
Start: 2023-07-11 | End: 2023-07-11

## 2023-07-11 RX ORDER — FENTANYL CITRATE 50 UG/ML
INJECTION, SOLUTION INTRAMUSCULAR; INTRAVENOUS
Status: DISCONTINUED | OUTPATIENT
Start: 2023-07-11 | End: 2023-07-11 | Stop reason: HOSPADM

## 2023-07-11 RX ORDER — NITROGLYCERIN 5 MG/ML
INJECTION, SOLUTION INTRAVENOUS
Status: DISCONTINUED | OUTPATIENT
Start: 2023-07-11 | End: 2023-07-11 | Stop reason: HOSPADM

## 2023-07-11 RX ORDER — HEPARIN SODIUM 1000 [USP'U]/ML
INJECTION, SOLUTION INTRAVENOUS; SUBCUTANEOUS
Status: DISCONTINUED | OUTPATIENT
Start: 2023-07-11 | End: 2023-07-11 | Stop reason: HOSPADM

## 2023-07-11 RX ORDER — VERAPAMIL HYDROCHLORIDE 2.5 MG/ML
INJECTION, SOLUTION INTRAVENOUS
Status: DISCONTINUED | OUTPATIENT
Start: 2023-07-11 | End: 2023-07-11 | Stop reason: HOSPADM

## 2023-07-11 RX ORDER — MIDAZOLAM HYDROCHLORIDE 1 MG/ML
INJECTION INTRAMUSCULAR; INTRAVENOUS
Status: DISCONTINUED | OUTPATIENT
Start: 2023-07-11 | End: 2023-07-11 | Stop reason: HOSPADM

## 2023-07-11 RX ORDER — CLOPIDOGREL 300 MG/1
TABLET, FILM COATED ORAL
Status: DISCONTINUED | OUTPATIENT
Start: 2023-07-11 | End: 2023-07-11 | Stop reason: HOSPADM

## 2023-07-11 RX ORDER — HYDRALAZINE HYDROCHLORIDE 20 MG/ML
INJECTION INTRAMUSCULAR; INTRAVENOUS
Status: DISCONTINUED | OUTPATIENT
Start: 2023-07-11 | End: 2023-07-11 | Stop reason: HOSPADM

## 2023-07-11 RX ORDER — DIPHENHYDRAMINE HYDROCHLORIDE 50 MG/ML
INJECTION INTRAMUSCULAR; INTRAVENOUS
Status: DISCONTINUED | OUTPATIENT
Start: 2023-07-11 | End: 2023-07-11 | Stop reason: HOSPADM

## 2023-07-11 RX ORDER — LIDOCAINE HYDROCHLORIDE 10 MG/ML
INJECTION, SOLUTION EPIDURAL; INFILTRATION; INTRACAUDAL; PERINEURAL
Status: DISCONTINUED | OUTPATIENT
Start: 2023-07-11 | End: 2023-07-11 | Stop reason: HOSPADM

## 2023-07-11 RX ADMIN — CARVEDILOL 6.25 MG: 3.12 TABLET, FILM COATED ORAL at 09:07

## 2023-07-11 RX ADMIN — SODIUM CHLORIDE, PRESERVATIVE FREE 10 ML: 5 INJECTION INTRAVENOUS at 06:07

## 2023-07-11 RX ADMIN — INSULIN ASPART 5 UNITS: 100 INJECTION, SOLUTION INTRAVENOUS; SUBCUTANEOUS at 04:07

## 2023-07-11 RX ADMIN — CLOPIDOGREL BISULFATE 75 MG: 75 TABLET ORAL at 09:07

## 2023-07-11 RX ADMIN — SODIUM CHLORIDE: 9 INJECTION, SOLUTION INTRAVENOUS at 01:07

## 2023-07-11 RX ADMIN — ASPIRIN 81 MG: 81 TABLET, COATED ORAL at 09:07

## 2023-07-11 RX ADMIN — ATORVASTATIN CALCIUM 40 MG: 40 TABLET, FILM COATED ORAL at 09:07

## 2023-07-11 RX ADMIN — INSULIN DETEMIR 15 UNITS: 100 INJECTION, SOLUTION SUBCUTANEOUS at 09:07

## 2023-07-11 RX ADMIN — PANTOPRAZOLE SODIUM 40 MG: 40 TABLET, DELAYED RELEASE ORAL at 09:07

## 2023-07-11 RX ADMIN — GABAPENTIN 100 MG: 100 CAPSULE ORAL at 09:07

## 2023-07-11 RX ADMIN — HYDROCODONE BITARTRATE AND ACETAMINOPHEN 1 TABLET: 5; 325 TABLET ORAL at 09:07

## 2023-07-11 RX ADMIN — Medication 400 MG: at 09:07

## 2023-07-11 RX ADMIN — SODIUM CHLORIDE, PRESERVATIVE FREE 10 ML: 5 INJECTION INTRAVENOUS at 11:07

## 2023-07-11 RX ADMIN — DULOXETINE HYDROCHLORIDE 60 MG: 30 CAPSULE, DELAYED RELEASE ORAL at 01:07

## 2023-07-11 RX ADMIN — INSULIN ASPART 5 UNITS: 100 INJECTION, SOLUTION INTRAVENOUS; SUBCUTANEOUS at 01:07

## 2023-07-11 NOTE — PROGRESS NOTES
[facility]    Cardiology  Progress Note    Patient Name: Mayra Parker  MRN: [id number]  Admission Date: 7/5/2023  Hospital Length of Stay: 6 days  Code Status: Full Code   Attending Physician: Goyo Ramirez MD   Primary Care Physician: Rashaun Weiss MD  Expected Discharge Date:   Principal Problem:<principal problem not specified>    Subjective:     Brief HPI: Mayra Parker is a 73 y.o. female with a pmh of hypertension, type 2 diabetes mellitus with peripheral neuropathy, CAD, CVA, PAD, and CKD2, known to [facility] (Dr. Estrella - scheduled for peripheral angiogram on 7.11.23), who presented with complaint of worsening bilateral leg pain after a recent decrease in gabapentin dose. Workup with CTA showed severe stenosis without acute occlusion and arterial ultrasound showed severe flow reduction in left with moderate flow reduction in right. Patient also found to be hyperglycemic and was admitted to hospital medicine for management. Cardiology was consulted for left leg ischemia. Per family member, patient's left lower extremity was colder than normal yesterday, and the patient was in such severe pain that she could not walk. Heparin gtt started upon admission.      Hospital Course:  7.6.23: NAD. Warm extremities. Motor function intact.   7.7.23: NAD. Chest pain this morning. Possible indigestion.   7.8.23: NAD. Denies CP or SOB. Family at bedside   7.9.23: NAD. Denies CP or SOB. BLE warm. Denies rest pain. Remains on Heparin.VSS.  7.10.23: NAD Noted. Vitals Stable. On Heparin Infusion. On DAPT. No bleeding noted. Peripheral Angiogram tomorrow.  7.11.23: NAD Noted. Patient resting. Family at bedside. NPO for Peripheral Angiogram today.    PMH: CVA, HLD, depression, dementia, T2DM, HTN, CKD2, CAD stents to RCAx2 and OM1  PSH: Right and left CEA, back surgery  Family History: Father- CAD native, Mother- CAD native, Son- CAD, MI- reason for death  Social History:  Former smoker, quit 2022    Previous Cardiac Diagnostics:   CTA Runoff  (7.5.23):  Vascular images: The abdominal aorta is normal in caliber.  There is some atherosclerotic plaque in the abdominal aorta and mesenteric vessels.  The celiac axis is patent.  SMA is patent.  There is a single renal artery on the left.  There is a 60% stenosis in the proximal left renal artery secondary to calcified plaque.  There is a 50 -60% stenosis in the right proximal renal artery secondary to calcified plaque. The DEVI is patent but heavily calcified.   Distal abdominal aorta has some atherosclerotic plaque within it.  Calcified plaque is seen in both common iliac arteries with multifocal areas of stenosis measuring up to 70%.  The proximal external iliac artery on the left side has a high-grade 80% stenosis.  The distal external iliac artery on the left side has some calcifications but no significant stenosis.  External iliac artery on the right side is patent with some calcified plaque but no high-grade stenosis is seen.   The right common femoral artery has some calcified plaque within it with a 40-50% stenosis.  The superficial femoral artery on the right side has a stent within it with multifocal areas of stenosis within the stent measuring up to 70%.  Popliteal artery is patent but is heavily calcified with multifocal areas of 50-60% stenosis.  There is very sluggish  single vessel runoff in the right lower extremity via the anterior tibial artery.  No significant flow is seen in the posterior tibial artery.   There is a stent in the superficial femoral artery on the left side.  Multifocal areas of stenosis seen within the stent.  There is flow in the popliteal artery.  It is heavily calcified.  There is sluggish 2 vessel runoff seen via the posterior tibial and anterior tibial artery.      CV US Arterial & ESSIE (7.5.23):  Patent right lower extremity arterial system with biphasic waveforms throughout demonstrating moderate  arterial flow reduction consistent with inflow disease.   The left lower extremity arterial system demonstrated severe arterial flow reduction with a stenosis of the mid superficial femoral and no flow identified in the dorsalis pedis artery suggestive of an occlusion.   Biphasic waveforms in the common femoral and then change to monophasic waveforms throughout are consistent with inflow and multilevel disease.   The resting ankle-brachial indices for the right lower extremity demonstrated moderate arterial flow reduction in the posterior tibial and dorsalis pedis arteries.  PTA .73 DPA .79  The resting ankle-brachial indices for the left lower extremity demonstrated severe arterial flow reduction in the posterior tibial artery and no flow in the dorsalis pedis artery, suggestive of an occlusion.   PTA .67 DPA 0.    Peripheral Angiogram with Intervention (8.9.22):  Successful Peripheral Intervention to the Right SFA and Right Popliteal Arteries CSI Atherectomy & Balloon Angioplasty & Placement of 2 Bare-Metal Stents  Left Lower Extremity: LCVA Calcified/Patent. Left SFA and Left Popliteal have been recently Stented (They Remain Widely Patent with no Significant Disease) Below the knee there is a two vessel runoff of the posterior tibial and peroneal arteries.     LHC/Stent (5.20.22):  There was mild left ventricular systolic dysfunction.  The left ventricular end diastolic pressure was normal.  The pre-procedure left ventricular end diastolic pressure was 12.  The ejection fraction was calculated to be 45%.  The ejection fraction was 40-50% by visual estimate.  There was no mitral valve regurgitation.  There was no aortic valve stenosis.  The Prox RCA to Dist RCA lesion was 90% stenosed with 0% stenosis post-intervention. The stents were placed. The distal stent to the RCA was a 2.25 by 38 mm drug-eluting stent. The mid and proximal stent was a 2.5 x 22 mm drug-eluting stent.  A stent was successfully placed.  A  stent was successfully placed.  The estimated blood loss was none.  There was two vessel coronary artery disease.  The PCI was successful.  The 1st Mrg lesion was 90% stenosed with 0% stenosis post-intervention. This marginal stent was a 2.25 x 38 mm drug-eluting stent post dilated to 2.5 mm  A stent was successfully placed.     TTE (5.3.22):  LV normal in size. Global LVSF. LVEF 55%. LV diastolic function is impaired Grade I with normal LA pressure. Noted mild concentric LVH. Mild calcification of aortic valve noted with adequate cuspal excursion. 1+ TR. PASP 31mmHg. Mobility of anterior and posterior mitral leaflet is normal. Mild MAC with Trace MR.      PET (4.19.22):  Abnormal perfusion study consistent with large area of moderate to severe inferior and inferoapical ischemia. Perfusion imaging suggestive of single vessel disease in distribution of RCA. LV cavity normal on stress studies. Stress LVEF 42% and LV global function is mildly reduced. Rest LV cavity normal. Rest LVEF 56% and rest LV global function is normal.      Review of Systems   Respiratory:  Negative for shortness of breath.    Cardiovascular:  Negative for chest pain, palpitations and leg swelling.      Objective:     Vital Signs (Most Recent):  Temp: 98.5 °F (36.9 °C) (07/11/23 0731)  Pulse: 76 (07/11/23 0731)  Resp: 18 (07/11/23 0731)  BP: 113/67 (07/11/23 0731)  SpO2: 95 % (07/11/23 0731) Vital Signs (24h Range):  Temp:  [97.3 °F (36.3 °C)-98.5 °F (36.9 °C)] 98.5 °F (36.9 °C)  Pulse:  [68-76] 76  Resp:  [18] 18  SpO2:  [95 %-98 %] 95 %  BP: (113-158)/(67-87) 113/67     Weight: 75.3 kg (166 lb)  Body mass index is 28.49 kg/m².    SpO2: 95 %         Intake/Output Summary (Last 24 hours) at 7/11/2023 0929  Last data filed at 7/11/2023 0602  Gross per 24 hour   Intake 1620 ml   Output 3900 ml   Net -2280 ml         Lines/Drains/Airways       Peripheral Intravenous Line  Duration                  Peripheral IV - Single Lumen 07/05/23 2231 22 G  Left;Posterior Hand 5 days         Midline Catheter Insertion/Assessment  - Single Lumen 07/07/23 1026 Left basilic vein (medial side of arm) 3 days                    Significant Labs:   Recent Results (from the past 72 hour(s))   APTT    Collection Time: 07/08/23 10:05 AM   Result Value Ref Range    PTT 99.5 (H) 23.2 - 33.7 seconds   POCT glucose    Collection Time: 07/08/23 10:50 AM   Result Value Ref Range    POCT Glucose 279 (H) 70 - 110 mg/dL   POCT glucose    Collection Time: 07/08/23  4:23 PM   Result Value Ref Range    POCT Glucose 181 (H) 70 - 110 mg/dL   POCT glucose    Collection Time: 07/08/23  8:08 PM   Result Value Ref Range    POCT Glucose 361 (H) 70 - 110 mg/dL   APTT    Collection Time: 07/09/23  4:39 AM   Result Value Ref Range    PTT 96.2 (H) 23.2 - 33.7 seconds   CBC with Differential    Collection Time: 07/09/23  4:39 AM   Result Value Ref Range    WBC 4.77 4.50 - 11.50 x10(3)/mcL    RBC 3.75 (L) 4.20 - 5.40 x10(6)/mcL    Hgb 10.6 (L) 12.0 - 16.0 g/dL    Hct 34.2 (L) 37.0 - 47.0 %    MCV 91.2 80.0 - 94.0 fL    MCH 28.3 27.0 - 31.0 pg    MCHC 31.0 (L) 33.0 - 36.0 g/dL    RDW 13.2 11.5 - 17.0 %    Platelet 245 130 - 400 x10(3)/mcL    MPV 9.8 7.4 - 10.4 fL    Neut % 48.1 %    Lymph % 35.6 %    Mono % 11.7 %    Eos % 3.8 %    Basophil % 0.6 %    Lymph # 1.70 0.6 - 4.6 x10(3)/mcL    Neut # 2.29 2.1 - 9.2 x10(3)/mcL    Mono # 0.56 0.1 - 1.3 x10(3)/mcL    Eos # 0.18 0 - 0.9 x10(3)/mcL    Baso # 0.03 <=0.2 x10(3)/mcL    IG# 0.01 0 - 0.04 x10(3)/mcL    IG% 0.2 %    NRBC% 0.0 %   POCT glucose    Collection Time: 07/09/23  5:13 AM   Result Value Ref Range    POCT Glucose 164 (H) 70 - 110 mg/dL   POCT glucose    Collection Time: 07/09/23 10:37 AM   Result Value Ref Range    POCT Glucose 257 (H) 70 - 110 mg/dL   POCT glucose    Collection Time: 07/09/23  4:40 PM   Result Value Ref Range    POCT Glucose 260 (H) 70 - 110 mg/dL   POCT glucose    Collection Time: 07/09/23  8:15 PM   Result Value Ref Range     POCT Glucose 163 (H) 70 - 110 mg/dL   POCT glucose    Collection Time: 07/10/23  5:21 AM   Result Value Ref Range    POCT Glucose 138 (H) 70 - 110 mg/dL   APTT    Collection Time: 07/10/23  5:35 AM   Result Value Ref Range    PTT 99.4 (H) 23.2 - 33.7 seconds   Comprehensive Metabolic Panel    Collection Time: 07/10/23  5:35 AM   Result Value Ref Range    Sodium Level 139 136 - 145 mmol/L    Potassium Level 4.9 3.5 - 5.1 mmol/L    Chloride 104 98 - 107 mmol/L    Carbon Dioxide 26 23 - 31 mmol/L    Glucose Level 140 (H) 82 - 115 mg/dL    Blood Urea Nitrogen 12.1 9.8 - 20.1 mg/dL    Creatinine 1.06 (H) 0.55 - 1.02 mg/dL    Calcium Level Total 9.5 8.4 - 10.2 mg/dL    Protein Total 6.9 5.8 - 7.6 gm/dL    Albumin Level 3.3 (L) 3.4 - 4.8 g/dL    Globulin 3.6 (H) 2.4 - 3.5 gm/dL    Albumin/Globulin Ratio 0.9 (L) 1.1 - 2.0 ratio    Bilirubin Total 0.7 <=1.5 mg/dL    Alkaline Phosphatase 68 40 - 150 unit/L    Alanine Aminotransferase 28 0 - 55 unit/L    Aspartate Aminotransferase 53 (H) 5 - 34 unit/L    eGFR 56 mls/min/1.73/m2   Magnesium    Collection Time: 07/10/23  5:35 AM   Result Value Ref Range    Magnesium Level 2.10 1.60 - 2.60 mg/dL   CBC with Differential    Collection Time: 07/10/23  5:35 AM   Result Value Ref Range    WBC 5.79 4.50 - 11.50 x10(3)/mcL    RBC 4.01 (L) 4.20 - 5.40 x10(6)/mcL    Hgb 11.4 (L) 12.0 - 16.0 g/dL    Hct 36.3 (L) 37.0 - 47.0 %    MCV 90.5 80.0 - 94.0 fL    MCH 28.4 27.0 - 31.0 pg    MCHC 31.4 (L) 33.0 - 36.0 g/dL    RDW 13.2 11.5 - 17.0 %    Platelet 277 130 - 400 x10(3)/mcL    MPV 9.8 7.4 - 10.4 fL    Neut % 54.3 %    Lymph % 30.1 %    Mono % 10.5 %    Eos % 4.1 %    Basophil % 0.7 %    Lymph # 1.74 0.6 - 4.6 x10(3)/mcL    Neut # 3.14 2.1 - 9.2 x10(3)/mcL    Mono # 0.61 0.1 - 1.3 x10(3)/mcL    Eos # 0.24 0 - 0.9 x10(3)/mcL    Baso # 0.04 <=0.2 x10(3)/mcL    IG# 0.02 0 - 0.04 x10(3)/mcL    IG% 0.3 %    NRBC% 0.0 %   POCT glucose    Collection Time: 07/10/23 10:17 AM   Result Value Ref Range     POCT Glucose 279 (H) 70 - 110 mg/dL   POCT glucose    Collection Time: 07/10/23  3:44 PM   Result Value Ref Range    POCT Glucose 300 (H) 70 - 110 mg/dL   POCT glucose    Collection Time: 07/10/23  7:53 PM   Result Value Ref Range    POCT Glucose 157 (H) 70 - 110 mg/dL   APTT    Collection Time: 07/11/23  4:12 AM   Result Value Ref Range    PTT 96.2 (H) 23.2 - 33.7 seconds   Basic Metabolic Panel    Collection Time: 07/11/23  4:12 AM   Result Value Ref Range    Sodium Level 136 136 - 145 mmol/L    Potassium Level 5.1 3.5 - 5.1 mmol/L    Chloride 101 98 - 107 mmol/L    Carbon Dioxide 27 23 - 31 mmol/L    Glucose Level 228 (H) 82 - 115 mg/dL    Blood Urea Nitrogen 12.6 9.8 - 20.1 mg/dL    Creatinine 1.19 (H) 0.55 - 1.02 mg/dL    BUN/Creatinine Ratio 11     Calcium Level Total 9.3 8.4 - 10.2 mg/dL    Anion Gap 8.0 mEq/L    eGFR 49 mls/min/1.73/m2   Magnesium    Collection Time: 07/11/23  4:12 AM   Result Value Ref Range    Magnesium Level 2.10 1.60 - 2.60 mg/dL   CBC with Differential    Collection Time: 07/11/23  4:12 AM   Result Value Ref Range    WBC 6.08 4.50 - 11.50 x10(3)/mcL    RBC 3.75 (L) 4.20 - 5.40 x10(6)/mcL    Hgb 10.6 (L) 12.0 - 16.0 g/dL    Hct 34.1 (L) 37.0 - 47.0 %    MCV 90.9 80.0 - 94.0 fL    MCH 28.3 27.0 - 31.0 pg    MCHC 31.1 (L) 33.0 - 36.0 g/dL    RDW 13.3 11.5 - 17.0 %    Platelet 257 130 - 400 x10(3)/mcL    MPV 10.1 7.4 - 10.4 fL    Neut % 45.2 %    Lymph % 37.0 %    Mono % 12.7 %    Eos % 4.1 %    Basophil % 0.7 %    Lymph # 2.25 0.6 - 4.6 x10(3)/mcL    Neut # 2.75 2.1 - 9.2 x10(3)/mcL    Mono # 0.77 0.1 - 1.3 x10(3)/mcL    Eos # 0.25 0 - 0.9 x10(3)/mcL    Baso # 0.04 <=0.2 x10(3)/mcL    IG# 0.02 0 - 0.04 x10(3)/mcL    IG% 0.3 %    NRBC% 0.0 %   POCT glucose    Collection Time: 07/11/23  6:06 AM   Result Value Ref Range    POCT Glucose 176 (H) 70 - 110 mg/dL   POCT glucose    Collection Time: 07/11/23  6:10 AM   Result Value Ref Range    POCT Glucose 127 (H) 70 - 110 mg/dL       Telemetry:   SR    Physical Exam  Vitals and nursing note reviewed.   Constitutional:       General: She is not in acute distress.     Appearance: Normal appearance. She is not ill-appearing.   HENT:      Head: Normocephalic.      Mouth/Throat:      Mouth: Mucous membranes are moist.      Pharynx: Oropharynx is clear.   Cardiovascular:      Rate and Rhythm: Normal rate and regular rhythm.   Pulmonary:      Effort: Pulmonary effort is normal. No respiratory distress.   Musculoskeletal:      Cervical back: Neck supple.      Right lower leg: No edema.      Left lower leg: No edema.      Comments: Legs are Warm   Skin:     General: Skin is warm and dry.   Neurological:      Mental Status: She is alert. Mental status is at baseline.      Comments: Dementia noted. Patient is pleasant.     Current Inpatient Medications:    Current Facility-Administered Medications:     0.9%  NaCl infusion, , Intravenous, Once, Goyo Ramirez MD    acetaminophen tablet 650 mg, 650 mg, Oral, Q6H PRN, OPAL KyleP-BC    ALPRAZolam tablet 0.5 mg, 0.5 mg, Oral, TID PRN, Goyo Ramirez MD    aluminum-magnesium hydroxide-simethicone 200-200-20 mg/5 mL suspension 30 mL, 30 mL, Oral, QID PRN, OPAL KyleP-BC    aspirin EC tablet 81 mg, 81 mg, Oral, Daily, [place] Riggs MD, 81 mg at 07/10/23 0819    atorvastatin tablet 40 mg, 40 mg, Oral, QHS, [place] Riggs MD, 40 mg at 07/10/23 2034    carvediloL tablet 6.25 mg, 6.25 mg, Oral, BID, Delia Vee MD, 6.25 mg at 07/10/23 2034    cloNIDine tablet 0.2 mg, 0.2 mg, Oral, Q6H PRN, Delia Vee MD, 0.2 mg at 07/08/23 0009    clopidogreL tablet 75 mg, 75 mg, Oral, Daily, [place] Riggs MD, 75 mg at 07/10/23 0819    dextrose 10% bolus 125 mL 125 mL, 12.5 g, Intravenous, PRN, OPAL KyleP-BC    dextrose 10% bolus 250 mL 250 mL, 25 g, Intravenous, PRN, OPAL KyleP-BC    DULoxetine DR capsule 60 mg, 60 mg, Oral, [facility], 60 mg at 07/10/23 0520     gabapentin capsule 100 mg, 100 mg, Oral, QHS, Delia Vee MD, 100 mg at 07/10/23 2034    glucagon (human recombinant) injection 1 mg, 1 mg, Intramuscular, PRN, Araceli Agosto AGACNP-BC    heparin 25,000 units in dextrose 5% (100 units/ml) IV bolus from bag - ADDITIONAL PRN BOLUS - 30 units/kg, 30 Units/kg (Adjusted), Intravenous, PRN, RAHAT BahP, 1,866 Units at 07/07/23 0845    heparin 25,000 units in dextrose 5% (100 units/ml) IV bolus from bag - ADDITIONAL PRN BOLUS - 60 units/kg, 60 Units/kg (Adjusted), Intravenous, PRN, Nicole Stern FNP    heparin 25,000 units in dextrose 5% 250 mL (100 units/mL) infusion HIGH INTENSITY nomogram - [facility], 0-40 Units/kg/hr (Adjusted), Intravenous, Continuous, VAN Bah, Last Rate: 8.1 mL/hr at 07/10/23 2102, 13 Units/kg/hr at 07/10/23 2102    HYDROcodone-acetaminophen 5-325 mg per tablet 1 tablet, 1 tablet, Oral, Q6H PRN, [place] Riggs MD, 1 tablet at 07/10/23 1442    insulin aspart U-100 injection 1-10 Units, 1-10 Units, Subcutaneous, Q6H PRN, Araceli Agosto AGACNP-BC, 6 Units at 07/10/23 1651    insulin aspart U-100 injection 5 Units, 5 Units, Subcutaneous, TIDWM, Goyo Ramirez MD, 5 Units at 07/10/23 1645    insulin detemir U-100 injection 15 Units, 15 Units, Subcutaneous, QHS, Goyo Ramirez MD, 15 Units at 07/10/23 2034    magnesium oxide tablet 400 mg, 400 mg, Oral, BID, [place] Riggs MD, 400 mg at 07/10/23 2034    melatonin tablet 6 mg, 6 mg, Oral, Nightly PRN, OPAL KyleP-BC, 6 mg at 07/06/23 2212    morphine injection 4 mg, 4 mg, Intravenous, Q4H PRN, [place] Riggs MD, 4 mg at 07/06/23 0226    naloxone 0.4 mg/mL injection 0.02 mg, 0.02 mg, Intravenous, PRN, KEVIN Kyle    ondansetron injection 4 mg, 4 mg, Intravenous, Q4H PRN, KEVIN Kyle    pantoprazole EC tablet 40 mg, 40 mg, Oral, Daily, [place] Riggs MD, 40 mg at 07/10/23 0819    polyethylene glycol packet 17 g, 17 g, Oral, BID PRN, Araceli  PRAFUL Agosto, AGACNP-BC, 17 g at 07/08/23 1821    prochlorperazine injection Soln 5 mg, 5 mg, Intravenous, Q6H PRN, Araceli Agosto, AGACNP-BC    simethicone chewable tablet 80 mg, 1 tablet, Oral, QID PRN, Araceli Agosto, AGACNP-BC, 80 mg at 07/07/23 1021    sodium chloride 0.9% flush 10 mL, 10 mL, Intravenous, PRN, Xiomy Terry MD    Flushing PICC Protocol, , , Until Discontinued **AND** sodium chloride 0.9% flush 10 mL, 10 mL, Intravenous, Q6H, 10 mL at 07/11/23 0607 **AND** sodium chloride 0.9% flush 10 mL, 10 mL, Intravenous, PRN, Delia Vee MD    VTE Risk Mitigation (From admission, onward)           Ordered     Reason for No Pharmacological VTE Prophylaxis  Once        Question:  Reasons:  Answer:  Physician Provided (leave comment)    07/05/23 2116     IP VTE HIGH RISK PATIENT  Once         07/05/23 2116     Place sequential compression device  Until discontinued         07/05/23 2116     heparin 25,000 units in dextrose 5% (100 units/ml) IV bolus from bag - ADDITIONAL PRN BOLUS - 60 units/kg  As needed (PRN)        Question:  Heparin Infusion Adjustment (DO NOT MODIFY ANSWER)  Answer:  [email or web address]\Images\Pharmacy\HeparinInfusions\heparin HIGH INTENSITY nomogram for [facility] [id number]    07/05/23 1946     heparin 25,000 units in dextrose 5% (100 units/ml) IV bolus from bag - ADDITIONAL PRN BOLUS - 30 units/kg  As needed (PRN)        Question:  Heparin Infusion Adjustment (DO NOT MODIFY ANSWER)  Answer:  [email or web address]\Images\Pharmacy\HeparinInfusions\heparin HIGH INTENSITY nomogram for [facility] [id number]    07/05/23 1946     heparin 25,000 units in dextrose 5% 250 mL (100 units/mL) infusion HIGH INTENSITY nomogram - [facility]  Continuous        Question Answer Comment   Heparin Infusion Adjustment (DO NOT MODIFY ANSWER) [email or web address]\Images\Pharmacy\HeparinInfusions\heparin HIGH INTENSITY nomogram for [facility] [id number]    Begin at (in units/kg/hr) 18 07/05/23 1946                    Assessment:   Acute  Limb Ischemia (Denies Rest Pain Currently, Reports Significant Claudication- Chronic in Nature)    - On DAPT & Heparin Infusion  PAD    - Left DP occlusion on Arterial Duplex this admission    - CTA Runoff: R SFA stent patent with 70% stenosis, R Pop patent heavily calcified, L SFA stent patent with multifocal stenosis, L Pop patent heavily calcified, Right sluggish single vessel runoff without significant flow in R PTA, Left sluggish 2 vessel runoff    - Peripheral Angio in 7.2022 s/p L SFA and L Pop Stents    - Peripheral Angio in 8.2022 s/p R SFA and R Pop Stents  CAD    - LHC in 5.2022: PCI/Stent to RCAx2, OM1  ICMO with Recovered LV Function    - EF 55% (5.2022)  Hypertension  Anemia (Stable)    - No Bleeding Noted  DM II  Chronic Kidney Stage II (Renal Function Stable)  Dementia    Plan:   Continue Current Cardiac Medications Including DAPT & Heparin Infusion Re: Obstructive PAD/ALI.  NPO  Plan Peripheral Angiogram with Possible Intervention Today (7.11.23)  Risks/Benefits/Alternatives of the Peripheral Angiogram Discussed with the patient and her Daughter (7.10.23). She elects to Proceed. Consent Signed by Daughter given patient's history of Dementia.    VAN Card  [facility]  07/11/2023    I have seen the patient, reviewed the Nurse Practitioner's note, assessment and plan. I have personally interviewed and examined the patient at bedside and agree with the findings. Medical decision making listed above were done under my guidance.    Physical exam:  Cardiovascular system: regular rhythm, no murmur.  Lungs: CTAB.  Extremities: No leg edema.    Plan:  As above

## 2023-07-11 NOTE — PROGRESS NOTES
[facility]  Hospital Medicine Progress Note        Chief Complaint: Inpatient Follow-up for PAD     HPI:   73-year-old female with significant history of ischemic cardiomyopathy ejection fraction-40-50%, CAD status post PCI on aspirin, Plavix, chronic kidney disease stage 2, type 2 diabetes mellitus, HTN, HLD, CVA, chronic dementia, depression.  Patient presented to the ED with complaints of worsening bilateral lower extremity pain x1 day.  Worsening pain on walking.  Hemodynamically stable.  Lab significant for hyperglycemia mild renal insufficiency.  Arterial ultrasound concerning for acute limb ischemia.  CT angiogram with severe stenosis.  Patient was admitted to hospitalist medicine service . resumed aspirin, Plavix.  Initiated heparin GTT.  CIS consulted for angiogram with intervention.  Angiogram with intervention planned for 7/10 .    Interval Hx:   Patient has been taken to the OR for angiogram and possible intervention follow-up postop recs   Vitals reviewed and stable on room air   Labs reviewed and stable        Objective/physical exam:  General: In no acute distress  Chest: Clear to auscultation bilaterally  Heart: RRR, +S1, S2, no appreciable murmur  Abdomen: Soft, nontender, BS +  MSK: Warm, no lower extremity edema, no clubbing or cyanosis  Neurologic: Cranial nerve II-XII intact, Strength 5/5 in all 4 extremities    Assessment/Plan:  Bilateral critical limb ischemia/severe bilateral PVD  Bilateral lower extremity claudication secondary to above  Anemia of chronic disease  CKD stage 2-stable   History of CAD status post PCI with ischemic cardiomyopathy-appears compensated  Poorly-controlled HTN  Poorly-controlled Type 2 diabetes mellitus with hyperglycemia  HLD  History of CVA  Chronic dementia  Depression        Plan:  Patient has been taken to the OR for angiogram and possible intervention follow-up postop recs   Vitals reviewed and stable on room air   Labs reviewed  and stable   Cont po prn pain meds   Cont IV Heparin for severe PAD  Cont ASA, Plavix and statin   Cont Levemir for glycemic control. Added aspart before each meal  BP now better controlled  Current meds reviewed         Continue strict aspiration, fall and decubitus precautions     Critical care note:  Critical care diagnosis: Severe PAD needing iv heparin drip   Critical care interventions: Hands-on evaluation, review of labs/radiographs/records and discussion with patient and family if present  Critical care time spent: 35 minutes         VTE prophylaxis: Heparin      Patient condition:  Fair     Anticipated discharge and Disposition:   Home         All diagnosis and differential diagnosis have been reviewed; assessment and plan has been documented; I have personally reviewed the labs and test results that are presently available; I have reviewed the patients medication list; I have reviewed the consulting providers response and recommendations. I have reviewed or attempted to review medical records based upon their availability     All of the patient's questions have been  addressed and answered. Patient's is agreeable to the above stated plan. I will continue to monitor closely and make adjustments to medical management as needed.  _____________________________________________________________________    VITAL SIGNS: 24 HRS MIN & MAX LAST   Temp  Min: 97.2 °F (36.2 °C)  Max: 98.5 °F (36.9 °C) 97.2 °F (36.2 °C)   BP  Min: 113/67  Max: 146/75 (!) 143/74   Pulse  Min: 68  Max: 79  79   Resp  Min: 18  Max: 18 18   SpO2  Min: 95 %  Max: 98 % 97 %     I have reviewed the following labs:    Recent Labs   Lab 07/09/23  0439 07/10/23  0535 07/11/23  0412   WBC 4.77 5.79 6.08   RBC 3.75* 4.01* 3.75*   HGB 10.6* 11.4* 10.6*   HCT 34.2* 36.3* 34.1*   MCV 91.2 90.5 90.9   MCH 28.3 28.4 28.3   MCHC 31.0* 31.4* 31.1*   RDW 13.2 13.2 13.3    277 257   MPV 9.8 9.8 10.1       Recent Labs   Lab 07/07/23  0350 07/08/23  0622  07/10/23  0535 07/11/23  0412    136 139 136   K 4.9 4.4 4.9 5.1   CO2 22* 23 26 27   BUN 12.0 10.0 12.1 12.6   CREATININE 1.07* 1.10* 1.06* 1.19*   CALCIUM 9.1 9.0 9.5 9.3   MG 2.10  --  2.10 2.10   ALBUMIN 3.1* 3.0* 3.3*  --    ALKPHOS 53 51 68  --    ALT 16 16 28  --    AST 26 26 53*  --    BILITOT 0.6 0.6 0.7  --           Microbiology Results (last 7 days)       ** No results found for the last 168 hours. **             See below for Radiology    Scheduled Med:   aspirin  81 mg Oral Daily    atorvastatin  40 mg Oral QHS    carvediloL  6.25 mg Oral BID    clopidogreL  75 mg Oral Daily    DULoxetine  60 mg Oral QAM    gabapentin  100 mg Oral QHS    insulin aspart U-100  5 Units Subcutaneous TIDWM    insulin detemir U-100  15 Units Subcutaneous QHS    magnesium oxide  400 mg Oral BID    pantoprazole  40 mg Oral Daily    sodium chloride 0.9%  10 mL Intravenous Q6H        Continuous Infusions:   sodium chloride 0.9% 100 mL/hr at 07/11/23 1315        PRN Meds:  acetaminophen, ALPRAZolam, aluminum-magnesium hydroxide-simethicone, cloNIDine, dextrose 10%, dextrose 10%, glucagon (human recombinant), HYDROcodone-acetaminophen, insulin aspart U-100, melatonin, morphine, naloxone, ondansetron, polyethylene glycol, prochlorperazine, simethicone, sodium chloride 0.9%, Flushing PICC Protocol **AND** sodium chloride 0.9% **AND** sodium chloride 0.9%       Assessment/Plan:      VTE prophylaxis:     Patient condition:  Stable/Fair/Guarded/ Serious/ Critical    Anticipated discharge and Disposition:         All diagnosis and differential diagnosis have been reviewed; assessment and plan has been documented; I have personally reviewed the labs and test results that are presently available; I have reviewed the patients medication list; I have reviewed the consulting providers response and recommendations. I have reviewed or attempted to review medical records based upon their availability    All of the patient's questions  have been  addressed and answered. Patient's is agreeable to the above stated plan. I will continue to monitor closely and make adjustments to medical management as needed.  _____________________________________________________________________    Nutrition Status:    Radiology:  I have personally reviewed the following imaging and agree with the radiologist.     Cardiac catheterization  Narrative:   The Prox L SFA to Dist L SFA lesion was 90% stenosed with 10% stenosis   post-intervention.    The estimated blood loss was <50 mL.    Patient brought to catheterization lab in a fasting state.  She was placed   on table prepped and draped in usual sterile fashion.  Time-out was   completed.  Left wrist was anesthetized 1% lidocaine.  Seldinger technique   and ultrasound guidance slender sheath was placed wire.  Flushed with a   radial cocktail mix.  Pigtail was then placed in distal abdominal aorta   and angiogram was performed 1st order runoff.  Then placed into the right   common femoral artery and a left common femoral artery respectively and   images were taken in the runoff fashion.  Significant InStent restenosis   was noted bilaterally however patient's symptoms more the left than the   right and therefore the left was intervened on.  A 6 French 119 cm tumor   radial sheath was then placed in which the tip was placed in the left   common femoral artery.  0.018 wire was then placed distally.  2.0 laser   atherectomy was performed due to heavy fibrotic InStent restenosis.  4 mm   balloon angioplasty was performed throughout.  5 mm by 200 mm cross   stellar balloon was then used from the distal InStent restenosis   proximally.  5 mm drug coated balloons were then used throughout the   entire distal to ostial stented segment.  Final angiographic images showed   excellent results with less than 10% residual wires and catheters were   removed.  The radial TR band was placed over the access point to achieve   hemostasis  she was transported to the postop area in a stable condition   and given 300 mg of Plavix.    Complications:  None   Contrast:  95 cc    Distal abdominal aorta: is patent  No noted aneurysm  Right system:  - LUCILA:  Widely patent  - EIA/IIA:  Widely patent  - CFA:  Widely patent  - SFA:  Entire segment is stented.  Greater than 70% InStent restenosis   throughout.  - Popliteal:  Patent.  - infrapopliteal:    - Anterior tibial:  Focal greater than 70% proximal long tubular lesion.    Runs into the foot  - Posterior tibial:  Small-vessel occludes proximally.  - Peroneal/TPT:  TPT noted to have focal lesions of greater than 70%.    Small-vessel distally runs off into the foot.  Posterior communicating   branch noted off of the artery into the posterior circulation.    Left system:  - LUCILA:  Widely patent  - EIA/IIA:  Widely patent  - CFA:  Widely patent  - SFA:  Entire segment stented.  Greater than 70% in stent restenosis   noted throughout   - Popliteal:  Moderate luminal irregularities  - infrapopliteal:    - Anterior tibial:  Large dominant vessel.  Luminal irregularities.  - Posterior tibial:  Small-vessel occluded proximally.  - Peroneal:  TPT with severe lesions greater than 70%.  Proximal  of   the peroneal    Intervention:   - Left SFA ISR:  This was not an acute limb.  Laser atherectomy, 4 mm   balloon angioplasty and 5 mm drug coated balloon with less than 10%   residual stenosis  Impression:    Successful intervention of left SFA ISR with less than 10% residual   stenosis    Plan:   Aggressive medical management for underlying peripheral artery disease   Routine postop care  Outpatient intervention of the right    The procedure log was documented by Documenter: Lon Carreon RN and   verified by Jonathan Alanis MD.    Date: 7/11/2023  Time: 11:45 AM      Abby Hull MD   07/11/2023

## 2023-07-11 NOTE — PROGRESS NOTES
"Inpatient Nutrition Evaluation    Admit Date: 7/5/2023   Total duration of encounter: 6 days    Nutrition Recommendation/Prescription     Continue heart healthy, diabetic diet as tolerated  RD to monitor po intake and weight changes    Nutrition Assessment     Chart Review    Reason Seen: length of stay    Malnutrition Screening Tool Results   Have you recently lost weight without trying?: No  Have you been eating poorly because of a decreased appetite?: No   MST Score: 0     Diagnosis:  Acute Limb Ischemia   PAD  CAD  ICMO with recovered LV function EF 55%  Anemia  HTN  CKD    Relevant Medical History: HTN, HLD, DM 2, neuropathy, CAD, CVA, PAD, CKD, dementia, depression    Nutrition-Related Medications: 0.9% NaCl, insulin aspart 5 units, insulin detemir 15 units, magnesium oxide, protonix, zofran prn, miralax prn    Nutrition-Related Labs: RBC-3.75, H/H-10.6/34.1, creat-1.19, glu-228      Diet Order: Diet heart healthy Diabetic  Oral Supplement Order: none  Appetite/Oral Intake: good/% of meals  Factors Affecting Nutritional Intake: none identified  Food/Restorationism/Cultural Preferences: unable to obtain  Food Allergies: unable to obtain    Skin Integrity: intact  Wound(s):       Comments    7/11: pt NPO this AM for angiogram, diet advanced post procedure. Pt in cath lab at time of visit. Spoke with RN, who reports no complaints of GI issues or decreased appetite. 100% intake documented per EMR. Per MST, no decreased appetite or weight loss prior admission. Unable to obtain weight history at this time. Bed weight of 75.3 kg (166 lb) on 7/6. Weight stable per EMR weights.    Anthropometrics    Height: 5' 4" (162.6 cm) Height Method: Stated  Last Weight: 75.3 kg (166 lb) (07/06/23 0205) Weight Method: Bed Scale  BMI (Calculated): 28.5  BMI Classification: overweight (BMI 25-29.9)     Ideal Body Weight (IBW), Female: 120 lb     % Ideal Body Weight, Female (lb): 138.33 %                             Usual Weight " Provided By: EMR weight history    Wt Readings from Last 5 Encounters:   07/06/23 75.3 kg (166 lb)   09/28/22 68.9 kg (152 lb)   07/06/22 73.5 kg (162 lb)   06/26/22 73.5 kg (162 lb 0.6 oz)   05/29/22 73.1 kg (161 lb 2.5 oz)     Weight Change(s) Since Admission:  Admit Weight: 73.5 kg (162 lb) (07/05/23 1207)      Patient Education    Not applicable.    Monitoring & Evaluation     Dietitian will monitor food and beverage intake and weight.  Nutrition Risk/Follow-Up: low (follow-up in 5-7 days)  Patients assigned 'low nutrition risk' status do not qualify for a full nutritional assessment but will be monitored and re-evaluated in a 5-7 day time period. Please consult if re-evaluation needed sooner.

## 2023-07-11 NOTE — PLAN OF CARE
Problem: Adult Inpatient Plan of Care  Goal: Plan of Care Review  Outcome: Ongoing, Progressing  Flowsheets (Taken 7/11/2023 1321)  Plan of Care Reviewed With: patient  Goal: Absence of Hospital-Acquired Illness or Injury  Outcome: Ongoing, Progressing  Intervention: Identify and Manage Fall Risk  Flowsheets (Taken 7/11/2023 1321)  Safety Promotion/Fall Prevention:   assistive device/personal item within reach   Fall Risk reviewed with patient/family   diversional activities provided   family to remain at bedside   high risk medications identified   lighting adjusted   medications reviewed   nonskid shoes/socks when out of bed   side rails raised x 2   instructed to call staff for mobility   room near unit station  Intervention: Prevent Skin Injury  Flowsheets (Taken 7/11/2023 1321)  Skin Protection:   incontinence pads utilized   tubing/devices free from skin contact   transparent dressing maintained   adhesive use limited  Intervention: Prevent and Manage VTE (Venous Thromboembolism) Risk  Flowsheets (Taken 7/11/2023 1321)  VTE Prevention/Management:   bleeding precations maintained   bleeding risk assessed   bleeding risk factor(s) identified, provider notified   dorsiflexion/plantar flexion performed   fluids promoted   remove, assess skin, and reapply compression stockings  Range of Motion: active ROM (range of motion) encouraged  Intervention: Prevent Infection  Flowsheets (Taken 7/11/2023 1321)  Infection Prevention:   environmental surveillance performed   equipment surfaces disinfected   hand hygiene promoted   cohorting utilized   personal protective equipment utilized   rest/sleep promoted   single patient room provided  Goal: Optimal Comfort and Wellbeing  Outcome: Ongoing, Progressing  Intervention: Monitor Pain and Promote Comfort  Flowsheets (Taken 7/11/2023 1321)  Pain Management Interventions:   medication offered   pain management plan reviewed with patient/caregiver   pillow support provided    position adjusted   quiet environment facilitated   relaxation techniques promoted  Intervention: Provide Person-Centered Care  Flowsheets (Taken 7/11/2023 1321)  Trust Relationship/Rapport:   care explained   choices provided   empathic listening provided   emotional support provided   reassurance provided   thoughts/feelings acknowledged   questions encouraged   questions answered     Problem: Skin Injury Risk Increased  Goal: Skin Health and Integrity  Outcome: Ongoing, Progressing  Intervention: Optimize Skin Protection  Flowsheets (Taken 7/11/2023 1321)  Pressure Reduction Techniques:   frequent weight shift encouraged   pressure points protected   heels elevated off bed   weight shift assistance provided   positioned off wounds  Pressure Reduction Devices: positioning supports utilized  Skin Protection:   incontinence pads utilized   tubing/devices free from skin contact   transparent dressing maintained   adhesive use limited  Intervention: Promote and Optimize Oral Intake  Flowsheets (Taken 7/11/2023 1321)  Oral Nutrition Promotion:   physical activity promoted   rest periods promoted     Problem: Infection  Goal: Absence of Infection Signs and Symptoms  Outcome: Ongoing, Progressing  Intervention: Prevent or Manage Infection  Flowsheets (Taken 7/11/2023 1321)  Infection Management: aseptic technique maintained  Isolation Precautions: protective     Problem: Fall Injury Risk  Goal: Absence of Fall and Fall-Related Injury  Outcome: Ongoing, Progressing  Intervention: Identify and Manage Contributors  Flowsheets (Taken 7/11/2023 1321)  Self-Care Promotion:   independence encouraged   BADL personal objects within reach   BADL personal routines maintained   safe use of adaptive equipment encouraged  Medication Review/Management:   medications reviewed   high-risk medications identified  Intervention: Promote Injury-Free Environment  Flowsheets (Taken 7/11/2023 1321)  Safety Promotion/Fall Prevention:   assistive  device/personal item within reach   Fall Risk reviewed with patient/family   diversional activities provided   family to remain at bedside   high risk medications identified   lighting adjusted   medications reviewed   nonskid shoes/socks when out of bed   side rails raised x 2   instructed to call staff for mobility   room near unit station

## 2023-07-12 LAB
ANION GAP SERPL CALC-SCNC: 10 MEQ/L
BASOPHILS # BLD AUTO: 0.02 X10(3)/MCL
BASOPHILS NFR BLD AUTO: 0.3 %
BUN SERPL-MCNC: 12.8 MG/DL (ref 9.8–20.1)
CALCIUM SERPL-MCNC: 8.7 MG/DL (ref 8.4–10.2)
CHLORIDE SERPL-SCNC: 100 MMOL/L (ref 98–107)
CO2 SERPL-SCNC: 24 MMOL/L (ref 23–31)
CREAT SERPL-MCNC: 1.17 MG/DL (ref 0.55–1.02)
CREAT/UREA NIT SERPL: 11
EOSINOPHIL # BLD AUTO: 0.25 X10(3)/MCL (ref 0–0.9)
EOSINOPHIL NFR BLD AUTO: 4.3 %
ERYTHROCYTE [DISTWIDTH] IN BLOOD BY AUTOMATED COUNT: 13.4 % (ref 11.5–17)
GFR SERPLBLD CREATININE-BSD FMLA CKD-EPI: 50 MLS/MIN/1.73/M2
GLUCOSE SERPL-MCNC: 190 MG/DL (ref 82–115)
HCT VFR BLD AUTO: 30.4 % (ref 37–47)
HGB BLD-MCNC: 9.5 G/DL (ref 12–16)
IMM GRANULOCYTES # BLD AUTO: 0.02 X10(3)/MCL (ref 0–0.04)
IMM GRANULOCYTES NFR BLD AUTO: 0.3 %
LYMPHOCYTES # BLD AUTO: 0.94 X10(3)/MCL (ref 0.6–4.6)
LYMPHOCYTES NFR BLD AUTO: 16.3 %
MAGNESIUM SERPL-MCNC: 2.2 MG/DL (ref 1.6–2.6)
MCH RBC QN AUTO: 28.4 PG (ref 27–31)
MCHC RBC AUTO-ENTMCNC: 31.3 G/DL (ref 33–36)
MCV RBC AUTO: 91 FL (ref 80–94)
MONOCYTES # BLD AUTO: 0.79 X10(3)/MCL (ref 0.1–1.3)
MONOCYTES NFR BLD AUTO: 13.7 %
NEUTROPHILS # BLD AUTO: 3.73 X10(3)/MCL (ref 2.1–9.2)
NEUTROPHILS NFR BLD AUTO: 65.1 %
NRBC BLD AUTO-RTO: 0 %
PLATELET # BLD AUTO: 224 X10(3)/MCL (ref 130–400)
PMV BLD AUTO: 9.5 FL (ref 7.4–10.4)
POCT GLUCOSE: 117 MG/DL (ref 70–110)
POCT GLUCOSE: 141 MG/DL (ref 70–110)
POCT GLUCOSE: 141 MG/DL (ref 70–110)
POCT GLUCOSE: 239 MG/DL (ref 70–110)
POCT GLUCOSE: 281 MG/DL (ref 70–110)
POTASSIUM SERPL-SCNC: 4.8 MMOL/L (ref 3.5–5.1)
RBC # BLD AUTO: 3.34 X10(6)/MCL (ref 4.2–5.4)
SODIUM SERPL-SCNC: 134 MMOL/L (ref 136–145)
WBC # SPEC AUTO: 5.75 X10(3)/MCL (ref 4.5–11.5)

## 2023-07-12 PROCEDURE — 63600175 PHARM REV CODE 636 W HCPCS: Performed by: INTERNAL MEDICINE

## 2023-07-12 PROCEDURE — 83735 ASSAY OF MAGNESIUM: CPT | Performed by: NURSE PRACTITIONER

## 2023-07-12 PROCEDURE — 25000003 PHARM REV CODE 250: Performed by: INTERNAL MEDICINE

## 2023-07-12 PROCEDURE — A4216 STERILE WATER/SALINE, 10 ML: HCPCS | Performed by: INTERNAL MEDICINE

## 2023-07-12 PROCEDURE — 21400001 HC TELEMETRY ROOM

## 2023-07-12 PROCEDURE — 97162 PT EVAL MOD COMPLEX 30 MIN: CPT

## 2023-07-12 PROCEDURE — 85025 COMPLETE CBC W/AUTO DIFF WBC: CPT | Performed by: NURSE PRACTITIONER

## 2023-07-12 PROCEDURE — 80048 BASIC METABOLIC PNL TOTAL CA: CPT | Performed by: NURSE PRACTITIONER

## 2023-07-12 RX ORDER — ENOXAPARIN SODIUM 100 MG/ML
40 INJECTION SUBCUTANEOUS EVERY 24 HOURS
Status: DISCONTINUED | OUTPATIENT
Start: 2023-07-13 | End: 2023-07-13 | Stop reason: HOSPADM

## 2023-07-12 RX ADMIN — HYDROCODONE BITARTRATE AND ACETAMINOPHEN 1 TABLET: 5; 325 TABLET ORAL at 11:07

## 2023-07-12 RX ADMIN — SODIUM CHLORIDE, PRESERVATIVE FREE 10 ML: 5 INJECTION INTRAVENOUS at 11:07

## 2023-07-12 RX ADMIN — PANTOPRAZOLE SODIUM 40 MG: 40 TABLET, DELAYED RELEASE ORAL at 08:07

## 2023-07-12 RX ADMIN — ATORVASTATIN CALCIUM 40 MG: 40 TABLET, FILM COATED ORAL at 08:07

## 2023-07-12 RX ADMIN — SODIUM CHLORIDE, PRESERVATIVE FREE 10 ML: 5 INJECTION INTRAVENOUS at 06:07

## 2023-07-12 RX ADMIN — INSULIN DETEMIR 15 UNITS: 100 INJECTION, SOLUTION SUBCUTANEOUS at 08:07

## 2023-07-12 RX ADMIN — Medication 400 MG: at 08:07

## 2023-07-12 RX ADMIN — CARVEDILOL 6.25 MG: 3.12 TABLET, FILM COATED ORAL at 08:07

## 2023-07-12 RX ADMIN — INSULIN ASPART 5 UNITS: 100 INJECTION, SOLUTION INTRAVENOUS; SUBCUTANEOUS at 04:07

## 2023-07-12 RX ADMIN — GABAPENTIN 100 MG: 100 CAPSULE ORAL at 08:07

## 2023-07-12 RX ADMIN — ASPIRIN 81 MG: 81 TABLET, COATED ORAL at 08:07

## 2023-07-12 RX ADMIN — CLOPIDOGREL BISULFATE 75 MG: 75 TABLET ORAL at 08:07

## 2023-07-12 RX ADMIN — INSULIN ASPART 5 UNITS: 100 INJECTION, SOLUTION INTRAVENOUS; SUBCUTANEOUS at 11:07

## 2023-07-12 RX ADMIN — DULOXETINE HYDROCHLORIDE 60 MG: 30 CAPSULE, DELAYED RELEASE ORAL at 08:07

## 2023-07-12 RX ADMIN — SODIUM CHLORIDE, PRESERVATIVE FREE 10 ML: 5 INJECTION INTRAVENOUS at 05:07

## 2023-07-12 RX ADMIN — INSULIN ASPART 5 UNITS: 100 INJECTION, SOLUTION INTRAVENOUS; SUBCUTANEOUS at 08:07

## 2023-07-12 NOTE — PLAN OF CARE
Problem: Physical Therapy  Goal: Physical Therapy Goal  Description: Goals to be met by: 23     Patient will increase functional independence with mobility by performin. Supine to sit with S[place]  2. Sit to supine with [place]  3. Sit to stand transfer with Modified S[place]  4. Gait  x 150 feet with Modified [place] using Rolling Walker.     Outcome: Ongoing, Progressing

## 2023-07-12 NOTE — PROGRESS NOTES
[facility]    Cardiology  Progress Note    Patient Name: Mayra Parker  MRN: [id number]  Admission Date: 7/5/2023  Hospital Length of Stay: 7 days  Code Status: Full Code   Attending Physician: Goyo Ramirez MD   Primary Care Physician: Rashaun Weiss MD  Expected Discharge Date:   Principal Problem:<principal problem not specified>    Subjective:     Brief HPI: Mayra Parker is a 73 y.o. female with a pmh of hypertension, type 2 diabetes mellitus with peripheral neuropathy, CAD, CVA, PAD, and CKD2, known to [facility] (Dr. Estrella - scheduled for peripheral angiogram on 7.11.23), who presented with complaint of worsening bilateral leg pain after a recent decrease in gabapentin dose. Workup with CTA showed severe stenosis without acute occlusion and arterial ultrasound showed severe flow reduction in left with moderate flow reduction in right. Patient also found to be hyperglycemic and was admitted to hospital medicine for management. Cardiology was consulted for left leg ischemia. Per family member, patient's left lower extremity was colder than normal yesterday, and the patient was in such severe pain that she could not walk. Heparin gtt started upon admission.      Hospital Course:  7.6.23: NAD. Warm extremities. Motor function intact.   7.7.23: NAD. Chest pain this morning. Possible indigestion.   7.8.23: NAD. Denies CP or SOB. Family at bedside   7.9.23: NAD. Denies CP or SOB. BLE warm. Denies rest pain. Remains on Heparin.VSS.  7.10.23: NAD Noted. Vitals Stable. On Heparin Infusion. On DAPT. No bleeding noted. Peripheral Angiogram tomorrow.  7.11.23: NAD Noted. Patient resting. Family at bedside. NPO for Peripheral Angiogram today.  7.12.23: S/P PA (7.11.23) ~ Successful intervention of L SFA ISR with less than 10% residual stenosis. R radial osite is benign with no swelling or hematoma. She denies CP, Nausea, and +2 BLE pulses    PMH: CVA, HLD, depression,  dementia, T2DM, HTN, CKD2, CAD stents to RCAx2 and OM1  PSH: Right and left CEA, back surgery  Family History: Father- CAD native, Mother- CAD native, Son- CAD, MI- reason for death  Social History: Former smoker, quit 2022    Previous Cardiac Diagnostics:     L Peripheral angiogram (7.11.23)   LUCILA:  Widely patent  - EIA/IIA:  Widely patent  - CFA:  Widely patent  - SFA:  Entire segment stented.  Greater than 70% in stent restenosis noted throughout   - Popliteal:  Moderate luminal irregularities  - infrapopliteal:    - Anterior tibial:  Large dominant vessel.  Luminal irregularities.  - Posterior tibial:  Small-vessel occluded proximally.  - Peroneal:  TPT with severe lesions greater than 70%.  Proximal  of the peroneal  Intervention:   - Left SFA ISR:  This was not an acute limb.  Laser atherectomy, 4 mm balloon angioplasty and 5 mm drug coated balloon with less than 10% residual stenosis  Impression:   Successful intervention of left SFA ISR with less than 10% residual stenosis  Plan:   Aggressive medical management for underlying peripheral artery disease   Routine postop care  Outpatient intervention of the right    CTA Runoff  (7.5.23):  Vascular images: The abdominal aorta is normal in caliber.  There is some atherosclerotic plaque in the abdominal aorta and mesenteric vessels.  The celiac axis is patent.  SMA is patent.  There is a single renal artery on the left.  There is a 60% stenosis in the proximal left renal artery secondary to calcified plaque.  There is a 50 -60% stenosis in the right proximal renal artery secondary to calcified plaque. The DEVI is patent but heavily calcified.   Distal abdominal aorta has some atherosclerotic plaque within it.  Calcified plaque is seen in both common iliac arteries with multifocal areas of stenosis measuring up to 70%.  The proximal external iliac artery on the left side has a high-grade 80% stenosis.  The distal external iliac artery on the left side has some  calcifications but no significant stenosis.  External iliac artery on the right side is patent with some calcified plaque but no high-grade stenosis is seen.   The right common femoral artery has some calcified plaque within it with a 40-50% stenosis.  The superficial femoral artery on the right side has a stent within it with multifocal areas of stenosis within the stent measuring up to 70%.  Popliteal artery is patent but is heavily calcified with multifocal areas of 50-60% stenosis.  There is very sluggish  single vessel runoff in the right lower extremity via the anterior tibial artery.  No significant flow is seen in the posterior tibial artery.   There is a stent in the superficial femoral artery on the left side.  Multifocal areas of stenosis seen within the stent.  There is flow in the popliteal artery.  It is heavily calcified.  There is sluggish 2 vessel runoff seen via the posterior tibial and anterior tibial artery.      CV US Arterial & ESSIE (7.5.23):  Patent right lower extremity arterial system with biphasic waveforms throughout demonstrating moderate arterial flow reduction consistent with inflow disease.   The left lower extremity arterial system demonstrated severe arterial flow reduction with a stenosis of the mid superficial femoral and no flow identified in the dorsalis pedis artery suggestive of an occlusion.   Biphasic waveforms in the common femoral and then change to monophasic waveforms throughout are consistent with inflow and multilevel disease.   The resting ankle-brachial indices for the right lower extremity demonstrated moderate arterial flow reduction in the posterior tibial and dorsalis pedis arteries.  PTA .73 DPA .79  The resting ankle-brachial indices for the left lower extremity demonstrated severe arterial flow reduction in the posterior tibial artery and no flow in the dorsalis pedis artery, suggestive of an occlusion.   PTA .67 DPA 0.    Peripheral Angiogram with Intervention  (8.9.22):  Successful Peripheral Intervention to the Right SFA and Right Popliteal Arteries CSI Atherectomy & Balloon Angioplasty & Placement of 2 Bare-Metal Stents  Left Lower Extremity: LCVA Calcified/Patent. Left SFA and Left Popliteal have been recently Stented (They Remain Widely Patent with no Significant Disease) Below the knee there is a two vessel runoff of the posterior tibial and peroneal arteries.     LHC/Stent (5.20.22):  There was mild left ventricular systolic dysfunction.  The left ventricular end diastolic pressure was normal.  The pre-procedure left ventricular end diastolic pressure was 12.  The ejection fraction was calculated to be 45%.  The ejection fraction was 40-50% by visual estimate.  There was no mitral valve regurgitation.  There was no aortic valve stenosis.  The Prox RCA to Dist RCA lesion was 90% stenosed with 0% stenosis post-intervention. The stents were placed. The distal stent to the RCA was a 2.25 by 38 mm drug-eluting stent. The mid and proximal stent was a 2.5 x 22 mm drug-eluting stent.  A stent was successfully placed.  A stent was successfully placed.  The estimated blood loss was none.  There was two vessel coronary artery disease.  The PCI was successful.  The 1st Mrg lesion was 90% stenosed with 0% stenosis post-intervention. This marginal stent was a 2.25 x 38 mm drug-eluting stent post dilated to 2.5 mm  A stent was successfully placed.     TTE (5.3.22):  LV normal in size. Global LVSF. LVEF 55%. LV diastolic function is impaired Grade I with normal LA pressure. Noted mild concentric LVH. Mild calcification of aortic valve noted with adequate cuspal excursion. 1+ TR. PASP 31mmHg. Mobility of anterior and posterior mitral leaflet is normal. Mild MAC with Trace MR.      PET (4.19.22):  Abnormal perfusion study consistent with large area of moderate to severe inferior and inferoapical ischemia. Perfusion imaging suggestive of single vessel disease in distribution of RCA.  LV cavity normal on stress studies. Stress LVEF 42% and LV global function is mildly reduced. Rest LV cavity normal. Rest LVEF 56% and rest LV global function is normal.      Review of Systems   Respiratory:  Negative for shortness of breath.    Cardiovascular:  Negative for chest pain, palpitations and leg swelling.      Objective:     Vital Signs (Most Recent):  Temp: 97.6 °F (36.4 °C) (07/12/23 0731)  Pulse: 76 (07/12/23 0731)  Resp: 18 (07/12/23 0731)  BP: 136/79 (07/12/23 0731)  SpO2: 96 % (07/12/23 0731) Vital Signs (24h Range):  Temp:  [97.2 °F (36.2 °C)-98.6 °F (37 °C)] 97.6 °F (36.4 °C)  Pulse:  [76-86] 76  Resp:  [14-18] 18  SpO2:  [93 %-100 %] 96 %  BP: ()/(45-91) 136/79     Weight: 75.3 kg (166 lb)  Body mass index is 28.49 kg/m².    SpO2: 96 %         Intake/Output Summary (Last 24 hours) at 7/12/2023 1027  Last data filed at 7/12/2023 0216  Gross per 24 hour   Intake 575 ml   Output 1500 ml   Net -925 ml         Lines/Drains/Airways       Drain  Duration                  Sheath 07/11/23 1057 Left <1 day              Peripheral Intravenous Line  Duration                  Peripheral IV - Single Lumen 07/05/23 2231 22 G Left;Posterior Hand 6 days         Midline Catheter Insertion/Assessment  - Single Lumen 07/07/23 1026 Left basilic vein (medial side of arm) 5 days                    Significant Labs:   Recent Results (from the past 72 hour(s))   POCT glucose    Collection Time: 07/09/23 10:37 AM   Result Value Ref Range    POCT Glucose 257 (H) 70 - 110 mg/dL   POCT glucose    Collection Time: 07/09/23  4:40 PM   Result Value Ref Range    POCT Glucose 260 (H) 70 - 110 mg/dL   POCT glucose    Collection Time: 07/09/23  8:15 PM   Result Value Ref Range    POCT Glucose 163 (H) 70 - 110 mg/dL   POCT glucose    Collection Time: 07/10/23  5:21 AM   Result Value Ref Range    POCT Glucose 138 (H) 70 - 110 mg/dL   APTT    Collection Time: 07/10/23  5:35 AM   Result Value Ref Range    PTT 99.4 (H) 23.2 -  33.7 seconds   Comprehensive Metabolic Panel    Collection Time: 07/10/23  5:35 AM   Result Value Ref Range    Sodium Level 139 136 - 145 mmol/L    Potassium Level 4.9 3.5 - 5.1 mmol/L    Chloride 104 98 - 107 mmol/L    Carbon Dioxide 26 23 - 31 mmol/L    Glucose Level 140 (H) 82 - 115 mg/dL    Blood Urea Nitrogen 12.1 9.8 - 20.1 mg/dL    Creatinine 1.06 (H) 0.55 - 1.02 mg/dL    Calcium Level Total 9.5 8.4 - 10.2 mg/dL    Protein Total 6.9 5.8 - 7.6 gm/dL    Albumin Level 3.3 (L) 3.4 - 4.8 g/dL    Globulin 3.6 (H) 2.4 - 3.5 gm/dL    Albumin/Globulin Ratio 0.9 (L) 1.1 - 2.0 ratio    Bilirubin Total 0.7 <=1.5 mg/dL    Alkaline Phosphatase 68 40 - 150 unit/L    Alanine Aminotransferase 28 0 - 55 unit/L    Aspartate Aminotransferase 53 (H) 5 - 34 unit/L    eGFR 56 mls/min/1.73/m2   Magnesium    Collection Time: 07/10/23  5:35 AM   Result Value Ref Range    Magnesium Level 2.10 1.60 - 2.60 mg/dL   CBC with Differential    Collection Time: 07/10/23  5:35 AM   Result Value Ref Range    WBC 5.79 4.50 - 11.50 x10(3)/mcL    RBC 4.01 (L) 4.20 - 5.40 x10(6)/mcL    Hgb 11.4 (L) 12.0 - 16.0 g/dL    Hct 36.3 (L) 37.0 - 47.0 %    MCV 90.5 80.0 - 94.0 fL    MCH 28.4 27.0 - 31.0 pg    MCHC 31.4 (L) 33.0 - 36.0 g/dL    RDW 13.2 11.5 - 17.0 %    Platelet 277 130 - 400 x10(3)/mcL    MPV 9.8 7.4 - 10.4 fL    Neut % 54.3 %    Lymph % 30.1 %    Mono % 10.5 %    Eos % 4.1 %    Basophil % 0.7 %    Lymph # 1.74 0.6 - 4.6 x10(3)/mcL    Neut # 3.14 2.1 - 9.2 x10(3)/mcL    Mono # 0.61 0.1 - 1.3 x10(3)/mcL    Eos # 0.24 0 - 0.9 x10(3)/mcL    Baso # 0.04 <=0.2 x10(3)/mcL    IG# 0.02 0 - 0.04 x10(3)/mcL    IG% 0.3 %    NRBC% 0.0 %   POCT glucose    Collection Time: 07/10/23 10:17 AM   Result Value Ref Range    POCT Glucose 279 (H) 70 - 110 mg/dL   POCT glucose    Collection Time: 07/10/23  3:44 PM   Result Value Ref Range    POCT Glucose 300 (H) 70 - 110 mg/dL   POCT glucose    Collection Time: 07/10/23  7:53 PM   Result Value Ref Range    POCT  Glucose 157 (H) 70 - 110 mg/dL   APTT    Collection Time: 07/11/23  4:12 AM   Result Value Ref Range    PTT 96.2 (H) 23.2 - 33.7 seconds   Basic Metabolic Panel    Collection Time: 07/11/23  4:12 AM   Result Value Ref Range    Sodium Level 136 136 - 145 mmol/L    Potassium Level 5.1 3.5 - 5.1 mmol/L    Chloride 101 98 - 107 mmol/L    Carbon Dioxide 27 23 - 31 mmol/L    Glucose Level 228 (H) 82 - 115 mg/dL    Blood Urea Nitrogen 12.6 9.8 - 20.1 mg/dL    Creatinine 1.19 (H) 0.55 - 1.02 mg/dL    BUN/Creatinine Ratio 11     Calcium Level Total 9.3 8.4 - 10.2 mg/dL    Anion Gap 8.0 mEq/L    eGFR 49 mls/min/1.73/m2   Magnesium    Collection Time: 07/11/23  4:12 AM   Result Value Ref Range    Magnesium Level 2.10 1.60 - 2.60 mg/dL   CBC with Differential    Collection Time: 07/11/23  4:12 AM   Result Value Ref Range    WBC 6.08 4.50 - 11.50 x10(3)/mcL    RBC 3.75 (L) 4.20 - 5.40 x10(6)/mcL    Hgb 10.6 (L) 12.0 - 16.0 g/dL    Hct 34.1 (L) 37.0 - 47.0 %    MCV 90.9 80.0 - 94.0 fL    MCH 28.3 27.0 - 31.0 pg    MCHC 31.1 (L) 33.0 - 36.0 g/dL    RDW 13.3 11.5 - 17.0 %    Platelet 257 130 - 400 x10(3)/mcL    MPV 10.1 7.4 - 10.4 fL    Neut % 45.2 %    Lymph % 37.0 %    Mono % 12.7 %    Eos % 4.1 %    Basophil % 0.7 %    Lymph # 2.25 0.6 - 4.6 x10(3)/mcL    Neut # 2.75 2.1 - 9.2 x10(3)/mcL    Mono # 0.77 0.1 - 1.3 x10(3)/mcL    Eos # 0.25 0 - 0.9 x10(3)/mcL    Baso # 0.04 <=0.2 x10(3)/mcL    IG# 0.02 0 - 0.04 x10(3)/mcL    IG% 0.3 %    NRBC% 0.0 %   POCT glucose    Collection Time: 07/11/23  6:06 AM   Result Value Ref Range    POCT Glucose 176 (H) 70 - 110 mg/dL   POCT glucose    Collection Time: 07/11/23  1:05 PM   Result Value Ref Range    POCT Glucose 261 (H) 70 - 110 mg/dL   POCT glucose    Collection Time: 07/11/23  3:54 PM   Result Value Ref Range    POCT Glucose 307 (H) 70 - 110 mg/dL   POCT glucose    Collection Time: 07/11/23  8:55 PM   Result Value Ref Range    POCT Glucose 239 (H) 70 - 110 mg/dL   Basic Metabolic Panel     Collection Time: 07/12/23  3:47 AM   Result Value Ref Range    Sodium Level 134 (L) 136 - 145 mmol/L    Potassium Level 4.8 3.5 - 5.1 mmol/L    Chloride 100 98 - 107 mmol/L    Carbon Dioxide 24 23 - 31 mmol/L    Glucose Level 190 (H) 82 - 115 mg/dL    Blood Urea Nitrogen 12.8 9.8 - 20.1 mg/dL    Creatinine 1.17 (H) 0.55 - 1.02 mg/dL    BUN/Creatinine Ratio 11     Calcium Level Total 8.7 8.4 - 10.2 mg/dL    Anion Gap 10.0 mEq/L    eGFR 50 mls/min/1.73/m2   Magnesium    Collection Time: 07/12/23  3:47 AM   Result Value Ref Range    Magnesium Level 2.20 1.60 - 2.60 mg/dL   CBC with Differential    Collection Time: 07/12/23  3:47 AM   Result Value Ref Range    WBC 5.75 4.50 - 11.50 x10(3)/mcL    RBC 3.34 (L) 4.20 - 5.40 x10(6)/mcL    Hgb 9.5 (L) 12.0 - 16.0 g/dL    Hct 30.4 (L) 37.0 - 47.0 %    MCV 91.0 80.0 - 94.0 fL    MCH 28.4 27.0 - 31.0 pg    MCHC 31.3 (L) 33.0 - 36.0 g/dL    RDW 13.4 11.5 - 17.0 %    Platelet 224 130 - 400 x10(3)/mcL    MPV 9.5 7.4 - 10.4 fL    Neut % 65.1 %    Lymph % 16.3 %    Mono % 13.7 %    Eos % 4.3 %    Basophil % 0.3 %    Lymph # 0.94 0.6 - 4.6 x10(3)/mcL    Neut # 3.73 2.1 - 9.2 x10(3)/mcL    Mono # 0.79 0.1 - 1.3 x10(3)/mcL    Eos # 0.25 0 - 0.9 x10(3)/mcL    Baso # 0.02 <=0.2 x10(3)/mcL    IG# 0.02 0 - 0.04 x10(3)/mcL    IG% 0.3 %    NRBC% 0.0 %   POCT glucose    Collection Time: 07/12/23  5:11 AM   Result Value Ref Range    POCT Glucose 141 (H) 70 - 110 mg/dL       Telemetry:  SR    Physical Exam  Vitals and nursing note reviewed.   Constitutional:       General: She is not in acute distress.     Appearance: Normal appearance. She is not ill-appearing.   HENT:      Head: Normocephalic.      Mouth/Throat:      Mouth: Mucous membranes are moist.      Pharynx: Oropharynx is clear.   Cardiovascular:      Rate and Rhythm: Normal rate and regular rhythm.   Pulmonary:      Effort: Pulmonary effort is normal. No respiratory distress.   Musculoskeletal:      Cervical back: Neck supple.       Right lower leg: No edema.      Left lower leg: No edema.      Comments: Legs are Warm   Skin:     General: Skin is warm and dry.   Neurological:      Mental Status: She is alert. Mental status is at baseline.      Comments: Dementia noted. Patient is pleasant.     Current Inpatient Medications:    Current Facility-Administered Medications:     acetaminophen tablet 650 mg, 650 mg, Oral, Q6H PRN, LOUIE yKleCNP-BC    ALPRAZolam tablet 0.5 mg, 0.5 mg, Oral, TID PRN, Goyo Ramirez MD    aluminum-magnesium hydroxide-simethicone 200-200-20 mg/5 mL suspension 30 mL, 30 mL, Oral, QID PRN, OPAL KyleP-BC    aspirin EC tablet 81 mg, 81 mg, Oral, Daily, Maine Riggs MD, 81 mg at 07/12/23 0855    atorvastatin tablet 40 mg, 40 mg, Oral, QHS, Maine Riggs MD, 40 mg at 07/11/23 2118    carvediloL tablet 6.25 mg, 6.25 mg, Oral, BID, Delia Vee MD, 6.25 mg at 07/12/23 0855    cloNIDine tablet 0.2 mg, 0.2 mg, Oral, Q6H PRN, Delia Vee MD, 0.2 mg at 07/08/23 0009    clopidogreL tablet 75 mg, 75 mg, Oral, Daily, Maine Riggs MD, 75 mg at 07/12/23 0855    dextrose 10% bolus 125 mL 125 mL, 12.5 g, Intravenous, PRN, OPAL KyleP-BC    dextrose 10% bolus 250 mL 250 mL, 25 g, Intravenous, PRN, LOUIE KyleCNP-BC    DULoxetine DR capsule 60 mg, 60 mg, Oral, QAM, Maine Riggs MD, 60 mg at 07/12/23 0855    gabapentin capsule 100 mg, 100 mg, Oral, QHS, Delia Vee MD, 100 mg at 07/11/23 2116    glucagon (human recombinant) injection 1 mg, 1 mg, Intramuscular, PRN, LOUIE KyleCNP-BC    HYDROcodone-acetaminophen 5-325 mg per tablet 1 tablet, 1 tablet, Oral, Q6H PRN, Maine Riggs MD, 1 tablet at 07/11/23 2117    insulin aspart U-100 injection 1-10 Units, 1-10 Units, Subcutaneous, Q6H PRN, Araceli Agosto, [facility], 6 Units at 07/10/23 1651    insulin aspart U-100 injection 5 Units, 5 Units, Subcutaneous, TIDWM, Goyo Ramirez MD, 5 Units at 07/12/23 0855    insulin  detemir U-100 injection 15 Units, 15 Units, Subcutaneous, QHS, Goyo Ramirez MD, 15 Units at 07/11/23 2118    magnesium oxide tablet 400 mg, 400 mg, Oral, BID, Maine Riggs MD, 400 mg at 07/12/23 0855    melatonin tablet 6 mg, 6 mg, Oral, Nightly PRN, Araceli BASILIO Triny, AGACNP-BC, 6 mg at 07/06/23 2212    morphine injection 4 mg, 4 mg, Intravenous, Q4H PRN, Maine Riggs MD, 4 mg at 07/06/23 0226    naloxone 0.4 mg/mL injection 0.02 mg, 0.02 mg, Intravenous, PRN, Araceli G Triny, AGACNP-BC    ondansetron injection 4 mg, 4 mg, Intravenous, Q4H PRN, Araceli G Triny, AGACNP-BC    pantoprazole EC tablet 40 mg, 40 mg, Oral, Daily, Maine Riggs MD, 40 mg at 07/12/23 0855    polyethylene glycol packet 17 g, 17 g, Oral, BID PRN, Araceli G Triny, AGACNP-BC, 17 g at 07/08/23 1821    prochlorperazine injection Soln 5 mg, 5 mg, Intravenous, Q6H PRN, Araclei G Triny, AGACNP-BC    simethicone chewable tablet 80 mg, 1 tablet, Oral, QID PRN, Araceli G Triny, AGACNP-BC, 80 mg at 07/07/23 1021    sodium chloride 0.9% flush 10 mL, 10 mL, Intravenous, PRN, Xiomy Terry MD    Flushing PICC Protocol, , , Until Discontinued **AND** sodium chloride 0.9% flush 10 mL, 10 mL, Intravenous, Q6H, 10 mL at 07/12/23 0525 **AND** sodium chloride 0.9% flush 10 mL, 10 mL, Intravenous, PRN, Delia Vee MD    VTE Risk Mitigation (From admission, onward)           Ordered     Reason for No Pharmacological VTE Prophylaxis  Once        Question:  Reasons:  Answer:  Physician Provided (leave comment)    07/05/23 2116     IP VTE HIGH RISK PATIENT  Once         07/05/23 2116     Place sequential compression device  Until discontinued         07/05/23 2116                    Assessment:   Acute Limb Ischemia (Denies Rest Pain Currently, Reports Significant Claudication- Chronic in Nature)    - On DAPT & Heparin Infusion  PAD   - Peripheral angiogram 7.11.23 ~ Successful intervention of left SFA ISR with less than 10% residual stenosis   - Left DP  occlusion on Arterial Duplex this admission    - CTA Runoff: R SFA stent patent with 70% stenosis, R Pop patent heavily calcified, L SFA stent patent with multifocal stenosis, L Pop patent heavily calcified, Right sluggish single vessel runoff without significant flow in R PTA, Left sluggish 2 vessel runoff    - Peripheral Angio in 7.2022 s/p L SFA and L Pop Stents    - Peripheral Angio in 8.2022 s/p R SFA and R Pop Stents  CAD    - [facility] in 5.2022: PCI/Stent to RCAx2, OM1  ICMO with Recovered LV Function    - EF 55% (5.2022)  Hypertension  Anemia (Stable)    - No Bleeding Noted  DM II  Chronic Kidney Stage II (Renal Function Stable)  Dementia    Plan:   S/P Peripheral angiogram 7.11.23 ~ Successful intervention of left SFA ISR with less than 10% residual stenosis  Continue Current Cardiac Medications Including DAPT  Agree with adding PT to evaluate and treat   Cardiology to sign off, please re-consult if needed    Cely Sharp NP

## 2023-07-12 NOTE — PT/OT/SLP EVAL
Physical Therapy Evaluation    Patient Name:  Mayra Parker   MRN:  [id number]    Recommendations:     Discharge Recommendations: home health PT, home with home health   Discharge Equipment Recommendations: none   Barriers to discharge:  medical dx, impaired mobility     Assessment:     Mayra Parker is a 73 y.o. female admitted with a medical diagnosis of acute limb ischemia s/p angio with successful intervention of L SFA.  She presents with the following impairments/functional limitations: weakness, gait instability, impaired endurance, impaired balance, decreased lower extremity function, impaired self care skills, impaired functional mobility.    Rehab Prognosis: Good; patient would benefit from acute skilled PT services to address these deficits and reach maximum level of function.    Recent Surgery: Procedure(s) (LRB):  Peripheral angiography (N/A)  Atherectomy, Lower Arterial  Angioplasty-peripheral (N/A) 1 Day Post-Op    Plan:     During this hospitalization, patient to be seen 5 x/week to address the identified rehab impairments via gait training, therapeutic activities, therapeutic exercises and progress toward the following goals:    Plan of Care Expires:  08/12/23    Subjective     Chief Complaint: n/a  Patient/Family Comments/goals: to get stonger   Pain/Comfort:  Pain Rating 1: 0/10    Patients cultural, spiritual, Mandaen conflicts given the current situation: no    Living Environment:  Pt lives alone in a [facility]; 4 stairs to enter/exit with 1 railing - stated that daughter helps her to complete stairs   Prior to admission, patients level of function was independent but daughter checks on her in the morning and the afternoons.    Equipment used at home: rollator.  DME owned (not currently used):  walker, rolling, rollator, bedside commode, wheelchair. .    Upon discharge, patient will have assistance from daughter.    Objective:     Communicated with NSG prior to session.  Patient found  up in chair with telemetry, PureWick  upon PT entry to room.    General Precautions: Standard,    Orthopedic Precautions:N/A   Braces: N/A  Respiratory Status: Room air      Exams:  Cognitive Exam:  Patient is oriented to Person, Place, Time, and Situation  RLE Strength: grossly 4/5  LLE Strength: grossly 4/5  Skin integrity: Visible skin intact      Functional Mobility:  Transfers:     Sit to Stand:  contact guard assistance with rolling walker; x3 trials from bedside chair  Gait: pt ambulated approx 50 ft + 120 ft with use of RW and CGA for safety: B knees remained flexed in stance phase; forward flexed posturing requiring VC for upright posture but pt had difficulty maintaining an erect posture for long; step through pattern; intermittent scissoring noted; seated rest break in btw trials  Patient was noted to have a slight shakiness with ax to which she reported occurred at home prior to admit.    Patient provided with verbal education regarding POC, mobility, safety.  Understanding was verbalized.     Patient left up in chair with all lines intact and call button in reach.    GOALS:   Multidisciplinary Problems       Physical Therapy Goals          Problem: Physical Therapy    Goal Priority Disciplines Outcome Goal Variances Interventions   Physical Therapy Goal     PT, PT/OT Ongoing, Progressing     Description: Goals to be met by: 23     Patient will increase functional independence with mobility by performin. Supine to sit with [place]  2. Sit to supine with [place]  3. Sit to stand transfer with Modified [place]  4. Gait  x 150 feet with Modified [place] using Rolling Walker.                          History:     Past Medical History:   Diagnosis Date    Arthritis     Coronary artery disease     CVA (cerebral vascular accident)     affected right side of body    Depression     Diabetes mellitus     Hyperlipidemia     Hypertension     Unspecified dementia without behavioral  disturbance        Past Surgical History:   Procedure Laterality Date    ANGIOPLASTY, PERIPHERAL BLOOD VESSEL N/A 7/11/2023    Procedure: Angioplasty-peripheral;  Surgeon: Jonathan Alanis MD;  Location: [facility] CATH LAB;  Service: Cardiology;  Laterality: N/A;    ATHERECTOMY, LOWER ARTERIAL  7/11/2023    Procedure: Atherectomy, Lower Arterial;  Surgeon: Jonathan Alanis MD;  Location: [facility] CATH LAB;  Service: Cardiology;;    BACK SURGERY      CAROTID ENDARTERECTOMY Left     CAROTID ENDARTERECTOMY Right     LEFT HEART CATHETERIZATION  05/20/2022    Dr. Estrella; Stent placement    LEFT HEART CATHETERIZATION Left 5/20/2022    Procedure: CATHETERIZATION, HEART, LEFT;  Surgeon: Jone Estrella MD;  Location: [facility] CATH LAB;  Service: Cardiology;  Laterality: Left;  LHC VIA R GROIN    PERIPHERAL ANGIOGRAPHY N/A 7/11/2023    Procedure: Peripheral angiography;  Surgeon: Jonathan Alanis MD;  Location: [facility] CATH LAB;  Service: Cardiology;  Laterality: N/A;       Time Tracking:     PT Received On: 07/12/23  PT Start Time: 1439     PT Stop Time: 1458  PT Total Time (min): 19 min     Billable Minutes: Evaluation mod      07/12/2023

## 2023-07-13 VITALS
BODY MASS INDEX: 28.34 KG/M2 | OXYGEN SATURATION: 97 % | SYSTOLIC BLOOD PRESSURE: 99 MMHG | WEIGHT: 166 LBS | TEMPERATURE: 98 F | HEART RATE: 76 BPM | HEIGHT: 64 IN | DIASTOLIC BLOOD PRESSURE: 63 MMHG | RESPIRATION RATE: 18 BRPM

## 2023-07-13 PROBLEM — M79.605 BILATERAL LEG PAIN: Status: ACTIVE | Noted: 2023-07-13

## 2023-07-13 PROBLEM — M79.604 BILATERAL LEG PAIN: Status: ACTIVE | Noted: 2023-07-13

## 2023-07-13 LAB
POCT GLUCOSE: 146 MG/DL (ref 70–110)
POCT GLUCOSE: 205 MG/DL (ref 70–110)

## 2023-07-13 PROCEDURE — 25000003 PHARM REV CODE 250: Performed by: INTERNAL MEDICINE

## 2023-07-13 PROCEDURE — 63600175 PHARM REV CODE 636 W HCPCS: Performed by: INTERNAL MEDICINE

## 2023-07-13 PROCEDURE — A4216 STERILE WATER/SALINE, 10 ML: HCPCS | Performed by: INTERNAL MEDICINE

## 2023-07-13 RX ORDER — METFORMIN HYDROCHLORIDE 1000 MG/1
1000 TABLET ORAL 2 TIMES DAILY WITH MEALS
Qty: 180 TABLET | Refills: 3 | Status: SHIPPED | OUTPATIENT
Start: 2023-07-13 | End: 2024-07-12

## 2023-07-13 RX ORDER — CLOPIDOGREL BISULFATE 75 MG/1
75 TABLET ORAL DAILY
Qty: 30 TABLET | Refills: 11 | Status: SHIPPED | OUTPATIENT
Start: 2023-07-13 | End: 2024-07-12

## 2023-07-13 RX ORDER — HYDROCODONE BITARTRATE AND ACETAMINOPHEN 5; 325 MG/1; MG/1
1 TABLET ORAL EVERY 6 HOURS PRN
Qty: 28 TABLET | Refills: 0 | Status: SHIPPED | OUTPATIENT
Start: 2023-07-13 | End: 2023-07-20

## 2023-07-13 RX ADMIN — SODIUM CHLORIDE, PRESERVATIVE FREE 10 ML: 5 INJECTION INTRAVENOUS at 06:07

## 2023-07-13 RX ADMIN — INSULIN ASPART 5 UNITS: 100 INJECTION, SOLUTION INTRAVENOUS; SUBCUTANEOUS at 08:07

## 2023-07-13 RX ADMIN — CARVEDILOL 6.25 MG: 3.12 TABLET, FILM COATED ORAL at 08:07

## 2023-07-13 RX ADMIN — PANTOPRAZOLE SODIUM 40 MG: 40 TABLET, DELAYED RELEASE ORAL at 08:07

## 2023-07-13 RX ADMIN — HYDROCODONE BITARTRATE AND ACETAMINOPHEN 1 TABLET: 5; 325 TABLET ORAL at 08:07

## 2023-07-13 RX ADMIN — INSULIN ASPART 5 UNITS: 100 INJECTION, SOLUTION INTRAVENOUS; SUBCUTANEOUS at 11:07

## 2023-07-13 RX ADMIN — DULOXETINE HYDROCHLORIDE 60 MG: 30 CAPSULE, DELAYED RELEASE ORAL at 05:07

## 2023-07-13 RX ADMIN — Medication 400 MG: at 08:07

## 2023-07-13 RX ADMIN — ASPIRIN 81 MG: 81 TABLET, COATED ORAL at 08:07

## 2023-07-13 RX ADMIN — CLOPIDOGREL BISULFATE 75 MG: 75 TABLET ORAL at 08:07

## 2023-07-13 NOTE — PROGRESS NOTES
[facility]  Hospital Medicine Progress Note        Chief Complaint: Inpatient Follow-up for PAD     HPI:   73-year-old female with significant history of ischemic cardiomyopathy ejection fraction-40-50%, CAD status post PCI on aspirin, Plavix, chronic kidney disease stage 2, type 2 diabetes mellitus, HTN, HLD, CVA, chronic dementia, depression.  Patient presented to the ED with complaints of worsening bilateral lower extremity pain x1 day.  Worsening pain on walking.  Hemodynamically stable.  Lab significant for hyperglycemia mild renal insufficiency.  Arterial ultrasound concerning for acute limb ischemia.  CT angiogram with severe stenosis.  Patient was admitted to hospitalist medicine service . resumed aspirin, Plavix.  Initiated heparin GTT.  [facility] consulted for angiogram with intervention.  Patient underwent peripheral angiogram for LLE on 07/11 with laser atherectomy, 4 mm balloon angioplasty and 5 mm drug coated balloon with less than 10% residual stenosis of L SFA ISR.  RLE PCI planned for outpatient by [facility]IS.    Interval Hx:   Today, patient was seen at bedside.  She stated she was doing well and had no new complaints.  PT consulted to ambulate patient's; will follow recommendations.  May start planning for DC tomorrow per PT recommendations once Cardiology clears her.     Objective/physical exam:  General: alert lady lying comfortably in bed, in no acute distress  HENT: oral and oropharyngeal mucosa moist, pink, with no erythema or exudates, no ear pain or discharge  Neck: normal neck movement, no lymph nodes or swellings, no JVD or Carotid bruit  Respiratory: clear breathing sounds bilaterally, no crackles, rales, ronchi or wheezes  Cardiovascular: clear S1 and S2, no murmurs, rubs or gallops  Peripheral Vascular: no lesions, ulcers or erosions, normal 2+ DP/PT pulses B/L; no pedal edema  Gastrointestinal: soft, non-tender, non-distended abdomen, no guarding, rigidity or rebound  tenderness, normal bowel sounds  Integumentary: normal skin color, no rashes or lesions  Neuro: AAO x 3; motor strength 5/5 in B/L UEs & LEs; sensation intact to gross and fine touch B/L; CN II-XII grossly intact    Assessment/Plan:  Severe bilateral PAD s/p PCI  Bilateral LE Claudication 2/2 Above  Anemia of chronic disease  CKD stage 2-stable   History of CAD status post PCI with ischemic cardiomyopathy-appears compensated  HTN  DM II  HLD  History of CVA  Dementia  Depression     Plan:  Patient continues to be admitted for close monitoring   Cardiology on board; following recommendations   Patient underwent peripheral angiogram with PCI of L SFA ISR with less than 10% residual stenosis; underwent laser atherectomy, 4 mm balloon angioplasty, 5 mm drug coated balloon  PT/OT consulted today; will follow recommendations   Patient continued on ASA 81 mg, Plavix 75 mg, atorvastatin 40 mg, Coreg 6.25 mg b.i.d., duloxetine 60 mg, gabapentin 100 mg, detemir insulin 15 units nightly, aspart insulin 5 units t.i.d., Protonix 40 mg  Patient tentatively planned for DC tomorrow per PT recommendations once cleared by Cardiology     VTE prophylaxis: Lovneox     Patient condition:  Stable     Anticipated discharge and Disposition:   Pending PT recs        All diagnosis and differential diagnosis have been reviewed; assessment and plan has been documented; I have personally reviewed the labs and test results that are presently available; I have reviewed the patients medication list; I have reviewed the consulting providers response and recommendations. I have reviewed or attempted to review medical records based upon their availability     All of the patient's questions have been  addressed and answered. Patient's is agreeable to the above stated plan. I will continue to monitor closely and make adjustments to medical management as needed.  _____________________________________________________________________    VITAL SIGNS: 24 HRS  MIN & MAX LAST   Temp  Min: 97.3 °F (36.3 °C)  Max: 98.6 °F (37 °C) 97.3 °F (36.3 °C)   BP  Min: 115/74  Max: 136/79 121/66   Pulse  Min: 76  Max: 86  85   Resp  Min: 14  Max: 18 18   SpO2  Min: 93 %  Max: 98 % (!) 93 %     I have reviewed the following labs:    Recent Labs   Lab 07/10/23  0535 07/11/23  0412 07/12/23  0347   WBC 5.79 6.08 5.75   RBC 4.01* 3.75* 3.34*   HGB 11.4* 10.6* 9.5*   HCT 36.3* 34.1* 30.4*   MCV 90.5 90.9 91.0   MCH 28.4 28.3 28.4   MCHC 31.4* 31.1* 31.3*   RDW 13.2 13.3 13.4    257 224   MPV 9.8 10.1 9.5         Recent Labs   Lab 07/07/23  0350 07/08/23  0622 07/10/23  0535 07/11/23  0412 07/12/23  0347    136 139 136 134*   K 4.9 4.4 4.9 5.1 4.8   CO2 22* 23 26 27 24   BUN 12.0 10.0 12.1 12.6 12.8   CREATININE 1.07* 1.10* 1.06* 1.19* 1.17*   CALCIUM 9.1 9.0 9.5 9.3 8.7   MG 2.10  --  2.10 2.10 2.20   ALBUMIN 3.1* 3.0* 3.3*  --   --    ALKPHOS 53 51 68  --   --    ALT 16 16 28  --   --    AST 26 26 53*  --   --    BILITOT 0.6 0.6 0.7  --   --           Cardiac catheterization  Narrative:   The Prox L SFA to Dist L SFA lesion was 90% stenosed with 10% stenosis   post-intervention.    The estimated blood loss was <50 mL.    Patient brought to catheterization lab in a fasting state.  She was placed   on table prepped and draped in usual sterile fashion.  Time-out was   completed.  Left wrist was anesthetized 1% lidocaine.  Seldinger technique   and ultrasound guidance slender sheath was placed wire.  Flushed with a   radial cocktail mix.  Pigtail was then placed in distal abdominal aorta   and angiogram was performed 1st order runoff.  Then placed into the right   common femoral artery and a left common femoral artery respectively and   images were taken in the runoff fashion.  Significant InStent restenosis   was noted bilaterally however patient's symptoms more the left than the   right and therefore the left was intervened on.  A 6 [place] 119 cm tumor   radial sheath was then  placed in which the tip was placed in the left   common femoral artery.  0.018 wire was then placed distally.  2.0 laser   atherectomy was performed due to heavy fibrotic InStent restenosis.  4 mm   balloon angioplasty was performed throughout.  5 mm by 200 mm cross   stellar balloon was then used from the distal InStent restenosis   proximally.  5 mm drug coated balloons were then used throughout the   entire distal to ostial stented segment.  Final angiographic images showed   excellent results with less than 10% residual wires and catheters were   removed.  The radial TR band was placed over the access point to achieve   hemostasis she was transported to the postop area in a stable condition   and given 300 mg of Plavix.    Complications:  None   Contrast:  95 cc    Distal abdominal aorta: is patent  No noted aneurysm  Right system:  - LUCILA:  Widely patent  - EIA/IIA:  Widely patent  - CFA:  Widely patent  - SFA:  Entire segment is stented.  Greater than 70% InStent restenosis   throughout.  - Popliteal:  Patent.  - infrapopliteal:    - Anterior tibial:  Focal greater than 70% proximal long tubular lesion.    Runs into the foot  - Posterior tibial:  Small-vessel occludes proximally.  - Peroneal/TPT:  TPT noted to have focal lesions of greater than 70%.    Small-vessel distally runs off into the foot.  Posterior communicating   branch noted off of the artery into the posterior circulation.    Left system:  - LUCILA:  Widely patent  - EIA/IIA:  Widely patent  - CFA:  Widely patent  - SFA:  Entire segment stented.  Greater than 70% in stent restenosis   noted throughout   - Popliteal:  Moderate luminal irregularities  - infrapopliteal:    - Anterior tibial:  Large dominant vessel.  Luminal irregularities.  - Posterior tibial:  Small-vessel occluded proximally.  - Peroneal:  TPT with severe lesions greater than 70%.  Proximal  of   the peroneal    Intervention:   - Left SFA ISR:  This was not an acute limb.  Laser  atherectomy, 4 mm   balloon angioplasty and 5 mm drug coated balloon with less than 10%   residual stenosis  Impression:    Successful intervention of left SFA ISR with less than 10% residual   stenosis    Plan:   Aggressive medical management for underlying peripheral artery disease   Routine postop care  Outpatient intervention of the right    The procedure log was documented by Documenter: Lon Carreon RN and   verified by Jonathan Alanis MD.    Date: 7/11/2023  Time: 11:45 AM      Vazquez Farah MD   07/12/2023

## 2023-07-13 NOTE — ED PROVIDER NOTES
Encounter Date: 7/5/2023       History     Chief Complaint   Patient presents with    Hyperglycemia    Foot Pain     C/o bilateral neuropathy pain in feet x1 year, worsening since yesterday after decrease in gabapentin. Also c/o hyperglycemia. Cbg 367.hx of dementia     HPI  Review of patient's allergies indicates:   Allergen Reactions    Glipizide Swelling     Swelling of the lips      Influenza vaccine tr-s 11 (pf) Hives    Pregabalin Other (See Comments)     nightmares     Past Medical History:   Diagnosis Date    Arthritis     Coronary artery disease     CVA (cerebral vascular accident)     affected right side of body    Depression     Diabetes mellitus     Hyperlipidemia     Hypertension     Unspecified dementia without behavioral disturbance      Past Surgical History:   Procedure Laterality Date    ANGIOPLASTY, PERIPHERAL BLOOD VESSEL N/A 7/11/2023    Procedure: Angioplasty-peripheral;  Surgeon: Jonathan Alanis MD;  Location: [facility] CATH LAB;  Service: Cardiology;  Laterality: N/A;    ATHERECTOMY, LOWER ARTERIAL  7/11/2023    Procedure: Atherectomy, Lower Arterial;  Surgeon: Jonathan Alanis MD;  Location: [facility] CATH LAB;  Service: Cardiology;;    BACK SURGERY      CAROTID ENDARTERECTOMY Left     CAROTID ENDARTERECTOMY Right     LEFT HEART CATHETERIZATION  05/20/2022    Dr. Estrella; Stent placement    LEFT HEART CATHETERIZATION Left 5/20/2022    Procedure: CATHETERIZATION, HEART, LEFT;  Surgeon: Jone Estrella MD;  Location: [facility] CATH LAB;  Service: Cardiology;  Laterality: Left;  LHC VIA R GROIN    PERIPHERAL ANGIOGRAPHY N/A 7/11/2023    Procedure: Peripheral angiography;  Surgeon: Jonathan Alanis MD;  Location: [facility] CATH LAB;  Service: Cardiology;  Laterality: N/A;     Family History   Problem Relation Age of Onset    Heart attack Son      Social History     Tobacco Use    Smoking status: Former     Packs/day: 0.50     Types: Cigarettes    Smokeless tobacco: Never    Tobacco comments:     Started smoking in late 20s    Substance Use Topics    Alcohol use: Never    Drug use: Never     Review of Systems    Physical Exam     Initial Vitals [07/05/23 1207]   BP Pulse Resp Temp SpO2   (!) 152/84 86 18 97.5 °F (36.4 °C) 99 %      MAP       --         Physical Exam    ED Course   Procedures  Labs Reviewed   COMPREHENSIVE METABOLIC PANEL - Abnormal; Notable for the following components:       Result Value    Sodium Level 135 (*)     Carbon Dioxide 22 (*)     Glucose Level 326 (*)     Creatinine 1.22 (*)     Globulin 3.6 (*)     Albumin/Globulin Ratio 0.9 (*)     All other components within normal limits   CBC WITH DIFFERENTIAL - Abnormal; Notable for the following components:    RBC 3.77 (*)     Hgb 11.1 (*)     Hct 35.1 (*)     MCHC 31.6 (*)     All other components within normal limits   PROTIME-INR - Abnormal; Notable for the following components:    PT 12.3 (*)     All other components within normal limits   POCT GLUCOSE - Abnormal; Notable for the following components:    POCT Glucose 194 (*)     All other components within normal limits   POCT GLUCOSE - Abnormal; Notable for the following components:    POCT Glucose 373 (*)     All other components within normal limits   APTT - Normal   CBC W/ AUTO DIFFERENTIAL    Narrative:     The following orders were created for panel order CBC auto differential.  Procedure                               Abnormality         Status                     ---------                               -----------         ------                     CBC with Differential[[id number]]        Abnormal            Final result                 Please view results for these tests on the individual orders.   POCT GLUCOSE MONITORING CONTINUOUS        ECG Results    None       Imaging Results              CTA Runoff ABD PEL Bilat Lower Ext (Final result)  Result time 07/05/23 18:15:41      Final result by José Miguel Parmar MD (07/05/23 18:15:41)                   Impression:      Extensive atherosclerotic disease  seen as outlined above but no evidence of acute occlusion seen      Electronically signed by: José Miguel Parmar  Date:    07/05/2023  Time:    18:15               Narrative:    EXAMINATION:  CTA RUNOFF ABD PEL BILAT LOWER EXT    CLINICAL HISTORY:  Claudication or leg ischemia;    TECHNIQUE:  Multiple axial images were obtained from the abdominal aorta to the level of the feet after contrast administration.  Sagittal and coronal reconstructions and MIPS reconstruction was performed.  Precontrast source imaging was also performed.  Automatic exposure control (AEC) is utilized to reduce patient radiation exposure.    COMPARISON:  None    FINDINGS:  Vascular images: The abdominal aorta is normal in caliber.  There is some atherosclerotic plaque in the abdominal aorta and mesenteric vessels.  The celiac axis is patent.  SMA is patent.  There is a single renal artery on the left.  There is a 60% stenosis in the proximal left renal artery secondary to calcified plaque.  There is a 50 -60% stenosis in the right proximal renal artery secondary to calcified plaque.  The DEVI is patent but heavily calcified.    Distal abdominal aorta has some atherosclerotic plaque within it.  Calcified plaque is seen in both common iliac arteries with multifocal areas of stenosis measuring up to 70%.  The proximal external iliac artery on the left side has a high-grade 80% stenosis.  The distal external iliac artery on the left side has some calcifications but no significant stenosis.  External iliac artery on the right side is patent with some calcified plaque but no high-grade stenosis is seen.    The right common femoral artery has some calcified plaque within it with a 40-50% stenosis.  The superficial femoral artery on the right side has a stent within it with multifocal areas of stenosis within the stent measuring up to 70%.  Popliteal artery is patent but is heavily calcified with multifocal areas of 50-60% stenosis.  There is very  sluggish  single vessel runoff in the right lower extremity via the anterior tibial artery.  No significant flow is seen in the posterior tibial artery.    There is a stent in the superficial femoral artery on the left side.  Multifocal areas of stenosis seen within the stent.  There is flow in the popliteal artery.  It is heavily calcified.  There is sluggish 2 vessel runoff seen via the posterior tibial and anterior tibial artery.    Source images: The liver appears normal.  No liver mass or lesion is seen.  The patient is status post cholecystectomy.  The pancreas appears grossly unremarkable. Spleen appears normal.  The adrenal glands appear normal.  No renal abnormality seen. Visualized portions of the bowel appear grossly unremarkable. Urinary bladder appears normal.                                       Medications   glucagon (human recombinant) injection 1 mg (has no administration in time range)   dextrose 10% bolus 125 mL 125 mL (has no administration in time range)   dextrose 10% bolus 250 mL 250 mL (has no administration in time range)   insulin aspart U-100 injection 1-10 Units (6 Units Subcutaneous Given 7/10/23 1651)   melatonin tablet 6 mg (6 mg Oral Given 7/6/23 2212)   ondansetron injection 4 mg (has no administration in time range)   prochlorperazine injection Soln 5 mg (has no administration in time range)   polyethylene glycol packet 17 g (17 g Oral Given 7/8/23 1821)   acetaminophen tablet 650 mg (has no administration in time range)   simethicone chewable tablet 80 mg (80 mg Oral Given 7/7/23 1021)   aluminum-magnesium hydroxide-simethicone 200-200-20 mg/5 mL suspension 30 mL (has no administration in time range)   naloxone 0.4 mg/mL injection 0.02 mg (has no administration in time range)   morphine injection 4 mg (4 mg Intravenous Given 7/6/23 0226)   HYDROcodone-acetaminophen 5-325 mg per tablet 1 tablet (1 tablet Oral Given 7/12/23 2331)   aspirin EC tablet 81 mg (81 mg Oral Given 7/12/23  0855)   atorvastatin tablet 40 mg (40 mg Oral Given 7/12/23 2010)   clopidogreL tablet 75 mg (75 mg Oral Given 7/12/23 0855)   DULoxetine DR capsule 60 mg (60 mg Oral Given 7/13/23 0506)   pantoprazole EC tablet 40 mg (40 mg Oral Given 7/12/23 0855)   magnesium oxide tablet 400 mg (400 mg Oral Given 7/12/23 2010)   gabapentin capsule 100 mg (100 mg Oral Given 7/12/23 2010)   0.9%  NaCl infusion ( Intravenous New Bag 7/7/23 1212)   sodium chloride 0.9% flush 10 mL (has no administration in time range)   sodium chloride 0.9% flush 10 mL (10 mLs Intravenous Given 7/12/23 2345)     And   sodium chloride 0.9% flush 10 mL (has no administration in time range)   carvediloL tablet 6.25 mg (6.25 mg Oral Given 7/12/23 2010)   cloNIDine tablet 0.2 mg (0.2 mg Oral Given 7/8/23 0009)   insulin detemir U-100 injection 15 Units (15 Units Subcutaneous Given 7/12/23 2012)   ALPRAZolam tablet 0.5 mg (has no administration in time range)   insulin aspart U-100 injection 5 Units (5 Units Subcutaneous Given 7/12/23 1629)   0.9%  NaCl infusion ( Intravenous New Bag 7/11/23 1315)   enoxaparin injection 40 mg (has no administration in time range)   sodium chloride 0.9% bolus 1,000 mL 1,000 mL (0 mLs Intravenous Stopped 7/5/23 1518)   morphine injection 4 mg (4 mg Intravenous Given 7/5/23 1418)   ondansetron injection 4 mg (4 mg Intravenous Given 7/5/23 1418)   iopamidoL (ISOVUE-370) injection 120 mL (120 mLs Intravenous Given 7/5/23 1717)   iopamidoL (ISOVUE-370) injection 100 mL (1 mL Intravenous Given 7/5/23 1801)   carvediloL tablet 3.125 mg (3.125 mg Oral Given 7/5/23 1829)   gabapentin capsule 300 mg (300 mg Oral Given 7/5/23 1906)   hydrALAZINE injection 20 mg (20 mg Intravenous Given 7/5/23 1956)   nitroGLYCERIN 2% TD oint ointment 0.5 inch (0.5 inches Topical (Top) Given 7/5/23 2026)   heparin 25,000 units in dextrose 5% (100 units/ml) IV bolus from bag INITIAL BOLUS (4,976 Units Intravenous Bolus from Bag 7/5/23 2025)   magnesium  sulfate 2g in water 50mL IVPB (premix) (0 g Intravenous Stopped 7/6/23 1451)   carvediloL tablet 3.125 mg (3.125 mg Oral Given 7/7/23 1433)                              Clinical Impression:   Final diagnoses:  [M79.606] Leg pain (Primary)  [I77.1] Arterial occlusion due to stenosis  [R73.9] Hyperglycemia  [M79.604, M79.605] Bilateral leg pain        ED Disposition Condition    Admit Stable                Alex Wilkes MD  07/13/23 9524

## 2023-07-13 NOTE — PLAN OF CARE
07/13/23 1117   Final Note   Assessment Type Final Discharge Note   Anticipated Discharge Disposition Home-Health  (resume [facility])   Post-Acute Status   Post-Acute Authorization Home Health   Home Health Status Set-up Complete/Auth obtained   Discharge Delays None known at this time

## 2023-07-13 NOTE — DISCHARGE SUMMARY
[facility] Hospital Medicine Discharge Summary    Admit Date: 7/5/2023  Discharge Date and Time: 7/13/202312:40 PM  Admitting Physician:  Team  Discharging Physician: Abby Hull MD.  Primary Care Physician: Rashaun Weiss MD  Consults: Cardiology and Hospital Medicine    Discharge Diagnoses:  Severe bilateral PAD s/p PCI  Bilateral LE Claudication 2/2 Above  Anemia of chronic disease  CKD stage 2-stable   History of CAD status post PCI with ischemic cardiomyopathy-appears compensated  HTN  DM II  HLD  History of CVA  Dementia  Depression    Hospital Course:   Chief Complaint: Inpatient Follow-up for PAD     HPI:   73-year-old female with significant history of ischemic cardiomyopathy ejection fraction-40-50%, CAD status post PCI on aspirin, Plavix, chronic kidney disease stage 2, type 2 diabetes mellitus, HTN, HLD, CVA, chronic dementia, depression.  Patient presented to the ED with complaints of worsening bilateral lower extremity pain x1 day.  Worsening pain on walking.  Hemodynamically stable.  Lab significant for hyperglycemia mild renal insufficiency.  Arterial ultrasound concerning for acute limb ischemia.  CT angiogram with severe stenosis.  Patient was admitted to hospitalist medicine service . resumed aspirin, Plavix.  Initiated heparin GTT.  [facility] consulted for angiogram with intervention.  Patient underwent peripheral angiogram for LLE on 07/11 with laser atherectomy, 4 mm balloon angioplasty and 5 mm drug coated balloon with less than 10% residual stenosis of L SFA ISR.  RLE PCI planned for outpatient by [facility].     Interval Hx:   Today, patient was seen at bedside.  She stated she was doing well and had no new complaints.  PT recs for home pt   F/up with [facility] out pt      Objective/physical exam:  General: alert lady lying comfortably in bed, in no acute distress  HENT: oral and oropharyngeal mucosa moist, pink, with no erythema or exudates, no ear pain or  discharge  Neck: normal neck movement, no lymph nodes or swellings, no JVD or Carotid bruit  Respiratory: clear breathing sounds bilaterally, no crackles, rales, ronchi or wheezes  Cardiovascular: clear S1 and S2, no murmurs, rubs or gallops  Peripheral Vascular: no lesions, ulcers or erosions, normal 2+ DP/PT pulses B/L; no pedal edema  Gastrointestinal: soft, non-tender, non-distended abdomen, no guarding, rigidity or rebound tenderness, normal bowel sounds  Integumentary: normal skin color, no rashes or lesions  Neuro: AAO x 3; motor strength 5/5 in B/L UEs & LEs; sensation intact to gross and fine touch B/L; CN II-XII grossly intact       Pt was seen and examined on the day of discharge  Vitals:  VITAL SIGNS: 24 HRS MIN & MAX LAST   Temp  Min: 96.2 °F (35.7 °C)  Max: 98.1 °F (36.7 °C) 98 °F (36.7 °C)   BP  Min: 99/63  Max: 174/88 99/63   Pulse  Min: 74  Max: 85  76   Resp  Min: 18  Max: 20 18   SpO2  Min: 93 %  Max: 97 % 97 %         Procedures Performed: No admission procedures for hospital encounter.     Significant Diagnostic Studies: See Full reports for all details    Recent Labs   Lab 07/10/23  0535 07/11/23 0412 07/12/23 0347   WBC 5.79 6.08 5.75   RBC 4.01* 3.75* 3.34*   HGB 11.4* 10.6* 9.5*   HCT 36.3* 34.1* 30.4*   MCV 90.5 90.9 91.0   MCH 28.4 28.3 28.4   MCHC 31.4* 31.1* 31.3*   RDW 13.2 13.3 13.4    257 224   MPV 9.8 10.1 9.5       Recent Labs   Lab 07/07/23  0350 07/08/23  0622 07/10/23  0535 07/11/23  0412 07/12/23  0347    136 139 136 134*   K 4.9 4.4 4.9 5.1 4.8   CO2 22* 23 26 27 24   BUN 12.0 10.0 12.1 12.6 12.8   CREATININE 1.07* 1.10* 1.06* 1.19* 1.17*   CALCIUM 9.1 9.0 9.5 9.3 8.7   MG 2.10  --  2.10 2.10 2.20   ALBUMIN 3.1* 3.0* 3.3*  --   --    ALKPHOS 53 51 68  --   --    ALT 16 16 28  --   --    AST 26 26 53*  --   --    BILITOT 0.6 0.6 0.7  --   --         Microbiology Results (last 7 days)       ** No results found for the last 168 hours. **             Cardiac  catheterization  Narrative:   The Prox L SFA to Dist L SFA lesion was 90% stenosed with 10% stenosis   post-intervention.    The estimated blood loss was <50 mL.    Patient brought to catheterization lab in a fasting state.  She was placed   on table prepped and draped in usual sterile fashion.  Time-out was   completed.  Left wrist was anesthetized 1% lidocaine.  Seldinger technique   and ultrasound guidance slender sheath was placed wire.  Flushed with a   radial cocktail mix.  Pigtail was then placed in distal abdominal aorta   and angiogram was performed 1st order runoff.  Then placed into the right   common femoral artery and a left common femoral artery respectively and   images were taken in the runoff fashion.  Significant InStent restenosis   was noted bilaterally however patient's symptoms more the left than the   right and therefore the left was intervened on.  A 6 Mauritian 119 cm tumor   radial sheath was then placed in which the tip was placed in the left   common femoral artery.  0.018 wire was then placed distally.  2.0 laser   atherectomy was performed due to heavy fibrotic InStent restenosis.  4 mm   balloon angioplasty was performed throughout.  5 mm by 200 mm cross   stellar balloon was then used from the distal InStent restenosis   proximally.  5 mm drug coated balloons were then used throughout the   entire distal to ostial stented segment.  Final angiographic images showed   excellent results with less than 10% residual wires and catheters were   removed.  The radial TR band was placed over the access point to achieve   hemostasis she was transported to the postop area in a stable condition   and given 300 mg of Plavix.    Complications:  None   Contrast:  95 cc    Distal abdominal aorta: is patent  No noted aneurysm  Right system:  - LUCILA:  Widely patent  - EIA/IIA:  Widely patent  - CFA:  Widely patent  - SFA:  Entire segment is stented.  Greater than 70% InStent restenosis   throughout.  -  Popliteal:  Patent.  - infrapopliteal:    - Anterior tibial:  Focal greater than 70% proximal long tubular lesion.    Runs into the foot  - Posterior tibial:  Small-vessel occludes proximally.  - Peroneal/TPT:  TPT noted to have focal lesions of greater than 70%.    Small-vessel distally runs off into the foot.  Posterior communicating   branch noted off of the artery into the posterior circulation.    Left system:  - LUCILA:  Widely patent  - EIA/IIA:  Widely patent  - CFA:  Widely patent  - SFA:  Entire segment stented.  Greater than 70% in stent restenosis   noted throughout   - Popliteal:  Moderate luminal irregularities  - infrapopliteal:    - Anterior tibial:  Large dominant vessel.  Luminal irregularities.  - Posterior tibial:  Small-vessel occluded proximally.  - Peroneal:  TPT with severe lesions greater than 70%.  Proximal  of   the peroneal    Intervention:   - Left SFA ISR:  This was not an acute limb.  Laser atherectomy, 4 mm   balloon angioplasty and 5 mm drug coated balloon with less than 10%   residual stenosis  Impression:    Successful intervention of left SFA ISR with less than 10% residual   stenosis    Plan:   Aggressive medical management for underlying peripheral artery disease   Routine postop care  Outpatient intervention of the right    The procedure log was documented by Documenter: Lon Carreon RN and   verified by Jonathan Alanis MD.    Date: 7/11/2023  Time: 11:45 AM         Medication List        START taking these medications      HYDROcodone-acetaminophen 5-325 mg per tablet  Commonly known as: NORCO  Take 1 tablet by mouth every 6 (six) hours as needed for Pain.            CHANGE how you take these medications      atorvastatin 40 MG tablet  Commonly known as: LIPITOR  What changed: Another medication with the same name was removed. Continue taking this medication, and follow the directions you see here.     carvediloL 3.125 MG tablet  Commonly known as: COREG  What changed: Another  medication with the same name was removed. Continue taking this medication, and follow the directions you see here.     clopidogreL 75 mg tablet  Commonly known as: PLAVIX  Take 1 tablet (75 mg total) by mouth once daily.  What changed: Another medication with the same name was removed. Continue taking this medication, and follow the directions you see here.     DULoxetine 60 MG capsule  Commonly known as: CYMBALTA  What changed: Another medication with the same name was removed. Continue taking this medication, and follow the directions you see here.     gabapentin 300 MG capsule  Commonly known as: NEURONTIN  Take 1 capsule (300 mg total) by mouth 2 (two) times daily.  What changed: Another medication with the same name was removed. Continue taking this medication, and follow the directions you see here.     JARDIANCE 25 mg tablet  Generic drug: empagliflozin  What changed: Another medication with the same name was removed. Continue taking this medication, and follow the directions you see here.     SITagliptin phosphate 100 MG Tab  Commonly known as: JANUVIA  Take 1 tablet (100 mg total) by mouth once daily.  What changed: Another medication with the same name was removed. Continue taking this medication, and follow the directions you see here.            CONTINUE taking these medications      aspirin 81 MG EC tablet  Commonly known as: ECOTRIN     donepeziL 5 MG tablet  Commonly known as: ARICEPT  Take 1 tablet (5 mg total) by mouth every evening.     magnesium oxide 400 mg (241.3 mg magnesium) tablet  Commonly known as: MAG-OX  Take 1 tablet (400 mg total) by mouth 2 (two) times daily.     metFORMIN 1000 MG tablet  Commonly known as: GLUCOPHAGE  Take 1 tablet (1,000 mg total) by mouth 2 (two) times daily with meals.     pantoprazole 40 MG tablet  Commonly known as: PROTONIX               Where to Get Your Medications        These medications were sent to [facility] - [place], [place] - [address] [place] [address] [address]  [address]  [address] [place] [address]      Phone: [phone]   clopidogreL 75 mg tablet  HYDROcodone-acetaminophen 5-325 mg per tablet  metFORMIN 1000 MG tablet          Explained in detail to the patient about the discharge plan, medications, and follow-up visits. Pt understands and agrees with the treatment plan  Discharge Disposition: Skilled Nursing Facility   Discharged Condition: stable  Diet-   Dietary Orders (From admission, onward)       Start     Ordered    07/11/23 1200  Diet heart healthy Diabetic  Diet effective now        Question:  Diet Modifier:  Answer:  Diabetic    07/11/23 1200                   Medications Per DC med rec  Activities as tolerated   Follow-up Information       [facility] Follow up.    Specialties: Home Health Services, Home Therapy Services, Home Living Aide Services  Why: Home Health will call you to set up a visit  Contact information:  [address] [address]  [phone]             Rashaun Weiss MD. Schedule an appointment as soon as possible for a visit in 2 week(s).    Specialty: Family Medicine  Why: F/U Appointment with Dr. Carol Banks: The office will call with an appointment date and time.  Contact information:  [address]  [address]  [facility] [address]  [phone]                           For further questions contact hospitalist office    Discharge time 33 minutes    For worsening symptoms, chest pain, shortness of breath, increased abdominal pain, high grade fever, stroke or stroke like symptoms, immediately go to the nearest Emergency Room or call 911 as soon as possible.      Abby Burnett M.D on 7/13/2023. at 12:40 PM.

## 2023-12-12 NOTE — DISCHARGE INSTRUCTIONS
Client is discharged in stable condition to TCU. Client and family verbalized understanding of discharge orders medications.  
Residential stability/Relationship stability/Sobriety

## 2024-10-24 ENCOUNTER — HOSPITAL ENCOUNTER (INPATIENT)
Facility: HOSPITAL | Age: 74
LOS: 11 days | Discharge: NURSING, REHAB OR LONG-TERM CARE | DRG: 312 | End: 2024-11-05
Attending: STUDENT IN AN ORGANIZED HEALTH CARE EDUCATION/TRAINING PROGRAM | Admitting: INTERNAL MEDICINE
Payer: MEDICARE

## 2024-10-24 DIAGNOSIS — R53.1 WEAKNESS: ICD-10-CM

## 2024-10-24 DIAGNOSIS — R07.9 CHEST PAIN: ICD-10-CM

## 2024-10-24 DIAGNOSIS — R42 DIZZINESS: ICD-10-CM

## 2024-10-24 DIAGNOSIS — R07.9 CHEST PAIN, UNSPECIFIED TYPE: Primary | ICD-10-CM

## 2024-10-24 LAB
ALBUMIN SERPL-MCNC: 3.3 G/DL (ref 3.4–4.8)
ALBUMIN/GLOB SERPL: 0.8 RATIO (ref 1.1–2)
ALP SERPL-CCNC: 58 UNIT/L (ref 40–150)
ALT SERPL-CCNC: 17 UNIT/L (ref 0–55)
ANION GAP SERPL CALC-SCNC: 11 MEQ/L
APTT PPP: 27.8 SECONDS (ref 23.2–33.7)
AST SERPL-CCNC: 16 UNIT/L (ref 5–34)
B-OH-BUTYR SERPL-MCNC: 0.1 MMOL/L
BACTERIA #/AREA URNS AUTO: ABNORMAL /HPF
BASOPHILS # BLD AUTO: 0.01 X10(3)/MCL
BASOPHILS NFR BLD AUTO: 0.2 %
BILIRUB SERPL-MCNC: 0.9 MG/DL
BILIRUB UR QL STRIP.AUTO: NEGATIVE
BUN SERPL-MCNC: 16.4 MG/DL (ref 9.8–20.1)
CALCIUM SERPL-MCNC: 9 MG/DL (ref 8.4–10.2)
CHLORIDE SERPL-SCNC: 101 MMOL/L (ref 98–107)
CLARITY UR: CLEAR
CO2 SERPL-SCNC: 23 MMOL/L (ref 23–31)
COLOR UR AUTO: ABNORMAL
CREAT SERPL-MCNC: 1.22 MG/DL (ref 0.55–1.02)
CREAT/UREA NIT SERPL: 13
EOSINOPHIL # BLD AUTO: 0.05 X10(3)/MCL (ref 0–0.9)
EOSINOPHIL NFR BLD AUTO: 0.9 %
ERYTHROCYTE [DISTWIDTH] IN BLOOD BY AUTOMATED COUNT: 13.4 % (ref 11.5–17)
GFR SERPLBLD CREATININE-BSD FMLA CKD-EPI: 47 ML/MIN/1.73/M2
GLOBULIN SER-MCNC: 3.9 GM/DL (ref 2.4–3.5)
GLUCOSE SERPL-MCNC: 254 MG/DL (ref 82–115)
GLUCOSE UR QL STRIP: ABNORMAL
HCT VFR BLD AUTO: 38.5 % (ref 37–47)
HGB BLD-MCNC: 12.3 G/DL (ref 12–16)
HGB UR QL STRIP: NEGATIVE
IMM GRANULOCYTES # BLD AUTO: 0.02 X10(3)/MCL (ref 0–0.04)
IMM GRANULOCYTES NFR BLD AUTO: 0.4 %
INR PPP: 0.9
KETONES UR QL STRIP: NEGATIVE
LEUKOCYTE ESTERASE UR QL STRIP: NEGATIVE
LYMPHOCYTES # BLD AUTO: 1.8 X10(3)/MCL (ref 0.6–4.6)
LYMPHOCYTES NFR BLD AUTO: 34.2 %
MCH RBC QN AUTO: 28.5 PG (ref 27–31)
MCHC RBC AUTO-ENTMCNC: 31.9 G/DL (ref 33–36)
MCV RBC AUTO: 89.3 FL (ref 80–94)
MONOCYTES # BLD AUTO: 0.35 X10(3)/MCL (ref 0.1–1.3)
MONOCYTES NFR BLD AUTO: 6.6 %
MUCOUS THREADS URNS QL MICRO: ABNORMAL /LPF
NEUTROPHILS # BLD AUTO: 3.04 X10(3)/MCL (ref 2.1–9.2)
NEUTROPHILS NFR BLD AUTO: 57.7 %
NITRITE UR QL STRIP: NEGATIVE
NRBC BLD AUTO-RTO: 0 %
PH UR STRIP: 5 [PH]
PLATELET # BLD AUTO: 273 X10(3)/MCL (ref 130–400)
PMV BLD AUTO: 9.3 FL (ref 7.4–10.4)
POCT GLUCOSE: 295 MG/DL (ref 70–110)
POTASSIUM SERPL-SCNC: 4 MMOL/L (ref 3.5–5.1)
PROT SERPL-MCNC: 7.2 GM/DL (ref 5.8–7.6)
PROT UR QL STRIP: NEGATIVE
PROTHROMBIN TIME: 12 SECONDS (ref 12.5–14.5)
RBC # BLD AUTO: 4.31 X10(6)/MCL (ref 4.2–5.4)
RBC #/AREA URNS AUTO: ABNORMAL /HPF
SODIUM SERPL-SCNC: 135 MMOL/L (ref 136–145)
SP GR UR STRIP.AUTO: 1.02 (ref 1–1.03)
SQUAMOUS #/AREA URNS LPF: ABNORMAL /HPF
TROPONIN I SERPL-MCNC: 0.02 NG/ML (ref 0–0.04)
UROBILINOGEN UR STRIP-ACNC: NORMAL
WBC # BLD AUTO: 5.27 X10(3)/MCL (ref 4.5–11.5)
WBC #/AREA URNS AUTO: ABNORMAL /HPF

## 2024-10-24 PROCEDURE — 85730 THROMBOPLASTIN TIME PARTIAL: CPT | Performed by: NURSE PRACTITIONER

## 2024-10-24 PROCEDURE — 85025 COMPLETE CBC W/AUTO DIFF WBC: CPT | Performed by: NURSE PRACTITIONER

## 2024-10-24 PROCEDURE — 81001 URINALYSIS AUTO W/SCOPE: CPT | Performed by: NURSE PRACTITIONER

## 2024-10-24 PROCEDURE — 93010 ELECTROCARDIOGRAM REPORT: CPT | Mod: ,,, | Performed by: INTERNAL MEDICINE

## 2024-10-24 PROCEDURE — 84484 ASSAY OF TROPONIN QUANT: CPT | Performed by: NURSE PRACTITIONER

## 2024-10-24 PROCEDURE — 80053 COMPREHEN METABOLIC PANEL: CPT | Performed by: NURSE PRACTITIONER

## 2024-10-24 PROCEDURE — 93005 ELECTROCARDIOGRAM TRACING: CPT

## 2024-10-24 PROCEDURE — 82010 KETONE BODYS QUAN: CPT | Performed by: NURSE PRACTITIONER

## 2024-10-24 PROCEDURE — 82962 GLUCOSE BLOOD TEST: CPT

## 2024-10-24 PROCEDURE — 99285 EMERGENCY DEPT VISIT HI MDM: CPT | Mod: 25

## 2024-10-24 PROCEDURE — 85610 PROTHROMBIN TIME: CPT | Performed by: NURSE PRACTITIONER

## 2024-10-24 NOTE — FIRST PROVIDER EVALUATION
Medical screening examination initiated.  I have conducted a focused provider triage encounter, findings are as follows:    Brief history of present illness:  Patient's family member states that patient's home health nurse reported that patient's CBG was greater than 400 today. Patient states dizziness.     There were no vitals filed for this visit.    Pertinent physical exam:  Awake, alert    Brief workup plan:  Labs    Preliminary workup initiated; this workup will be continued and followed by the physician or advanced practice provider that is assigned to the patient when roomed.

## 2024-10-25 LAB
ALBUMIN SERPL-MCNC: 3.2 G/DL (ref 3.4–4.8)
ALBUMIN/GLOB SERPL: 1 RATIO (ref 1.1–2)
ALP SERPL-CCNC: 55 UNIT/L (ref 40–150)
ALT SERPL-CCNC: 14 UNIT/L (ref 0–55)
ANION GAP SERPL CALC-SCNC: 10 MEQ/L
APICAL FOUR CHAMBER EJECTION FRACTION: 65 %
APICAL TWO CHAMBER EJECTION FRACTION: 54 %
AST SERPL-CCNC: 16 UNIT/L (ref 5–34)
AV INDEX (PROSTH): 0.68
AV MEAN GRADIENT: 5 MMHG
AV PEAK GRADIENT: 9 MMHG
AV VALVE AREA BY VELOCITY RATIO: 1.9 CM²
AV VALVE AREA: 1.9 CM²
AV VELOCITY RATIO: 0.67
BASOPHILS # BLD AUTO: 0.02 X10(3)/MCL
BASOPHILS NFR BLD AUTO: 0.4 %
BILIRUB SERPL-MCNC: 0.9 MG/DL
BSA FOR ECHO PROCEDURE: 1.71 M2
BUN SERPL-MCNC: 16.2 MG/DL (ref 9.8–20.1)
CALCIUM SERPL-MCNC: 8.9 MG/DL (ref 8.4–10.2)
CHLORIDE SERPL-SCNC: 104 MMOL/L (ref 98–107)
CO2 SERPL-SCNC: 25 MMOL/L (ref 23–31)
CREAT SERPL-MCNC: 1.05 MG/DL (ref 0.55–1.02)
CREAT/UREA NIT SERPL: 15
DOP CALC AO PEAK VEL: 1.5 M/S
DOP CALC AO VTI: 24.4 CM
DOP CALC LVOT AREA: 2.8 CM2
DOP CALC LVOT DIAMETER: 1.9 CM
DOP CALC LVOT PEAK VEL: 1 M/S
DOP CALC LVOT STROKE VOLUME: 46.8 CM3
DOP CALC MV VTI: 19.9 CM
DOP CALCLVOT PEAK VEL VTI: 16.5 CM
E WAVE DECELERATION TIME: 177 MSEC
E/A RATIO: 0.6
E/E' RATIO: 14.6 M/S
EOSINOPHIL # BLD AUTO: 0.06 X10(3)/MCL (ref 0–0.9)
EOSINOPHIL NFR BLD AUTO: 1.1 %
ERYTHROCYTE [DISTWIDTH] IN BLOOD BY AUTOMATED COUNT: 13.6 % (ref 11.5–17)
GFR SERPLBLD CREATININE-BSD FMLA CKD-EPI: 56 ML/MIN/1.73/M2
GLOBULIN SER-MCNC: 3.2 GM/DL (ref 2.4–3.5)
GLUCOSE SERPL-MCNC: 179 MG/DL (ref 82–115)
HCT VFR BLD AUTO: 35.4 % (ref 37–47)
HGB BLD-MCNC: 11.5 G/DL (ref 12–16)
IMM GRANULOCYTES # BLD AUTO: 0.01 X10(3)/MCL (ref 0–0.04)
IMM GRANULOCYTES NFR BLD AUTO: 0.2 %
LEFT ATRIUM AREA SYSTOLIC (APICAL 2 CHAMBER): 12 CM2
LEFT ATRIUM AREA SYSTOLIC (APICAL 4 CHAMBER): 12.8 CM2
LEFT ATRIUM SIZE: 2.3 CM
LEFT ATRIUM VOLUME INDEX MOD: 16.9 ML/M2
LEFT ATRIUM VOLUME MOD: 28.6 ML
LEFT VENTRICLE DIASTOLIC VOLUME INDEX: 30.89 ML/M2
LEFT VENTRICLE DIASTOLIC VOLUME: 52.2 ML
LEFT VENTRICLE END DIASTOLIC VOLUME APICAL 2 CHAMBER: 46.3 ML
LEFT VENTRICLE END DIASTOLIC VOLUME APICAL 4 CHAMBER: 57.2 ML
LEFT VENTRICLE END SYSTOLIC VOLUME APICAL 2 CHAMBER: 27.2 ML
LEFT VENTRICLE END SYSTOLIC VOLUME APICAL 4 CHAMBER: 29.3 ML
LEFT VENTRICLE SYSTOLIC VOLUME INDEX: 12.3 ML/M2
LEFT VENTRICLE SYSTOLIC VOLUME: 20.8 ML
LV LATERAL E/E' RATIO: 14.6 M/S
LV SEPTAL E/E' RATIO: 14.6 M/S
LVOT MG: 2 MMHG
LVOT MV: 0.65 CM/S
LYMPHOCYTES # BLD AUTO: 2.17 X10(3)/MCL (ref 0.6–4.6)
LYMPHOCYTES NFR BLD AUTO: 40.9 %
MAGNESIUM SERPL-MCNC: 1.6 MG/DL (ref 1.6–2.6)
MCH RBC QN AUTO: 28.5 PG (ref 27–31)
MCHC RBC AUTO-ENTMCNC: 32.5 G/DL (ref 33–36)
MCV RBC AUTO: 87.8 FL (ref 80–94)
MONOCYTES # BLD AUTO: 0.48 X10(3)/MCL (ref 0.1–1.3)
MONOCYTES NFR BLD AUTO: 9 %
MV MEAN GRADIENT: 3 MMHG
MV PEAK A VEL: 1.22 M/S
MV PEAK E VEL: 0.73 M/S
MV PEAK GRADIENT: 5 MMHG
MV STENOSIS PRESSURE HALF TIME: 43 MS
MV VALVE AREA BY CONTINUITY EQUATION: 2.35 CM2
MV VALVE AREA P 1/2 METHOD: 5.12 CM2
NEUTROPHILS # BLD AUTO: 2.57 X10(3)/MCL (ref 2.1–9.2)
NEUTROPHILS NFR BLD AUTO: 48.4 %
NRBC BLD AUTO-RTO: 0 %
OHS LV EJECTION FRACTION SIMPSONS BIPLANE MOD: 60 %
OHS QRS DURATION: 84 MS
OHS QRS DURATION: 86 MS
OHS QTC CALCULATION: 459 MS
OHS QTC CALCULATION: 529 MS
PHOSPHATE SERPL-MCNC: 3.4 MG/DL (ref 2.3–4.7)
PISA TR MAX VEL: 1.68 M/S
PLATELET # BLD AUTO: 233 X10(3)/MCL (ref 130–400)
PMV BLD AUTO: 9 FL (ref 7.4–10.4)
POCT GLUCOSE: 191 MG/DL (ref 70–110)
POCT GLUCOSE: 210 MG/DL (ref 70–110)
POCT GLUCOSE: 215 MG/DL (ref 70–110)
POCT GLUCOSE: 253 MG/DL (ref 70–110)
POCT GLUCOSE: 254 MG/DL (ref 70–110)
POTASSIUM SERPL-SCNC: 4.3 MMOL/L (ref 3.5–5.1)
PROT SERPL-MCNC: 6.4 GM/DL (ref 5.8–7.6)
PV PEAK GRADIENT: 5 MMHG
PV PEAK VELOCITY: 1.07 M/S
RA PRESSURE ESTIMATED: 3 MMHG
RBC # BLD AUTO: 4.03 X10(6)/MCL (ref 4.2–5.4)
RV TB RVSP: 5 MMHG
SODIUM SERPL-SCNC: 139 MMOL/L (ref 136–145)
TDI LATERAL: 0.05 M/S
TDI SEPTAL: 0.05 M/S
TDI: 0.05 M/S
TR MAX PG: 11 MMHG
TRICUSPID ANNULAR PLANE SYSTOLIC EXCURSION: 2.21 CM
TROPONIN I SERPL-MCNC: 0.03 NG/ML (ref 0–0.04)
TROPONIN I SERPL-MCNC: <0.01 NG/ML (ref 0–0.04)
TROPONIN I SERPL-MCNC: <0.01 NG/ML (ref 0–0.04)
TV REST PULMONARY ARTERY PRESSURE: 14 MMHG
WBC # BLD AUTO: 5.31 X10(3)/MCL (ref 4.5–11.5)

## 2024-10-25 PROCEDURE — 63600175 PHARM REV CODE 636 W HCPCS

## 2024-10-25 PROCEDURE — 63600175 PHARM REV CODE 636 W HCPCS: Performed by: STUDENT IN AN ORGANIZED HEALTH CARE EDUCATION/TRAINING PROGRAM

## 2024-10-25 PROCEDURE — 25000003 PHARM REV CODE 250: Performed by: STUDENT IN AN ORGANIZED HEALTH CARE EDUCATION/TRAINING PROGRAM

## 2024-10-25 PROCEDURE — 63600175 PHARM REV CODE 636 W HCPCS: Performed by: NURSE PRACTITIONER

## 2024-10-25 PROCEDURE — 21400001 HC TELEMETRY ROOM

## 2024-10-25 PROCEDURE — 80053 COMPREHEN METABOLIC PANEL: CPT | Performed by: NURSE PRACTITIONER

## 2024-10-25 PROCEDURE — 83735 ASSAY OF MAGNESIUM: CPT | Performed by: NURSE PRACTITIONER

## 2024-10-25 PROCEDURE — 93005 ELECTROCARDIOGRAM TRACING: CPT

## 2024-10-25 PROCEDURE — 93010 ELECTROCARDIOGRAM REPORT: CPT | Mod: ,,, | Performed by: INTERNAL MEDICINE

## 2024-10-25 PROCEDURE — 84100 ASSAY OF PHOSPHORUS: CPT | Performed by: NURSE PRACTITIONER

## 2024-10-25 PROCEDURE — 25000003 PHARM REV CODE 250

## 2024-10-25 PROCEDURE — 84484 ASSAY OF TROPONIN QUANT: CPT | Performed by: NURSE PRACTITIONER

## 2024-10-25 PROCEDURE — 25000242 PHARM REV CODE 250 ALT 637 W/ HCPCS

## 2024-10-25 PROCEDURE — 85025 COMPLETE CBC W/AUTO DIFF WBC: CPT | Performed by: NURSE PRACTITIONER

## 2024-10-25 RX ORDER — TALC
6 POWDER (GRAM) TOPICAL NIGHTLY PRN
Status: DISCONTINUED | OUTPATIENT
Start: 2024-10-25 | End: 2024-11-05 | Stop reason: HOSPADM

## 2024-10-25 RX ORDER — ALUMINUM HYDROXIDE, MAGNESIUM HYDROXIDE, AND SIMETHICONE 1200; 120; 1200 MG/30ML; MG/30ML; MG/30ML
30 SUSPENSION ORAL 4 TIMES DAILY PRN
Status: DISCONTINUED | OUTPATIENT
Start: 2024-10-25 | End: 2024-11-05 | Stop reason: HOSPADM

## 2024-10-25 RX ORDER — ONDANSETRON HYDROCHLORIDE 2 MG/ML
4 INJECTION, SOLUTION INTRAVENOUS EVERY 4 HOURS PRN
Status: DISCONTINUED | OUTPATIENT
Start: 2024-10-25 | End: 2024-11-05 | Stop reason: HOSPADM

## 2024-10-25 RX ORDER — IBUPROFEN 200 MG
24 TABLET ORAL
Status: DISCONTINUED | OUTPATIENT
Start: 2024-10-25 | End: 2024-11-05 | Stop reason: HOSPADM

## 2024-10-25 RX ORDER — LANOLIN ALCOHOL/MO/W.PET/CERES
400 CREAM (GRAM) TOPICAL 2 TIMES DAILY
Status: DISCONTINUED | OUTPATIENT
Start: 2024-10-25 | End: 2024-11-05 | Stop reason: HOSPADM

## 2024-10-25 RX ORDER — DULOXETIN HYDROCHLORIDE 30 MG/1
60 CAPSULE, DELAYED RELEASE ORAL EVERY MORNING
Status: DISCONTINUED | OUTPATIENT
Start: 2024-10-25 | End: 2024-11-05 | Stop reason: HOSPADM

## 2024-10-25 RX ORDER — CLOPIDOGREL BISULFATE 75 MG/1
75 TABLET ORAL DAILY
Status: DISCONTINUED | OUTPATIENT
Start: 2024-10-25 | End: 2024-11-05 | Stop reason: HOSPADM

## 2024-10-25 RX ORDER — CARVEDILOL 3.12 MG/1
3.12 TABLET ORAL 2 TIMES DAILY
Status: DISCONTINUED | OUTPATIENT
Start: 2024-10-25 | End: 2024-10-26

## 2024-10-25 RX ORDER — GABAPENTIN 300 MG/1
300 CAPSULE ORAL 2 TIMES DAILY
Status: DISCONTINUED | OUTPATIENT
Start: 2024-10-25 | End: 2024-10-29

## 2024-10-25 RX ORDER — ALUMINUM HYDROXIDE, MAGNESIUM HYDROXIDE, AND SIMETHICONE 1200; 120; 1200 MG/30ML; MG/30ML; MG/30ML
30 SUSPENSION ORAL ONCE
Status: COMPLETED | OUTPATIENT
Start: 2024-10-25 | End: 2024-10-25

## 2024-10-25 RX ORDER — LIDOCAINE HYDROCHLORIDE 20 MG/ML
15 SOLUTION OROPHARYNGEAL ONCE
Status: COMPLETED | OUTPATIENT
Start: 2024-10-25 | End: 2024-10-25

## 2024-10-25 RX ORDER — ACETAMINOPHEN 325 MG/1
650 TABLET ORAL EVERY 6 HOURS PRN
Status: DISCONTINUED | OUTPATIENT
Start: 2024-10-25 | End: 2024-11-05 | Stop reason: HOSPADM

## 2024-10-25 RX ORDER — ATORVASTATIN CALCIUM 40 MG/1
40 TABLET, FILM COATED ORAL NIGHTLY
Status: DISCONTINUED | OUTPATIENT
Start: 2024-10-25 | End: 2024-11-05 | Stop reason: HOSPADM

## 2024-10-25 RX ORDER — IBUPROFEN 200 MG
16 TABLET ORAL
Status: DISCONTINUED | OUTPATIENT
Start: 2024-10-25 | End: 2024-11-05 | Stop reason: HOSPADM

## 2024-10-25 RX ORDER — SIMETHICONE 80 MG
1 TABLET,CHEWABLE ORAL 4 TIMES DAILY PRN
Status: DISCONTINUED | OUTPATIENT
Start: 2024-10-25 | End: 2024-11-05 | Stop reason: HOSPADM

## 2024-10-25 RX ORDER — HYDRALAZINE HYDROCHLORIDE 20 MG/ML
10 INJECTION INTRAMUSCULAR; INTRAVENOUS EVERY 4 HOURS PRN
Status: DISCONTINUED | OUTPATIENT
Start: 2024-10-25 | End: 2024-11-05 | Stop reason: HOSPADM

## 2024-10-25 RX ORDER — NALOXONE HCL 0.4 MG/ML
0.02 VIAL (ML) INJECTION
Status: DISCONTINUED | OUTPATIENT
Start: 2024-10-25 | End: 2024-11-05 | Stop reason: HOSPADM

## 2024-10-25 RX ORDER — PROCHLORPERAZINE EDISYLATE 5 MG/ML
5 INJECTION INTRAMUSCULAR; INTRAVENOUS EVERY 6 HOURS PRN
Status: DISCONTINUED | OUTPATIENT
Start: 2024-10-25 | End: 2024-11-05 | Stop reason: HOSPADM

## 2024-10-25 RX ORDER — ENOXAPARIN SODIUM 100 MG/ML
40 INJECTION SUBCUTANEOUS EVERY 24 HOURS
Status: DISCONTINUED | OUTPATIENT
Start: 2024-10-25 | End: 2024-11-05 | Stop reason: HOSPADM

## 2024-10-25 RX ORDER — INSULIN ASPART 100 [IU]/ML
0-10 INJECTION, SOLUTION INTRAVENOUS; SUBCUTANEOUS
Status: DISCONTINUED | OUTPATIENT
Start: 2024-10-25 | End: 2024-11-05 | Stop reason: HOSPADM

## 2024-10-25 RX ORDER — ASPIRIN 81 MG/1
81 TABLET ORAL DAILY
Status: DISCONTINUED | OUTPATIENT
Start: 2024-10-25 | End: 2024-11-05 | Stop reason: HOSPADM

## 2024-10-25 RX ORDER — NITROGLYCERIN 0.4 MG/1
0.4 TABLET SUBLINGUAL EVERY 5 MIN PRN
Status: DISCONTINUED | OUTPATIENT
Start: 2024-10-25 | End: 2024-11-05 | Stop reason: HOSPADM

## 2024-10-25 RX ORDER — POLYETHYLENE GLYCOL 3350 17 G/17G
17 POWDER, FOR SOLUTION ORAL 2 TIMES DAILY PRN
Status: DISCONTINUED | OUTPATIENT
Start: 2024-10-25 | End: 2024-11-05 | Stop reason: HOSPADM

## 2024-10-25 RX ORDER — INSULIN GLARGINE 100 [IU]/ML
5 INJECTION, SOLUTION SUBCUTANEOUS NIGHTLY
Status: DISCONTINUED | OUTPATIENT
Start: 2024-10-25 | End: 2024-10-26

## 2024-10-25 RX ORDER — GLUCAGON 1 MG
1 KIT INJECTION
Status: DISCONTINUED | OUTPATIENT
Start: 2024-10-25 | End: 2024-11-05 | Stop reason: HOSPADM

## 2024-10-25 RX ORDER — IBUPROFEN 200 MG
400 TABLET ORAL ONCE
Status: COMPLETED | OUTPATIENT
Start: 2024-10-25 | End: 2024-10-25

## 2024-10-25 RX ORDER — PANTOPRAZOLE SODIUM 40 MG/1
40 TABLET, DELAYED RELEASE ORAL DAILY
Status: DISCONTINUED | OUTPATIENT
Start: 2024-10-25 | End: 2024-11-05 | Stop reason: HOSPADM

## 2024-10-25 RX ORDER — DULAGLUTIDE 1.5 MG/.5ML
1.5 INJECTION, SOLUTION SUBCUTANEOUS
Status: ON HOLD | COMMUNITY
End: 2024-11-04 | Stop reason: HOSPADM

## 2024-10-25 RX ORDER — DONEPEZIL HYDROCHLORIDE 5 MG/1
5 TABLET, FILM COATED ORAL NIGHTLY
Status: DISCONTINUED | OUTPATIENT
Start: 2024-10-25 | End: 2024-11-05 | Stop reason: HOSPADM

## 2024-10-25 RX ADMIN — CARVEDILOL 3.12 MG: 3.12 TABLET, FILM COATED ORAL at 09:10

## 2024-10-25 RX ADMIN — CLOPIDOGREL BISULFATE 75 MG: 75 TABLET ORAL at 09:10

## 2024-10-25 RX ADMIN — IBUPROFEN 400 MG: 200 TABLET, FILM COATED ORAL at 02:10

## 2024-10-25 RX ADMIN — Medication 400 MG: at 09:10

## 2024-10-25 RX ADMIN — INSULIN ASPART 2 UNITS: 100 INJECTION, SOLUTION INTRAVENOUS; SUBCUTANEOUS at 09:10

## 2024-10-25 RX ADMIN — DULOXETINE HYDROCHLORIDE 60 MG: 30 CAPSULE, DELAYED RELEASE ORAL at 09:10

## 2024-10-25 RX ADMIN — GABAPENTIN 300 MG: 300 CAPSULE ORAL at 09:10

## 2024-10-25 RX ADMIN — ALUMINUM HYDROXIDE, MAGNESIUM HYDROXIDE, AND SIMETHICONE 30 ML: 1200; 120; 1200 SUSPENSION ORAL at 02:10

## 2024-10-25 RX ADMIN — INSULIN GLARGINE 5 UNITS: 100 INJECTION, SOLUTION SUBCUTANEOUS at 09:10

## 2024-10-25 RX ADMIN — LIDOCAINE HYDROCHLORIDE 15 ML: 20 SOLUTION ORAL at 02:10

## 2024-10-25 RX ADMIN — INSULIN ASPART 2 UNITS: 100 INJECTION, SOLUTION INTRAVENOUS; SUBCUTANEOUS at 06:10

## 2024-10-25 RX ADMIN — INSULIN ASPART 6 UNITS: 100 INJECTION, SOLUTION INTRAVENOUS; SUBCUTANEOUS at 11:10

## 2024-10-25 RX ADMIN — NITROGLYCERIN 0.4 MG: 0.4 TABLET, ORALLY DISINTEGRATING SUBLINGUAL at 01:10

## 2024-10-25 RX ADMIN — ENOXAPARIN SODIUM 40 MG: 40 INJECTION SUBCUTANEOUS at 05:10

## 2024-10-25 RX ADMIN — NITROGLYCERIN 0.4 MG: 0.4 TABLET, ORALLY DISINTEGRATING SUBLINGUAL at 11:10

## 2024-10-25 RX ADMIN — PANTOPRAZOLE SODIUM 40 MG: 40 TABLET, DELAYED RELEASE ORAL at 09:10

## 2024-10-25 RX ADMIN — INSULIN ASPART 4 UNITS: 100 INJECTION, SOLUTION INTRAVENOUS; SUBCUTANEOUS at 06:10

## 2024-10-25 RX ADMIN — DONEPEZIL HYDROCHLORIDE 5 MG: 5 TABLET, FILM COATED ORAL at 09:10

## 2024-10-25 RX ADMIN — ATORVASTATIN CALCIUM 40 MG: 40 TABLET, FILM COATED ORAL at 09:10

## 2024-10-25 RX ADMIN — ASPIRIN 81 MG: 81 TABLET, COATED ORAL at 09:10

## 2024-10-25 RX ADMIN — HYDRALAZINE HYDROCHLORIDE 10 MG: 20 INJECTION INTRAMUSCULAR; INTRAVENOUS at 10:10

## 2024-10-25 NOTE — ED PROVIDER NOTES
Encounter Date: 10/24/2024    SCRIBE #1 NOTE: I, Yasmany Kingsley, am scribing for, and in the presence of,  Kevin Salazar MD. I have scribed the following portions of the note - Other sections scribed: HPI, ROS, PE.       History     Chief Complaint   Patient presents with    Dizziness     Dizziness & blurred vision that started today. Daughter also reports hyperglycemia () & hypotension (SBP 70) at home today.  in triage. Compliant with medications.      74 year old female presents to ED for generalized weakness onset this afternoon (10/24). Per daughter pt's home health nurse reported blood sugar in the 400s and low blood pressure, then witnessed pt fall on her buttocks while ambulating with roll aid, denies head strike. Pt reports that she has stabbing mid sternal chest pain that started this afternoon with nausea. Pt dnies SOB, fever, and chills.     Cardiologist: MD Sameer     The history is provided by the patient and a relative. No  was used.     Review of patient's allergies indicates:   Allergen Reactions    Glipizide Swelling     Swelling of the lips      Influenza vaccine tr-s 11 (pf) Hives    Pregabalin Other (See Comments)     nightmares     Past Medical History:   Diagnosis Date    Arthritis     Coronary artery disease     CVA (cerebral vascular accident)     affected right side of body    Depression     Diabetes mellitus     Hyperlipidemia     Hypertension     Unspecified dementia without behavioral disturbance      Past Surgical History:   Procedure Laterality Date    ANGIOPLASTY, PERIPHERAL BLOOD VESSEL N/A 7/11/2023    Procedure: Angioplasty-peripheral;  Surgeon: Jonathan Alanis MD;  Location: [facility] CATH LAB;  Service: Cardiology;  Laterality: N/A;    ATHERECTOMY, LOWER ARTERIAL  7/11/2023    Procedure: Atherectomy, Lower Arterial;  Surgeon: Jonathan Alanis MD;  Location: [facility] CATH LAB;  Service: Cardiology;;    BACK SURGERY      CAROTID ENDARTERECTOMY Left      CAROTID ENDARTERECTOMY Right     LEFT HEART CATHETERIZATION  05/20/2022    Dr. Estrella; Stent placement    LEFT HEART CATHETERIZATION Left 5/20/2022    Procedure: CATHETERIZATION, HEART, LEFT;  Surgeon: Jone Estrella MD;  Location: [facility] CATH LAB;  Service: Cardiology;  Laterality: Left;  LHC VIA R GROIN    PERIPHERAL ANGIOGRAPHY N/A 7/11/2023    Procedure: Peripheral angiography;  Surgeon: Jonathan Alanis MD;  Location: [facility] CATH LAB;  Service: Cardiology;  Laterality: N/A;     Family History   Problem Relation Name Age of Onset    Heart attack Son       Social History     Tobacco Use    Smoking status: Former     Current packs/day: 0.50     Types: Cigarettes    Smokeless tobacco: Never    Tobacco comments:     Started smoking in late 20s   Substance Use Topics    Alcohol use: Never    Drug use: Never     Review of Systems   Constitutional:  Negative for chills and fever.   Respiratory:  Negative for shortness of breath.    Cardiovascular:  Positive for chest pain.   Gastrointestinal:  Positive for nausea.   Musculoskeletal:         Generalized weakness       Physical Exam     Initial Vitals   BP Pulse Resp Temp SpO2   10/24/24 1739 10/24/24 1739 10/24/24 1739 10/24/24 1739 10/24/24 1741   108/71 87 18 98 °F (36.7 °C) (!) 92 %      MAP       --                Physical Exam    Nursing note and vitals reviewed.  Constitutional: She appears well-developed and well-nourished. She is not diaphoretic. No distress.   HENT:   Head: Normocephalic and atraumatic.   Nose: Nose normal. Mouth/Throat: Oropharynx is clear and moist.   Eyes: EOM are normal. Pupils are equal, round, and reactive to light.   Neck: Neck supple.   Normal range of motion.  Cardiovascular:  Normal rate and regular rhythm.           No murmur heard.  Pulmonary/Chest: Breath sounds normal. No respiratory distress. She has no wheezes. She has no rales.   Abdominal: Abdomen is soft. She exhibits no distension. There is no abdominal tenderness.    Musculoskeletal:      Cervical back: Normal range of motion and neck supple.     Neurological: She is alert and oriented to person, place, and time. She has normal strength. No cranial nerve deficit or sensory deficit. GCS score is 15. GCS eye subscore is 4. GCS verbal subscore is 5. GCS motor subscore is 6.   Awake, alert, answers questions approprietly    Skin: Skin is warm. Capillary refill takes less than 2 seconds. No rash noted.         ED Course   Procedures  Labs Reviewed   COMPREHENSIVE METABOLIC PANEL - Abnormal       Result Value    Sodium 135 (*)     Potassium 4.0      Chloride 101      CO2 23      Glucose 254 (*)     Blood Urea Nitrogen 16.4      Creatinine 1.22 (*)     Calcium 9.0      Protein Total 7.2      Albumin 3.3 (*)     Globulin 3.9 (*)     Albumin/Globulin Ratio 0.8 (*)     Bilirubin Total 0.9      ALP 58      ALT 17      AST 16      eGFR 47      Anion Gap 11.0      BUN/Creatinine Ratio 13     URINALYSIS, REFLEX TO URINE CULTURE - Abnormal    Color, UA Light-Yellow      Appearance, UA Clear      Specific Gravity, UA 1.020      pH, UA 5.0      Protein, UA Negative      Glucose, UA 4+ (*)     Ketones, UA Negative      Blood, UA Negative      Bilirubin, UA Negative      Urobilinogen, UA Normal      Nitrites, UA Negative      Leukocyte Esterase, UA Negative      RBC, UA 0-5      WBC, UA 0-5      Bacteria, UA None Seen      Squamous Epithelial Cells, UA Trace      Mucous, UA Trace (*)    CBC WITH DIFFERENTIAL - Abnormal    WBC 5.27      RBC 4.31      Hgb 12.3      Hct 38.5      MCV 89.3      MCH 28.5      MCHC 31.9 (*)     RDW 13.4      Platelet 273      MPV 9.3      Neut % 57.7      Lymph % 34.2      Mono % 6.6      Eos % 0.9      Basophil % 0.2      Lymph # 1.80      Neut # 3.04      Mono # 0.35      Eos # 0.05      Baso # 0.01      IG# 0.02      IG% 0.4      NRBC% 0.0     PROTIME-INR - Abnormal    PT 12.0 (*)     INR 0.9     COMPREHENSIVE METABOLIC PANEL - Abnormal    Sodium 139      Potassium  4.3      Chloride 104      CO2 25      Glucose 179 (*)     Blood Urea Nitrogen 16.2      Creatinine 1.05 (*)     Calcium 8.9      Protein Total 6.4      Albumin 3.2 (*)     Globulin 3.2      Albumin/Globulin Ratio 1.0 (*)     Bilirubin Total 0.9      ALP 55      ALT 14      AST 16      eGFR 56      Anion Gap 10.0      BUN/Creatinine Ratio 15     CBC WITH DIFFERENTIAL - Abnormal    WBC 5.31      RBC 4.03 (*)     Hgb 11.5 (*)     Hct 35.4 (*)     MCV 87.8      MCH 28.5      MCHC 32.5 (*)     RDW 13.6      Platelet 233      MPV 9.0      Neut % 48.4      Lymph % 40.9      Mono % 9.0      Eos % 1.1      Basophil % 0.4      Lymph # 2.17      Neut # 2.57      Mono # 0.48      Eos # 0.06      Baso # 0.02      IG# 0.01      IG% 0.2      NRBC% 0.0     POCT GLUCOSE - Abnormal    POCT Glucose 295 (*)    POCT GLUCOSE - Abnormal    POCT Glucose 191 (*)    POCT GLUCOSE - Abnormal    POCT Glucose 210 (*)    POCT GLUCOSE - Abnormal    POCT Glucose 253 (*)    BETA - HYDROXYBUTYRATE, SERUM - Normal    Beta Hydroxybutyrate 0.10     APTT - Normal    PTT 27.8     TROPONIN I - Normal    Troponin-I 0.022     TROPONIN I - Normal    Troponin-I 0.026     MAGNESIUM - Normal    Magnesium Level 1.60     PHOSPHORUS - Normal    Phosphorus Level 3.4     TROPONIN I - Normal    Troponin-I <0.010     TROPONIN I - Normal    Troponin-I <0.010     CBC W/ AUTO DIFFERENTIAL    Narrative:     The following orders were created for panel order CBC Auto Differential.  Procedure                               Abnormality         Status                     ---------                               -----------         ------                     CBC with Differential[[id number]]       Abnormal            Final result                 Please view results for these tests on the individual orders.   CBC W/ AUTO DIFFERENTIAL    Narrative:     The following orders were created for panel order CBC with Automated Differential.  Procedure                                Abnormality         Status                     ---------                               -----------         ------                     CBC with Differential[[id number]]       Abnormal            Final result                 Please view results for these tests on the individual orders.   POCT GLUCOSE MONITORING CONTINUOUS          Imaging Results              CT Head Without Contrast (Final result)  Result time 10/25/24 08:03:08      Final result by Akbar Mcneill MD (10/25/24 08:03:08)                   Impression:      No acute intracranial findings or significant interval change compared to June 2022.    No significant discrepancy with the preliminary report.      Electronically signed by: Akbar Mcneill  Date:    10/25/2024  Time:    08:03               Narrative:    EXAMINATION:  CT HEAD WITHOUT CONTRAST    CLINICAL HISTORY:  Dizziness, persistent/recurrent, cardiac or vascular cause suspected;    TECHNIQUE:  CT imaging of the head performed from the skull base to the vertex without intravenous contrast.  DLP 1025 mGycm. Automatic exposure control, adjustment of mA/kV or iterative reconstruction technique was used to reduce radiation.    COMPARISON:  25 June 2022    FINDINGS:  There is no acute cortical infarct, hemorrhage or mass lesion.  There is left cerebellar encephalomalacia.  No new parenchymal attenuation abnormality.  Ventricular size is stable.  There are vascular calcifications.    Visualized paranasal sinuses and mastoid air cells are clear.                        Preliminary result by Sami Zuluaga MD (10/24/24 23:22:06)                   Impression:    1. No acute intracranial process identified. Details and other findings as noted above.               Narrative:    START OF REPORT:  Technique: CT of the head was performed without intravenous contrast with axial as well as coronal and sagittal images.    Comparison: Comparison is with study dated 2022-09-26 17:58:29.    Dosage Information:  Automated exposure control was utilized.    Clinical history: Dizziness, persistent/recurrent, cardiac or vascular cause suspected.    Findings:  Hemorrhage: No acute intracranial hemorrhage is seen.  CSF spaces: The ventricles sulci and basal cisterns are within normal limits.  Brain parenchyma: No acute infarct.  Cerebellum: There is stable chronic lacunar infarct in the right cerebellar hemisphere. A stable hypodense focus measuring 2.1 x 1.9 cm is again seen in the left cerebellar hemisphere which may represent a chronic infarct.  Vascular: Unremarkable venous sinuses. Moderate atheromatous calcification of the intracranial arteries is seen.  Sella and skull base: The sella appears to be within normal limits for age.  Cerebellopontine angles: Within normal limits.  Herniation: None.  Intracranial calcifications: Incidental note is made of bilateral choroid plexus calcification. Incidental note is made of some pineal region calcification.  Calvarium: No acute linear or depressed skull fracture is seen.    Maxillofacial Structures:  Paranasal sinuses: The visualized paranasal sinuses appear clear with no mucoperiosteal thickening or air fluid levels identified.  Orbits: The orbits appear unremarkable.  Zygomatic arches: The zygomatic arches are intact and unremarkable.  Temporal bones and mastoids: The temporal bones and mastoids appear unremarkable.  TMJ: The mandibular condyles appear normally placed with respect to the mandibular fossa.    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                                         X-Ray Chest 1 View (Final result)  Result time 10/24/24 18:37:57      Final result by José Miguel Parmar MD (10/24/24 18:37:57)                   Impression:      No abnormality seen      Electronically signed by: Fabiano Parmar  Date:    10/24/2024  Time:    18:37               Narrative:    EXAMINATION:  XR  CHEST 1 VIEW    CLINICAL HISTORY:  Weakness    TECHNIQUE:  Single frontal view of the chest was performed.    COMPARISON:  09/26/2022    FINDINGS:  The lungs are clear.  The heart is normal appearance.  The pulmonary vascularity is unremarkable.  Aorta appears grossly unremarkable.  No pleural effusions are seen.  Bones and joints show no acute abnormality.                                       Medications   melatonin tablet 6 mg (has no administration in time range)   ondansetron injection 4 mg (has no administration in time range)   prochlorperazine injection Soln 5 mg (has no administration in time range)   polyethylene glycol packet 17 g (has no administration in time range)   acetaminophen tablet 650 mg (has no administration in time range)   simethicone chewable tablet 80 mg (has no administration in time range)   aluminum-magnesium hydroxide-simethicone 200-200-20 mg/5 mL suspension 30 mL (has no administration in time range)   naloxone 0.4 mg/mL injection 0.02 mg (has no administration in time range)   enoxaparin injection 40 mg (has no administration in time range)   glucose chewable tablet 16 g (has no administration in time range)   glucose chewable tablet 24 g (has no administration in time range)   glucagon (human recombinant) injection 1 mg (has no administration in time range)   insulin aspart U-100 injection 0-10 Units (6 Units Subcutaneous Given 10/25/24 1137)   dextrose 10% bolus 125 mL 125 mL (has no administration in time range)   dextrose 10% bolus 250 mL 250 mL (has no administration in time range)   aspirin EC tablet 81 mg (81 mg Oral Given 10/25/24 0942)   atorvastatin tablet 40 mg (has no administration in time range)   clopidogreL tablet 75 mg (75 mg Oral Given 10/25/24 0942)   donepeziL tablet 5 mg (has no administration in time range)   DULoxetine DR capsule 60 mg (60 mg Oral Given 10/25/24 0942)   gabapentin capsule 300 mg (300 mg Oral Given 10/25/24 0942)   pantoprazole EC tablet 40 mg  (40 mg Oral Given 10/25/24 0942)   magnesium oxide tablet 400 mg (400 mg Oral Given 10/25/24 0942)   hydrALAZINE injection 10 mg (10 mg Intravenous Given 10/25/24 1055)   nitroGLYCERIN SL tablet 0.4 mg (0.4 mg Sublingual Given 10/25/24 1304)   aluminum-magnesium hydroxide-simethicone 200-200-20 mg/5 mL suspension 30 mL (30 mLs Oral Given 10/25/24 0206)     And   LIDOcaine viscous HCl 2% oral solution 15 mL (15 mLs Oral Given 10/25/24 0206)   ibuprofen tablet 400 mg (400 mg Oral Given 10/25/24 1441)     Medical Decision Making  Problems Addressed:  Chest pain, unspecified type: acute illness or injury  Dizziness: acute illness or injury  Weakness: acute illness or injury    Amount and/or Complexity of Data Reviewed  Radiology: ordered. Decision-making details documented in ED Course.    Risk  OTC drugs.  Prescription drug management.  Decision regarding hospitalization.    Differential diagnosis (includes but is not limited to):   ACS, arrhythmia, electrolyte abnormalities, dehydration, kidney injury, dehydration, deconditioning, DKA, anemia    MDM Narrative  74-year-old female presents with multiple complaints including dizziness, intermittent vision changes, hyperglycemia, hypotension at home.  On evaluation of the patient in the emergency department, she was reporting left-sided chest pain as well.  She does have a history of coronary disease with multiple stents in place as well.  CT head negative.  X-ray reviewed.  EKG reviewed.  Labs reviewed, initial troponin is negative.  Glucose improving, no evidence of DKA.  She remains generally weak and family is concerned that she will continue to be weak at home and they are concerned she will fall.  In addition, given her coronary risk factors, further evaluation required.  Discussed with the hospitalist, will admit for further care.    Dispo: ADmit    My independent radiology interpretation: as above  Point of care US (independently performed and interpreted):    Decision rules/clinical scoring:     Sepsis Perfusion Assessment:     Amount and/or Complexity of Data Reviewed  Independent historian: daughter    Summary of history: See HPI  External data reviewed: notes from previous ED visits and notes from clinic visits  Summary of data reviewed: Prior records reviewed  Risk and benefits of testing: discussed   Labs: ordered and reviewed  Radiology: ordered and independent interpretation performed (see above or ED course)  ECG/medicine tests: ordered and independent interpretation performed (see above or ED course)  Discussion of management or test interpretation with external provider(s): discussed with hospitalist physician   Summary of discussion: as above    Risk  Parenteral controlled substances   Drug therapy requiring intense monitoring for toxicity   Decision regarding hospitalization  Shared decision making     Critical Care  none    Data Reviewed/Counseling: I have personally reviewed the patient's vital signs, nursing notes, and other relevant tests, information, and imaging. I had a detailed discussion regarding the historical points, exam findings, and any diagnostic results supporting the discharge diagnosis. I personally performed the history, PE, MDM and procedures as documented above and agree with the scribe's documentation.    Portions of this note were dictated using voice recognition software. Although it was reviewed for accuracy, some inherent voice recognition errors may have occurred and may be present in this document.         Scribe Attestation:   Scribe #1: I performed the above scribed service and the documentation accurately describes the services I performed. I attest to the accuracy of the note.    Attending Attestation:           Physician Attestation for Scribe:  Physician Attestation Statement for Scribe #1: I, Kevin Salazar MD, reviewed documentation, as scribed by Yasmany Kingsley in my presence, and it is both accurate and  complete.             ED Course as of 10/25/24 1604   Fri Oct 25, 2024   0000 EKG independently interpreted by me.  EKG: NSR @ 86, no STEMI, Qtc 459 []   0104 X-Ray Chest 1 View  Independently visualized/reviewed by me during the ED visit.  - No acute lobar consolidation, no PTX []   0105 Hospital medicine paged [BG]      ED Course User Index  [BG] Yasmany Kingsley  [] Kevin Salazar MD                           Clinical Impression:  Final diagnoses:  [R42] Dizziness  [R53.1] Weakness  [R07.9] Chest pain, unspecified type (Primary)          ED Disposition Condition    Admit Stable                Kevin Salazar MD  10/25/24 1604

## 2024-10-25 NOTE — H&P
[facility] Medicine History & Physical Examination       Patient Name: Mayra Parker  MRN: [id number]  Patient Class: IP- Inpatient   Admission Date: 10/24/2024   Admitting Physician: JANETH Service   Length of Stay: 0  Attending Physician: Dr Mary Foster  Primary Care Provider: Rashaun Weiss MD  Face-to-Face encounter date: 10/25/2024  Code Status: Full code  Chief Complaint: Dizziness (Dizziness & blurred vision that started today. Daughter also reports hyperglycemia () & hypotension (SBP 70) at home today.  in triage. Compliant with medications. )        Screening for Social Drivers for health:  Patient screened for food insecurity, housing instability, transportation needs, utility difficulties, and interpersonal safety (select all that apply as identified as concern)  []Housing or Food  []Transportation Needs  []Utility Difficulties  []Interpersonal safety  [x]None    Patient information was obtained from patient, patient's family, past medical records and/or ER records.     HISTORY OF PRESENT ILLNESS:   Mayra Parker is a 74 y.o. female who PMH includes CVA no deficits, CAD, chronic arthritis, HTN, HLD, DM type II, dementia; presents to the ED at [facility] on 10/24/2024 with a primary complaint of dizziness with associated blurred vision. Pt reports she had visit from her home health nurse  which reported her blood glucose was in the 400's, it was reported that the patient had a low blood pressure as well and had a witnessed fall where she landed on her buttocks while ambulating with a walker.  No reports of head injury.  No reports of loss of consciousness or seizures.  Patient denies any alcohol, tobacco, or illicit drug use.  She reports she quit smoking 2 years ago.  The daughter  daughter called EMS for ED transport.  Patient does report feeling weak and not herself over the past few days.  No reports of shortness a breath, fever, chills or  any sick contacts.  She denies any new foods or medications.  Lab work reviewed demonstrated sodium 135, creatinine 1.22, glucose 254, other indices unremarkable.  Chest x-ray impression reviewed demonstrated no abnormality seen.  CT head without contrast impression reviewed demonstrated no acute intracranial findings or significant interval change compared to June 20, 2022.  In reviewing patient's lab trends her creatinine in July 20, 2023 was 1.17.  Her last A1c was 9.1 on 10/07/2024.  Patient does admit to not following her diet as prescribed.  She does reports she no longer smokes cigarettes she quit 2 years ago around the time that she had her stroke.  Initial vital signs /71 pulse 87 respirations 18 temperature 98° F O2 saturation 92% on room air.  Patient did receive some IV hydration which her blood pressure did trend up and was in the hypertensive side at the time of rounds.  She reports feeling much better.  Her O2 saturations improved without the requirement of supplemental oxygen therapy.  No family was present at the bedside at the time of rounds however patient is able to provide the historical information to the questions asked.  She did still endorse chest pain at the time of examination which was reproducible. Pt is ask for her cardiologist Dr. Estrella.  Patient is admitted to hospital medicine services for further management.    PAST MEDICAL HISTORY:     Past Medical History:   Diagnosis Date    Arthritis     Coronary artery disease     CVA (cerebral vascular accident)     affected right side of body    Depression     Diabetes mellitus     Hyperlipidemia     Hypertension     Unspecified dementia without behavioral disturbance        PAST SURGICAL HISTORY:     Past Surgical History:   Procedure Laterality Date    ANGIOPLASTY, PERIPHERAL BLOOD VESSEL N/A 7/11/2023    Procedure: Angioplasty-peripheral;  Surgeon: Jonathan Alanis MD;  Location: [facility] CATH LAB;  Service: Cardiology;  Laterality: N/A;     ATHERECTOMY, LOWER ARTERIAL  7/11/2023    Procedure: Atherectomy, Lower Arterial;  Surgeon: Jonathan Alanis MD;  Location: [facility] CATH LAB;  Service: Cardiology;;    BACK SURGERY      CAROTID ENDARTERECTOMY Left     CAROTID ENDARTERECTOMY Right     LEFT HEART CATHETERIZATION  05/20/2022    Dr. Estrella; Stent placement    LEFT HEART CATHETERIZATION Left 5/20/2022    Procedure: CATHETERIZATION, HEART, LEFT;  Surgeon: Jone Estrella MD;  Location: [facility] CATH LAB;  Service: Cardiology;  Laterality: Left;  LHC VIA R GROIN    PERIPHERAL ANGIOGRAPHY N/A 7/11/2023    Procedure: Peripheral angiography;  Surgeon: Jonathan Alanis MD;  Location: [facility] CATH LAB;  Service: Cardiology;  Laterality: N/A;       ALLERGIES:   Glipizide, Influenza vaccine tr-s 11 (pf), and Pregabalin    FAMILY HISTORY:   Reviewed and negative    SOCIAL HISTORY:     Social History     Tobacco Use    Smoking status: Former     Current packs/day: 0.50     Types: Cigarettes    Smokeless tobacco: Never    Tobacco comments:     Started smoking in late 20s   Substance Use Topics    Alcohol use: Never        HOME MEDICATIONS:   As documented  Prior to Admission medications    Medication Sig Start Date End Date Taking? Authorizing Provider   aspirin (ECOTRIN) 81 MG EC tablet Take 81 mg by mouth once daily.   Yes Provider, Historical   atorvastatin (LIPITOR) 40 MG tablet Take 40 mg by mouth every evening.   Yes Provider, Historical   carvediloL (COREG) 3.125 MG tablet Take 3.125 mg by mouth 2 (two) times daily. 5/31/23  Yes Provider, Historical   clopidogreL (PLAVIX) 75 mg tablet Take 1 tablet (75 mg total) by mouth once daily. 7/13/23 10/25/24 Yes Abby Hull MD   donepeziL (ARICEPT) 5 MG tablet Take 1 tablet (5 mg total) by mouth every evening. 6/6/22  Yes Meron Scott FNP   dulaglutide (TRULICITY) 1.5 mg/0.5 mL pen injector Inject 1.5 mg into the skin every 7 days.   Yes Provider, Historical   DULoxetine (CYMBALTA) 60 MG capsule Take 1 capsule by mouth every  morning. 1/7/22  Yes Provider, Historical   JARDIANCE 25 mg tablet Take 25 mg by mouth every morning. 5/31/23  Yes Provider, Historical   magnesium oxide (MAG-OX) 400 mg (241.3 mg magnesium) tablet Take 1 tablet (400 mg total) by mouth 2 (two) times daily. 6/6/22  Yes Meron Scott FNP   metFORMIN (GLUCOPHAGE) 1000 MG tablet Take 1 tablet (1,000 mg total) by mouth 2 (two) times daily with meals. 7/13/23 10/25/24 Yes Abby Hull MD   pantoprazole (PROTONIX) 40 MG tablet Take 40 mg by mouth once daily.   Yes Provider, Historical   gabapentin (NEURONTIN) 300 MG capsule Take 1 capsule (300 mg total) by mouth 2 (two) times daily. 10/12/22 10/25/24  Benji Anderson MD   losartan (COZAAR) 25 MG tablet Take 1 tablet (25 mg total) by mouth once daily. 6/6/22 6/27/22  Meron Scott FNP   pregabalin (LYRICA) 75 MG capsule Take 75 mg by mouth 2 (two) times daily.  5/25/22  Provider, Historical   SITagliptin (JANUVIA) 100 MG Tab Take 1 tablet (100 mg total) by mouth once daily. 6/6/22 10/25/24  Meron Scott FNP       REVIEW OF SYSTEMS:   Except as documented, all other systems reviewed and negative     PHYSICAL EXAM:     VITAL SIGNS: 24 HRS MIN & MAX LAST   Temp  Min: 98 °F (36.7 °C)  Max: 98.7 °F (37.1 °C) 98 °F (36.7 °C)   BP  Min: 108/71  Max: 210/98 134/75   Pulse  Min: 74  Max: 92  90   Resp  Min: 12  Max: 20 20   SpO2  Min: 92 %  Max: 100 % 100 %       General appearance:  Elderly female chronically ill-appearing, nontoxic; no family member at the bedside  HENT: Atraumatic head. Moist mucous membranes of oral cavity.  Eyes: PERRL  Lungs: Clear to auscultation bilaterally. No wheezing present.   Heart: Regular rate and rhythm. S1 and S2 present cap refill brisk, trace edema, mild midsternum chest pain complaints  Abdomen: Soft, non-distended, non-tender. No rebound tenderness/guarding. Bowel sounds are normal.   Extremities: No cyanosis, clubbing, or edema.  Skin: No Rash.   Neuro: Motor and sensory exams  grossly intact. Good tone. Muscle strength 5/5 in all 4 extremities  Psych/mental status: Appropriate mood and affect. Responds appropriately to questions.     LABS AND IMAGING:     Recent Labs   Lab 10/24/24  1754 10/25/24  0427   WBC 5.27 5.31   RBC 4.31 4.03*   HGB 12.3 11.5*   HCT 38.5 35.4*   MCV 89.3 87.8   MCH 28.5 28.5   MCHC 31.9* 32.5*   RDW 13.4 13.6    233   MPV 9.3 9.0       Recent Labs   Lab 10/24/24  1754 10/25/24  0427   * 139   K 4.0 4.3    104   CO2 23 25   BUN 16.4 16.2   CREATININE 1.22* 1.05*   CALCIUM 9.0 8.9   MG  --  1.60   ALBUMIN 3.3* 3.2*   ALKPHOS 58 55   ALT 17 14   AST 16 16   BILITOT 0.9 0.9       Microbiology Results (last 7 days)       ** No results found for the last 168 hours. **             CT Head Without Contrast  Narrative: EXAMINATION:  CT HEAD WITHOUT CONTRAST    CLINICAL HISTORY:  Dizziness, persistent/recurrent, cardiac or vascular cause suspected;    TECHNIQUE:  CT imaging of the head performed from the skull base to the vertex without intravenous contrast.  DLP 1025 mGycm. Automatic exposure control, adjustment of mA/kV or iterative reconstruction technique was used to reduce radiation.    COMPARISON:  25 June 2022    FINDINGS:  There is no acute cortical infarct, hemorrhage or mass lesion.  There is left cerebellar encephalomalacia.  No new parenchymal attenuation abnormality.  Ventricular size is stable.  There are vascular calcifications.    Visualized paranasal sinuses and mastoid air cells are clear.  Impression: No acute intracranial findings or significant interval change compared to June 2022.    No significant discrepancy with the preliminary report.    Electronically signed by: Akbar Mcneill  Date:    10/25/2024  Time:    08:03        ASSESSMENT & PLAN:   ASSESSMENT:  Acute chest pain-rule out-POA   Hypovolemic hypotension-POA   Fall-ground level- POA  Dm type 2 with hyperglycemia-POA   Acute on chronic kidney disease stage 3-POA    HTN-essential-POA   History of CVA with out residual deficits-POA   Weakness-POA     PLAN:  Consult Cardiology Services appreciated assistance and recommendations-patient requested   Neurochecks q.4 hours   Fall precautions   PT OT eval and treat   Accu-Cheks and sliding scale coverage   Patient's last hemoglobin A1c was 9.1 suggestive of uncontrolled diabetes   Continue with insulin therapy   PRN neb treatments   PRN antiemetic per retic therapy   Resume home medication as deemed necessary   Trend out troponin levels   Repeat lab work in a.m.      VTE Prophylaxis: Lovenox for DVT prophylaxis and will be advised to be as mobile as possible and sit in a chair as tolerated    Patient condition:  Stable    __________________________________________________________________________  INPATIENT LIST OF MEDICATIONS     Scheduled Meds:   aspirin  81 mg Oral Daily    atorvastatin  40 mg Oral QHS    clopidogreL  75 mg Oral Daily    donepeziL  5 mg Oral QHS    DULoxetine  60 mg Oral QAM    enoxparin  40 mg Subcutaneous Daily    gabapentin  300 mg Oral BID    magnesium oxide  400 mg Oral BID    pantoprazole  40 mg Oral Daily     Continuous Infusions:  PRN Meds:.  Current Facility-Administered Medications:     acetaminophen, 650 mg, Oral, Q6H PRN    aluminum-magnesium hydroxide-simethicone, 30 mL, Oral, QID PRN    dextrose 10%, 12.5 g, Intravenous, PRN    dextrose 10%, 25 g, Intravenous, PRN    glucagon (human recombinant), 1 mg, Intramuscular, PRN    glucose, 16 g, Oral, PRN    glucose, 24 g, Oral, PRN    hydrALAZINE, 10 mg, Intravenous, Q4H PRN    insulin aspart U-100, 0-10 Units, Subcutaneous, QID (AC + HS) PRN    melatonin, 6 mg, Oral, Nightly PRN    naloxone, 0.02 mg, Intravenous, PRN    nitroGLYCERIN, 0.4 mg, Sublingual, Q5 Min PRN    ondansetron, 4 mg, Intravenous, Q4H PRN    polyethylene glycol, 17 g, Oral, BID PRN    prochlorperazine, 5 mg, Intravenous, Q6H PRN    simethicone, 1 tablet, Oral, QID PRN      Adonay CUADRA  VAN Teran have reviewed and discussed the case with Dr. Mary Foster.  Please see the following addendum for further assessment and plan from their attending MD.  VAN Castillo   10/25/2024    ________________________________________________________________________________    MD Addendum:  Dr. VENECIA ---assumed care of this patient today at ---am/pm  For the patient encounter, I performed the substantive portion of the visit, I reviewed the NP/PA documentation, treatment plan, and medical decision making.  I had face to face time with this patient     A. History:    B. Physical exam:    C. Medical decision making:    Discharge Planning and Disposition: No mobility needs. Ambulating well. Good social support system.   Anticipated discharge    All diagnosis and differential diagnosis have been reviewed; assessment and plan has been documented; I have personally reviewed the labs and test results that are presently available; I have reviewed the patients medication list; I have reviewed the consulting providers response and recommendations. I have reviewed or attempted to review medical records based upon their availability.    All of the patient and family questions have been addressed and answered. Patient's is agreeable to the above stated plan. I will continue to monitor closely and make adjustments to medical management as needed.

## 2024-10-25 NOTE — CONSULTS
Cardiology   Ms. Mayra Parker is a 74 y.o. female with:   Chief Complaint   Patient presents with    Dizziness     Dizziness & blurred vision that started today. Daughter also reports hyperglycemia () & hypotension (SBP 70) at home today.  in triage. Compliant with medications.          HPI  Cardiology was consulted for  chest pain  Ms. Mayra Parker with past medical history significant for CAD with stent placement in 2022 and PID with stent placement in 2023, hypertension, type 2 DM, HLD, stroke in 2017, reported CHF with no awake on chart who presented to the ED with complaints of feeling weak, increased glucose, and chest pain.  The patient fell yesterday and landed on her buttock supported with her hands, she started feeling chest pain after wards substernal, constant, reproducible with palpation, nonradiating associated with shortness of breath.  Troponin and BNP negative, EKG unremarkable.  On my evaluation of the patient her blood pressure was elevated up to 200/130, her blood pressure went down went hydralazine.        PMH:    Patient Active Problem List   Diagnosis    Nonspecific abnormal results of basal metabolism function study    Coronary atherosclerosis    Multiple falls    Bilateral leg pain     Surgical Hx:    Past Surgical History:   Procedure Laterality Date    ANGIOPLASTY, PERIPHERAL BLOOD VESSEL N/A 7/11/2023    Procedure: Angioplasty-peripheral;  Surgeon: Jonathan Alanis MD;  Location: [facility] CATH LAB;  Service: Cardiology;  Laterality: N/A;    ATHERECTOMY, LOWER ARTERIAL  7/11/2023    Procedure: Atherectomy, Lower Arterial;  Surgeon: Jonathan Alanis MD;  Location: [facility] CATH LAB;  Service: Cardiology;;    BACK SURGERY      CAROTID ENDARTERECTOMY Left     CAROTID ENDARTERECTOMY Right     LEFT HEART CATHETERIZATION  05/20/2022    Dr. Estrella; Stent placement    LEFT HEART CATHETERIZATION Left 5/20/2022    Procedure: CATHETERIZATION, HEART, LEFT;  Surgeon: Jone Estrella MD;   Location: [facility] CATH LAB;  Service: Cardiology;  Laterality: Left;  LHC VIA R GROIN    PERIPHERAL ANGIOGRAPHY N/A 7/11/2023    Procedure: Peripheral angiography;  Surgeon: Jonathan Alanis MD;  Location: [facility] CATH LAB;  Service: Cardiology;  Laterality: N/A;       Social History     Socioeconomic History    Marital status:    Tobacco Use    Smoking status: Former     Current packs/day: 0.50     Types: Cigarettes    Smokeless tobacco: Never    Tobacco comments:     Started smoking in late 20s   Substance and Sexual Activity    Alcohol use: Never    Drug use: Never    Sexual activity: Not Currently     Social Drivers of Health     Financial Resource Strain: Low Risk  (10/25/2024)    Overall Financial Resource Strain (CARDIA)     Difficulty of Paying Living Expenses: Not hard at all   Recent Concern: Financial Resource Strain - Medium Risk (10/8/2024)    Received from [facility]    Overall Financial Resource Strain (CARDIA)     Difficulty of Paying Living Expenses: Somewhat hard   Food Insecurity: No Food Insecurity (10/25/2024)    Hunger Vital Sign     Worried About Running Out of Food in the Last Year: Never true     Ran Out of Food in the Last Year: Never true   Transportation Needs: No Transportation Needs (10/25/2024)    TRANSPORTATION NEEDS     Transportation : No   Physical Activity: Inactive (10/25/2024)    Exercise Vital Sign     Days of Exercise per Week: 0 days     Minutes of Exercise per Session: 0 min   Stress: No Stress Concern Present (10/25/2024)    [employer] - Occupational Stress Questionnaire     Feeling of Stress : Not at all   Housing Stability: Low Risk  (10/25/2024)    Housing Stability Vital Sign     Unable to Pay for Housing in the Last Year: No     Homeless in the Last Year: No       Family History   Problem Relation Name Age of Onset    Heart attack Son         Review of patient's  allergies indicates:   Allergen Reactions    Glipizide Swelling     Swelling of the lips      Influenza vaccine tr-s 11 (pf) Hives    Pregabalin Other (See Comments)     nightmares       Current Medications:    Current Facility-Administered Medications:     acetaminophen tablet 650 mg, 650 mg, Oral, Q6H PRN, Araceli Agosto, AGACNP-BC    aluminum-magnesium hydroxide-simethicone 200-200-20 mg/5 mL suspension 30 mL, 30 mL, Oral, QID PRN, Araceli Agosto, AGACNP-BC    aspirin EC tablet 81 mg, 81 mg, Oral, Daily, Bux, Mray, DO, 81 mg at 10/25/24 0942    atorvastatin tablet 40 mg, 40 mg, Oral, QHS, Bux, Mary, DO    clopidogreL tablet 75 mg, 75 mg, Oral, Daily, Bux, Mary, DO, 75 mg at 10/25/24 0942    dextrose 10% bolus 125 mL 125 mL, 12.5 g, Intravenous, PRN, Araceli Agosto, AGACNP-BC    dextrose 10% bolus 250 mL 250 mL, 25 g, Intravenous, PRN, Araceli Agosto, AGACNP-BC    donepeziL tablet 5 mg, 5 mg, Oral, QHS, Bux, Mary, DO    DULoxetine DR capsule 60 mg, 60 mg, Oral, QAM, Bux, Mary, DO, 60 mg at 10/25/24 0942    enoxaparin injection 40 mg, 40 mg, Subcutaneous, Daily, Araceli Agosto, AGACNP-BC    gabapentin capsule 300 mg, 300 mg, Oral, BID, Bux, Mary, DO, 300 mg at 10/25/24 0942    glucagon (human recombinant) injection 1 mg, 1 mg, Intramuscular, PRN, Araceli Agosto, AGACNP-BC    glucose chewable tablet 16 g, 16 g, Oral, PRN, Araceli Agosto, AGACNP-BC    glucose chewable tablet 24 g, 24 g, Oral, PRN, Araceli Agosto, AGACNP-BC    hydrALAZINE injection 10 mg, 10 mg, Intravenous, Q4H PRN, Loree Becker MD, 10 mg at 10/25/24 1055    ibuprofen tablet 400 mg, 400 mg, Oral, Once, Loree Becker MD    insulin aspart U-100 injection 0-10 Units, 0-10 Units, Subcutaneous, QID (AC + HS) PRN, Araceli Agosto, [facility]-BC, 6 Units at 10/25/24 1137    magnesium oxide tablet 400 mg, 400 mg, Oral, BID, Mary Foster DO, 400 mg at 10/25/24 0942    melatonin tablet 6 mg, 6 mg, Oral, Nightly PRN, Araceli Agosto,  SH[facility]Y-BC    naloxone 0.4 mg/mL injection 0.02 mg, 0.02 mg, Intravenous, PRN, Araceli Agosto AGACNP-BC    nitroGLYCERIN SL tablet 0.4 mg, 0.4 mg, Sublingual, Q5 Min PRN, Loree Becker MD, 0.4 mg at 10/25/24 1304    ondansetron injection 4 mg, 4 mg, Intravenous, Q4H PRN, Araceli Agosto AGACNP-BC    pantoprazole EC tablet 40 mg, 40 mg, Oral, Daily, Bux, Mary, DO, 40 mg at 10/25/24 0942    polyethylene glycol packet 17 g, 17 g, Oral, BID PRN, Araceli Agosto AGACNP-BC    prochlorperazine injection Soln 5 mg, 5 mg, Intravenous, Q6H PRN, Araceli Agosto AGACNP-BC    simethicone chewable tablet 80 mg, 1 tablet, Oral, QID PRN, Araceli Agosto AGACNP-BC    Current Outpatient Medications:     aspirin (ECOTRIN) 81 MG EC tablet, Take 81 mg by mouth once daily., Disp: , Rfl:     atorvastatin (LIPITOR) 40 MG tablet, Take 40 mg by mouth every evening., Disp: , Rfl:     carvediloL (COREG) 3.125 MG tablet, Take 3.125 mg by mouth 2 (two) times daily., Disp: , Rfl:     clopidogreL (PLAVIX) 75 mg tablet, Take 1 tablet (75 mg total) by mouth once daily., Disp: 30 tablet, Rfl: 11    DULoxetine (CYMBALTA) 60 MG capsule, Take 1 capsule by mouth every morning., Disp: , Rfl:     JARDIANCE 25 mg tablet, Take 25 mg by mouth every morning., Disp: , Rfl:     magnesium oxide (MAG-OX) 400 mg (241.3 mg magnesium) tablet, Take 1 tablet (400 mg total) by mouth 2 (two) times daily., Disp: 60 tablet, Rfl: 0    metFORMIN (GLUCOPHAGE) 1000 MG tablet, Take 1 tablet (1,000 mg total) by mouth 2 (two) times daily with meals., Disp: 180 tablet, Rfl: 3    pantoprazole (PROTONIX) 40 MG tablet, Take 40 mg by mouth once daily., Disp: , Rfl:     donepeziL (ARICEPT) 5 MG tablet, Take 1 tablet (5 mg total) by mouth every evening., Disp: 30 tablet, Rfl: 0    dulaglutide (TRULICITY) 1.5 mg/0.5 mL pen injector, Inject 1.5 mg into the skin every 7 days., Disp: , Rfl:   Outpatient Medications:    Current Outpatient Medications   Medication Instructions     "aspirin (ECOTRIN) 81 mg, Daily    atorvastatin (LIPITOR) 40 mg, Nightly    carvediloL (COREG) 3.125 mg, 2 times daily    clopidogreL (PLAVIX) 75 mg, Oral, Daily    donepeziL (ARICEPT) 5 mg, Oral, Nightly    DULoxetine (CYMBALTA) 60 MG capsule 1 capsule, Every morning    JARDIANCE 25 mg, Every morning    magnesium oxide (MAG-OX) 400 mg, Oral, 2 times daily    metFORMIN (GLUCOPHAGE) 1,000 mg, Oral, 2 times daily with meals    pantoprazole (PROTONIX) 40 mg, Daily    TRULICITY 1.5 mg, Subcutaneous, Every 7 days       ROS:  Please see HPI.    BP Readings from Last 3 Encounters:   10/25/24 134/75   07/13/23 99/63   10/12/22 124/66        Pulse Readings from Last 3 Encounters:   10/25/24 90   07/13/23 76   10/12/22 66        Temp Readings from Last 3 Encounters:   10/25/24 98 °F (36.7 °C) (Oral)   07/13/23 98 °F (36.7 °C) (Oral)   10/12/22 97.3 °F (36.3 °C) (Oral)     Wt Readings from Last 3 Encounters:   10/24/24 64.4 kg (142 lb)   07/06/23 75.3 kg (166 lb)   09/28/22 68.9 kg (152 lb)     BMI:  24.37 kg/m^2    PE  Blood pressure 134/75, pulse 90, temperature 98 °F (36.7 °C), temperature source Oral, resp. rate 20, height 5' 4" (1.626 m), weight 64.4 kg (142 lb), SpO2 100%.  CONSTITUTIONAL:  No acute distress.  Not ill appearing.  NECK:  Supple.    CARDIOVASCULAR:  Normal rate.    PULMONARY:  No respiratory distress.  No audible wheezing.    ABDOMINAL:  No distention.    MUSCULOSKELETAL:  No deformity.    SKIN:  No bruising.  No rash.  NEURO  Oriented x 3      CARDIAC TESTS  ECHO  No results found for this or any previous visit.    STRESS TEST  No results found for this or any previous visit.    [facility]  Results for orders placed during the hospital encounter of 07/05/23    Cardiac catheterization    Conclusion    The Prox L SFA to Dist L SFA lesion was 90% stenosed with 10% stenosis post-intervention.    The estimated blood loss was <50 mL.      Patient brought to catheterization lab in a fasting state.  She was placed on " table prepped and draped in usual sterile fashion.  Time-out was completed.  Left wrist was anesthetized 1% lidocaine.  Seldinger technique and ultrasound guidance slender sheath was placed wire.  Flushed with a radial cocktail mix.  Pigtail was then placed in distal abdominal aorta and angiogram was performed 1st order runoff.  Then placed into the right common femoral artery and a left common femoral artery respectively and images were taken in the runoff fashion.  Significant InStent restenosis was noted bilaterally however patient's symptoms more the left than the right and therefore the left was intervened on.  A 6 [place] 119 cm tumor radial sheath was then placed in which the tip was placed in the left common femoral artery.  0.018 wire was then placed distally.  2.0 laser atherectomy was performed due to heavy fibrotic InStent restenosis.  4 mm balloon angioplasty was performed throughout.  5 mm by 200 mm cross stellar balloon was then used from the distal InStent restenosis proximally.  5 mm drug coated balloons were then used throughout the entire distal to ostial stented segment.  Final angiographic images showed excellent results with less than 10% residual wires and catheters were removed.  The radial TR band was placed over the access point to achieve hemostasis she was transported to the postop area in a stable condition and given 300 mg of Plavix.    Complications:  None  Contrast:  95 cc      Distal abdominal aorta: is patent  No noted aneurysm  Right system:  - LUCILA:  Widely patent  - EIA/IIA:  Widely patent  - CFA:  Widely patent  - SFA:  Entire segment is stented.  Greater than 70% InStent restenosis throughout.  - Popliteal:  Patent.  - infrapopliteal:  - Anterior tibial:  Focal greater than 70% proximal long tubular lesion.  Runs into the foot  - Posterior tibial:  Small-vessel occludes proximally.  - Peroneal/TPT:  TPT noted to have focal lesions of greater than 70%.  Small-vessel distally runs  "off into the foot.  Posterior communicating branch noted off of the artery into the posterior circulation.    Left system:  - LUCILA:  Widely patent  - EIA/IIA:  Widely patent  - CFA:  Widely patent  - SFA:  Entire segment stented.  Greater than 70% in stent restenosis noted throughout  - Popliteal:  Moderate luminal irregularities  - infrapopliteal:  - Anterior tibial:  Large dominant vessel.  Luminal irregularities.  - Posterior tibial:  Small-vessel occluded proximally.  - Peroneal:  TPT with severe lesions greater than 70%.  Proximal  of the peroneal    Intervention:  - Left SFA ISR:  This was not an acute limb.  Laser atherectomy, 4 mm balloon angioplasty and 5 mm drug coated balloon with less than 10% residual stenosis      LABS  Last BMP  Lab Results   Component Value Date     10/25/2024    K 4.3 10/25/2024     10/25/2024    CO2 25 10/25/2024    BUN 16.2 10/25/2024    CREATININE 1.05 (H) 10/25/2024    CALCIUM 8.9 10/25/2024    ANIONGAP 7 (L) 12/17/2021    EGFRNORACEVR 56 10/25/2024       Lab Results   Component Value Date    CREATININE 1.05 (H) 10/25/2024    CREATININE 1.22 (H) 10/24/2024    CREATININE 1.17 (H) 07/12/2023     No results found for: "BNP"  Lab Results   Component Value Date    TROPONINI <0.010 10/25/2024    TROPONINI 0.026 10/25/2024    TROPONINI 0.022 10/24/2024     Last CBC     Lab Results   Component Value Date    WBC 5.31 10/25/2024    HGB 11.5 (L) 10/25/2024    HCT 35.4 (L) 10/25/2024    MCV 87.8 10/25/2024     10/25/2024           Last lipids    Lab Results   Component Value Date    CHOL 148 04/01/2024    CHOL 133 10/13/2022    CHOL 195 06/15/2021    HDL 56 04/01/2024    HDL 49 10/13/2022    HDL 47 06/15/2021    LDL 68.00 10/13/2022    LDL 92 10/16/2019    TRIG 133 04/01/2024    TRIG 78 10/13/2022    TRIG 297 (H) 06/15/2021    TOTALCHOLEST 3 10/13/2022    TOTALCHOLEST 5 10/16/2019       LFT   No components found for: "LFT"    ASSESSMENT  Non-cardiac chest " pain  Hypertensive urgency  History of CAD with stent placement in 2022  PID with stent placement in 2023  Reported CHF  Type 2 DM    PLAN  Her pain started after a fall, reproducible with palpation along with negative enzymes firm EKG makes it most likely musculoskeletal, very low suspicion for cardiac pain  Ibuprofen for pain  Control blood pressure  Getting echo to assess for heart failure    Cardiology will sign off    Loree Becker MD  Internal Medicine - PGY-2    Physician addendum:  I have seen and examined this patient as a split-shared visit with the PARTH d/t complicated medical management of above problems written in assessment and high acuity requiring physician expertise in medical decision-making. I performed the substantive portion of the history and exam. Above medical decision-making is also formulated by me.    Cardiovascular exam:  S1, S2  Lungs:  fine crackles at bases.  Extremities:  + edema bilaterally    Plan:  Atypical noncardiac chest pain.   Continue supportive therapy.  Follow up echocardiogram.    Jonathan Serrano MD  Cardiologist

## 2024-10-25 NOTE — PLAN OF CARE
CM met with pt at bedside for initial discharge assessment with daughter at bedside. Pt is  with two adult children. Pt has no POA or living will. Pt had no needs.   10/25/24 1209   Discharge Assessment   Assessment Type Discharge Planning Assessment   Confirmed/corrected address, phone number and insurance Yes   Source of Information patient;family   When was your last doctors appointment?   (Carol Banks and Dr Estrella, Cardio)   Communicated GIAN with patient/caregiver Date not available/Unable to determine   People in Home alone   Do you expect to return to your current living situation? Yes   Do you have help at home or someone to help you manage your care at home? Yes   Who are your caregiver(s) and their phone number(s)? Cynthia Pratt, daughter [phone]   Prior to hospitilization cognitive status: Unable to Assess   Current cognitive status: Alert/Oriented   Walking or Climbing Stairs Difficulty no   Dressing/Bathing Difficulty no   Home Layout Able to live on 1st floor   Equipment Currently Used at Home shower chair;walker, rolling;blood pressure machine   Readmission within 30 days? No   Patient currently being followed by outpatient case management? No   Do you currently have service(s) that help you manage your care at home? Yes   How Many hours does patient receive services 1   Name and Contact number of agency [facility] [phone] and [facility] [phone]   Is the pt/caregiver preference to resume services with current agency Yes   Do you take prescription medications? Yes   Do you have prescription coverage? Yes   Coverage Medicare   Do you have any problems affording any of your prescribed medications? No   Is the patient taking medications as prescribed? yes   Who is going to help you get home at discharge? pending   How do you get to doctors appointments? family or friend will provide   Are you on dialysis? No   Do you take coumadin? No   Discharge Plan A   (TBD)   DME  Needed Upon Discharge    (TBD)   Transition of Care Barriers   (no barriers at this time)   Physical Activity   On average, how many days per week do you engage in moderate to strenuous exercise (like a brisk walk)? 0 days   On average, how many minutes do you engage in exercise at this level? 0 min   Financial Resource Strain   How hard is it for you to pay for the very basics like food, housing, medical care, and heating? Not hard   Housing Stability   In the last 12 months, was there a time when you were not able to pay the mortgage or rent on time? N   At any time in the past 12 months, were you homeless or living in a shelter (including now)? N   Transportation Needs   Has the lack of transportation kept you from medical appointments, meetings, work or from getting things needed for daily living? No   Food Insecurity   Within the past 12 months, you worried that your food would run out before you got the money to buy more. Never true   Within the past 12 months, the food you bought just didn't last and you didn't have money to get more. Never true   Stress   Do you feel stress - tense, restless, nervous, or anxious, or unable to sleep at night because your mind is troubled all the time - these days? Not at all   Social Isolation   How often do you feel lonely or isolated from those around you?  Never   Alcohol Use   Q1: How often do you have a drink containing alcohol? Never   Q2: How many drinks containing alcohol do you have on a typical day when you are drinking? None   Q3: How often do you have six or more drinks on one occasion? Never   Utilities   In the past 12 months has the electric, gas, oil, or water company threatened to shut off services in your home? No   Health Literacy   How often do you need to have someone help you when you read instructions, pamphlets, or other written material from your doctor or pharmacy? Never

## 2024-10-26 PROBLEM — R42 DIZZINESS: Status: ACTIVE | Noted: 2024-10-26

## 2024-10-26 LAB
POCT GLUCOSE: 190 MG/DL (ref 70–110)
POCT GLUCOSE: 235 MG/DL (ref 70–110)
POCT GLUCOSE: 254 MG/DL (ref 70–110)
POCT GLUCOSE: 370 MG/DL (ref 70–110)

## 2024-10-26 PROCEDURE — 97162 PT EVAL MOD COMPLEX 30 MIN: CPT

## 2024-10-26 PROCEDURE — 25000003 PHARM REV CODE 250: Performed by: INTERNAL MEDICINE

## 2024-10-26 PROCEDURE — 25000003 PHARM REV CODE 250: Performed by: STUDENT IN AN ORGANIZED HEALTH CARE EDUCATION/TRAINING PROGRAM

## 2024-10-26 PROCEDURE — 21400001 HC TELEMETRY ROOM

## 2024-10-26 PROCEDURE — 63600175 PHARM REV CODE 636 W HCPCS: Performed by: NURSE PRACTITIONER

## 2024-10-26 PROCEDURE — 63600175 PHARM REV CODE 636 W HCPCS: Performed by: STUDENT IN AN ORGANIZED HEALTH CARE EDUCATION/TRAINING PROGRAM

## 2024-10-26 PROCEDURE — 97535 SELF CARE MNGMENT TRAINING: CPT

## 2024-10-26 PROCEDURE — 97166 OT EVAL MOD COMPLEX 45 MIN: CPT

## 2024-10-26 RX ORDER — GABAPENTIN 300 MG/1
300 CAPSULE ORAL 2 TIMES DAILY
Qty: 60 CAPSULE | Refills: 0 | OUTPATIENT
Start: 2024-10-26

## 2024-10-26 RX ORDER — MECLIZINE HYDROCHLORIDE 25 MG/1
25 TABLET ORAL 3 TIMES DAILY PRN
Status: CANCELLED | OUTPATIENT
Start: 2024-10-26

## 2024-10-26 RX ORDER — MECLIZINE HYDROCHLORIDE 25 MG/1
25 TABLET ORAL 3 TIMES DAILY PRN
Status: DISCONTINUED | OUTPATIENT
Start: 2024-10-26 | End: 2024-11-05 | Stop reason: HOSPADM

## 2024-10-26 RX ORDER — INSULIN GLARGINE 100 [IU]/ML
10 INJECTION, SOLUTION SUBCUTANEOUS NIGHTLY
Status: DISCONTINUED | OUTPATIENT
Start: 2024-10-26 | End: 2024-10-27

## 2024-10-26 RX ADMIN — ENOXAPARIN SODIUM 40 MG: 40 INJECTION SUBCUTANEOUS at 04:10

## 2024-10-26 RX ADMIN — INSULIN ASPART 2 UNITS: 100 INJECTION, SOLUTION INTRAVENOUS; SUBCUTANEOUS at 10:10

## 2024-10-26 RX ADMIN — Medication: at 09:10

## 2024-10-26 RX ADMIN — CLOPIDOGREL BISULFATE 75 MG: 75 TABLET ORAL at 09:10

## 2024-10-26 RX ADMIN — Medication 400 MG: at 09:10

## 2024-10-26 RX ADMIN — INSULIN ASPART 8 UNITS: 100 INJECTION, SOLUTION INTRAVENOUS; SUBCUTANEOUS at 12:10

## 2024-10-26 RX ADMIN — PANTOPRAZOLE SODIUM 40 MG: 40 TABLET, DELAYED RELEASE ORAL at 09:10

## 2024-10-26 RX ADMIN — INSULIN GLARGINE 10 UNITS: 100 INJECTION, SOLUTION SUBCUTANEOUS at 09:10

## 2024-10-26 RX ADMIN — CARVEDILOL 3.12 MG: 3.12 TABLET, FILM COATED ORAL at 09:10

## 2024-10-26 RX ADMIN — GABAPENTIN 300 MG: 300 CAPSULE ORAL at 09:10

## 2024-10-26 RX ADMIN — INSULIN ASPART 1 UNITS: 100 INJECTION, SOLUTION INTRAVENOUS; SUBCUTANEOUS at 06:10

## 2024-10-26 RX ADMIN — DULOXETINE HYDROCHLORIDE 60 MG: 30 CAPSULE, DELAYED RELEASE ORAL at 06:10

## 2024-10-26 RX ADMIN — DONEPEZIL HYDROCHLORIDE 5 MG: 5 TABLET, FILM COATED ORAL at 09:10

## 2024-10-26 RX ADMIN — ATORVASTATIN CALCIUM 40 MG: 40 TABLET, FILM COATED ORAL at 09:10

## 2024-10-26 RX ADMIN — INSULIN ASPART 6 UNITS: 100 INJECTION, SOLUTION INTRAVENOUS; SUBCUTANEOUS at 05:10

## 2024-10-26 RX ADMIN — ASPIRIN 81 MG: 81 TABLET, COATED ORAL at 09:10

## 2024-10-26 NOTE — PT/OT/SLP EVAL
Occupational Therapy  Evaluation    Name: Mayra Parker  MRN: [id number]  Admitting Diagnosis: dizziness, weakness, chest pain  Recent Surgery: * No surgery found *      Recommendations:     Discharge therapy intensity: Low Intensity Therapy   Discharge Equipment Recommendations:     Barriers to discharge:  None    Assessment:     Mayra Parker is a 74 y.o. female with a medical diagnosis of dizziness, weakness, chest pain.  She presents with the following performance deficits affecting function: weakness, impaired endurance, impaired self care skills, impaired functional mobility.     Rehab Prognosis: Good; patient would benefit from acute skilled OT services to address these deficits and reach maximum level of function.       Plan:     Patient to be seen 3 x/week to address the above listed problems via self-care/home management, therapeutic activities, therapeutic exercises  Plan of Care Expires: 11/08/24  Plan of Care Reviewed with: patient    Subjective     Chief Complaint: I want to take a bath  Patient/Family Comments/goals: to get stronger, go home    Occupational Profile:  Living Environment: lives alone, [facility]  Previous level of function: used RW for short distances, wheelchair for long distances outside the home  Roles and Routines: Judaism  Equipment Used at Home: walker, rolling, wheelchair  Assistance upon Discharge: daughter, self, home health    Objective:     OT communicated with RN prior to session.      Patient was found HOB elevated with telemetry, PureWick upon OT entry to room.    General Precautions: Standard, fall  Orthopedic Precautions: N/A  Braces: N/A    Vital Signs: Orthostatics: Supine n/a, Sitting 136/74, [id number]    Bed Mobility:    Patient completed Rolling/Turning to Right with stand by assistance  Patient completed Scooting/Bridging with stand by assistance  Patient completed Supine to Sit with stand by assistance    Functional Mobility/Transfers:  Patient completed  Sit <> Stand Transfer with stand by assistance  with  grab bars(s)   Patient completed Bed <> Chair Transfer using Step Transfer technique with stand by assistance with grab bars(s)  Patient completed Toilet Transfer Step Transfer technique with stand by assistance with  hand-held assist    Activities of Daily Living:  Grooming: stand by assistance    Bathing: minimum assistance    Upper Body Dressing: stand by assistance    Lower Body Dressing: stand by assistance    Toileting: minimum assistance      AMPAC 6 Click ADL:  AMPAC Total Score: 21    Functional Cognition:  Intact    Visual Perceptual Skills:  Intact    Upper Extremity Function:  Right Upper Extremity:   Range of Motion: WFL  Strength: WFL  Coordination: WFL    Left Upper Extremity:  Range of Motion: WFL  Strength: WFL  Coordination: WFL    Balance:   Static sitting balance: WFL    Therapeutic Positioning  Risk for acquired pressure injuries is increased due to relative decrease in mobility d/t hospitalization .    OT interventions performed during the course of today's session:   Education was provided on benefits of and recommendations for therapeutic positioning    OT recommendations for therapeutic positioning throughout hospitalization:   Follow [facility] Pressure Injury Prevention Protocol    Additional Treatment:  ADL Training: Sponge bath and grooming/hygiene at sink, sitting up in chair, standing at sink with min assist/stand by assist.    Patient Education:  Patient provided with verbal education education regarding OT role/goals/POC, fall prevention, safety awareness, and Discharge/DME recommendations.  Understanding was verbalized, however additional teaching warranted.     Patient left up in chair with all lines intact, call button in reach, and RN notified.    GOALS:   Multidisciplinary Problems       Occupational Therapy Goals          Problem: Occupational Therapy    Goal Priority Disciplines Outcome Interventions   Occupational Therapy Goal     Low OT, PT/OT Progressing    Description: LTG: Pt will perform basic ADLs and ADL transfers with Modified independence using LRAD by discharge.    STG: to be met by 11/08/2024    Pt will complete grooming standing at sink with LRAD with SBA.  Pt will complete UB dressing with SBA.  Pt will complete LB dressing with SBA using LRAD.  Pt will complete toileting with SBA using LRAD.  Pt will complete functional mobility to/from toilet and toilet transfer with SBA using LRAD.                       History:     Past Medical History:   Diagnosis Date    Arthritis     Coronary artery disease     CVA (cerebral vascular accident)     affected right side of body    Depression     Diabetes mellitus     Hyperlipidemia     Hypertension     Unspecified dementia without behavioral disturbance          Past Surgical History:   Procedure Laterality Date    ANGIOPLASTY, PERIPHERAL BLOOD VESSEL N/A 7/11/2023    Procedure: Angioplasty-peripheral;  Surgeon: Jonathan Alanis MD;  Location: [facility] CATH LAB;  Service: Cardiology;  Laterality: N/A;    ATHERECTOMY, LOWER ARTERIAL  7/11/2023    Procedure: Atherectomy, Lower Arterial;  Surgeon: Jonathan Alanis MD;  Location: [facility] CATH LAB;  Service: Cardiology;;    BACK SURGERY      CAROTID ENDARTERECTOMY Left     CAROTID ENDARTERECTOMY Right     LEFT HEART CATHETERIZATION  05/20/2022    Dr. Estrella; Stent placement    LEFT HEART CATHETERIZATION Left 5/20/2022    Procedure: CATHETERIZATION, HEART, LEFT;  Surgeon: Jone Estrella MD;  Location: [facility] CATH LAB;  Service: Cardiology;  Laterality: Left;  LHC VIA R GROIN    PERIPHERAL ANGIOGRAPHY N/A 7/11/2023    Procedure: Peripheral angiography;  Surgeon: Jonathan Alanis MD;  Location: [facility] CATH LAB;  Service: Cardiology;  Laterality: N/A;       Time Tracking:     OT Date of Treatment: 10/26/24  OT Start Time: 0920  OT Stop Time: 1010  OT Total Time (min): 50 min    Billable Minutes:Evaluation 10  Self Care/Home Management 40    10/26/2024

## 2024-10-26 NOTE — PT/OT/SLP EVAL
Physical Therapy Evaluation    Patient Name:  Mayra Parker   MRN:  [id number]    Recommendations:     Discharge therapy intensity: Moderate Intensity Therapy   Discharge Equipment Recommendations: none   Barriers to discharge: Impaired mobility and Ongoing medical needs    Assessment:     Mayra Parker is a 74 y.o. female admitted with a medical diagnosis of dizziness, weakness, chest pain.  She presents with the following impairments/functional limitations: weakness, impaired endurance, impaired balance, impaired functional mobility, decreased lower extremity function, impaired coordination. Pt's mobility limited by BLE shakiness and symptoms of blurry vision upon standing up from chair. Pt completed sit to stand transfer with min A using RW. Did not mobilize further due to symptoms. Pt lives alone and is Mod I using rollator at baseline. Pt's daughter lives nearby and assist pt as needed. Recommending moderate intensity therapy at this time, will update recommendation as patient progresses and able to assess gait.     Rehab Prognosis: Good; patient would benefit from acute skilled PT services to address these deficits and reach maximum level of function.    Recent Surgery: * No surgery found *      Plan:     During this hospitalization, patient would benefit from acute PT services 5 x/week to address the identified rehab impairments via gait training, therapeutic activities, therapeutic exercises and progress toward the following goals:    Plan of Care Expires:  11/26/24    Subjective     Chief Complaint: blurry vision   Patient/Family Comments/goals:  get better   Pain/Comfort:   No verbal pain rating stated     Patients cultural, spiritual, Restorationism conflicts given the current situation: no    Living Environment:  Pt lives alone in [facility] with 3 GABRIEL (R handrail).   Prior to admission, patients level of function was Mod I using rollator.  Equipment used at home: walker, rolling.  DME owned (not  currently used): none.  Upon discharge, patient will have assistance from daughter.    Objective:     Communicated with RN prior to session.  Patient found up in chair with telemetry, pulse ox (continuous)  upon PT entry to room.    General Precautions: Standard, fall  Orthopedic Precautions:N/A   Braces: N/A  Respiratory Status: Room air  Blood Pressure:   122/69 sitting in chair prior to standing   130/69 sitting in chair after standing up   Unable to take a reading in standing due to machine not reading and patient unable to tolerate standing due to symptoms.       Exams:  RLE ROM: WFL  RLE Strength: WFL  LLE ROM: WFL  LLE Strength: WFL  Skin integrity: Visible skin intact      Functional Mobility:  Transfers:     Sit <> Stand:  minimum assistance with rolling walker      AM-PAC 6 CLICK MOBILITY  Total Score:        Treatment & Education:      Patient provided with verbal education education regarding PT role/goals/POC, fall prevention, and safety awareness.  Understanding was verbalized, however additional teaching warranted.     Patient left up in chair with all lines intact, call button in reach, and RN notified.    GOALS:   Multidisciplinary Problems       Physical Therapy Goals          Problem: Physical Therapy    Goal Priority Disciplines Outcome Interventions   Physical Therapy Goal     PT, PT/OT Progressing    Description: Goals to be met by: 2024     Patient will increase functional independence with mobility by performin. Supine to sit with Stand-by Assistance  2. Sit to supine with Stand-by Assistance  3. Sit to stand transfer with Stand-by Assistance  4. Gait  TBD                         History:     Past Medical History:   Diagnosis Date    Arthritis     Coronary artery disease     CVA (cerebral vascular accident)     affected right side of body    Depression     Diabetes mellitus     Hyperlipidemia     Hypertension     Unspecified dementia without behavioral disturbance        Past  Surgical History:   Procedure Laterality Date    ANGIOPLASTY, PERIPHERAL BLOOD VESSEL N/A 7/11/2023    Procedure: Angioplasty-peripheral;  Surgeon: Jonathan Alanis MD;  Location: [facility] CATH LAB;  Service: Cardiology;  Laterality: N/A;    ATHERECTOMY, LOWER ARTERIAL  7/11/2023    Procedure: Atherectomy, Lower Arterial;  Surgeon: Jonathan Alanis MD;  Location: [facility] CATH LAB;  Service: Cardiology;;    BACK SURGERY      CAROTID ENDARTERECTOMY Left     CAROTID ENDARTERECTOMY Right     LEFT HEART CATHETERIZATION  05/20/2022    Dr. Estrella; Stent placement    LEFT HEART CATHETERIZATION Left 5/20/2022    Procedure: CATHETERIZATION, HEART, LEFT;  Surgeon: Jone Estrella MD;  Location: [facility] CATH LAB;  Service: Cardiology;  Laterality: Left;  LHC VIA R GROIN    PERIPHERAL ANGIOGRAPHY N/A 7/11/2023    Procedure: Peripheral angiography;  Surgeon: Jonathan Alanis MD;  Location: [facility] CATH LAB;  Service: Cardiology;  Laterality: N/A;       Time Tracking:     PT Received On: 10/26/24  PT Start Time: 1050     PT Stop Time: 1101  PT Total Time (min): 11 min     Billable Minutes: Evaluation Mod      10/26/2024

## 2024-10-26 NOTE — PLAN OF CARE
Problem: Physical Therapy  Goal: Physical Therapy Goal  Description: Goals to be met by: 2024     Patient will increase functional independence with mobility by performin. Supine to sit with Stand-by Assistance  2. Sit to supine with Stand-by Assistance  3. Sit to stand transfer with Stand-by Assistance  4. Gait  TBD.     Outcome: Progressing

## 2024-10-26 NOTE — PLAN OF CARE
Problem: Occupational Therapy  Goal: Occupational Therapy Goal  Description: LTG: Pt will perform basic ADLs and ADL transfers with Modified independence using LRAD by discharge.    STG: to be met by 11/08/2024    Pt will complete grooming standing at sink with LRAD with SBA.  Pt will complete UB dressing with SBA.  Pt will complete LB dressing with SBA using LRAD.  Pt will complete toileting with SBA using LRAD.  Pt will complete functional mobility to/from toilet and toilet transfer with SBA using LRAD.  Outcome: Progressing

## 2024-10-26 NOTE — PLAN OF CARE
Problem: Adult Inpatient Plan of Care  Goal: Plan of Care Review  10/26/2024 0729 by Jillian Dimas RN  Outcome: Progressing  10/26/2024 0728 by Jillian Dimas RN  Outcome: Progressing  Goal: Patient-Specific Goal (Individualized)  10/26/2024 0729 by Jillian Dimas RN  Outcome: Progressing  10/26/2024 0728 by Jillian Dimas RN  Outcome: Progressing  Goal: Absence of Hospital-Acquired Illness or Injury  10/26/2024 0729 by Jillian Dimas RN  Outcome: Progressing  10/26/2024 0728 by Jillian Dimas RN  Outcome: Progressing  Goal: Optimal Comfort and Wellbeing  10/26/2024 0729 by Jillian Dimas RN  Outcome: Progressing  10/26/2024 0728 by Jillian Dimas RN  Outcome: Progressing  Goal: Readiness for Transition of Care  10/26/2024 0729 by Jillian Dimas RN  Outcome: Progressing  10/26/2024 0728 by Jillian Dimas RN  Outcome: Progressing     Problem: Fatigue  Goal: Improved Activity Tolerance  Outcome: Progressing     Problem: Fall Injury Risk  Goal: Absence of Fall and Fall-Related Injury  Outcome: Progressing

## 2024-10-26 NOTE — PROGRESS NOTES
[facility] Hospital Medicine Progress Note        Chief Complaint: Inpatient Follow-up for     HPI:   Mayra Parker is a 74 y.o. female who PMH includes CVA no deficits, CAD, chronic arthritis, HTN, HLD, DM type II, dementia; presents to the ED at [facility] on 10/24/2024 with a primary complaint of dizziness with associated blurred vision. Pt reports she had visit from her home health nurse  which reported her blood glucose was in the 400's, it was reported that the patient had a low blood pressure as well and had a witnessed fall where she landed on her buttocks while ambulating with a walker.  No reports of head injury.  No reports of loss of consciousness or seizures.  Patient denies any alcohol, tobacco, or illicit drug use.  She reports she quit smoking 2 years ago.  The daughter  daughter called EMS for ED transport.  Patient does report feeling weak and not herself over the past few days.  No reports of shortness a breath, fever, chills or any sick contacts.  She denies any new foods or medications.  Lab work reviewed demonstrated sodium 135, creatinine 1.22, glucose 254, other indices unremarkable.  Chest x-ray impression reviewed demonstrated no abnormality seen.  CT head without contrast impression reviewed demonstrated no acute intracranial findings or significant interval change compared to June 20, 2022.  In reviewing patient's lab trends her creatinine in July 20, 2023 was 1.17.  Her last A1c was 9.1 on 10/07/2024.  Patient does admit to not following her diet as prescribed.  She does reports she no longer smokes cigarettes she quit 2 years ago around the time that she had her stroke.  Initial vital signs /71 pulse 87 respirations 18 temperature 98° F O2 saturation 92% on room air.  Patient did receive some IV hydration which her blood pressure did trend up and was in the hypertensive side at the time of rounds.  She reports feeling much better.  Her O2  saturations improved without the requirement of supplemental oxygen therapy.  No family was present at the bedside at the time of rounds however patient is able to provide the historical information to the questions asked.  She did still endorse chest pain at the time of examination which was reproducible. Pt is ask for her cardiologist Dr. Estrella.  Patient is admitted to hospital medicine services for further management.    Interval Hx:   Patient seen and examined by bedside.  No further chest pain reported.  Has not gotten up from bed since yesterday.  Per nursing, when later she did she noted complaints instability and dizziness.    Case was discussed with patient's nurse and  on the floor.    Objective/physical exam:  General: In no acute distress, afebrile  Chest: Clear to auscultation bilaterally  Heart: RRR, +S1, S2, no appreciable murmur  Abdomen: Soft, nontender, BS +  MSK: Warm, no lower extremity edema, no clubbing or cyanosis  Neurologic: Alert and oriented x4, Cranial nerve II-XII intact, Strength 5/5 in all 4 extremities    VITAL SIGNS: 24 HRS MIN & MAX LAST   Temp  Min: 97.7 °F (36.5 °C)  Max: 98.8 °F (37.1 °C) 98 °F (36.7 °C)   BP  Min: 106/63  Max: 148/83 127/69   Pulse  Min: 75  Max: 101  78   Resp  Min: 18  Max: 18 18   SpO2  Min: 97 %  Max: 99 % 99 %     I have reviewed the following labs:  Recent Labs   Lab 10/24/24  1754 10/25/24  0427   WBC 5.27 5.31   RBC 4.31 4.03*   HGB 12.3 11.5*   HCT 38.5 35.4*   MCV 89.3 87.8   MCH 28.5 28.5   MCHC 31.9* 32.5*   RDW 13.4 13.6    233   MPV 9.3 9.0     Recent Labs   Lab 10/24/24  1754 10/25/24  0427   * 139   K 4.0 4.3    104   CO2 23 25   BUN 16.4 16.2   CREATININE 1.22* 1.05*   CALCIUM 9.0 8.9   MG  --  1.60   ALBUMIN 3.3* 3.2*   ALKPHOS 58 55   ALT 17 14   AST 16 16   BILITOT 0.9 0.9     Microbiology Results (last 7 days)       ** No results found for the last 168 hours. **             See below for  Radiology    Assessment/Plan:  Chest pain, ACS ruled out, likely MSK related, resolved  Hypertensive urgency , resolved  Dizziness  Witnessed fall  History of type 2 diabetes, CKD, hypertension, CVA     Patient notes compliance with all her medications including DA PT   Cardiology was consulted, ACS ruled out  Troponin x2 unremarkable   Hyperglycemia improved   Started 5 units of Lantus however continues to have some hyperglycemia, increased to 10  Accu-Cheks, diabetic diet   Sliding scale   Resume appropriate home meds   Echo done showed EF of 55-60% with normal systolic function   PT and OT consulted, recommend SNF   Orthostatics obtained, negative for hypotension  Add meclizine prn  CT head on arrival negative; no other focal deficits  DC beta blocker  Fall precautions  Labs in a.m.    VTE prophylaxis: lovenox    Patient condition:  stable    Anticipated discharge and Disposition:   tbd      All diagnosis and differential diagnosis have been reviewed; assessment and plan has been documented; I have personally reviewed the labs and test results that are presently available; I have reviewed the patients medication list; I have reviewed the consulting providers response and recommendations. I have reviewed or attempted to review medical records based upon their availability    All of the patient's questions have been  addressed and answered. Patient's is agreeable to the above stated plan. I will continue to monitor closely and make adjustments to medical management as needed.    Portions of this note dictated using EMR integrated voice recognition software, and may be subject to voice recognition errors not corrected at proofreading. Please contact writer for clarification if needed.   _____________________________________________________________________    Malnutrition Status:    Scheduled Med:   aspirin  81 mg Oral Daily    atorvastatin  40 mg Oral QHS    carvediloL  3.125 mg Oral BID    clopidogreL  75 mg Oral  Daily    donepeziL  5 mg Oral QHS    DULoxetine  60 mg Oral QAM    enoxparin  40 mg Subcutaneous Daily    gabapentin  300 mg Oral BID    insulin glargine U-100  5 Units Subcutaneous QHS    magnesium oxide  400 mg Oral BID    pantoprazole  40 mg Oral Daily    zinc oxide-cod liver oil   Topical (Top) BID      Continuous Infusions:     PRN Meds:    Current Facility-Administered Medications:     acetaminophen, 650 mg, Oral, Q6H PRN    aluminum-magnesium hydroxide-simethicone, 30 mL, Oral, QID PRN    dextrose 10%, 12.5 g, Intravenous, PRN    dextrose 10%, 25 g, Intravenous, PRN    glucagon (human recombinant), 1 mg, Intramuscular, PRN    glucose, 16 g, Oral, PRN    glucose, 24 g, Oral, PRN    hydrALAZINE, 10 mg, Intravenous, Q4H PRN    insulin aspart U-100, 0-10 Units, Subcutaneous, QID (AC + HS) PRN    melatonin, 6 mg, Oral, Nightly PRN    naloxone, 0.02 mg, Intravenous, PRN    nitroGLYCERIN, 0.4 mg, Sublingual, Q5 Min PRN    ondansetron, 4 mg, Intravenous, Q4H PRN    polyethylene glycol, 17 g, Oral, BID PRN    prochlorperazine, 5 mg, Intravenous, Q6H PRN    simethicone, 1 tablet, Oral, QID PRN     Radiology:  I have personally reviewed the following imaging and agree with the radiologist.     Echo    Left Ventricle: The left ventricle is normal in size. Normal wall   thickness. There is normal systolic function with a visually estimated   ejection fraction of 55 - 60%.    Right Ventricle: Normal right ventricular cavity size. Systolic   function is normal.    IVC/SVC: Normal venous pressure at 3 mmHg.  CT Head Without Contrast  Narrative: EXAMINATION:  CT HEAD WITHOUT CONTRAST    CLINICAL HISTORY:  Dizziness, persistent/recurrent, cardiac or vascular cause suspected;    TECHNIQUE:  CT imaging of the head performed from the skull base to the vertex without intravenous contrast.  DLP 1025 mGycm. Automatic exposure control, adjustment of mA/kV or iterative reconstruction technique was used to reduce  radiation.    COMPARISON:  25 June 2022    FINDINGS:  There is no acute cortical infarct, hemorrhage or mass lesion.  There is left cerebellar encephalomalacia.  No new parenchymal attenuation abnormality.  Ventricular size is stable.  There are vascular calcifications.    Visualized paranasal sinuses and mastoid air cells are clear.  Impression: No acute intracranial findings or significant interval change compared to June 2022.    No significant discrepancy with the preliminary report.    Electronically signed by: Akbar Mcneill  Date:    10/25/2024  Time:    08:03      Mary Foster MD  Department of Hospital Medicine   [facility]   10/26/2024

## 2024-10-26 NOTE — PROGRESS NOTES
[facility] [address]  Wound Care    Patient Name:  Mayra Parker   MRN:  [id number]  Date: 10/26/2024  Diagnosis: Dizziness    History:     Past Medical History:   Diagnosis Date    Arthritis     Coronary artery disease     CVA (cerebral vascular accident)     affected right side of body    Depression     Diabetes mellitus     Hyperlipidemia     Hypertension     Unspecified dementia without behavioral disturbance        Social History     Socioeconomic History    Marital status:    Tobacco Use    Smoking status: Former     Current packs/day: 0.50     Types: Cigarettes    Smokeless tobacco: Never    Tobacco comments:     Started smoking in late 20s   Substance and Sexual Activity    Alcohol use: Never    Drug use: Never    Sexual activity: Not Currently     Social Drivers of Health     Financial Resource Strain: Low Risk  (10/25/2024)    Overall Financial Resource Strain (CARDIA)     Difficulty of Paying Living Expenses: Not hard at all   Recent Concern: Financial Resource Strain - Medium Risk (10/8/2024)    Received from [facility]    Overall Financial Resource Strain (CARDIA)     Difficulty of Paying Living Expenses: Somewhat hard   Food Insecurity: No Food Insecurity (10/25/2024)    Hunger Vital Sign     Worried About Running Out of Food in the Last Year: Never true     Ran Out of Food in the Last Year: Never true   Transportation Needs: No Transportation Needs (10/25/2024)    TRANSPORTATION NEEDS     Transportation : No   Physical Activity: Inactive (10/25/2024)    Exercise Vital Sign     Days of Exercise per Week: 0 days     Minutes of Exercise per Session: 0 min   Stress: No Stress Concern Present (10/25/2024)    [employer] - Occupational Stress Questionnaire     Feeling of Stress : Not at all   Housing Stability: Low Risk  (10/25/2024)    Housing Stability Vital  Sign     Unable to Pay for Housing in the Last Year: No     Homeless in the Last Year: No       Precautions:     Allergies as of 10/24/2024 - Reviewed 07/06/2023   Allergen Reaction Noted    Glipizide Swelling 03/15/2022    Influenza vaccine tr-s 11 (pf) Hives 06/25/2022    Pregabalin Other (See Comments) 06/09/2022       WO Assessment Details/Treatment      10/26/24 0837   WO Assessment   Visit Date 10/26/24   Visit Time 0837   Consult Type New   [facility] Speciality Wound   Intervention chart review;assessed;applied;orders   Teaching on-going        Wound 10/26/24 Moisture associated dermatitis Coccyx   Date First Assessed: 10/26/24   Present on Original Admission: Yes  Primary Wound Type: Moisture associated dermatitis  Location: Coccyx   Wound Image    Dressing Appearance Intact;Dry   Drainage Amount Scant   Drainage Characteristics/Odor Serosanguineous   Appearance Pink;Red   Tissue loss description Partial thickness   Black (%), Wound Tissue Color 0 %   Red (%), Wound Tissue Color 100 %   Yellow (%), Wound Tissue Color 0 %   Periwound Area Dry;Intact   Wound Edges Irregular   Wound Length (cm) 2.4 cm   Wound Width (cm) 3.1 cm   Wound Depth (cm) 0.1 cm   Wound Volume (cm^3) 0.744 cm^3   Wound Surface Area (cm^2) 7.44 cm^2   Care Cleansed with:;Other (see comments)  (remedy skin cleanser, applied desitin , abd pad, tape)     [facility] new consult for buttocks. Introduced myself and Rena nurse tech to patient. Explained reason for visit. Pt reported she fell yesterday and lives at home. Pt consented to wound care evaluation and treatment. Assessed buttocks and noted a cluster of broken areas due to MASD. Cleansed, measured and photos obtained for EMR. Treatment recommendations include: cleanse with remedy skin cleanser, dry well , apply Desitin , abd pad and tape BID and prn until healed. Pt is able to move well with minimal assistance. Discussed good hygiene and staying clean and dry. Plan of care discussed with  nurse. Nursing to continue with treatment recommendations . Will follow up.   10/26/2024

## 2024-10-27 LAB
ANION GAP SERPL CALC-SCNC: 7 MEQ/L
BUN SERPL-MCNC: 17.3 MG/DL (ref 9.8–20.1)
CALCIUM SERPL-MCNC: 8.7 MG/DL (ref 8.4–10.2)
CHLORIDE SERPL-SCNC: 109 MMOL/L (ref 98–107)
CO2 SERPL-SCNC: 25 MMOL/L (ref 23–31)
CREAT SERPL-MCNC: 1.26 MG/DL (ref 0.55–1.02)
CREAT/UREA NIT SERPL: 14
ERYTHROCYTE [DISTWIDTH] IN BLOOD BY AUTOMATED COUNT: 13.8 % (ref 11.5–17)
GFR SERPLBLD CREATININE-BSD FMLA CKD-EPI: 45 ML/MIN/1.73/M2
GLUCOSE SERPL-MCNC: 217 MG/DL (ref 82–115)
HCT VFR BLD AUTO: 33.9 % (ref 37–47)
HGB BLD-MCNC: 10.9 G/DL (ref 12–16)
MCH RBC QN AUTO: 28.7 PG (ref 27–31)
MCHC RBC AUTO-ENTMCNC: 32.2 G/DL (ref 33–36)
MCV RBC AUTO: 89.2 FL (ref 80–94)
NRBC BLD AUTO-RTO: 0 %
PLATELET # BLD AUTO: 253 X10(3)/MCL (ref 130–400)
PMV BLD AUTO: 9.2 FL (ref 7.4–10.4)
POCT GLUCOSE: 284 MG/DL (ref 70–110)
POCT GLUCOSE: 298 MG/DL (ref 70–110)
POTASSIUM SERPL-SCNC: 4.6 MMOL/L (ref 3.5–5.1)
RBC # BLD AUTO: 3.8 X10(6)/MCL (ref 4.2–5.4)
SODIUM SERPL-SCNC: 141 MMOL/L (ref 136–145)
WBC # BLD AUTO: 4.1 X10(3)/MCL (ref 4.5–11.5)

## 2024-10-27 PROCEDURE — 83550 IRON BINDING TEST: CPT | Performed by: STUDENT IN AN ORGANIZED HEALTH CARE EDUCATION/TRAINING PROGRAM

## 2024-10-27 PROCEDURE — 63600175 PHARM REV CODE 636 W HCPCS: Performed by: STUDENT IN AN ORGANIZED HEALTH CARE EDUCATION/TRAINING PROGRAM

## 2024-10-27 PROCEDURE — 63600175 PHARM REV CODE 636 W HCPCS: Performed by: NURSE PRACTITIONER

## 2024-10-27 PROCEDURE — 63600175 PHARM REV CODE 636 W HCPCS

## 2024-10-27 PROCEDURE — 21400001 HC TELEMETRY ROOM

## 2024-10-27 PROCEDURE — 25000003 PHARM REV CODE 250: Performed by: STUDENT IN AN ORGANIZED HEALTH CARE EDUCATION/TRAINING PROGRAM

## 2024-10-27 PROCEDURE — 82306 VITAMIN D 25 HYDROXY: CPT | Performed by: STUDENT IN AN ORGANIZED HEALTH CARE EDUCATION/TRAINING PROGRAM

## 2024-10-27 PROCEDURE — 85027 COMPLETE CBC AUTOMATED: CPT | Performed by: STUDENT IN AN ORGANIZED HEALTH CARE EDUCATION/TRAINING PROGRAM

## 2024-10-27 PROCEDURE — 80048 BASIC METABOLIC PNL TOTAL CA: CPT | Performed by: STUDENT IN AN ORGANIZED HEALTH CARE EDUCATION/TRAINING PROGRAM

## 2024-10-27 PROCEDURE — 36415 COLL VENOUS BLD VENIPUNCTURE: CPT | Performed by: STUDENT IN AN ORGANIZED HEALTH CARE EDUCATION/TRAINING PROGRAM

## 2024-10-27 RX ORDER — INSULIN GLARGINE 100 [IU]/ML
15 INJECTION, SOLUTION SUBCUTANEOUS NIGHTLY
Status: DISCONTINUED | OUTPATIENT
Start: 2024-10-27 | End: 2024-10-28

## 2024-10-27 RX ADMIN — INSULIN GLARGINE 15 UNITS: 100 INJECTION, SOLUTION SUBCUTANEOUS at 08:10

## 2024-10-27 RX ADMIN — INSULIN ASPART 6 UNITS: 100 INJECTION, SOLUTION INTRAVENOUS; SUBCUTANEOUS at 01:10

## 2024-10-27 RX ADMIN — Medication: at 09:10

## 2024-10-27 RX ADMIN — Medication 400 MG: at 09:10

## 2024-10-27 RX ADMIN — INSULIN ASPART 5 UNITS: 100 INJECTION, SOLUTION INTRAVENOUS; SUBCUTANEOUS at 09:10

## 2024-10-27 RX ADMIN — ASPIRIN 81 MG: 81 TABLET, COATED ORAL at 09:10

## 2024-10-27 RX ADMIN — CLOPIDOGREL BISULFATE 75 MG: 75 TABLET ORAL at 09:10

## 2024-10-27 RX ADMIN — Medication 400 MG: at 08:10

## 2024-10-27 RX ADMIN — DULOXETINE HYDROCHLORIDE 60 MG: 30 CAPSULE, DELAYED RELEASE ORAL at 09:10

## 2024-10-27 RX ADMIN — GABAPENTIN 300 MG: 300 CAPSULE ORAL at 09:10

## 2024-10-27 RX ADMIN — ENOXAPARIN SODIUM 40 MG: 40 INJECTION SUBCUTANEOUS at 06:10

## 2024-10-27 RX ADMIN — PANTOPRAZOLE SODIUM 40 MG: 40 TABLET, DELAYED RELEASE ORAL at 09:10

## 2024-10-27 RX ADMIN — HYDRALAZINE HYDROCHLORIDE 10 MG: 20 INJECTION INTRAMUSCULAR; INTRAVENOUS at 09:10

## 2024-10-27 RX ADMIN — DONEPEZIL HYDROCHLORIDE 5 MG: 5 TABLET, FILM COATED ORAL at 08:10

## 2024-10-27 RX ADMIN — ATORVASTATIN CALCIUM 40 MG: 40 TABLET, FILM COATED ORAL at 08:10

## 2024-10-27 RX ADMIN — GABAPENTIN 300 MG: 300 CAPSULE ORAL at 08:10

## 2024-10-27 NOTE — PLAN OF CARE
Problem: Adult Inpatient Plan of Care  Goal: Plan of Care Review  10/27/2024 0320 by Geovanna Mckeon RN  Outcome: Progressing  10/27/2024 0319 by Geovanna Mckeon RN  Outcome: Progressing  Goal: Patient-Specific Goal (Individualized)  10/27/2024 0320 by Geovanna Mckeon RN  Outcome: Progressing  10/27/2024 0319 by Geovanna Mckeon RN  Outcome: Progressing  Goal: Absence of Hospital-Acquired Illness or Injury  10/27/2024 0320 by Geovanna Mckeon RN  Outcome: Progressing  10/27/2024 0319 by Geovanna Mckeon RN  Outcome: Progressing  Goal: Optimal Comfort and Wellbeing  10/27/2024 0320 by Geovanna Mckeon RN  Outcome: Progressing  10/27/2024 0319 by Geovanna Mckeon RN  Outcome: Progressing  Goal: Readiness for Transition of Care  10/27/2024 0320 by Geovanna Mckeon RN  Outcome: Progressing  10/27/2024 0319 by Geovanna Mckeon RN  Outcome: Progressing     Problem: Fatigue  Goal: Improved Activity Tolerance  10/27/2024 0320 by Geovanna Mckeon RN  Outcome: Progressing  10/27/2024 0319 by Geovanna Mckeon RN  Outcome: Progressing     Problem: Fall Injury Risk  Goal: Absence of Fall and Fall-Related Injury  10/27/2024 0320 by Geovanna Mckeon RN  Outcome: Progressing  10/27/2024 0319 by Geovanna Mckeon RN  Outcome: Progressing     Problem: Wound  Goal: Optimal Coping  10/27/2024 0320 by Geovanna Mckeon RN  Outcome: Progressing  10/27/2024 0319 by Geovanna Mckeon RN  Outcome: Progressing  Goal: Optimal Functional Ability  10/27/2024 0320 by Geovanna Mckeon RN  Outcome: Progressing  10/27/2024 0319 by Geovanna Mckeon RN  Outcome: Progressing  Goal: Absence of Infection Signs and Symptoms  10/27/2024 0320 by Geovanna Mckeon RN  Outcome: Progressing  10/27/2024 0319 by Geovanna Mckeon RN  Outcome: Progressing  Goal: Improved Oral Intake  10/27/2024 0320 by Geovanna Mckeon RN  Outcome: Progressing  10/27/2024 0319 by Geovanna Mckeon RN  Outcome: Progressing  Goal: Optimal  Pain Control and Function  10/27/2024 0320 by Geovanna Mckeon RN  Outcome: Progressing  10/27/2024 0319 by Geovanna Mckeon RN  Outcome: Progressing  Goal: Skin Health and Integrity  10/27/2024 0320 by Geovanna Mckeon RN  Outcome: Progressing  10/27/2024 0319 by Geovanna Mckeon RN  Outcome: Progressing  Goal: Optimal Wound Healing  10/27/2024 0320 by Geovanna Mckeon RN  Outcome: Progressing  10/27/2024 0319 by Geovanna Mckeon RN  Outcome: Progressing

## 2024-10-27 NOTE — PROGRESS NOTES
[facility] Hospital Medicine Progress Note        Chief Complaint: Inpatient Follow-up for     HPI:   Mayra Parker is a 74 y.o. female who PMH includes CVA no deficits, CAD, chronic arthritis, HTN, HLD, DM type II, dementia; presents to the ED at [facility] on 10/24/2024 with a primary complaint of dizziness with associated blurred vision. Pt reports she had visit from her home health nurse  which reported her blood glucose was in the 400's, it was reported that the patient had a low blood pressure as well and had a witnessed fall where she landed on her buttocks while ambulating with a walker.  No reports of head injury.  No reports of loss of consciousness or seizures.  Patient denies any alcohol, tobacco, or illicit drug use.  She reports she quit smoking 2 years ago.  The daughter  daughter called EMS for ED transport.  Patient does report feeling weak and not herself over the past few days.  No reports of shortness a breath, fever, chills or any sick contacts.  She denies any new foods or medications.  Lab work reviewed demonstrated sodium 135, creatinine 1.22, glucose 254, other indices unremarkable.  Chest x-ray impression reviewed demonstrated no abnormality seen.  CT head without contrast impression reviewed demonstrated no acute intracranial findings or significant interval change compared to June 20, 2022.  In reviewing patient's lab trends her creatinine in July 20, 2023 was 1.17.  Her last A1c was 9.1 on 10/07/2024.  Patient does admit to not following her diet as prescribed.  She does reports she no longer smokes cigarettes she quit 2 years ago around the time that she had her stroke.  Initial vital signs /71 pulse 87 respirations 18 temperature 98° F O2 saturation 92% on room air.  Patient did receive some IV hydration which her blood pressure did trend up and was in the hypertensive side at the time of rounds.  She reports feeling much better.  Her O2  saturations improved without the requirement of supplemental oxygen therapy.  No family was present at the bedside at the time of rounds however patient is able to provide the historical information to the questions asked.  She did still endorse chest pain at the time of examination which was reproducible. Pt is ask for her cardiologist Dr. Estrella.  Patient is admitted to hospital medicine services for further management.    Interval Hx:   Patient seen and examined by bedside.  Continues to endorse weakness and dizziness.  Endorses when she is lying down and standing up.  No chest pain.  No nausea or vomiting.  She is eating and drinking well.  No other focal deficits noted      Case was discussed with patient's nurse and  on the floor.    Objective/physical exam:  General: In no acute distress, afebrile  Chest: Clear to auscultation bilaterally  Heart: RRR, +S1, S2, no appreciable murmur  Abdomen: Soft, nontender, BS +  MSK: Warm, no lower extremity edema, no clubbing or cyanosis  Neurologic: Alert and oriented x4, Cranial nerve II-XII intact, Strength 5/5 in all 4 extremities    VITAL SIGNS: 24 HRS MIN & MAX LAST   Temp  Min: 97.4 °F (36.3 °C)  Max: 98.3 °F (36.8 °C) 98 °F (36.7 °C)   BP  Min: 109/69  Max: 174/71 (!) 174/71   Pulse  Min: 72  Max: 81  79   Resp  Min: 17  Max: 18 18   SpO2  Min: 97 %  Max: 99 % 98 %     I have reviewed the following labs:  Recent Labs   Lab 10/24/24  1754 10/25/24  0427 10/27/24  0350   WBC 5.27 5.31 4.10*   RBC 4.31 4.03* 3.80*   HGB 12.3 11.5* 10.9*   HCT 38.5 35.4* 33.9*   MCV 89.3 87.8 89.2   MCH 28.5 28.5 28.7   MCHC 31.9* 32.5* 32.2*   RDW 13.4 13.6 13.8    233 253   MPV 9.3 9.0 9.2     Recent Labs   Lab 10/24/24  1754 10/25/24  0427 10/27/24  0350   * 139 141   K 4.0 4.3 4.6    104 109*   CO2 23 25 25   BUN 16.4 16.2 17.3   CREATININE 1.22* 1.05* 1.26*   CALCIUM 9.0 8.9 8.7   MG  --  1.60  --    ALBUMIN 3.3* 3.2*  --    ALKPHOS 58 55  --    ALT 17  14  --    AST 16 16  --    BILITOT 0.9 0.9  --      Microbiology Results (last 7 days)       ** No results found for the last 168 hours. **             See below for Radiology    Assessment/Plan:  Chest pain, ACS ruled out, likely MSK related, resolved  Hypertensive urgency , resolved  Dizziness  Witnessed fall  History of type 2 diabetes, CKD, hypertension, CVA     Patient notes compliance with all her medications including DA PT   Cardiology was consulted, ACS ruled out  Troponin x2 unremarkable   Hyperglycemia improved   Started 5 units of Lantus however continues to have some hyperglycemia, increased to 15  Accu-Cheks, diabetic diet   Sliding scale   Resume appropriate home meds   Echo done showed EF of 55-60% with normal systolic function   PT and OT following  Orthostatics obtained, negative for hypotension  Add meclizine prn  CT head on arrival negative; no other focal deficits  DC beta blocker  Patient continues complaint of dizziness and blurry vision along with weakness, we will obtain MRI of the brain to rule out any focal strokes or abnormality  Fall precautions  Labs in a.m.    VTE prophylaxis: lovenox    Patient condition:  stable    Anticipated discharge and Disposition:   tbd      All diagnosis and differential diagnosis have been reviewed; assessment and plan has been documented; I have personally reviewed the labs and test results that are presently available; I have reviewed the patients medication list; I have reviewed the consulting providers response and recommendations. I have reviewed or attempted to review medical records based upon their availability    All of the patient's questions have been  addressed and answered. Patient's is agreeable to the above stated plan. I will continue to monitor closely and make adjustments to medical management as needed.    Portions of this note dictated using EMR integrated voice recognition software, and may be subject to voice recognition errors not  corrected at proofreading. Please contact writer for clarification if needed.   _____________________________________________________________________    Malnutrition Status:    Scheduled Med:   aspirin  81 mg Oral Daily    atorvastatin  40 mg Oral QHS    clopidogreL  75 mg Oral Daily    donepeziL  5 mg Oral QHS    DULoxetine  60 mg Oral QAM    enoxparin  40 mg Subcutaneous Daily    gabapentin  300 mg Oral BID    insulin glargine U-100  10 Units Subcutaneous QHS    magnesium oxide  400 mg Oral BID    pantoprazole  40 mg Oral Daily    zinc oxide-cod liver oil   Topical (Top) BID      Continuous Infusions:     PRN Meds:    Current Facility-Administered Medications:     acetaminophen, 650 mg, Oral, Q6H PRN    aluminum-magnesium hydroxide-simethicone, 30 mL, Oral, QID PRN    dextrose 10%, 12.5 g, Intravenous, PRN    dextrose 10%, 25 g, Intravenous, PRN    glucagon (human recombinant), 1 mg, Intramuscular, PRN    glucose, 16 g, Oral, PRN    glucose, 24 g, Oral, PRN    hydrALAZINE, 10 mg, Intravenous, Q4H PRN    insulin aspart U-100, 0-10 Units, Subcutaneous, QID (AC + HS) PRN    meclizine, 25 mg, Oral, TID PRN    melatonin, 6 mg, Oral, Nightly PRN    naloxone, 0.02 mg, Intravenous, PRN    nitroGLYCERIN, 0.4 mg, Sublingual, Q5 Min PRN    ondansetron, 4 mg, Intravenous, Q4H PRN    polyethylene glycol, 17 g, Oral, BID PRN    prochlorperazine, 5 mg, Intravenous, Q6H PRN    simethicone, 1 tablet, Oral, QID PRN     Radiology:  I have personally reviewed the following imaging and agree with the radiologist.     Echo    Left Ventricle: The left ventricle is normal in size. Normal wall   thickness. There is normal systolic function with a visually estimated   ejection fraction of 55 - 60%.    Right Ventricle: Normal right ventricular cavity size. Systolic   function is normal.    IVC/SVC: Normal venous pressure at 3 mmHg.  CT Head Without Contrast  Narrative: EXAMINATION:  CT HEAD WITHOUT CONTRAST    CLINICAL HISTORY:  Dizziness,  persistent/recurrent, cardiac or vascular cause suspected;    TECHNIQUE:  CT imaging of the head performed from the skull base to the vertex without intravenous contrast.  DLP 1025 mGycm. Automatic exposure control, adjustment of mA/kV or iterative reconstruction technique was used to reduce radiation.    COMPARISON:  25 June 2022    FINDINGS:  There is no acute cortical infarct, hemorrhage or mass lesion.  There is left cerebellar encephalomalacia.  No new parenchymal attenuation abnormality.  Ventricular size is stable.  There are vascular calcifications.    Visualized paranasal sinuses and mastoid air cells are clear.  Impression: No acute intracranial findings or significant interval change compared to June 2022.    No significant discrepancy with the preliminary report.    Electronically signed by: Akbar Mcneill  Date:    10/25/2024  Time:    08:03      Mayr Foster DO  Department of Hospital Medicine  [facility]  10/27/2024                 [facility]

## 2024-10-27 NOTE — PLAN OF CARE
Problem: Adult Inpatient Plan of Care  Goal: Plan of Care Review  Outcome: Progressing  Goal: Patient-Specific Goal (Individualized)  Outcome: Progressing  Goal: Absence of Hospital-Acquired Illness or Injury  Outcome: Progressing  Goal: Optimal Comfort and Wellbeing  Outcome: Progressing  Goal: Readiness for Transition of Care  Outcome: Progressing     Problem: Fatigue  Goal: Improved Activity Tolerance  Outcome: Progressing     Problem: Fall Injury Risk  Goal: Absence of Fall and Fall-Related Injury  Outcome: Progressing     Problem: Wound  Goal: Optimal Coping  Outcome: Progressing  Goal: Optimal Functional Ability  Outcome: Progressing  Goal: Absence of Infection Signs and Symptoms  Outcome: Progressing  Goal: Improved Oral Intake  Outcome: Progressing  Goal: Optimal Pain Control and Function  Outcome: Progressing  Goal: Skin Health and Integrity  Outcome: Progressing  Goal: Optimal Wound Healing  Outcome: Progressing

## 2024-10-28 LAB
POCT GLUCOSE: 278 MG/DL (ref 70–110)
POCT GLUCOSE: 285 MG/DL (ref 70–110)
POCT GLUCOSE: 335 MG/DL (ref 70–110)
POCT GLUCOSE: 337 MG/DL (ref 70–110)
POCT GLUCOSE: 454 MG/DL (ref 70–110)

## 2024-10-28 PROCEDURE — 97530 THERAPEUTIC ACTIVITIES: CPT | Mod: CQ

## 2024-10-28 PROCEDURE — 63600175 PHARM REV CODE 636 W HCPCS: Performed by: NURSE PRACTITIONER

## 2024-10-28 PROCEDURE — 63600175 PHARM REV CODE 636 W HCPCS: Performed by: STUDENT IN AN ORGANIZED HEALTH CARE EDUCATION/TRAINING PROGRAM

## 2024-10-28 PROCEDURE — 25000003 PHARM REV CODE 250: Performed by: STUDENT IN AN ORGANIZED HEALTH CARE EDUCATION/TRAINING PROGRAM

## 2024-10-28 PROCEDURE — 21400001 HC TELEMETRY ROOM

## 2024-10-28 PROCEDURE — 63600175 PHARM REV CODE 636 W HCPCS

## 2024-10-28 PROCEDURE — 97116 GAIT TRAINING THERAPY: CPT | Mod: CQ

## 2024-10-28 RX ORDER — AMLODIPINE BESYLATE 5 MG/1
5 TABLET ORAL DAILY
Status: DISCONTINUED | OUTPATIENT
Start: 2024-10-28 | End: 2024-10-30

## 2024-10-28 RX ORDER — INSULIN ASPART 100 [IU]/ML
5 INJECTION, SOLUTION INTRAVENOUS; SUBCUTANEOUS
Status: DISCONTINUED | OUTPATIENT
Start: 2024-10-28 | End: 2024-10-29

## 2024-10-28 RX ORDER — INSULIN GLARGINE 100 [IU]/ML
20 INJECTION, SOLUTION SUBCUTANEOUS NIGHTLY
Status: DISCONTINUED | OUTPATIENT
Start: 2024-10-28 | End: 2024-10-29

## 2024-10-28 RX ADMIN — GABAPENTIN 300 MG: 300 CAPSULE ORAL at 10:10

## 2024-10-28 RX ADMIN — Medication: at 10:10

## 2024-10-28 RX ADMIN — PANTOPRAZOLE SODIUM 40 MG: 40 TABLET, DELAYED RELEASE ORAL at 10:10

## 2024-10-28 RX ADMIN — ASPIRIN 81 MG: 81 TABLET, COATED ORAL at 10:10

## 2024-10-28 RX ADMIN — AMLODIPINE BESYLATE 5 MG: 5 TABLET ORAL at 10:10

## 2024-10-28 RX ADMIN — DULOXETINE HYDROCHLORIDE 60 MG: 30 CAPSULE, DELAYED RELEASE ORAL at 10:10

## 2024-10-28 RX ADMIN — INSULIN ASPART 5 UNITS: 100 INJECTION, SOLUTION INTRAVENOUS; SUBCUTANEOUS at 04:10

## 2024-10-28 RX ADMIN — Medication 400 MG: at 10:10

## 2024-10-28 RX ADMIN — HYDRALAZINE HYDROCHLORIDE 10 MG: 20 INJECTION INTRAMUSCULAR; INTRAVENOUS at 01:10

## 2024-10-28 RX ADMIN — CLOPIDOGREL BISULFATE 75 MG: 75 TABLET ORAL at 10:10

## 2024-10-28 RX ADMIN — INSULIN ASPART 8 UNITS: 100 INJECTION, SOLUTION INTRAVENOUS; SUBCUTANEOUS at 01:10

## 2024-10-28 RX ADMIN — Medication 400 MG: at 08:10

## 2024-10-28 RX ADMIN — ENOXAPARIN SODIUM 40 MG: 40 INJECTION SUBCUTANEOUS at 07:10

## 2024-10-28 RX ADMIN — DONEPEZIL HYDROCHLORIDE 5 MG: 5 TABLET, FILM COATED ORAL at 08:10

## 2024-10-28 RX ADMIN — INSULIN GLARGINE 20 UNITS: 100 INJECTION, SOLUTION SUBCUTANEOUS at 08:10

## 2024-10-28 RX ADMIN — INSULIN ASPART 4 UNITS: 100 INJECTION, SOLUTION INTRAVENOUS; SUBCUTANEOUS at 11:10

## 2024-10-28 RX ADMIN — INSULIN ASPART 6 UNITS: 100 INJECTION, SOLUTION INTRAVENOUS; SUBCUTANEOUS at 07:10

## 2024-10-28 RX ADMIN — ATORVASTATIN CALCIUM 40 MG: 40 TABLET, FILM COATED ORAL at 08:10

## 2024-10-28 RX ADMIN — Medication: at 08:10

## 2024-10-28 RX ADMIN — GABAPENTIN 300 MG: 300 CAPSULE ORAL at 08:10

## 2024-10-28 NOTE — PT/OT/SLP PROGRESS
Physical Therapy Treatment    Patient Name:  Mayra Parker   MRN:  [id number]    Recommendations:     Discharge therapy intensity: Moderate Intensity Therapy   Discharge Equipment Recommendations: none  Barriers to discharge: Impaired mobility and Ongoing medical needs    Assessment:     Mayra Parker is a 74 y.o. female admitted with a medical diagnosis of dizziness, weakness, chest pain.  She presents with the following impairments/functional limitations: weakness, impaired endurance, impaired balance, impaired functional mobility, decreased lower extremity function, impaired coordination .    Rehab Prognosis: Good; patient would benefit from acute skilled PT services to address these deficits and reach maximum level of function.    Recent Surgery: * No surgery found *      Plan:     During this hospitalization, patient would benefit from acute PT services 5 x/week to address the identified rehab impairments via gait training, therapeutic activities, therapeutic exercises and progress toward the following goals:    Plan of Care Expires:  11/26/24    Subjective     Chief Complaint: fatigue  Patient/Family Comments/goals: to go home  Pain/Comfort:  Pain Rating 1: 0/10      Objective:     Communicated with pts nurse prior to session.  Patient found HOB elevated with pulse ox (continuous), telemetry, peripheral IV upon PT entry to room.     General Precautions: Standard, fall  Orthopedic Precautions: N/A  Braces: N/A  Respiratory Status: Room air  Blood Pressure: 158/73  Skin Integrity: Visible skin intact      Functional Mobility:  Bed Mobility:     Rolling Left:  contact guard assistance  Rolling Right: contact guard assistance  Scooting: contact guard assistance and minimum assistance  Supine to Sit: minimum assistance  Transfers:     Sit to Stand:  minimum assistance with rolling walker  Bed to Chair: contact guard assistance and minimum assistance with  rolling walker  using  Step Transfer  Toilet  Transfer: contact guard assistance and minimum assistance with  rolling walker  using  Step Transfer  Gait: The pt ambulated 70 ft x 2 trials w/ RW CGA and cues for posture, decrease forward lean and to improve obstacle negotiation.   Balance: No LOB during t/f's and gait.     Therapeutic Activities/Exercises:  Sitting EOB  pt assisted / [facility] gown to cover back, demonstrating good sit balance.  Pt required encouragement for participation, tolerating all tasks well.     Education:  Patient and family were provided with verbal education and demonstrations education regarding PT role/goals/POC, fall prevention, and safety awareness.  Understanding was verbalized, however additional teaching warranted.     Patient left sitting on toilet with call button in reach, nurse notified, and CNA present    GOALS:   Multidisciplinary Problems       Physical Therapy Goals          Problem: Physical Therapy    Goal Priority Disciplines Outcome Interventions   Physical Therapy Goal     PT, PT/OT Progressing    Description: Goals to be met by: 2024     Patient will increase functional independence with mobility by performin. Supine to sit with Stand-by Assistance  2. Sit to supine with Stand-by Assistance  3. Sit to stand transfer with Stand-by Assistance  4. Gait  TBD                         Time Tracking:     PT Received On: 10/28/24  PT Start Time: 1232     PT Stop Time: 1255  PT Total Time (min): 23 min     Billable Minutes: Gait Training 10 and Therapeutic Activity 13    Treatment Type: Treatment  PT/PTA: PTA     Number of PTA visits since last PT visit: 1     10/28/2024

## 2024-10-28 NOTE — PROGRESS NOTES
[facility] Hospital Medicine Progress Note        Chief Complaint: Inpatient Follow-up for     HPI:   Mayra Parker is a 74 y.o. female who PMH includes CVA no deficits, CAD, chronic arthritis, HTN, HLD, DM type II, dementia; presents to the ED at [facility] on 10/24/2024 with a primary complaint of dizziness with associated blurred vision. Pt reports she had visit from her home health nurse  which reported her blood glucose was in the 400's, it was reported that the patient had a low blood pressure as well and had a witnessed fall where she landed on her buttocks while ambulating with a walker.  No reports of head injury.  No reports of loss of consciousness or seizures.  Patient denies any alcohol, tobacco, or illicit drug use.  She reports she quit smoking 2 years ago.  The daughter  daughter called EMS for ED transport.  Patient does report feeling weak and not herself over the past few days.  No reports of shortness a breath, fever, chills or any sick contacts.  She denies any new foods or medications.  Lab work reviewed demonstrated sodium 135, creatinine 1.22, glucose 254, other indices unremarkable.  Chest x-ray impression reviewed demonstrated no abnormality seen.  CT head without contrast impression reviewed demonstrated no acute intracranial findings or significant interval change compared to June 20, 2022.  In reviewing patient's lab trends her creatinine in July 20, 2023 was 1.17.  Her last A1c was 9.1 on 10/07/2024.  Patient does admit to not following her diet as prescribed.  She does reports she no longer smokes cigarettes she quit 2 years ago around the time that she had her stroke.  Initial vital signs /71 pulse 87 respirations 18 temperature 98° F O2 saturation 92% on room air.  Patient did receive some IV hydration which her blood pressure did trend up and was in the hypertensive side at the time of rounds.  She reports feeling much better.  Her O2  saturations improved without the requirement of supplemental oxygen therapy.  No family was present at the bedside at the time of rounds however patient is able to provide the historical information to the questions asked.  She did still endorse chest pain at the time of examination which was reproducible. Pt is ask for her cardiologist Dr. Estrella.  Patient is admitted to hospital medicine services for further management.    Interval Hx:   Patient seen and examined by bedside.  Continues to complain of blurry vision dizziness.  No acute overnight events.  No other focal deficits noted.  If she is eating and drinking well.  Blood pressure slightly elevated this morning      Case was discussed with patient's nurse and  on the floor.    Objective/physical exam:  General: In no acute distress, afebrile  Chest: Clear to auscultation bilaterally  Heart: RRR, +S1, S2, no appreciable murmur  Abdomen: Soft, nontender, BS +  MSK: Warm, no lower extremity edema, no clubbing or cyanosis  Neurologic: Alert and oriented x4, Cranial nerve II-XII intact, Strength 5/5 in all 4 extremities    VITAL SIGNS: 24 HRS MIN & MAX LAST   Temp  Min: 96.6 °F (35.9 °C)  Max: 98.2 °F (36.8 °C) 97.6 °F (36.4 °C)   BP  Min: 114/66  Max: 213/101 (!) 165/88   Pulse  Min: 76  Max: 96  96   Resp  Min: 16  Max: 16 16   SpO2  Min: 96 %  Max: 100 % 100 %     I have reviewed the following labs:  Recent Labs   Lab 10/24/24  1754 10/25/24  0427 10/27/24  0350   WBC 5.27 5.31 4.10*   RBC 4.31 4.03* 3.80*   HGB 12.3 11.5* 10.9*   HCT 38.5 35.4* 33.9*   MCV 89.3 87.8 89.2   MCH 28.5 28.5 28.7   MCHC 31.9* 32.5* 32.2*   RDW 13.4 13.6 13.8    233 253   MPV 9.3 9.0 9.2     Recent Labs   Lab 10/24/24  1754 10/25/24  0427 10/27/24  0350   * 139 141   K 4.0 4.3 4.6    104 109*   CO2 23 25 25   BUN 16.4 16.2 17.3   CREATININE 1.22* 1.05* 1.26*   CALCIUM 9.0 8.9 8.7   MG  --  1.60  --    ALBUMIN 3.3* 3.2*  --    ALKPHOS 58 55  --    ALT 17  14  --    AST 16 16  --    BILITOT 0.9 0.9  --      Microbiology Results (last 7 days)       ** No results found for the last 168 hours. **             See below for Radiology    Assessment/Plan:  Chest pain, ACS ruled out, likely MSK related, resolved  Hypertensive urgency , resolved  Dizziness  Witnessed fall  History of type 2 diabetes, CKD, hypertension, CVA     Patient notes compliance with all her medications including DA PT   Cardiology was consulted, ACS ruled out  Troponin x2 unremarkable   Hyperglycemia improved   Started 5 units of Lantus however continues to have some hyperglycemia, increased to 20 units   Add 5 units with meals along with sliding scale  Accu-Cheks, diabetic diet   Resume appropriate home meds   Echo done showed EF of 55-60% with normal systolic function   PT and OT following  Orthostatics obtained, negative for hypotension  Add meclizine prn  CT head on arrival negative; no other focal deficits  DC beta blocker  Patient continues complaint of dizziness and blurry vision along with weakness, we will obtain MRI of the brain to rule out any focal strokes or abnormality  Add amlodipine 5 mg for hypertension  Fall precautions  Labs in a.m.    VTE prophylaxis: lovenox    Patient condition:  stable    Anticipated discharge and Disposition:   Pending per MRI results      All diagnosis and differential diagnosis have been reviewed; assessment and plan has been documented; I have personally reviewed the labs and test results that are presently available; I have reviewed the patients medication list; I have reviewed the consulting providers response and recommendations. I have reviewed or attempted to review medical records based upon their availability    All of the patient's questions have been  addressed and answered. Patient's is agreeable to the above stated plan. I will continue to monitor closely and make adjustments to medical management as needed.    Portions of this note dictated using  EMR integrated voice recognition software, and may be subject to voice recognition errors not corrected at proofreading. Please contact writer for clarification if needed.   _____________________________________________________________________    Malnutrition Status:    Scheduled Med:   amLODIPine  5 mg Oral Daily    aspirin  81 mg Oral Daily    atorvastatin  40 mg Oral QHS    clopidogreL  75 mg Oral Daily    donepeziL  5 mg Oral QHS    DULoxetine  60 mg Oral QAM    enoxparin  40 mg Subcutaneous Daily    gabapentin  300 mg Oral BID    insulin glargine U-100  15 Units Subcutaneous QHS    magnesium oxide  400 mg Oral BID    pantoprazole  40 mg Oral Daily    zinc oxide-cod liver oil   Topical (Top) BID      Continuous Infusions:     PRN Meds:    Current Facility-Administered Medications:     acetaminophen, 650 mg, Oral, Q6H PRN    aluminum-magnesium hydroxide-simethicone, 30 mL, Oral, QID PRN    dextrose 10%, 12.5 g, Intravenous, PRN    dextrose 10%, 25 g, Intravenous, PRN    glucagon (human recombinant), 1 mg, Intramuscular, PRN    glucose, 16 g, Oral, PRN    glucose, 24 g, Oral, PRN    hydrALAZINE, 10 mg, Intravenous, Q4H PRN    insulin aspart U-100, 0-10 Units, Subcutaneous, QID (AC + HS) PRN    meclizine, 25 mg, Oral, TID PRN    melatonin, 6 mg, Oral, Nightly PRN    naloxone, 0.02 mg, Intravenous, PRN    nitroGLYCERIN, 0.4 mg, Sublingual, Q5 Min PRN    ondansetron, 4 mg, Intravenous, Q4H PRN    polyethylene glycol, 17 g, Oral, BID PRN    prochlorperazine, 5 mg, Intravenous, Q6H PRN    simethicone, 1 tablet, Oral, QID PRN     Radiology:  I have personally reviewed the following imaging and agree with the radiologist.     Echo    Left Ventricle: The left ventricle is normal in size. Normal wall   thickness. There is normal systolic function with a visually estimated   ejection fraction of 55 - 60%.    Right Ventricle: Normal right ventricular cavity size. Systolic   function is normal.    IVC/SVC: Normal venous  pressure at 3 mmHg.  CT Head Without Contrast  Narrative: EXAMINATION:  CT HEAD WITHOUT CONTRAST    CLINICAL HISTORY:  Dizziness, persistent/recurrent, cardiac or vascular cause suspected;    TECHNIQUE:  CT imaging of the head performed from the skull base to the vertex without intravenous contrast.  DLP 1025 mGycm. Automatic exposure control, adjustment of mA/kV or iterative reconstruction technique was used to reduce radiation.    COMPARISON:  25 June 2022    FINDINGS:  There is no acute cortical infarct, hemorrhage or mass lesion.  There is left cerebellar encephalomalacia.  No new parenchymal attenuation abnormality.  Ventricular size is stable.  There are vascular calcifications.    Visualized paranasal sinuses and mastoid air cells are clear.  Impression: No acute intracranial findings or significant interval change compared to June 2022.    No significant discrepancy with the preliminary report.    Electronically signed by: Akbar Mcneill  Date:    10/25/2024  Time:    08:03      Mary Foster DO  Department of Hospital Medicine  [facility]  10/28/2024

## 2024-10-29 LAB
25(OH)D3+25(OH)D2 SERPL-MCNC: 50 NG/ML (ref 30–80)
IRON SATN MFR SERPL: 28 % (ref 20–50)
IRON SERPL-MCNC: 79 UG/DL (ref 50–170)
POCT GLUCOSE: 233 MG/DL (ref 70–110)
POCT GLUCOSE: 284 MG/DL (ref 70–110)
POCT GLUCOSE: 315 MG/DL (ref 70–110)
POCT GLUCOSE: 398 MG/DL (ref 70–110)
POCT GLUCOSE: 471 MG/DL (ref 70–110)
TIBC SERPL-MCNC: 207 UG/DL (ref 70–310)
TIBC SERPL-MCNC: 286 UG/DL (ref 250–450)
TRANSFERRIN SERPL-MCNC: 269 MG/DL (ref 173–360)

## 2024-10-29 PROCEDURE — 63600175 PHARM REV CODE 636 W HCPCS: Performed by: STUDENT IN AN ORGANIZED HEALTH CARE EDUCATION/TRAINING PROGRAM

## 2024-10-29 PROCEDURE — 97530 THERAPEUTIC ACTIVITIES: CPT

## 2024-10-29 PROCEDURE — 25000003 PHARM REV CODE 250: Performed by: STUDENT IN AN ORGANIZED HEALTH CARE EDUCATION/TRAINING PROGRAM

## 2024-10-29 PROCEDURE — 21400001 HC TELEMETRY ROOM

## 2024-10-29 PROCEDURE — 63600175 PHARM REV CODE 636 W HCPCS: Performed by: NURSE PRACTITIONER

## 2024-10-29 RX ORDER — INSULIN GLARGINE 100 [IU]/ML
30 INJECTION, SOLUTION SUBCUTANEOUS NIGHTLY
Status: DISCONTINUED | OUTPATIENT
Start: 2024-10-29 | End: 2024-11-05 | Stop reason: HOSPADM

## 2024-10-29 RX ORDER — INSULIN ASPART 100 [IU]/ML
10 INJECTION, SOLUTION INTRAVENOUS; SUBCUTANEOUS
Status: DISCONTINUED | OUTPATIENT
Start: 2024-10-29 | End: 2024-10-30

## 2024-10-29 RX ADMIN — INSULIN ASPART 5 UNITS: 100 INJECTION, SOLUTION INTRAVENOUS; SUBCUTANEOUS at 10:10

## 2024-10-29 RX ADMIN — Medication: at 10:10

## 2024-10-29 RX ADMIN — INSULIN GLARGINE 30 UNITS: 100 INJECTION, SOLUTION SUBCUTANEOUS at 08:10

## 2024-10-29 RX ADMIN — DULOXETINE HYDROCHLORIDE 60 MG: 30 CAPSULE, DELAYED RELEASE ORAL at 11:10

## 2024-10-29 RX ADMIN — GABAPENTIN 300 MG: 300 CAPSULE ORAL at 10:10

## 2024-10-29 RX ADMIN — ATORVASTATIN CALCIUM 40 MG: 40 TABLET, FILM COATED ORAL at 08:10

## 2024-10-29 RX ADMIN — INSULIN ASPART 10 UNITS: 100 INJECTION, SOLUTION INTRAVENOUS; SUBCUTANEOUS at 11:10

## 2024-10-29 RX ADMIN — CLOPIDOGREL BISULFATE 75 MG: 75 TABLET ORAL at 10:10

## 2024-10-29 RX ADMIN — ASPIRIN 81 MG: 81 TABLET, COATED ORAL at 10:10

## 2024-10-29 RX ADMIN — Medication 400 MG: at 08:10

## 2024-10-29 RX ADMIN — Medication 400 MG: at 10:10

## 2024-10-29 RX ADMIN — INSULIN ASPART 10 UNITS: 100 INJECTION, SOLUTION INTRAVENOUS; SUBCUTANEOUS at 05:10

## 2024-10-29 RX ADMIN — SODIUM CHLORIDE 1000 ML: 9 INJECTION, SOLUTION INTRAVENOUS at 02:10

## 2024-10-29 RX ADMIN — ENOXAPARIN SODIUM 40 MG: 40 INJECTION SUBCUTANEOUS at 05:10

## 2024-10-29 RX ADMIN — PANTOPRAZOLE SODIUM 40 MG: 40 TABLET, DELAYED RELEASE ORAL at 10:10

## 2024-10-29 RX ADMIN — Medication: at 08:10

## 2024-10-29 RX ADMIN — AMLODIPINE BESYLATE 5 MG: 5 TABLET ORAL at 10:10

## 2024-10-29 RX ADMIN — DONEPEZIL HYDROCHLORIDE 5 MG: 5 TABLET, FILM COATED ORAL at 08:10

## 2024-10-29 NOTE — PT/OT/SLP PROGRESS
Attempt to see for therapy however family and student nrsg report blood glucose to be 471. OT holding off until appropriate.

## 2024-10-29 NOTE — PLAN OF CARE
10/29/24 1535   Discharge Reassessment   Assessment Type Discharge Planning Reassessment   Did the patient's condition or plan change since previous assessment? No   Discharge Plan discussed with: Patient;Adult children  (Cynthia Pratt (Daughter)  [phone] ([place]))   Communicated GIAN with patient/caregiver Date not available/Unable to determine   Discharge Plan A Skilled Nursing Facility  (THe patient and her daughter (Cynthia) were given a choice list through [employer] for review re: SNF facilities.)   Discharge Plan B Skilled Nursing Facility   DME Needed Upon Discharge  none   Transition of Care Barriers None   Post-Acute Status   Post-Acute Authorization Placement   Post-Acute Placement Status Patient List Provided   Coverage Payor: MEDICARE - MEDICARE PART A & B -   Hospital Resources/Appts/Education Provided Appointments scheduled and added to AVS   Patient choice form signed by patient/caregiver List with quality metrics by geographic area provided   Discharge Delays None known at this time     The patient and her daughter (Cynthia Pratt ([phone]) were given a choice list through [employer] for review. Therapy is recommending (SNF) services upon discharge. Will send referrals for (SNF) services upon preference determined by the patient & family. Cynthia states that she will have the (3) choices selected by morning.

## 2024-10-29 NOTE — PROGRESS NOTES
[facility] Hospital Medicine Progress Note        Chief Complaint: Inpatient Follow-up for     HPI:   Mayra Parker is a 74 y.o. female who PMH includes CVA no deficits, CAD, chronic arthritis, HTN, HLD, DM type II, dementia; presents to the ED at [facility] on 10/24/2024 with a primary complaint of dizziness with associated blurred vision. Pt reports she had visit from her home health nurse  which reported her blood glucose was in the 400's, it was reported that the patient had a low blood pressure as well and had a witnessed fall where she landed on her buttocks while ambulating with a walker.  No reports of head injury.  No reports of loss of consciousness or seizures.  Patient denies any alcohol, tobacco, or illicit drug use.  She reports she quit smoking 2 years ago.  The daughter  daughter called EMS for ED transport.  Patient does report feeling weak and not herself over the past few days.  No reports of shortness a breath, fever, chills or any sick contacts.  She denies any new foods or medications.  Lab work reviewed demonstrated sodium 135, creatinine 1.22, glucose 254, other indices unremarkable.  Chest x-ray impression reviewed demonstrated no abnormality seen.  CT head without contrast impression reviewed demonstrated no acute intracranial findings or significant interval change compared to June 20, 2022.  In reviewing patient's lab trends her creatinine in July 20, 2023 was 1.17.  Her last A1c was 9.1 on 10/07/2024.  Patient does admit to not following her diet as prescribed.  She does reports she no longer smokes cigarettes she quit 2 years ago around the time that she had her stroke.  Initial vital signs /71 pulse 87 respirations 18 temperature 98° F O2 saturation 92% on room air.  Patient did receive some IV hydration which her blood pressure did trend up and was in the hypertensive side at the time of rounds.  She reports feeling much better.  Her O2  saturations improved without the requirement of supplemental oxygen therapy.  No family was present at the bedside at the time of rounds however patient is able to provide the historical information to the questions asked.  She did still endorse chest pain at the time of examination which was reproducible. Pt is ask for her cardiologist Dr. Estrella.  Patient is admitted to hospital medicine services for further management.    Interval Hx:   Patient seen and examined by bedside.  No acute overnight events.  Continues to complain dizziness and blurry vision.  Hemodynamically stable.  Persistent hyperglycemia despite adjusting insulin several times; no snacks seen by bedside    Case was discussed with patient's nurse and  on the floor.    Objective/physical exam:  General: In no acute distress, afebrile  Chest: Clear to auscultation bilaterally  Heart: RRR, +S1, S2, no appreciable murmur  Abdomen: Soft, nontender, BS +  MSK: Warm, no lower extremity edema, no clubbing or cyanosis  Neurologic: Alert and oriented x4, Cranial nerve II-XII intact, Strength 5/5 in all 4 extremities    VITAL SIGNS: 24 HRS MIN & MAX LAST   Temp  Min: 96.6 °F (35.9 °C)  Max: 98.7 °F (37.1 °C) 97.6 °F (36.4 °C)   BP  Min: 96/61  Max: 213/101 (!) 148/65   Pulse  Min: 76  Max: 97  92   No data recorded 16   SpO2  Min: 95 %  Max: 100 % 99 %     I have reviewed the following labs:  Recent Labs   Lab 10/24/24  1754 10/25/24  0427 10/27/24  0350   WBC 5.27 5.31 4.10*   RBC 4.31 4.03* 3.80*   HGB 12.3 11.5* 10.9*   HCT 38.5 35.4* 33.9*   MCV 89.3 87.8 89.2   MCH 28.5 28.5 28.7   MCHC 31.9* 32.5* 32.2*   RDW 13.4 13.6 13.8    233 253   MPV 9.3 9.0 9.2     Recent Labs   Lab 10/24/24  1754 10/25/24  0427 10/27/24  0350   * 139 141   K 4.0 4.3 4.6    104 109*   CO2 23 25 25   BUN 16.4 16.2 17.3   CREATININE 1.22* 1.05* 1.26*   CALCIUM 9.0 8.9 8.7   MG  --  1.60  --    ALBUMIN 3.3* 3.2*  --    ALKPHOS 58 55  --    ALT 17 14  --     AST 16 16  --    BILITOT 0.9 0.9  --      Microbiology Results (last 7 days)       ** No results found for the last 168 hours. **             See below for Radiology    Assessment/Plan:  Chest pain, ACS ruled out, likely MSK related, resolved  Hypertensive urgency , resolved  Dizziness  Witnessed fall  History of type 2 diabetes, CKD, hypertension, CVA     Patient notes compliance with all her medications including DA PT   Cardiology was consulted, ACS ruled out  Troponin x2 unremarkable   Hyperglycemia improved   Increase Lantus to 30 units  Increase short-acting 10 units with meals along with sliding scale  Accu-Cheks, diabetic diet   Resume appropriate home meds   Echo done showed EF of 55-60% with normal systolic function   PT and OT following, recommend SNF   Patient interested in skilled nursing facility   Case management consulted  Orthostatics obtained, negative for hypotension  Add meclizine prn  CT head on arrival negative; no other focal deficits  DC beta blocker  Patient continues complaint of dizziness and blurry vision along with weakness, we will obtain MRI of the brain to rule out any focal strokes or abnormality, plan for MRI to be hopefully done later today   If MRI does not get done today we will just repeat CT head tomorrow  Add amlodipine 5 mg for hypertension  Fall precautions  Labs in a.m.    VTE prophylaxis: lovenox    Patient condition:  stable    Anticipated discharge and Disposition:   Pending per MRI results      All diagnosis and differential diagnosis have been reviewed; assessment and plan has been documented; I have personally reviewed the labs and test results that are presently available; I have reviewed the patients medication list; I have reviewed the consulting providers response and recommendations. I have reviewed or attempted to review medical records based upon their availability    All of the patient's questions have been  addressed and answered. Patient's is agreeable to  the above stated plan. I will continue to monitor closely and make adjustments to medical management as needed.    Portions of this note dictated using EMR integrated voice recognition software, and may be subject to voice recognition errors not corrected at proofreading. Please contact writer for clarification if needed.   _____________________________________________________________________    Malnutrition Status:    Scheduled Med:   amLODIPine  5 mg Oral Daily    aspirin  81 mg Oral Daily    atorvastatin  40 mg Oral QHS    clopidogreL  75 mg Oral Daily    donepeziL  5 mg Oral QHS    DULoxetine  60 mg Oral QAM    enoxparin  40 mg Subcutaneous Daily    gabapentin  300 mg Oral BID    insulin aspart U-100  10 Units Subcutaneous TIDWM    insulin glargine U-100  30 Units Subcutaneous QHS    magnesium oxide  400 mg Oral BID    pantoprazole  40 mg Oral Daily    zinc oxide-cod liver oil   Topical (Top) BID      Continuous Infusions:     PRN Meds:    Current Facility-Administered Medications:     acetaminophen, 650 mg, Oral, Q6H PRN    aluminum-magnesium hydroxide-simethicone, 30 mL, Oral, QID PRN    dextrose 10%, 12.5 g, Intravenous, PRN    dextrose 10%, 25 g, Intravenous, PRN    glucagon (human recombinant), 1 mg, Intramuscular, PRN    glucose, 16 g, Oral, PRN    glucose, 24 g, Oral, PRN    hydrALAZINE, 10 mg, Intravenous, Q4H PRN    insulin aspart U-100, 0-10 Units, Subcutaneous, QID (AC + HS) PRN    meclizine, 25 mg, Oral, TID PRN    melatonin, 6 mg, Oral, Nightly PRN    naloxone, 0.02 mg, Intravenous, PRN    nitroGLYCERIN, 0.4 mg, Sublingual, Q5 Min PRN    ondansetron, 4 mg, Intravenous, Q4H PRN    polyethylene glycol, 17 g, Oral, BID PRN    prochlorperazine, 5 mg, Intravenous, Q6H PRN    simethicone, 1 tablet, Oral, QID PRN     Radiology:  I have personally reviewed the following imaging and agree with the radiologist.     Echo    Left Ventricle: The left ventricle is normal in size. Normal wall   thickness. There is  normal systolic function with a visually estimated   ejection fraction of 55 - 60%.    Right Ventricle: Normal right ventricular cavity size. Systolic   function is normal.    IVC/SVC: Normal venous pressure at 3 mmHg.  CT Head Without Contrast  Narrative: EXAMINATION:  CT HEAD WITHOUT CONTRAST    CLINICAL HISTORY:  Dizziness, persistent/recurrent, cardiac or vascular cause suspected;    TECHNIQUE:  CT imaging of the head performed from the skull base to the vertex without intravenous contrast.  DLP 1025 mGycm. Automatic exposure control, adjustment of mA/kV or iterative reconstruction technique was used to reduce radiation.    COMPARISON:  25 June 2022    FINDINGS:  There is no acute cortical infarct, hemorrhage or mass lesion.  There is left cerebellar encephalomalacia.  No new parenchymal attenuation abnormality.  Ventricular size is stable.  There are vascular calcifications.    Visualized paranasal sinuses and mastoid air cells are clear.  Impression: No acute intracranial findings or significant interval change compared to June 2022.    No significant discrepancy with the preliminary report.    Electronically signed by: Akbar Mcneill  Date:    10/25/2024  Time:    08:03      Mary Foster DO  Department of Hospital Medicine  [facility]  10/29/2024

## 2024-10-29 NOTE — PT/OT/SLP PROGRESS
Physical Therapy Treatment    Patient Name:  Mayra Parker   MRN:  [id number]    Recommendations:     Discharge therapy intensity: Moderate Intensity Therapy   Discharge Equipment Recommendations: none  Barriers to discharge: Impaired mobility    Assessment:     Mayra Parker is a 74 y.o. female admitted with a medical diagnosis of dizziness, weakness, chest pain. .  She presents with the following impairments/functional limitations: weakness, impaired endurance, gait instability, impaired functional mobility, impaired self care skills, impaired balance  pt had fainting spells on 2 occasions in  sitting and standing but BP remained wnl     Rehab Prognosis: Good; patient would benefit from acute skilled PT services to address these deficits and reach maximum level of function.    Recent Surgery: * No surgery found *      Plan:     During this hospitalization, patient would benefit from acute PT services 5 x/week to address the identified rehab impairments via gait training, therapeutic activities, therapeutic exercises and progress toward the following goals:    Plan of Care Expires:  11/26/24    Subjective     Chief Complaint:   Patient/Family Comments/goals:   Pain/Comfort:         Objective:     Communicated with nurse prior to session.  Patient found supine with telemetry, pulse ox (continuous) upon PT entry to room.     General Precautions: Standard, fall  Orthopedic Precautions: N/A  Braces: N/A  Respiratory Status: Room air  Blood Pressure: pt initial bp was 130/61 in sitting ,bud=ding was 134/64  Skin Integrity: Visible skin intact      Functional Mobility:  Bed Mobility:     Supine to Sit: stand by assistance  Sit to Supine: stand by assistance  Transfers:     Sit to Stand:  contact guard assistance with no AD  Bed to Chair: contact guard assistance with  rolling walker  using  Stand Pivot  Gait: no gait secondary to fainting spells. Pt had 4 fainting spells ,but did not become  unresponsive    Therapeutic Activities/Exercises:      Education:  Patient provided with verbal education education regarding PT role/goals/POC.  Understanding was verbalized.     Patient left supine with all lines intact, call button in reach, bed alarm on, and nurse notified    GOALS:   Multidisciplinary Problems       Physical Therapy Goals          Problem: Physical Therapy    Goal Priority Disciplines Outcome Interventions   Physical Therapy Goal     PT, PT/OT Progressing    Description: Goals to be met by: 2024     Patient will increase functional independence with mobility by performin. Supine to sit with Stand-by Assistance  2. Sit to supine with Stand-by Assistance  3. Sit to stand transfer with Stand-by Assistance  4. Gait  TBD                         Time Tracking:     PT Received On: 10/29/24  PT Start Time: 1515     PT Stop Time: 1530  PT Total Time (min): 15 min     Billable Minutes: Therapeutic Activity 15    Treatment Type: Treatment  PT/PTA: PT     Number of PTA visits since last PT visit: 2     10/29/2024

## 2024-10-30 LAB
POCT GLUCOSE: 197 MG/DL (ref 70–110)
POCT GLUCOSE: 198 MG/DL (ref 70–110)
POCT GLUCOSE: 298 MG/DL (ref 70–110)
POCT GLUCOSE: 301 MG/DL (ref 70–110)

## 2024-10-30 PROCEDURE — 63600175 PHARM REV CODE 636 W HCPCS: Performed by: NURSE PRACTITIONER

## 2024-10-30 PROCEDURE — 21400001 HC TELEMETRY ROOM

## 2024-10-30 PROCEDURE — 25000003 PHARM REV CODE 250: Performed by: STUDENT IN AN ORGANIZED HEALTH CARE EDUCATION/TRAINING PROGRAM

## 2024-10-30 PROCEDURE — 63600175 PHARM REV CODE 636 W HCPCS: Performed by: STUDENT IN AN ORGANIZED HEALTH CARE EDUCATION/TRAINING PROGRAM

## 2024-10-30 RX ORDER — INSULIN ASPART 100 [IU]/ML
12 INJECTION, SOLUTION INTRAVENOUS; SUBCUTANEOUS
Status: DISCONTINUED | OUTPATIENT
Start: 2024-10-30 | End: 2024-11-05 | Stop reason: HOSPADM

## 2024-10-30 RX ORDER — AMLODIPINE BESYLATE 5 MG/1
10 TABLET ORAL DAILY
Status: DISCONTINUED | OUTPATIENT
Start: 2024-10-31 | End: 2024-11-05 | Stop reason: HOSPADM

## 2024-10-30 RX ADMIN — DULOXETINE HYDROCHLORIDE 60 MG: 30 CAPSULE, DELAYED RELEASE ORAL at 08:10

## 2024-10-30 RX ADMIN — ENOXAPARIN SODIUM 40 MG: 40 INJECTION SUBCUTANEOUS at 04:10

## 2024-10-30 RX ADMIN — INSULIN ASPART 12 UNITS: 100 INJECTION, SOLUTION INTRAVENOUS; SUBCUTANEOUS at 04:10

## 2024-10-30 RX ADMIN — INSULIN ASPART 2 UNITS: 100 INJECTION, SOLUTION INTRAVENOUS; SUBCUTANEOUS at 12:10

## 2024-10-30 RX ADMIN — Medication 400 MG: at 08:10

## 2024-10-30 RX ADMIN — AMLODIPINE BESYLATE 5 MG: 5 TABLET ORAL at 08:10

## 2024-10-30 RX ADMIN — INSULIN ASPART 10 UNITS: 100 INJECTION, SOLUTION INTRAVENOUS; SUBCUTANEOUS at 08:10

## 2024-10-30 RX ADMIN — CLOPIDOGREL BISULFATE 75 MG: 75 TABLET ORAL at 09:10

## 2024-10-30 RX ADMIN — INSULIN GLARGINE 30 UNITS: 100 INJECTION, SOLUTION SUBCUTANEOUS at 08:10

## 2024-10-30 RX ADMIN — ASPIRIN 81 MG: 81 TABLET, COATED ORAL at 08:10

## 2024-10-30 RX ADMIN — PANTOPRAZOLE SODIUM 40 MG: 40 TABLET, DELAYED RELEASE ORAL at 08:10

## 2024-10-30 RX ADMIN — Medication: at 08:10

## 2024-10-30 RX ADMIN — ATORVASTATIN CALCIUM 40 MG: 40 TABLET, FILM COATED ORAL at 08:10

## 2024-10-30 RX ADMIN — DONEPEZIL HYDROCHLORIDE 5 MG: 5 TABLET, FILM COATED ORAL at 08:10

## 2024-10-30 NOTE — PROGRESS NOTES
[facility] Hospital Medicine Progress Note        Chief Complaint: Inpatient Follow-up     HPI:   74 y.o. female who PMH includes CVA no deficits, CAD, chronic arthritis, HTN, HLD, DM type II, dementia; presents to the ED at [facility] on 10/24/2024 with a primary complaint of dizziness with associated blurred vision. Pt reports she had visit from her home health nurse  which reported her blood glucose was in the 400's, it was reported that the patient had a low blood pressure as well and had a witnessed fall where she landed on her buttocks while ambulating with a walker.  No reports of head injury.  No reports of loss of consciousness or seizures.  Patient denies any alcohol, tobacco, or illicit drug use.  She reports she quit smoking 2 years ago.  The daughter  daughter called EMS for ED transport.  Patient does report feeling weak and not herself over the past few days.  No reports of shortness a breath, fever, chills or any sick contacts.  She denies any new foods or medications.  Lab work reviewed demonstrated sodium 135, creatinine 1.22, glucose 254, other indices unremarkable.  Chest x-ray impression reviewed demonstrated no abnormality seen.  CT head without contrast impression reviewed demonstrated no acute intracranial findings or significant interval change compared to June 20, 2022.  In reviewing patient's lab trends her creatinine in July 20, 2023 was 1.17.  Her last A1c was 9.1 on 10/07/2024.  Patient does admit to not following her diet as prescribed.  She does reports she no longer smokes cigarettes she quit 2 years ago around the time that she had her stroke.  Initial vital signs /71 pulse 87 respirations 18 temperature 98° F O2 saturation 92% on room air.  Patient did receive some IV hydration which her blood pressure did trend up and was in the hypertensive side at the time of rounds.  She reports feeling much better.  Her O2 saturations improved without the  requirement of supplemental oxygen therapy.  No family was present at the bedside at the time of rounds however patient is able to provide the historical information to the questions asked.  She did still endorse chest pain at the time of examination which was reproducible. Pt is ask for her cardiologist Dr. Estrella.  Patient is admitted to hospital medicine services for further management.     Interval Hx:   Vital signs remained stable overnight.  Went for CT of the head yesterday which he was negative for any acute findings.  She was pain has resolved.     Objective/physical exam:  General: In no acute distress, afebrile  Chest: Clear to auscultation bilaterally  Heart: RRR, +S1, S2, no appreciable murmur  Abdomen: Soft, nontender, BS +  MSK: Warm, no lower extremity edema, no clubbing or cyanosis  Neurologic: Alert and oriented x4, Cranial nerve II-XII intact, Strength 5/5 in all 4 extremities    VITAL SIGNS: 24 HRS MIN & MAX LAST   Temp  Min: 97.3 °F (36.3 °C)  Max: 98.4 °F (36.9 °C) 98.4 °F (36.9 °C)   BP  Min: 130/68  Max: 228/101 (!) 142/76   Pulse  Min: 81  Max: 111  90   Resp  Min: 18  Max: 20 20   SpO2  Min: 95 %  Max: 99 % 97 %       Recent Labs   Lab 10/24/24  1754 10/25/24  0427 10/27/24  0350   WBC 5.27 5.31 4.10*   RBC 4.31 4.03* 3.80*   HGB 12.3 11.5* 10.9*   HCT 38.5 35.4* 33.9*   MCV 89.3 87.8 89.2   MCH 28.5 28.5 28.7   MCHC 31.9* 32.5* 32.2*   RDW 13.4 13.6 13.8    233 253   MPV 9.3 9.0 9.2       Recent Labs   Lab 10/24/24  1754 10/25/24  0427 10/27/24  0350   * 139 141   K 4.0 4.3 4.6    104 109*   CO2 23 25 25   BUN 16.4 16.2 17.3   CREATININE 1.22* 1.05* 1.26*   CALCIUM 9.0 8.9 8.7   MG  --  1.60  --    ALBUMIN 3.3* 3.2*  --    ALKPHOS 58 55  --    ALT 17 14  --    AST 16 16  --    BILITOT 0.9 0.9  --           Microbiology Results (last 7 days)       ** No results found for the last 168 hours. **             Radiology:  CT Head Without Contrast  Narrative: EXAMINATION:  CT  HEAD WITHOUT CONTRAST    CLINICAL HISTORY:  Dizziness, persistent/recurrent, cardiac or vascular cause suspected;    TECHNIQUE:  CT imaging of the head performed from the skull base to the vertex without intravenous contrast.   mGycm. Automatic exposure control, adjustment of mA/kV or iterative reconstruction technique was used to reduce radiation.    COMPARISON:  24 October 2024    FINDINGS:  There is no acute cortical infarct, hemorrhage or mass lesion.  No new parenchymal attenuation abnormality.  Ventricular size is stable.  There are vascular calcifications.    Visualized paranasal sinuses and mastoid air cells are clear.  Impression: No acute intracranial findings or significant interval change compared to last week.    Electronically signed by: Akbar Mcneill  Date:    10/30/2024  Time:    09:30        Medications:  Scheduled Meds:   [START ON 10/31/2024] amLODIPine  10 mg Oral Daily    aspirin  81 mg Oral Daily    atorvastatin  40 mg Oral QHS    clopidogreL  75 mg Oral Daily    donepeziL  5 mg Oral QHS    DULoxetine  60 mg Oral QAM    enoxparin  40 mg Subcutaneous Daily    insulin aspart U-100  12 Units Subcutaneous TIDWM    insulin glargine U-100  30 Units Subcutaneous QHS    magnesium oxide  400 mg Oral BID    pantoprazole  40 mg Oral Daily    zinc oxide-cod liver oil   Topical (Top) BID     Continuous Infusions:  PRN Meds:.  Current Facility-Administered Medications:     acetaminophen, 650 mg, Oral, Q6H PRN    aluminum-magnesium hydroxide-simethicone, 30 mL, Oral, QID PRN    dextrose 10%, 12.5 g, Intravenous, PRN    dextrose 10%, 25 g, Intravenous, PRN    glucagon (human recombinant), 1 mg, Intramuscular, PRN    glucose, 16 g, Oral, PRN    glucose, 24 g, Oral, PRN    hydrALAZINE, 10 mg, Intravenous, Q4H PRN    insulin aspart U-100, 0-10 Units, Subcutaneous, QID (AC + HS) PRN    meclizine, 25 mg, Oral, TID PRN    melatonin, 6 mg, Oral, Nightly PRN    naloxone, 0.02 mg, Intravenous, PRN     nitroGLYCERIN, 0.4 mg, Sublingual, Q5 Min PRN    ondansetron, 4 mg, Intravenous, Q4H PRN    polyethylene glycol, 17 g, Oral, BID PRN    prochlorperazine, 5 mg, Intravenous, Q6H PRN    simethicone, 1 tablet, Oral, QID PRN        Assessment/Plan:   Noncardiac chest pain, musculoskeletal Hypertensive urgency , resolved  Dizziness likely orthostatic  Witnessed fall  History of type 2 diabetes, CKD, hypertension, CVA     Cardiology has recommended supportive care.  Continue with her dual antiplatelet therapy.  Acute coronary syndrome has been ruled out.    Blood pressure better controlled.  Continue current oral regimen including amlodipine.    Blood sugars are labile.  Could have tighter control.  Lantus was increased on the 29th to 30 units.  Patient was on metformin, Jardiance, Januvia as an outpatient.  Will restart her Jardiance and metformin. Monitor surgars  PT and OT recommending moderate intensity therapy   Case management working on SNF.    Medically stable once accepted    oJrdy Young MD   10/31/2024    All diagnosis and differential diagnosis have been reviewed; assessment and plan has been documented; I have personally reviewed the labs and test results that are presently available; I have reviewed the patients medication list; I have reviewed the consulting providers response and recommendations. I have reviewed or attempted to review medical records based upon their availability    All of the patient's questions have been  addressed and answered. Patient's is agreeable to the above stated plan. I will continue to monitor closely and make adjustments to medical management as needed.  _____________________________________________________________________

## 2024-10-30 NOTE — PLAN OF CARE
10/30/24 1025   Discharge Reassessment   Assessment Type Discharge Planning Reassessment   Did the patient's condition or plan change since previous assessment? No   Discharge Plan discussed with: Patient;Adult children  (Cynthia Pratt (Daughter)  [phone] (Mobile))   Communicated GIAN with patient/caregiver Date not available/Unable to determine   Discharge Plan A Skilled Nursing Facility  (FOC: The patient and her daughter (Cytnhia Pratt) are requesting that (SNF) referrals be sent to: 1. [facility]. [facility] 3. [facility].)   Discharge Plan B Skilled Nursing Facility   DME Needed Upon Discharge  none   Transition of Care Barriers None   Why the patient remains in the hospital Requires continued medical care   Post-Acute Status   Post-Acute Authorization Placement   Post-Acute Placement Status Patient List Provided   Coverage Payor: MEDICARE - MEDICARE PART A & B -   Hospital Resources/Appts/Education Provided Appointments scheduled and added to AVS   Patient choice form signed by patient/caregiver List with quality metrics by geographic area provided   Discharge Delays None known at this time     The patient and her daughter (Cynthia Pratt: [phone]) were given a choice list through [facility] for review re: (SNF) facilities. They are requesting referrals be sent to: 1. [facility] 2. [facility] 3. [facility]. Will keep the patient and family informed as updates are received.

## 2024-10-30 NOTE — PROGRESS NOTES
[facility] Hospital Medicine Progress Note        Chief Complaint: Inpatient Follow-up for     HPI:   Mayra Parker is a 74 y.o. female who PMH includes CVA no deficits, CAD, chronic arthritis, HTN, HLD, DM type II, dementia; presents to the ED at [facility] on 10/24/2024 with a primary complaint of dizziness with associated blurred vision. Pt reports she had visit from her home health nurse  which reported her blood glucose was in the 400's, it was reported that the patient had a low blood pressure as well and had a witnessed fall where she landed on her buttocks while ambulating with a walker.  No reports of head injury.  No reports of loss of consciousness or seizures.  Patient denies any alcohol, tobacco, or illicit drug use.  She reports she quit smoking 2 years ago.  The daughter  daughter called EMS for ED transport.  Patient does report feeling weak and not herself over the past few days.  No reports of shortness a breath, fever, chills or any sick contacts.  She denies any new foods or medications.  Lab work reviewed demonstrated sodium 135, creatinine 1.22, glucose 254, other indices unremarkable.  Chest x-ray impression reviewed demonstrated no abnormality seen.  CT head without contrast impression reviewed demonstrated no acute intracranial findings or significant interval change compared to June 20, 2022.  In reviewing patient's lab trends her creatinine in July 20, 2023 was 1.17.  Her last A1c was 9.1 on 10/07/2024.  Patient does admit to not following her diet as prescribed.  She does reports she no longer smokes cigarettes she quit 2 years ago around the time that she had her stroke.  Initial vital signs /71 pulse 87 respirations 18 temperature 98° F O2 saturation 92% on room air.  Patient did receive some IV hydration which her blood pressure did trend up and was in the hypertensive side at the time of rounds.  She reports feeling much better.  Her O2  saturations improved without the requirement of supplemental oxygen therapy.  No family was present at the bedside at the time of rounds however patient is able to provide the historical information to the questions asked.  She did still endorse chest pain at the time of examination which was reproducible. Pt is ask for her cardiologist Dr. Estrella.  Patient is admitted to hospital medicine services for further management.    Interval Hx:   Patient down on CT.  No acute overnight events.  Labs and vitals remained stable.  Pending placement    Case was discussed with patient's nurse and  on the floor.    Objective/physical exam:  General: In no acute distress, afebrile  Chest: Clear to auscultation bilaterally  Heart: RRR, +S1, S2, no appreciable murmur  Abdomen: Soft, nontender, BS +  MSK: Warm, no lower extremity edema, no clubbing or cyanosis  Neurologic: Alert and oriented x4, Cranial nerve II-XII intact, Strength 5/5 in all 4 extremities    VITAL SIGNS: 24 HRS MIN & MAX LAST   Temp  Min: 97.3 °F (36.3 °C)  Max: 98.4 °F (36.9 °C) 97.5 °F (36.4 °C)   BP  Min: 116/68  Max: 228/101 (!) 158/70   Pulse  Min: 81  Max: 111  81   Resp  Min: 16  Max: 20 20   SpO2  Min: 95 %  Max: 99 % 99 %     I have reviewed the following labs:  Recent Labs   Lab 10/24/24  1754 10/25/24  0427 10/27/24  0350   WBC 5.27 5.31 4.10*   RBC 4.31 4.03* 3.80*   HGB 12.3 11.5* 10.9*   HCT 38.5 35.4* 33.9*   MCV 89.3 87.8 89.2   MCH 28.5 28.5 28.7   MCHC 31.9* 32.5* 32.2*   RDW 13.4 13.6 13.8    233 253   MPV 9.3 9.0 9.2     Recent Labs   Lab 10/24/24  1754 10/25/24  0427 10/27/24  0350   * 139 141   K 4.0 4.3 4.6    104 109*   CO2 23 25 25   BUN 16.4 16.2 17.3   CREATININE 1.22* 1.05* 1.26*   CALCIUM 9.0 8.9 8.7   MG  --  1.60  --    ALBUMIN 3.3* 3.2*  --    ALKPHOS 58 55  --    ALT 17 14  --    AST 16 16  --    BILITOT 0.9 0.9  --      Microbiology Results (last 7 days)       ** No results found for the last 168  hours. **             See below for Radiology    Assessment/Plan:  Chest pain, ACS ruled out, likely MSK related, resolved  Hypertensive urgency , resolved  Dizziness likely orthostatic  Witnessed fall  History of type 2 diabetes, CKD, hypertension, CVA     Patient notes compliance with all her medications including DA PT   Cardiology was consulted, ACS ruled out  Troponin x2 unremarkable   Hyperglycemia improved   Continue Lantus to 30 units  Increase short-acting 12 units with meals along with sliding scale  Accu-Cheks, diabetic diet   Resume appropriate home meds   Echo done showed EF of 55-60% with normal systolic function   PT and OT following, recommend SNF   Patient interested in skilled nursing facility   Case management consulted, pending placement  Initial orthostatics were negative however repeat orthostatics has been positive   1 L of normal saline bolus given on 10/29, compression stockings added  Add meclizine prn  CT head on arrival negative; no other focal deficits  DC beta blocker  Due to MRI taking a long time to be obtained, repeat CT head was done few days later today and continued to be unremarkable.  Increase amlodipine to 10 mg for hypertension  Fall precautions  Labs in a.m.    VTE prophylaxis: lovenox    Patient condition:  stable    Anticipated discharge and Disposition:  SNF      All diagnosis and differential diagnosis have been reviewed; assessment and plan has been documented; I have personally reviewed the labs and test results that are presently available; I have reviewed the patients medication list; I have reviewed the consulting providers response and recommendations. I have reviewed or attempted to review medical records based upon their availability    All of the patient's questions have been  addressed and answered. Patient's is agreeable to the above stated plan. I will continue to monitor closely and make adjustments to medical management as needed.    Portions of this note  dictated using EMR integrated voice recognition software, and may be subject to voice recognition errors not corrected at proofreading. Please contact writer for clarification if needed.   _____________________________________________________________________    Malnutrition Status:    Scheduled Med:   [START ON 10/31/2024] amLODIPine  10 mg Oral Daily    aspirin  81 mg Oral Daily    atorvastatin  40 mg Oral QHS    clopidogreL  75 mg Oral Daily    donepeziL  5 mg Oral QHS    DULoxetine  60 mg Oral QAM    enoxparin  40 mg Subcutaneous Daily    insulin aspart U-100  10 Units Subcutaneous TIDWM    insulin glargine U-100  30 Units Subcutaneous QHS    magnesium oxide  400 mg Oral BID    pantoprazole  40 mg Oral Daily    zinc oxide-cod liver oil   Topical (Top) BID      Continuous Infusions:     PRN Meds:    Current Facility-Administered Medications:     acetaminophen, 650 mg, Oral, Q6H PRN    aluminum-magnesium hydroxide-simethicone, 30 mL, Oral, QID PRN    dextrose 10%, 12.5 g, Intravenous, PRN    dextrose 10%, 25 g, Intravenous, PRN    glucagon (human recombinant), 1 mg, Intramuscular, PRN    glucose, 16 g, Oral, PRN    glucose, 24 g, Oral, PRN    hydrALAZINE, 10 mg, Intravenous, Q4H PRN    insulin aspart U-100, 0-10 Units, Subcutaneous, QID (AC + HS) PRN    meclizine, 25 mg, Oral, TID PRN    melatonin, 6 mg, Oral, Nightly PRN    naloxone, 0.02 mg, Intravenous, PRN    nitroGLYCERIN, 0.4 mg, Sublingual, Q5 Min PRN    ondansetron, 4 mg, Intravenous, Q4H PRN    polyethylene glycol, 17 g, Oral, BID PRN    prochlorperazine, 5 mg, Intravenous, Q6H PRN    simethicone, 1 tablet, Oral, QID PRN     Radiology:  I have personally reviewed the following imaging and agree with the radiologist.     CT Head Without Contrast  Narrative: EXAMINATION:  CT HEAD WITHOUT CONTRAST    CLINICAL HISTORY:  Dizziness, persistent/recurrent, cardiac or vascular cause suspected;    TECHNIQUE:  CT imaging of the head performed from the skull base to  the vertex without intravenous contrast.   mGycm. Automatic exposure control, adjustment of mA/kV or iterative reconstruction technique was used to reduce radiation.    COMPARISON:  24 October 2024    FINDINGS:  There is no acute cortical infarct, hemorrhage or mass lesion.  No new parenchymal attenuation abnormality.  Ventricular size is stable.  There are vascular calcifications.    Visualized paranasal sinuses and mastoid air cells are clear.  Impression: No acute intracranial findings or significant interval change compared to last week.    Electronically signed by: Akbar Mcneill  Date:    10/30/2024  Time:    09:30      Mary Foster DO  Department of Hospital Medicine  [facility]  10/30/2024

## 2024-10-30 NOTE — PT/OT/SLP PROGRESS
Physical Therapy      Patient Name:  Mayra Parker   MRN:  [id number]    Patient not seen today secondary torefusing therapy services due to waiting for her lunch tray. Patient was offered to transfer to chair to eat lunch but declined. Will follow-up when schedule allows.

## 2024-10-30 NOTE — PT/OT/SLP PROGRESS
Occupational Therapy      Patient Name:  Mayra Parker   MRN:  [id number]    Patient not seen today secondary to refusing therapy services due to waiting for her lunch tray. Patient was offered to transfer to chair to eat lunch but declined. Will follow-up when schedule allows.    10/30/2024

## 2024-10-31 LAB
POCT GLUCOSE: 148 MG/DL (ref 70–110)
POCT GLUCOSE: 199 MG/DL (ref 70–110)
POCT GLUCOSE: 230 MG/DL (ref 70–110)
POCT GLUCOSE: 304 MG/DL (ref 70–110)

## 2024-10-31 PROCEDURE — 25000003 PHARM REV CODE 250: Performed by: STUDENT IN AN ORGANIZED HEALTH CARE EDUCATION/TRAINING PROGRAM

## 2024-10-31 PROCEDURE — 25000003 PHARM REV CODE 250: Performed by: NURSE PRACTITIONER

## 2024-10-31 PROCEDURE — 63600175 PHARM REV CODE 636 W HCPCS: Performed by: STUDENT IN AN ORGANIZED HEALTH CARE EDUCATION/TRAINING PROGRAM

## 2024-10-31 PROCEDURE — 63600175 PHARM REV CODE 636 W HCPCS: Performed by: NURSE PRACTITIONER

## 2024-10-31 PROCEDURE — 21400001 HC TELEMETRY ROOM

## 2024-10-31 PROCEDURE — 97116 GAIT TRAINING THERAPY: CPT | Mod: CQ

## 2024-10-31 PROCEDURE — 25000003 PHARM REV CODE 250: Performed by: HOSPITALIST

## 2024-10-31 PROCEDURE — 97535 SELF CARE MNGMENT TRAINING: CPT

## 2024-10-31 RX ORDER — METFORMIN HYDROCHLORIDE 500 MG/1
1000 TABLET ORAL 2 TIMES DAILY WITH MEALS
Status: DISCONTINUED | OUTPATIENT
Start: 2024-10-31 | End: 2024-11-05 | Stop reason: HOSPADM

## 2024-10-31 RX ORDER — LOSARTAN POTASSIUM 25 MG/1
25 TABLET ORAL DAILY
Status: DISCONTINUED | OUTPATIENT
Start: 2024-11-01 | End: 2024-11-05 | Stop reason: HOSPADM

## 2024-10-31 RX ADMIN — METFORMIN HYDROCHLORIDE 1000 MG: 500 TABLET, FILM COATED ORAL at 08:10

## 2024-10-31 RX ADMIN — PANTOPRAZOLE SODIUM 40 MG: 40 TABLET, DELAYED RELEASE ORAL at 08:10

## 2024-10-31 RX ADMIN — DONEPEZIL HYDROCHLORIDE 5 MG: 5 TABLET, FILM COATED ORAL at 10:10

## 2024-10-31 RX ADMIN — Medication 400 MG: at 08:10

## 2024-10-31 RX ADMIN — CLOPIDOGREL BISULFATE 75 MG: 75 TABLET ORAL at 08:10

## 2024-10-31 RX ADMIN — Medication 400 MG: at 10:10

## 2024-10-31 RX ADMIN — INSULIN ASPART 12 UNITS: 100 INJECTION, SOLUTION INTRAVENOUS; SUBCUTANEOUS at 11:10

## 2024-10-31 RX ADMIN — ATORVASTATIN CALCIUM 40 MG: 40 TABLET, FILM COATED ORAL at 10:10

## 2024-10-31 RX ADMIN — ENOXAPARIN SODIUM 40 MG: 40 INJECTION SUBCUTANEOUS at 04:10

## 2024-10-31 RX ADMIN — METFORMIN HYDROCHLORIDE 1000 MG: 500 TABLET, FILM COATED ORAL at 04:10

## 2024-10-31 RX ADMIN — AMLODIPINE BESYLATE 10 MG: 5 TABLET ORAL at 08:10

## 2024-10-31 RX ADMIN — Medication: at 11:10

## 2024-10-31 RX ADMIN — ASPIRIN 81 MG: 81 TABLET, COATED ORAL at 08:10

## 2024-10-31 RX ADMIN — Medication 6 MG: at 10:10

## 2024-10-31 RX ADMIN — INSULIN ASPART 12 UNITS: 100 INJECTION, SOLUTION INTRAVENOUS; SUBCUTANEOUS at 04:10

## 2024-10-31 RX ADMIN — DULOXETINE HYDROCHLORIDE 60 MG: 30 CAPSULE, DELAYED RELEASE ORAL at 08:10

## 2024-10-31 RX ADMIN — Medication: at 10:10

## 2024-10-31 NOTE — PROGRESS NOTES
[facility] Hospital Medicine Progress Note        Chief Complaint: Inpatient Follow-up     HPI:   74 y.o. female who PMH includes CVA no deficits, CAD, chronic arthritis, HTN, HLD, DM type II, dementia; presents to the ED at [facility] on 10/24/2024 with a primary complaint of dizziness with associated blurred vision. Pt reports she had visit from her home health nurse  which reported her blood glucose was in the 400's, it was reported that the patient had a low blood pressure as well and had a witnessed fall where she landed on her buttocks while ambulating with a walker.  No reports of head injury.  No reports of loss of consciousness or seizures.  Patient denies any alcohol, tobacco, or illicit drug use.  She reports she quit smoking 2 years ago.  The daughter  daughter called EMS for ED transport.  Patient does report feeling weak and not herself over the past few days.  No reports of shortness a breath, fever, chills or any sick contacts.  She denies any new foods or medications.  Lab work reviewed demonstrated sodium 135, creatinine 1.22, glucose 254, other indices unremarkable.  Chest x-ray impression reviewed demonstrated no abnormality seen.  CT head without contrast impression reviewed demonstrated no acute intracranial findings or significant interval change compared to June 20, 2022.  In reviewing patient's lab trends her creatinine in July 20, 2023 was 1.17.  Her last A1c was 9.1 on 10/07/2024.  Patient does admit to not following her diet as prescribed.  She does reports she no longer smokes cigarettes she quit 2 years ago around the time that she had her stroke.  Initial vital signs /71 pulse 87 respirations 18 temperature 98° F O2 saturation 92% on room air.  Patient did receive some IV hydration which her blood pressure did trend up and was in the hypertensive side at the time of rounds.  She reports feeling much better.  Her O2 saturations improved without the  requirement of supplemental oxygen therapy.  No family was present at the bedside at the time of rounds however patient is able to provide the historical information to the questions asked.  She did still endorse chest pain at the time of examination which was reproducible. Pt is ask for her cardiologist Dr. Estrella.  Patient is admitted to hospital medicine services for further management.     Interval Hx:   No further chest pain.  Glucose better controlled. BP stable     Objective/physical exam:  General: In no acute distress, afebrile  Chest: Clear to auscultation bilaterally  Heart: RRR, +S1, S2, no appreciable murmur  Abdomen: Soft, nontender, BS +  MSK: Warm, no lower extremity edema, no clubbing or cyanosis  Neurologic: Alert and oriented x4, Cranial nerve II-XII intact, Strength 5/5 in all 4 extremities    VITAL SIGNS: 24 HRS MIN & MAX LAST   Temp  Min: 97.5 °F (36.4 °C)  Max: 98.4 °F (36.9 °C) 97.6 °F (36.4 °C)   BP  Min: 142/76  Max: 186/80 (!) 116/57   Pulse  Min: 81  Max: 90  81   Resp  Min: 18  Max: 18 18   SpO2  Min: 97 %  Max: 100 % 97 %       Recent Labs   Lab 10/24/24  1754 10/25/24  0427 10/27/24  0350   WBC 5.27 5.31 4.10*   RBC 4.31 4.03* 3.80*   HGB 12.3 11.5* 10.9*   HCT 38.5 35.4* 33.9*   MCV 89.3 87.8 89.2   MCH 28.5 28.5 28.7   MCHC 31.9* 32.5* 32.2*   RDW 13.4 13.6 13.8    233 253   MPV 9.3 9.0 9.2       Recent Labs   Lab 10/24/24  1754 10/25/24  0427 10/27/24  0350   * 139 141   K 4.0 4.3 4.6    104 109*   CO2 23 25 25   BUN 16.4 16.2 17.3   CREATININE 1.22* 1.05* 1.26*   CALCIUM 9.0 8.9 8.7   MG  --  1.60  --    ALBUMIN 3.3* 3.2*  --    ALKPHOS 58 55  --    ALT 17 14  --    AST 16 16  --    BILITOT 0.9 0.9  --           Microbiology Results (last 7 days)       ** No results found for the last 168 hours. **             Radiology:  CT Head Without Contrast  Narrative: EXAMINATION:  CT HEAD WITHOUT CONTRAST    CLINICAL HISTORY:  Dizziness, persistent/recurrent, cardiac or  vascular cause suspected;    TECHNIQUE:  CT imaging of the head performed from the skull base to the vertex without intravenous contrast.   mGycm. Automatic exposure control, adjustment of mA/kV or iterative reconstruction technique was used to reduce radiation.    COMPARISON:  24 October 2024    FINDINGS:  There is no acute cortical infarct, hemorrhage or mass lesion.  No new parenchymal attenuation abnormality.  Ventricular size is stable.  There are vascular calcifications.    Visualized paranasal sinuses and mastoid air cells are clear.  Impression: No acute intracranial findings or significant interval change compared to last week.    Electronically signed by: Akbar Mcneill  Date:    10/30/2024  Time:    09:30        Medications:  Scheduled Meds:   amLODIPine  10 mg Oral Daily    aspirin  81 mg Oral Daily    atorvastatin  40 mg Oral QHS    clopidogreL  75 mg Oral Daily    donepeziL  5 mg Oral QHS    DULoxetine  60 mg Oral QAM    empagliflozin  25 mg Oral QAM    enoxparin  40 mg Subcutaneous Daily    insulin aspart U-100  12 Units Subcutaneous TIDWM    insulin glargine U-100  30 Units Subcutaneous QHS    magnesium oxide  400 mg Oral BID    metFORMIN  1,000 mg Oral BID WM    pantoprazole  40 mg Oral Daily    zinc oxide-cod liver oil   Topical (Top) BID     Continuous Infusions:  PRN Meds:.  Current Facility-Administered Medications:     acetaminophen, 650 mg, Oral, Q6H PRN    aluminum-magnesium hydroxide-simethicone, 30 mL, Oral, QID PRN    dextrose 10%, 12.5 g, Intravenous, PRN    dextrose 10%, 25 g, Intravenous, PRN    glucagon (human recombinant), 1 mg, Intramuscular, PRN    glucose, 16 g, Oral, PRN    glucose, 24 g, Oral, PRN    hydrALAZINE, 10 mg, Intravenous, Q4H PRN    insulin aspart U-100, 0-10 Units, Subcutaneous, QID (AC + HS) PRN    meclizine, 25 mg, Oral, TID PRN    melatonin, 6 mg, Oral, Nightly PRN    naloxone, 0.02 mg, Intravenous, PRN    nitroGLYCERIN, 0.4 mg, Sublingual, Q5 Min PRN     ondansetron, 4 mg, Intravenous, Q4H PRN    polyethylene glycol, 17 g, Oral, BID PRN    prochlorperazine, 5 mg, Intravenous, Q6H PRN    simethicone, 1 tablet, Oral, QID PRN        Assessment/Plan:   Noncardiac chest pain, musculoskeletal Hypertensive urgency , resolved  Dizziness likely orthostatic  Witnessed fall  History of type 2 diabetes, CKD, hypertension, CVA     Cardiology has recommended supportive care.  Continue with her dual antiplatelet therapy. Acute coronary syndrome has been ruled out.    BP elevated yesterday. Restarted home losartan. Better this morning  Jardiance and metformin restarted yesterday. Monitor blood sugars. Much better. Continue scheduled insulin. Waiting for any hypoglycemia  PT and OT recommending moderate intensity therapy   Case management working on SNF.    Medically stable once accepted  Check labs this morning.      Jordy Young MD   11/1/2024    All diagnosis and differential diagnosis have been reviewed; assessment and plan has been documented; I have personally reviewed the labs and test results that are presently available; I have reviewed the patients medication list; I have reviewed the consulting providers response and recommendations. I have reviewed or attempted to review medical records based upon their availability    All of the patient's questions have been  addressed and answered. Patient's is agreeable to the above stated plan. I will continue to monitor closely and make adjustments to medical management as needed.  _____________________________________________________________________

## 2024-10-31 NOTE — PLAN OF CARE
SSC spoke with Luisana @ [facility], who states they are unable to accept pt at this time. Attempted to call admissions at [facility] with no answer or way to leave a voicemail.

## 2024-10-31 NOTE — PROGRESS NOTES
Inpatient Nutrition Evaluation    Admit Date: 10/24/2024   Total duration of encounter: 7 days    Nutrition Recommendation/Prescription     Continue oral diet as tolerated; Diet diabetic Cardiac (Low Na/Chol); 2000 Calories (up to 75 gm per meal)     Nutrition Assessment     Chart Review    Reason Seen: length of stay    Malnutrition Screening Tool Results   Have you recently lost weight without trying?: No  Have you been eating poorly because of a decreased appetite?: No   MST Score: 0     Diagnosis:  Noncardiac chest pain, musculoskeletal Hypertensive urgency , resolved  Dizziness likely orthostatic  Witnessed fall    Relevant Medical History: CVA no deficits, CAD, chronic arthritis, HTN, HLD, DM type II, dementia     Nutrition-Related Medications: Scheduled Medications:  amLODIPine, 10 mg, Daily  aspirin, 81 mg, Daily  atorvastatin, 40 mg, QHS  clopidogreL, 75 mg, Daily  donepeziL, 5 mg, QHS  DULoxetine, 60 mg, QAM  empagliflozin, 25 mg, QAM  enoxparin, 40 mg, Daily  insulin aspart U-100, 12 Units, TIDWM  insulin glargine U-100, 30 Units, QHS  magnesium oxide, 400 mg, BID  metFORMIN, 1,000 mg, BID WM  pantoprazole, 40 mg, Daily  zinc oxide-cod liver oil, , BID    Continuous Infusions:   PRN Medications:    amLODIPine tablet 10 mg    aspirin EC tablet 81 mg    atorvastatin tablet 40 mg    clopidogreL tablet 75 mg    donepeziL tablet 5 mg    DULoxetine DR capsule 60 mg    empagliflozin (Jardiance) tablet 25 mg    enoxaparin injection 40 mg    insulin aspart U-100 injection 12 Units    insulin glargine U-100 (Lantus) injection 30 Units    magnesium oxide tablet 400 mg    metFORMIN tablet 1,000 mg    pantoprazole EC tablet 40 mg    zinc oxide-cod liver oil 40 % paste        Nutrition-Related Labs:  Recent Labs   Lab 10/24/24  1754 10/25/24  0427 10/27/24  0350   * 139 141   K 4.0 4.3 4.6   CALCIUM 9.0 8.9 8.7   PHOS  --  3.4  --    MG  --  1.60  --     104 109*   CO2 23 25 25   BUN 16.4 16.2 17.3  "  CREATININE 1.22* 1.05* 1.26*   EGFRNORACEVR 47 56 45   GLUCOSE 254* 179* 217*   BILITOT 0.9 0.9  --    ALKPHOS 58 55  --    ALT 17 14  --    AST 16 16  --    ALBUMIN 3.3* 3.2*  --    WBC 5.27 5.31 4.10*   HGB 12.3 11.5* 10.9*   HCT 38.5 35.4* 33.9*        Diet Order: Diet diabetic Cardiac (Low Na/Chol); 2000 Calories (up to 75 gm per meal)  Oral Supplement Order: none  Appetite/Oral Intake: good/% of meals  Factors Affecting Nutritional Intake: none identified  Food/[employer]/Cultural Preferences: none reported  Food Allergies: no known food allergies       Wound(s):      Wound 10/26/24 Moisture associated dermatitis Coccyx-Tissue loss description: Partial thickness     Comments    10/31/24  pt tolerating oral diet, reports good appetite and intake of meals; no unintentional weight loss reported    Anthropometrics    Height: 5' 4" (162.6 cm) Height Method: Stated  Last Weight: 64.4 kg (142 lb) (10/24/24 1509) Weight Method: Stated  BMI (Calculated): 24.4  BMI Classification: normal (BMI 18.5-24.9)     Ideal Body Weight (IBW), Female: 120 lb     % Ideal Body Weight, Female (lb): 118.33 %                             Usual Weight Provided By: patient denies unintentional weight loss    Wt Readings from Last 5 Encounters:   10/24/24 64.4 kg (142 lb)   07/06/23 75.3 kg (166 lb)   09/28/22 68.9 kg (152 lb)   07/06/22 73.5 kg (162 lb)   06/26/22 73.5 kg (162 lb 0.6 oz)     Weight Change(s) Since Admission:  Admit Weight: 64.4 kg (142 lb) (10/24/24 5689)      Patient Education    Not applicable.    Monitoring & Evaluation     Dietitian will monitor food and beverage intake.  Nutrition Risk/Follow-Up: low (follow-up in 5-7 days)  Patients assigned 'low nutrition risk' status do not qualify for a full nutritional assessment but will be monitored and re-evaluated in a 5-7 day time period. Please consult if re-evaluation needed sooner.   "

## 2024-10-31 NOTE — PT/OT/SLP PROGRESS
Occupational Therapy   Treatment    Name: Mayra Parker  MRN: [id number]  Admitting Diagnosis:  Dizziness       Recommendations:     Recommended therapy intensity at discharge: Moderate Intensity Therapy   Discharge Equipment Recommendations:  to be determined by next level of care  Barriers to discharge:       Assessment:     Mayra aPrker is a 74 y.o. female with a medical diagnosis of Dizziness.  She presents with the following performance deficits affecting function are weakness, impaired endurance, impaired self care skills, impaired functional mobility, gait instability, impaired balance, decreased safety awareness.     Rehab Prognosis:  Good; patient would benefit from acute skilled OT services to address these deficits and reach maximum level of function.       Plan:     Patient to be seen 3 x/week to address the above listed problems via self-care/home management, therapeutic activities, therapeutic exercises  Plan of Care Expires: 11/08/24  Plan of Care Reviewed with: patient    Subjective     Pain/Comfort:  Pain Rating 1: 0/10    Objective:     Communicated with: nrsg prior to session.  Patient found HOB elevated with   upon OT entry to room.    General Precautions: Standard, fall    Orthopedic Precautions:N/A  Braces: N/A  Respiratory Status: Room air       Occupational Performance:     Bed Mobility:    Patient completed Supine to Sit with minimum assistance     Functional Mobility/Transfers:  Patient completed Sit <> Stand Transfer with contact guard assistance  with  rolling walker   Functional Mobility: ambulated out of room in hallway with CGA; min A for sit <> stand    Activities of Daily Living:  Lower Body Dressing: contact guard assistance for balance while donning underwear and wiping buttocks       Patient Education:  Patient provided with verbal education education regarding OT role/goals/POC, fall prevention, and safety awareness.  Understanding was verbalized.      Patient left  up in chair with all lines intact and call button in reach.    GOALS:   Multidisciplinary Problems       Occupational Therapy Goals          Problem: Occupational Therapy    Goal Priority Disciplines Outcome Interventions   Occupational Therapy Goal    Low OT, PT/OT Progressing    Description: LTG: Pt will perform basic ADLs and ADL transfers with Modified independence using LRAD by discharge.    STG: to be met by 11/08/2024    Pt will complete grooming standing at sink with LRAD with SBA.  Pt will complete UB dressing with SBA.  Pt will complete LB dressing with SBA using LRAD.  Pt will complete toileting with SBA using LRAD.  Pt will complete functional mobility to/from toilet and toilet transfer with SBA using LRAD.                       Time Tracking:     OT Date of Treatment:    OT Start Time: 0937  OT Stop Time: 0954  OT Total Time (min): 17 min    Billable Minutes:Self Care/Home Management 17min    OT/DERIAN: OT     Number of DERIAN visits since last OT visit: 1    10/31/2024

## 2024-10-31 NOTE — PT/OT/SLP PROGRESS
Physical Therapy Treatment    Patient Name:  Mayra Parker   MRN:  [id number]    Recommendations:     Discharge therapy intensity: Moderate Intensity Therapy   Discharge Equipment Recommendations: none  Barriers to discharge: Impaired mobility and Ongoing medical needs    Assessment:     Mayra Parekr is a 74 y.o. female admitted with a medical diagnosis of dizziness, weakness, chest pain.  She presents with the following impairments/functional limitations: weakness, impaired endurance, gait instability, impaired functional mobility, impaired self care skills, impaired balance.    Rehab Prognosis: Good; patient would benefit from acute skilled PT services to address these deficits and reach maximum level of function.    Recent Surgery: * No surgery found *      Plan:     During this hospitalization, patient would benefit from acute PT services 5 x/week to address the identified rehab impairments via gait training, therapeutic activities, therapeutic exercises and progress toward the following goals:    Plan of Care Expires:  11/26/24    Subjective     Chief Complaint: none stated  Patient/Family Comments/goals: to get stronger  Pain/Comfort:         Objective:     Communicated with nursing prior to session.  Patient found HOB elevated with pulse ox (continuous), telemetry, PureWick upon PT entry to room.     General Precautions: Standard, fall  Orthopedic Precautions: N/A  Braces: N/A  Respiratory Status: Room air  Blood Pressure: NT    Functional Mobility:  Bed Mobility:     Supine to Sit: minimum assistance  Transfers:     Sit to Stand:  contact guard assistance with rolling walker  Gait: 100' w/RW, CGA    Therapeutic Activities/Exercises:  Pt stood EOB for pericare and to don mesh underwear. Pt then ambulated in [facility]od for gait trial. Pt left [facility].     Education:  Patient provided with verbal education education regarding PT role/goals/POC, fall prevention, and safety awareness.  Understanding was  verbalized, however additional teaching warranted.     Patient left up in chair with all lines intact, call button in reach, and nurse notified    GOALS:   Multidisciplinary Problems       Physical Therapy Goals          Problem: Physical Therapy    Goal Priority Disciplines Outcome Interventions   Physical Therapy Goal     PT, PT/OT Progressing    Description: Goals to be met by: 2024     Patient will increase functional independence with mobility by performin. Supine to sit with Stand-by Assistance  2. Sit to supine with Stand-by Assistance  3. Sit to stand transfer with Stand-by Assistance  4. Gait  TBD                         Time Tracking:     PT Received On: 10/31/24  PT Start Time: 936     PT Stop Time: 954  PT Total Time (min): 18 min     Billable Minutes: Gait Training 18    Treatment Type: Treatment  PT/PTA: PTA     Number of PTA visits since last PT visit: 3     10/31/2024

## 2024-11-01 LAB
ANION GAP SERPL CALC-SCNC: 7 MEQ/L
BASOPHILS # BLD AUTO: 0.02 X10(3)/MCL
BASOPHILS NFR BLD AUTO: 0.4 %
BUN SERPL-MCNC: 12.8 MG/DL (ref 9.8–20.1)
CALCIUM SERPL-MCNC: 8.8 MG/DL (ref 8.4–10.2)
CHLORIDE SERPL-SCNC: 106 MMOL/L (ref 98–107)
CO2 SERPL-SCNC: 25 MMOL/L (ref 23–31)
CREAT SERPL-MCNC: 0.91 MG/DL (ref 0.55–1.02)
CREAT/UREA NIT SERPL: 14
EOSINOPHIL # BLD AUTO: 0.05 X10(3)/MCL (ref 0–0.9)
EOSINOPHIL NFR BLD AUTO: 1.1 %
ERYTHROCYTE [DISTWIDTH] IN BLOOD BY AUTOMATED COUNT: 14.5 % (ref 11.5–17)
GFR SERPLBLD CREATININE-BSD FMLA CKD-EPI: >60 ML/MIN/1.73/M2
GLUCOSE SERPL-MCNC: 161 MG/DL (ref 82–115)
HCT VFR BLD AUTO: 31.1 % (ref 37–47)
HGB BLD-MCNC: 9.8 G/DL (ref 12–16)
IMM GRANULOCYTES # BLD AUTO: 0.01 X10(3)/MCL (ref 0–0.04)
IMM GRANULOCYTES NFR BLD AUTO: 0.2 %
LYMPHOCYTES # BLD AUTO: 1.66 X10(3)/MCL (ref 0.6–4.6)
LYMPHOCYTES NFR BLD AUTO: 35.2 %
MCH RBC QN AUTO: 29.3 PG (ref 27–31)
MCHC RBC AUTO-ENTMCNC: 31.5 G/DL (ref 33–36)
MCV RBC AUTO: 92.8 FL (ref 80–94)
MONOCYTES # BLD AUTO: 0.48 X10(3)/MCL (ref 0.1–1.3)
MONOCYTES NFR BLD AUTO: 10.2 %
NEUTROPHILS # BLD AUTO: 2.5 X10(3)/MCL (ref 2.1–9.2)
NEUTROPHILS NFR BLD AUTO: 52.9 %
NRBC BLD AUTO-RTO: 0 %
PLATELET # BLD AUTO: 262 X10(3)/MCL (ref 130–400)
PMV BLD AUTO: 9.5 FL (ref 7.4–10.4)
POCT GLUCOSE: 168 MG/DL (ref 70–110)
POCT GLUCOSE: 170 MG/DL (ref 70–110)
POCT GLUCOSE: 173 MG/DL (ref 70–110)
POCT GLUCOSE: 192 MG/DL (ref 70–110)
POCT GLUCOSE: 95 MG/DL (ref 70–110)
POTASSIUM SERPL-SCNC: 4.9 MMOL/L (ref 3.5–5.1)
RBC # BLD AUTO: 3.35 X10(6)/MCL (ref 4.2–5.4)
SODIUM SERPL-SCNC: 138 MMOL/L (ref 136–145)
WBC # BLD AUTO: 4.72 X10(3)/MCL (ref 4.5–11.5)

## 2024-11-01 PROCEDURE — 36415 COLL VENOUS BLD VENIPUNCTURE: CPT | Performed by: HOSPITALIST

## 2024-11-01 PROCEDURE — 85025 COMPLETE CBC W/AUTO DIFF WBC: CPT | Performed by: HOSPITALIST

## 2024-11-01 PROCEDURE — 21400001 HC TELEMETRY ROOM

## 2024-11-01 PROCEDURE — 63600175 PHARM REV CODE 636 W HCPCS: Performed by: NURSE PRACTITIONER

## 2024-11-01 PROCEDURE — 25000003 PHARM REV CODE 250: Performed by: HOSPITALIST

## 2024-11-01 PROCEDURE — 63600175 PHARM REV CODE 636 W HCPCS: Performed by: STUDENT IN AN ORGANIZED HEALTH CARE EDUCATION/TRAINING PROGRAM

## 2024-11-01 PROCEDURE — 97116 GAIT TRAINING THERAPY: CPT

## 2024-11-01 PROCEDURE — 80048 BASIC METABOLIC PNL TOTAL CA: CPT | Performed by: HOSPITALIST

## 2024-11-01 PROCEDURE — 25000003 PHARM REV CODE 250: Performed by: STUDENT IN AN ORGANIZED HEALTH CARE EDUCATION/TRAINING PROGRAM

## 2024-11-01 PROCEDURE — 97530 THERAPEUTIC ACTIVITIES: CPT

## 2024-11-01 RX ADMIN — LOSARTAN POTASSIUM 25 MG: 25 TABLET, FILM COATED ORAL at 09:11

## 2024-11-01 RX ADMIN — AMLODIPINE BESYLATE 10 MG: 5 TABLET ORAL at 09:11

## 2024-11-01 RX ADMIN — Medication 400 MG: at 09:11

## 2024-11-01 RX ADMIN — ATORVASTATIN CALCIUM 40 MG: 40 TABLET, FILM COATED ORAL at 09:11

## 2024-11-01 RX ADMIN — INSULIN ASPART 12 UNITS: 100 INJECTION, SOLUTION INTRAVENOUS; SUBCUTANEOUS at 11:11

## 2024-11-01 RX ADMIN — Medication: at 09:11

## 2024-11-01 RX ADMIN — CLOPIDOGREL BISULFATE 75 MG: 75 TABLET ORAL at 09:11

## 2024-11-01 RX ADMIN — INSULIN ASPART 12 UNITS: 100 INJECTION, SOLUTION INTRAVENOUS; SUBCUTANEOUS at 06:11

## 2024-11-01 RX ADMIN — INSULIN ASPART 12 UNITS: 100 INJECTION, SOLUTION INTRAVENOUS; SUBCUTANEOUS at 08:11

## 2024-11-01 RX ADMIN — ASPIRIN 81 MG: 81 TABLET, COATED ORAL at 09:11

## 2024-11-01 RX ADMIN — DONEPEZIL HYDROCHLORIDE 5 MG: 5 TABLET, FILM COATED ORAL at 09:11

## 2024-11-01 RX ADMIN — ENOXAPARIN SODIUM 40 MG: 40 INJECTION SUBCUTANEOUS at 06:11

## 2024-11-01 RX ADMIN — PANTOPRAZOLE SODIUM 40 MG: 40 TABLET, DELAYED RELEASE ORAL at 08:11

## 2024-11-01 RX ADMIN — METFORMIN HYDROCHLORIDE 1000 MG: 500 TABLET, FILM COATED ORAL at 08:11

## 2024-11-01 RX ADMIN — METFORMIN HYDROCHLORIDE 1000 MG: 500 TABLET, FILM COATED ORAL at 06:11

## 2024-11-01 RX ADMIN — Medication: at 11:11

## 2024-11-01 RX ADMIN — DULOXETINE HYDROCHLORIDE 60 MG: 30 CAPSULE, DELAYED RELEASE ORAL at 06:11

## 2024-11-01 NOTE — PT/OT/SLP PROGRESS
Physical Therapy Treatment    Patient Name:  Mayra Parker   MRN:  [id number]    Recommendations:     Discharge therapy intensity: Moderate Intensity Therapy   Discharge Equipment Recommendations: to be determined by next level of care  Barriers to discharge: Ongoing medical needs    Assessment:     aMyra Parker is a 74 y.o. female admitted with a medical diagnosis of dizziness, weakness, chest pain.  She presents with the following impairments/functional limitations: weakness, gait instability, impaired endurance, impaired balance, decreased lower extremity function.    Rehab Prognosis: Good; patient would benefit from acute skilled PT services to address these deficits and reach maximum level of function.    Recent Surgery: * No surgery found *      Plan:     During this hospitalization, patient would benefit from acute PT services 5 x/week to address the identified rehab impairments via gait training, therapeutic activities, therapeutic exercises and progress toward the following goals:    Plan of Care Expires:  11/26/24    Subjective     Chief Complaint: none stated   Patient/Family Comments/goals: get stronger   Pain/Comfort:  Pain Rating 1: 0/10      Objective:     Communicated with RN prior to session.  Patient found HOB elevated with pulse ox (continuous), telemetry, PureWick upon PT entry to room.     General Precautions: Standard, fall  Orthopedic Precautions: N/A  Braces: N/A  Respiratory Status: Room air  Blood Pressure:   103/60 Sitting EOB  94/60 standing, asymptomatic   Skin Integrity: Visible skin intact      Functional Mobility:  Bed Mobility:     Supine to Sit: stand by assistance  Transfers:     Sit to Stand:  contact guard assistance with rolling walker  Gait: Pt amb short distance from bed to bathroom + 70ft CGA using RW. B unsteadiness, step through pattern, and forward flexed trunk posture. Ambulation distance limited by fatigue.     Therapeutic Activities/Exercises:  Pt amb short  distance from bed to toilet +void. Pt then stood at sink brushed teeth and her washed hands, CGA.     Education:  Patient provided with verbal education education regarding PT role/goals/POC, fall prevention, and safety awareness.  Understanding was verbalized, however additional teaching warranted.     Patient left up in chair with all lines intact and call button in reach    GOALS:   Multidisciplinary Problems       Physical Therapy Goals          Problem: Physical Therapy    Goal Priority Disciplines Outcome Interventions   Physical Therapy Goal     PT, PT/OT Progressing    Description: Goals to be met by: 2024     Patient will increase functional independence with mobility by performin. Supine to sit with Stand-by Assistance  2. Sit to supine with Stand-by Assistance  3. Sit to stand transfer with Stand-by Assistance  4. Gait  TBD                         Time Tracking:     PT Received On: 10/25/24  PT Start Time: 1025     PT Stop Time: 1051  PT Total Time (min): 26 min     Billable Minutes: Gait Training 10 and Therapeutic Activity 16    Treatment Type: Treatment  PT/PTA: PT     Number of PTA visits since last PT visit: 4     2024

## 2024-11-01 NOTE — PLAN OF CARE
SSC received communication from  that family would like to go with [facility]. Spoke with  at [facility], who states Michaelle Beckham is out of the office and all office workers have left for the day.

## 2024-11-01 NOTE — PLAN OF CARE
Problem: Adult Inpatient Plan of Care  Goal: Plan of Care Review  11/1/2024 0804 by Jillian Dimas RN  Outcome: Progressing  11/1/2024 0804 by Jillian Dimas RN  Outcome: Progressing  Goal: Patient-Specific Goal (Individualized)  11/1/2024 0804 by Jillian Dimas RN  Outcome: Progressing  11/1/2024 0804 by Jillian Dimas, RN  Outcome: Progressing  Goal: Absence of Hospital-Acquired Illness or Injury  11/1/2024 0804 by Jillian Dimas RN  Outcome: Progressing  11/1/2024 0804 by Jillian Dimas RN  Outcome: Progressing  Goal: Optimal Comfort and Wellbeing  11/1/2024 0804 by Jillian Dimas RN  Outcome: Progressing  11/1/2024 0804 by Jillian Dimas, RN  Outcome: Progressing  Goal: Readiness for Transition of Care  11/1/2024 0804 by Jillian Dimas RN  Outcome: Progressing  11/1/2024 0804 by Jillian Dimas, RN  Outcome: Progressing     Problem: Fatigue  Goal: Improved Activity Tolerance  11/1/2024 0804 by Jillian Dimas RN  Outcome: Progressing  11/1/2024 0804 by Jillian Dimas RN  Outcome: Progressing     Problem: Fall Injury Risk  Goal: Absence of Fall and Fall-Related Injury  11/1/2024 0804 by Jillian Dimas, RN  Outcome: Progressing  11/1/2024 0804 by Jillian Dimas, RN  Outcome: Progressing     Problem: Wound  Goal: Optimal Coping  11/1/2024 0804 by Jillian Dimas, RN  Outcome: Progressing  11/1/2024 0804 by Jillian Dimas, RN  Outcome: Progressing  Goal: Optimal Functional Ability  11/1/2024 0804 by Jillian Dimas, RN  Outcome: Progressing  11/1/2024 0804 by Jillian Dimas, RN  Outcome: Progressing  Goal: Absence of Infection Signs and Symptoms  11/1/2024 0804 by Jillian Dimas, RN  Outcome: Progressing  11/1/2024 0804 by Jillian Dimas, RN  Outcome: Progressing  Goal: Improved Oral Intake  11/1/2024 0804 by Jillian Dimas, RN  Outcome: Progressing  11/1/2024 0804 by Jillian Dimas, RN  Outcome: Progressing  Goal: Optimal Pain Control and Function  11/1/2024 0804 by Jillian Dimas, RN  Outcome: Progressing  11/1/2024 0804 by  Wing, Eushel, RN  Outcome: Progressing  Goal: Skin Health and Integrity  11/1/2024 0804 by Jillian Dimas RN  Outcome: Progressing  11/1/2024 0804 by Jillian Dimas RN  Outcome: Progressing  Goal: Optimal Wound Healing  11/1/2024 0804 by Jillian Dimas RN  Outcome: Progressing  11/1/2024 0804 by Jillian Dimas RN  Outcome: Progressing

## 2024-11-02 LAB
POCT GLUCOSE: 121 MG/DL (ref 70–110)
POCT GLUCOSE: 205 MG/DL (ref 70–110)
POCT GLUCOSE: 221 MG/DL (ref 70–110)
POCT GLUCOSE: 258 MG/DL (ref 70–110)

## 2024-11-02 PROCEDURE — 21400001 HC TELEMETRY ROOM

## 2024-11-02 PROCEDURE — 63600175 PHARM REV CODE 636 W HCPCS: Performed by: STUDENT IN AN ORGANIZED HEALTH CARE EDUCATION/TRAINING PROGRAM

## 2024-11-02 PROCEDURE — 63600175 PHARM REV CODE 636 W HCPCS: Performed by: NURSE PRACTITIONER

## 2024-11-02 PROCEDURE — 25000003 PHARM REV CODE 250: Performed by: STUDENT IN AN ORGANIZED HEALTH CARE EDUCATION/TRAINING PROGRAM

## 2024-11-02 PROCEDURE — 25000003 PHARM REV CODE 250: Performed by: HOSPITALIST

## 2024-11-02 RX ADMIN — ASPIRIN 81 MG: 81 TABLET, COATED ORAL at 09:11

## 2024-11-02 RX ADMIN — INSULIN ASPART 12 UNITS: 100 INJECTION, SOLUTION INTRAVENOUS; SUBCUTANEOUS at 06:11

## 2024-11-02 RX ADMIN — AMLODIPINE BESYLATE 10 MG: 5 TABLET ORAL at 09:11

## 2024-11-02 RX ADMIN — Medication: at 08:11

## 2024-11-02 RX ADMIN — Medication 400 MG: at 08:11

## 2024-11-02 RX ADMIN — PANTOPRAZOLE SODIUM 40 MG: 40 TABLET, DELAYED RELEASE ORAL at 09:11

## 2024-11-02 RX ADMIN — INSULIN GLARGINE 30 UNITS: 100 INJECTION, SOLUTION SUBCUTANEOUS at 08:11

## 2024-11-02 RX ADMIN — DULOXETINE HYDROCHLORIDE 60 MG: 30 CAPSULE, DELAYED RELEASE ORAL at 06:11

## 2024-11-02 RX ADMIN — ENOXAPARIN SODIUM 40 MG: 40 INJECTION SUBCUTANEOUS at 06:11

## 2024-11-02 RX ADMIN — CLOPIDOGREL BISULFATE 75 MG: 75 TABLET ORAL at 09:11

## 2024-11-02 RX ADMIN — METFORMIN HYDROCHLORIDE 1000 MG: 500 TABLET, FILM COATED ORAL at 06:11

## 2024-11-02 RX ADMIN — ATORVASTATIN CALCIUM 40 MG: 40 TABLET, FILM COATED ORAL at 08:11

## 2024-11-02 RX ADMIN — DONEPEZIL HYDROCHLORIDE 5 MG: 5 TABLET, FILM COATED ORAL at 08:11

## 2024-11-02 RX ADMIN — Medication: at 09:11

## 2024-11-02 RX ADMIN — METFORMIN HYDROCHLORIDE 1000 MG: 500 TABLET, FILM COATED ORAL at 08:11

## 2024-11-02 RX ADMIN — Medication 400 MG: at 09:11

## 2024-11-02 RX ADMIN — INSULIN ASPART 12 UNITS: 100 INJECTION, SOLUTION INTRAVENOUS; SUBCUTANEOUS at 01:11

## 2024-11-02 RX ADMIN — INSULIN ASPART 12 UNITS: 100 INJECTION, SOLUTION INTRAVENOUS; SUBCUTANEOUS at 08:11

## 2024-11-02 RX ADMIN — LOSARTAN POTASSIUM 25 MG: 25 TABLET, FILM COATED ORAL at 09:11

## 2024-11-02 NOTE — PROGRESS NOTES
[facility] Hospital Medicine Progress Note        Chief Complaint: Inpatient Follow-up     HPI:   74 y.o. female who PMH includes CVA no deficits, CAD, chronic arthritis, HTN, HLD, DM type II, dementia; presents to the ED at [facility] on 10/24/2024 with a primary complaint of dizziness with associated blurred vision. Pt reports she had visit from her home health nurse  which reported her blood glucose was in the 400's, it was reported that the patient had a low blood pressure as well and had a witnessed fall where she landed on her buttocks while ambulating with a walker.  No reports of head injury.  No reports of loss of consciousness or seizures.  Patient denies any alcohol, tobacco, or illicit drug use.  She reports she quit smoking 2 years ago.  The daughter  daughter called EMS for ED transport.  Patient does report feeling weak and not herself over the past few days.  No reports of shortness a breath, fever, chills or any sick contacts.  She denies any new foods or medications.  Lab work reviewed demonstrated sodium 135, creatinine 1.22, glucose 254, other indices unremarkable.  Chest x-ray impression reviewed demonstrated no abnormality seen.  CT head without contrast impression reviewed demonstrated no acute intracranial findings or significant interval change compared to June 20, 2022.  In reviewing patient's lab trends her creatinine in July 20, 2023 was 1.17.  Her last A1c was 9.1 on 10/07/2024.  Patient does admit to not following her diet as prescribed.  She does reports she no longer smokes cigarettes she quit 2 years ago around the time that she had her stroke.  Initial vital signs /71 pulse 87 respirations 18 temperature 98° F O2 saturation 92% on room air.  Patient did receive some IV hydration which her blood pressure did trend up and was in the hypertensive side at the time of rounds.  She reports feeling much better.  Her O2 saturations improved without the  requirement of supplemental oxygen therapy.  No family was present at the bedside at the time of rounds however patient is able to provide the historical information to the questions asked.  She did still endorse chest pain at the time of examination which was reproducible. Pt is ask for her cardiologist Dr. Estrella.  Patient is admitted to hospital medicine services for further management.     Interval Hx:   Patient seen and examined bedside.  No concerns. VSS      Objective/physical exam:  General: In no acute distress, afebrile  Chest: Clear to auscultation bilaterally  Heart: RRR, +S1, S2, no appreciable murmur  Abdomen: Soft, nontender, BS +  MSK: Warm, no lower extremity edema, no clubbing or cyanosis  Neurologic: Alert and oriented x4, Cranial nerve II-XII intact, Strength 5/5 in all 4 extremities    VITAL SIGNS: 24 HRS MIN & MAX LAST   Temp  Min: 97 °F (36.1 °C)  Max: 98.9 °F (37.2 °C) 98.6 °F (37 °C)   BP  Min: 111/62  Max: 130/65 117/68   Pulse  Min: 81  Max: 88  88   Resp  Min: 16  Max: 20 20   SpO2  Min: 97 %  Max: 99 % 98 %       Recent Labs   Lab 10/27/24  0350 11/01/24  0429   WBC 4.10* 4.72   RBC 3.80* 3.35*   HGB 10.9* 9.8*   HCT 33.9* 31.1*   MCV 89.2 92.8   MCH 28.7 29.3   MCHC 32.2* 31.5*   RDW 13.8 14.5    262   MPV 9.2 9.5       Recent Labs   Lab 10/27/24  0350 11/01/24  0429    138   K 4.6 4.9   * 106   CO2 25 25   BUN 17.3 12.8   CREATININE 1.26* 0.91   CALCIUM 8.7 8.8          Microbiology Results (last 7 days)       ** No results found for the last 168 hours. **             Radiology:  CT Head Without Contrast  Narrative: EXAMINATION:  CT HEAD WITHOUT CONTRAST    CLINICAL HISTORY:  Dizziness, persistent/recurrent, cardiac or vascular cause suspected;    TECHNIQUE:  CT imaging of the head performed from the skull base to the vertex without intravenous contrast.   mGycm. Automatic exposure control, adjustment of mA/kV or iterative reconstruction technique was used to  reduce radiation.    COMPARISON:  24 October 2024    FINDINGS:  There is no acute cortical infarct, hemorrhage or mass lesion.  No new parenchymal attenuation abnormality.  Ventricular size is stable.  There are vascular calcifications.    Visualized paranasal sinuses and mastoid air cells are clear.  Impression: No acute intracranial findings or significant interval change compared to last week.    Electronically signed by: Akbar Mcneill  Date:    10/30/2024  Time:    09:30        Medications:  Scheduled Meds:   amLODIPine  10 mg Oral Daily    aspirin  81 mg Oral Daily    atorvastatin  40 mg Oral QHS    clopidogreL  75 mg Oral Daily    donepeziL  5 mg Oral QHS    DULoxetine  60 mg Oral QAM    empagliflozin  25 mg Oral QAM    enoxparin  40 mg Subcutaneous Daily    insulin aspart U-100  12 Units Subcutaneous TIDWM    insulin glargine U-100  30 Units Subcutaneous QHS    losartan  25 mg Oral Daily    magnesium oxide  400 mg Oral BID    metFORMIN  1,000 mg Oral BID WM    pantoprazole  40 mg Oral Daily    zinc oxide-cod liver oil   Topical (Top) BID     Continuous Infusions:  PRN Meds:.  Current Facility-Administered Medications:     acetaminophen, 650 mg, Oral, Q6H PRN    aluminum-magnesium hydroxide-simethicone, 30 mL, Oral, QID PRN    dextrose 10%, 12.5 g, Intravenous, PRN    dextrose 10%, 25 g, Intravenous, PRN    glucagon (human recombinant), 1 mg, Intramuscular, PRN    glucose, 16 g, Oral, PRN    glucose, 24 g, Oral, PRN    hydrALAZINE, 10 mg, Intravenous, Q4H PRN    insulin aspart U-100, 0-10 Units, Subcutaneous, QID (AC + HS) PRN    meclizine, 25 mg, Oral, TID PRN    melatonin, 6 mg, Oral, Nightly PRN    naloxone, 0.02 mg, Intravenous, PRN    nitroGLYCERIN, 0.4 mg, Sublingual, Q5 Min PRN    ondansetron, 4 mg, Intravenous, Q4H PRN    polyethylene glycol, 17 g, Oral, BID PRN    prochlorperazine, 5 mg, Intravenous, Q6H PRN    simethicone, 1 tablet, Oral, QID PRN        Assessment/Plan:   Noncardiac chest pain,  musculoskeletal Hypertensive urgency , resolved  Dizziness likely orthostatic  Witnessed fall  History of type 2 diabetes, CKD, hypertension, CVA     Cardiology has recommended supportive care.  Continue with her dual antiplatelet therapy. Acute coronary syndrome has been ruled out.    BP improved with current regemin  Jardiance and metformin restarted yesterday. Monitor blood sugars. Much better. Continue scheduled insulin.   PT and OT recommending moderate intensity therapy   Case management working on SNF.    Medically stable once accepted      All diagnosis and differential diagnosis have been reviewed; assessment and plan has been documented; I have personally reviewed the labs and test results that are presently available; I have reviewed the patients medication list; I have reviewed the consulting providers response and recommendations. I have reviewed or attempted to review medical records based upon their availability    All of the patient's questions have been  addressed and answered. Patient's is agreeable to the above stated plan. I will continue to monitor closely and make adjustments to medical management as needed.  _____________________________________________________________________    Mray Foster DO  Department of Hospital Medicine  [facility]  11/02/2024

## 2024-11-03 LAB
POCT GLUCOSE: 111 MG/DL (ref 70–110)
POCT GLUCOSE: 142 MG/DL (ref 70–110)
POCT GLUCOSE: 229 MG/DL (ref 70–110)
POCT GLUCOSE: 257 MG/DL (ref 70–110)

## 2024-11-03 PROCEDURE — 63600175 PHARM REV CODE 636 W HCPCS: Performed by: STUDENT IN AN ORGANIZED HEALTH CARE EDUCATION/TRAINING PROGRAM

## 2024-11-03 PROCEDURE — 25000003 PHARM REV CODE 250: Performed by: HOSPITALIST

## 2024-11-03 PROCEDURE — 21400001 HC TELEMETRY ROOM

## 2024-11-03 PROCEDURE — 25000242 PHARM REV CODE 250 ALT 637 W/ HCPCS: Performed by: HOSPITALIST

## 2024-11-03 PROCEDURE — 25000003 PHARM REV CODE 250: Performed by: STUDENT IN AN ORGANIZED HEALTH CARE EDUCATION/TRAINING PROGRAM

## 2024-11-03 PROCEDURE — 63600175 PHARM REV CODE 636 W HCPCS: Performed by: NURSE PRACTITIONER

## 2024-11-03 RX ADMIN — Medication: at 09:11

## 2024-11-03 RX ADMIN — ENOXAPARIN SODIUM 40 MG: 40 INJECTION SUBCUTANEOUS at 05:11

## 2024-11-03 RX ADMIN — METFORMIN HYDROCHLORIDE 1000 MG: 500 TABLET, FILM COATED ORAL at 08:11

## 2024-11-03 RX ADMIN — DULOXETINE HYDROCHLORIDE 60 MG: 30 CAPSULE, DELAYED RELEASE ORAL at 08:11

## 2024-11-03 RX ADMIN — PANTOPRAZOLE SODIUM 40 MG: 40 TABLET, DELAYED RELEASE ORAL at 08:11

## 2024-11-03 RX ADMIN — METFORMIN HYDROCHLORIDE 1000 MG: 500 TABLET, FILM COATED ORAL at 05:11

## 2024-11-03 RX ADMIN — DONEPEZIL HYDROCHLORIDE 5 MG: 5 TABLET, FILM COATED ORAL at 08:11

## 2024-11-03 RX ADMIN — LOSARTAN POTASSIUM 25 MG: 25 TABLET, FILM COATED ORAL at 09:11

## 2024-11-03 RX ADMIN — INSULIN ASPART 12 UNITS: 100 INJECTION, SOLUTION INTRAVENOUS; SUBCUTANEOUS at 05:11

## 2024-11-03 RX ADMIN — Medication 400 MG: at 08:11

## 2024-11-03 RX ADMIN — INSULIN ASPART 12 UNITS: 100 INJECTION, SOLUTION INTRAVENOUS; SUBCUTANEOUS at 08:11

## 2024-11-03 RX ADMIN — INSULIN GLARGINE 30 UNITS: 100 INJECTION, SOLUTION SUBCUTANEOUS at 08:11

## 2024-11-03 RX ADMIN — CLOPIDOGREL BISULFATE 75 MG: 75 TABLET ORAL at 09:11

## 2024-11-03 RX ADMIN — AMLODIPINE BESYLATE 10 MG: 5 TABLET ORAL at 09:11

## 2024-11-03 RX ADMIN — ATORVASTATIN CALCIUM 40 MG: 40 TABLET, FILM COATED ORAL at 08:11

## 2024-11-03 RX ADMIN — ASPIRIN 81 MG: 81 TABLET, COATED ORAL at 09:11

## 2024-11-03 RX ADMIN — Medication 400 MG: at 09:11

## 2024-11-03 RX ADMIN — EMPAGLIFLOZIN 25 MG: 25 TABLET, FILM COATED ORAL at 08:11

## 2024-11-03 RX ADMIN — Medication: at 08:11

## 2024-11-03 NOTE — HPI
74 y.o. female who PMH includes CVA no deficits, CAD, chronic arthritis, HTN, HLD, DM type II, dementia; presents to the ED at [facility] on 10/24/2024 with a primary complaint of dizziness with associated blurred vision. Pt reports she had visit from her home health nurse  which reported her blood glucose was in the 400's, it was reported that the patient had a low blood pressure as well and had a witnessed fall where she landed on her buttocks while ambulating with a walker.  No reports of head injury.  No reports of loss of consciousness or seizures.  Patient denies any alcohol, tobacco, or illicit drug use.  She reports she quit smoking 2 years ago.  The daughter  daughter called EMS for ED transport.  Patient does report feeling weak and not herself over the past few days.  No reports of shortness a breath, fever, chills or any sick contacts.  She denies any new foods or medications.  Lab work reviewed demonstrated sodium 135, creatinine 1.22, glucose 254, other indices unremarkable.  Chest x-ray impression reviewed demonstrated no abnormality seen.  CT head without contrast impression reviewed demonstrated no acute intracranial findings or significant interval change compared to June 20, 2022.  In reviewing patient's lab trends her creatinine in July 20, 2023 was 1.17.  Her last A1c was 9.1 on 10/07/2024.  Patient does admit to not following her diet as prescribed.  She does reports she no longer smokes cigarettes she quit 2 years ago around the time that she had her stroke.  Initial vital signs /71 pulse 87 respirations 18 temperature 98° F O2 saturation 92% on room air.  Patient did receive some IV hydration which her blood pressure did trend up and was in the hypertensive side at the time of rounds.  She reports feeling much better.  Her O2 saturations improved without the requirement of supplemental oxygen therapy.  No family was present at the bedside at the time of rounds however patient is able to  provide the historical information to the questions asked.  She did still endorse chest pain at the time of examination which was reproducible. Pt is ask for her cardiologist Dr. Estrella.  Patient is admitted to hospital medicine services for further management.

## 2024-11-03 NOTE — SUBJECTIVE & OBJECTIVE
Review of Systems   Constitutional:  Negative for chills, fatigue and fever.   Respiratory: Negative.     Cardiovascular: Negative.    Gastrointestinal:  Positive for constipation. Negative for abdominal distention, abdominal pain, nausea and vomiting.   Genitourinary: Negative.    Musculoskeletal: Negative.    All other systems reviewed and are negative.    Objective:     Vital Signs (Most Recent):  Temp: 98.3 °F (36.8 °C) (11/03/24 0818)  Pulse: 91 (11/03/24 0818)  Resp: 20 (11/03/24 0818)  BP: 135/69 (11/03/24 0914)  SpO2: 97 % (11/03/24 0818) Vital Signs (24h Range):  Temp:  [97 °F (36.1 °C)-98.9 °F (37.2 °C)] 98.3 °F (36.8 °C)  Pulse:  [84-91] 91  Resp:  [18-20] 20  SpO2:  [97 %-98 %] 97 %  BP: (117-135)/(68-70) 135/69     Weight: 64.4 kg (142 lb)  Body mass index is 24.37 kg/m².    Intake/Output Summary (Last 24 hours) at 11/3/2024 0939  Last data filed at 11/2/2024 1457  Gross per 24 hour   Intake 720 ml   Output --   Net 720 ml         Physical Exam  Constitutional:       Appearance: Normal appearance.   Cardiovascular:      Rate and Rhythm: Normal rate and regular rhythm.      Pulses: Normal pulses.      Heart sounds: Normal heart sounds.   Pulmonary:      Effort: Pulmonary effort is normal.      Breath sounds: Normal breath sounds.   Abdominal:      General: Bowel sounds are normal.      Palpations: Abdomen is soft.   Skin:     General: Skin is warm and dry.   Neurological:      Mental Status: She is alert and oriented to person, place, and time. Mental status is at baseline.             Significant Labs: All pertinent labs within the past 24 hours have been reviewed.    Significant Imaging: I have reviewed all pertinent imaging results/findings within the past 24 hours.

## 2024-11-03 NOTE — HOSPITAL COURSE
Patient is sitting up in bed and doing well.  She does have some symptoms of constipation so she is asking for a stool softener.  Her appetite is decent and she has no other new complaints at this time.

## 2024-11-03 NOTE — PROGRESS NOTES
[facility] Hospital Medicine  Progress Note    Patient Name: Mayra Parker  MRN: [id number]  Patient Class: IP- Inpatient   Admission Date: 10/24/2024  Length of Stay: 9 days  Attending Physician: Jordy Young MD  Primary Care Provider: Rashaun Weiss MD        Subjective:     Principal Problem:Dizziness        HPI:  74 y.o. female who PMH includes CVA no deficits, CAD, chronic arthritis, HTN, HLD, DM type II, dementia; presents to the ED at [facility] on 10/24/2024 with a primary complaint of dizziness with associated blurred vision. Pt reports she had visit from her home health nurse  which reported her blood glucose was in the 400's, it was reported that the patient had a low blood pressure as well and had a witnessed fall where she landed on her buttocks while ambulating with a walker.  No reports of head injury.  No reports of loss of consciousness or seizures.  Patient denies any alcohol, tobacco, or illicit drug use.  She reports she quit smoking 2 years ago.  The daughter  daughter called EMS for ED transport.  Patient does report feeling weak and not herself over the past few days.  No reports of shortness a breath, fever, chills or any sick contacts.  She denies any new foods or medications.  Lab work reviewed demonstrated sodium 135, creatinine 1.22, glucose 254, other indices unremarkable.  Chest x-ray impression reviewed demonstrated no abnormality seen.  CT head without contrast impression reviewed demonstrated no acute intracranial findings or significant interval change compared to June 20, 2022.  In reviewing patient's lab trends her creatinine in July 20, 2023 was 1.17.  Her last A1c was 9.1 on 10/07/2024.  Patient does admit to not following her diet as prescribed.  She does reports she no longer smokes cigarettes she quit 2 years ago around the time that she had her stroke.  Initial vital signs /71 pulse 87 respirations 18 temperature 98° F  O2 saturation 92% on room air.  Patient did receive some IV hydration which her blood pressure did trend up and was in the hypertensive side at the time of rounds.  She reports feeling much better.  Her O2 saturations improved without the requirement of supplemental oxygen therapy.  No family was present at the bedside at the time of rounds however patient is able to provide the historical information to the questions asked.  She did still endorse chest pain at the time of examination which was reproducible. Pt is ask for her cardiologist Dr. Estrella.  Patient is admitted to hospital medicine services for further management.    Overview/Hospital Course:  Patient is sitting up in bed and doing well.  She does have some symptoms of constipation so she is asking for a stool softener.  Her appetite is decent and she has no other new complaints at this time.        Review of Systems   Constitutional:  Negative for chills, fatigue and fever.   Respiratory: Negative.     Cardiovascular: Negative.    Gastrointestinal:  Positive for constipation. Negative for abdominal distention, abdominal pain, nausea and vomiting.   Genitourinary: Negative.    Musculoskeletal: Negative.    All other systems reviewed and are negative.    Objective:     Vital Signs (Most Recent):  Temp: 98.3 °F (36.8 °C) (11/03/24 0818)  Pulse: 91 (11/03/24 0818)  Resp: 20 (11/03/24 0818)  BP: 135/69 (11/03/24 0914)  SpO2: 97 % (11/03/24 0818) Vital Signs (24h Range):  Temp:  [97 °F (36.1 °C)-98.9 °F (37.2 °C)] 98.3 °F (36.8 °C)  Pulse:  [84-91] 91  Resp:  [18-20] 20  SpO2:  [97 %-98 %] 97 %  BP: (117-135)/(68-70) 135/69     Weight: 64.4 kg (142 lb)  Body mass index is 24.37 kg/m².    Intake/Output Summary (Last 24 hours) at 11/3/2024 0939  Last data filed at 11/2/2024 1457  Gross per 24 hour   Intake 720 ml   Output --   Net 720 ml         Physical Exam  Constitutional:       Appearance: Normal appearance.   Cardiovascular:      Rate and Rhythm: Normal rate  and regular rhythm.      Pulses: Normal pulses.      Heart sounds: Normal heart sounds.   Pulmonary:      Effort: Pulmonary effort is normal.      Breath sounds: Normal breath sounds.   Abdominal:      General: Bowel sounds are normal.      Palpations: Abdomen is soft.   Skin:     General: Skin is warm and dry.   Neurological:      Mental Status: She is alert and oriented to person, place, and time. Mental status is at baseline.             Significant Labs: All pertinent labs within the past 24 hours have been reviewed.    Significant Imaging: I have reviewed all pertinent imaging results/findings within the past 24 hours.    Assessment/Plan:      No notes have been filed under this hospital service.  Service: Hospital Medicine    Noncardiac chest pain, musculoskeletal   Hypertensive urgency , resolved  Dizziness likely orthostatic  Witnessed fall  History of type 2 diabetes, CKD, hypertension, CVA     Cardiology has recommended supportive care.  Continue with her dual antiplatelet therapy.  Patient's Jardiance and metformin were restarted recently and her sugars are improving but we will continue with the scheduled insulin.  She continues to work with therapy and doing well and skilled nursing facility is considered.  We will start on Colace as well for stool softener and monitor.    VTE Risk Mitigation (From admission, onward)           Ordered     enoxaparin injection 40 mg  Daily         10/25/24 0359     IP VTE HIGH RISK PATIENT  Once         10/25/24 0359     Place sequential compression device  Until discontinued         10/25/24 0359                    Discharge Planning   GIAN: 11/4/2024     Code Status: Full Code   Is the patient medically ready for discharge?:     Reason for patient still in hospital (select all that apply): Pending disposition  Discharge Plan A: Skilled Nursing Facility (FOC: The patient and her daughter (Cynthia Pratt) are requesting that (SNF) referrals be sent to: 1. [facility] 2.  [facility] 3. [facility].)   Discharge Delays: None known at this time              Rebecca Titus MD  Department of Hospital Medicine   [facility]

## 2024-11-04 LAB
POCT GLUCOSE: 100 MG/DL (ref 70–110)
POCT GLUCOSE: 160 MG/DL (ref 70–110)
POCT GLUCOSE: 241 MG/DL (ref 70–110)

## 2024-11-04 PROCEDURE — 25000003 PHARM REV CODE 250: Performed by: STUDENT IN AN ORGANIZED HEALTH CARE EDUCATION/TRAINING PROGRAM

## 2024-11-04 PROCEDURE — 21400001 HC TELEMETRY ROOM

## 2024-11-04 PROCEDURE — 63600175 PHARM REV CODE 636 W HCPCS: Performed by: STUDENT IN AN ORGANIZED HEALTH CARE EDUCATION/TRAINING PROGRAM

## 2024-11-04 PROCEDURE — 25000003 PHARM REV CODE 250: Performed by: HOSPITALIST

## 2024-11-04 PROCEDURE — 63600175 PHARM REV CODE 636 W HCPCS: Performed by: NURSE PRACTITIONER

## 2024-11-04 PROCEDURE — 25000003 PHARM REV CODE 250: Performed by: INTERNAL MEDICINE

## 2024-11-04 PROCEDURE — 97116 GAIT TRAINING THERAPY: CPT

## 2024-11-04 RX ORDER — AMLODIPINE BESYLATE 10 MG/1
10 TABLET ORAL DAILY
Qty: 90 TABLET | Refills: 3 | Status: SHIPPED | OUTPATIENT
Start: 2024-11-05 | End: 2024-11-05

## 2024-11-04 RX ORDER — INSULIN GLARGINE 100 [IU]/ML
30 INJECTION, SOLUTION SUBCUTANEOUS NIGHTLY
Qty: 9 ML | Refills: 11 | Status: SHIPPED | OUTPATIENT
Start: 2024-11-04 | End: 2024-11-05

## 2024-11-04 RX ORDER — DULOXETIN HYDROCHLORIDE 60 MG/1
60 CAPSULE, DELAYED RELEASE ORAL EVERY MORNING
Qty: 30 CAPSULE | Refills: 0 | Status: SHIPPED | OUTPATIENT
Start: 2024-11-04 | End: 2024-11-05

## 2024-11-04 RX ORDER — DONEPEZIL HYDROCHLORIDE 5 MG/1
5 TABLET, FILM COATED ORAL NIGHTLY
Qty: 30 TABLET | Refills: 0 | Status: SHIPPED | OUTPATIENT
Start: 2024-11-04 | End: 2024-11-05

## 2024-11-04 RX ORDER — NITROGLYCERIN 0.4 MG/1
0.4 TABLET SUBLINGUAL EVERY 5 MIN PRN
Qty: 100 TABLET | Refills: 0 | Status: SHIPPED | OUTPATIENT
Start: 2024-11-04 | End: 2024-11-05

## 2024-11-04 RX ORDER — MECLIZINE HYDROCHLORIDE 25 MG/1
25 TABLET ORAL 3 TIMES DAILY PRN
Qty: 30 TABLET | Refills: 0 | Status: SHIPPED | OUTPATIENT
Start: 2024-11-04 | End: 2024-11-05

## 2024-11-04 RX ADMIN — ENOXAPARIN SODIUM 40 MG: 40 INJECTION SUBCUTANEOUS at 04:11

## 2024-11-04 RX ADMIN — INSULIN ASPART 12 UNITS: 100 INJECTION, SOLUTION INTRAVENOUS; SUBCUTANEOUS at 12:11

## 2024-11-04 RX ADMIN — METFORMIN HYDROCHLORIDE 1000 MG: 500 TABLET, FILM COATED ORAL at 04:11

## 2024-11-04 RX ADMIN — METFORMIN HYDROCHLORIDE 1000 MG: 500 TABLET, FILM COATED ORAL at 09:11

## 2024-11-04 RX ADMIN — DONEPEZIL HYDROCHLORIDE 5 MG: 5 TABLET, FILM COATED ORAL at 08:11

## 2024-11-04 RX ADMIN — PANTOPRAZOLE SODIUM 40 MG: 40 TABLET, DELAYED RELEASE ORAL at 09:11

## 2024-11-04 RX ADMIN — INSULIN ASPART 2 UNITS: 100 INJECTION, SOLUTION INTRAVENOUS; SUBCUTANEOUS at 12:11

## 2024-11-04 RX ADMIN — Medication 400 MG: at 08:11

## 2024-11-04 RX ADMIN — INSULIN GLARGINE 30 UNITS: 100 INJECTION, SOLUTION SUBCUTANEOUS at 08:11

## 2024-11-04 RX ADMIN — INSULIN ASPART 12 UNITS: 100 INJECTION, SOLUTION INTRAVENOUS; SUBCUTANEOUS at 09:11

## 2024-11-04 RX ADMIN — ASPIRIN 81 MG: 81 TABLET, COATED ORAL at 09:11

## 2024-11-04 RX ADMIN — ATORVASTATIN CALCIUM 40 MG: 40 TABLET, FILM COATED ORAL at 08:11

## 2024-11-04 RX ADMIN — Medication 400 MG: at 09:11

## 2024-11-04 RX ADMIN — AMLODIPINE BESYLATE 10 MG: 5 TABLET ORAL at 09:11

## 2024-11-04 RX ADMIN — CLOPIDOGREL BISULFATE 75 MG: 75 TABLET ORAL at 09:11

## 2024-11-04 RX ADMIN — LOSARTAN POTASSIUM 25 MG: 25 TABLET, FILM COATED ORAL at 09:11

## 2024-11-04 RX ADMIN — DOCUSATE SODIUM 50 MG: 50 CAPSULE, LIQUID FILLED ORAL at 09:11

## 2024-11-04 RX ADMIN — DULOXETINE HYDROCHLORIDE 60 MG: 30 CAPSULE, DELAYED RELEASE ORAL at 09:11

## 2024-11-04 NOTE — PT/OT/SLP PROGRESS
Physical Therapy Treatment    Patient Name:  Mayra Parker   MRN:  [id number]    Recommendations:     Discharge therapy intensity: Moderate Intensity Therapy   Discharge Equipment Recommendations: to be determined by next level of care  Barriers to discharge: Ongoing medical needs    Assessment:     Mayra Parker is a 74 y.o. female admitted with a medical diagnosis of dizziness, weakness, chest pain.  She presents with the following impairments/functional limitations: weakness, gait instability, impaired endurance, impaired balance, decreased lower extremity function.    Rehab Prognosis: Good; patient would benefit from acute skilled PT services to address these deficits and reach maximum level of function.    Recent Surgery: * No surgery found *      Plan:     During this hospitalization, patient would benefit from acute PT services 5 x/week to address the identified rehab impairments via gait training, therapeutic activities, therapeutic exercises and progress toward the following goals:    Plan of Care Expires:  11/26/24    Subjective     Chief Complaint: tired   Patient/Family Comments/goals: get stronger   Pain/Comfort:   No verbal pain rating stated       Objective:     Communicated with RN prior to session.  Patient found HOB elevated with telemetry, pulse ox (continuous) upon PT entry to room.     General Precautions: Standard, fall  Orthopedic Precautions: N/A  Braces: N/A  Respiratory Status: Room air  Blood Pressure:   Skin Integrity: Visible skin intact      Functional Mobility:  Bed Mobility:     Supine to Sit: stand by assistance  Transfers:     Sit to Stand:  contact guard assistance with rolling walker  Gait: Pt amb 140ft CGA using RW demo mild unsteadiness, forward flexed trunk, and step through pattern. Unable to amb any further secondary to fatigue.     Therapeutic Activities/Exercises:    Education:  Patient provided with verbal education education regarding PT role/goals/POC, fall  prevention, and safety awareness.  Understanding was verbalized, however additional teaching warranted.     Patient left up in chair with all lines intact and call button in reach    GOALS:   Multidisciplinary Problems       Physical Therapy Goals          Problem: Physical Therapy    Goal Priority Disciplines Outcome Interventions   Physical Therapy Goal     PT, PT/OT Progressing    Description: Goals to be met by: 2024     Patient will increase functional independence with mobility by performin. Supine to sit with Stand-by Assistance  2. Sit to supine with Stand-by Assistance  3. Sit to stand transfer with Stand-by Assistance  4. Gait  TBD                         Time Tracking:     PT Received On: 24  PT Start Time: 1105     PT Stop Time: 1113  PT Total Time (min): 8 min     Billable Minutes: Gait Training 8    Treatment Type: Treatment  PT/PTA: PT     Number of PTA visits since last PT visit: 5     2024

## 2024-11-04 NOTE — PLAN OF CARE
11/04/24 1405   Discharge Reassessment   Assessment Type Discharge Planning Reassessment   Did the patient's condition or plan change since previous assessment? No   Discharge Plan discussed with: Patient;Adult children  (Cynthia Pratt (Daughter)  [phone] (Mobile))   Communicated GIAN with patient/caregiver Date not available/Unable to determine   Discharge Plan A Skilled Nursing Facility  (FOC: The patient was accepted by [facility] and will be accepted for admission on tomorrow.)   Discharge Plan B Skilled Nursing Facility   DME Needed Upon Discharge  none   Transition of Care Barriers None   Why the patient remains in the hospital Requires continued medical care   Post-Acute Status   Post-Acute Authorization Placement   Post-Acute Placement Status Set-up Complete/Auth obtained   Coverage Payor: MEDICARE - MEDICARE PART A & B -   Hospital Resources/Appts/Education Provided Appointments scheduled and added to S   Patient choice form signed by patient/caregiver List with quality metrics by geographic area provided   Discharge Delays None known at this time     The patient has been accepted for (SNF) services at [facility] and will be admitted tomorrow. Clinical notes/updates were uploaded and sent by BESSY Bradshaw via [employer]. The patient and her daughter (Cynthia Pratt: [phone]) were informed of the above discharge plans and both are in agreement.

## 2024-11-04 NOTE — DISCHARGE SUMMARY
[facility] Hospital Medicine Discharge Summary    Admit Date: 10/24/2024  Discharge Date and Time: 11//5/2024 6:02 AM    Admitting Physician: Hospitalist team   Discharging Physician: Jordy Young MD.  Primary Care Physician: Rashaun Weiss MD      Discharge Diagnoses:    Noncardiac chest pain, musculoskeletal   Hypertensive urgency , resolved  Dizziness likely orthostatic  Witnessed fall  History of type 2 diabetes, CKD, hypertension, CVA    Hospital Course:   74 y.o. female who PMH includes CVA no deficits, CAD, chronic arthritis, HTN, HLD, DM type II, dementia; presents to the ED at [facility] on 10/24/2024 with a primary complaint of dizziness with associated blurred vision. Pt reports she had visit from her home health nurse  which reported her blood glucose was in the 400's, it was reported that the patient had a low blood pressure as well and had a witnessed fall where she landed on her buttocks while ambulating with a walker.  No reports of head injury.  No reports of loss of consciousness or seizures.  Patient denies any alcohol, tobacco, or illicit drug use.  She reports she quit smoking 2 years ago.  The daughter  daughter called EMS for ED transport.  Patient does report feeling weak and not herself over the past few days.  No reports of shortness a breath, fever, chills or any sick contacts.  She denies any new foods or medications.  Lab work reviewed demonstrated sodium 135, creatinine 1.22, glucose 254, other indices unremarkable.  Chest x-ray impression reviewed demonstrated no abnormality seen.  CT head without contrast impression reviewed demonstrated no acute intracranial findings or significant interval change compared to June 20, 2022.  In reviewing patient's lab trends her creatinine in July 20, 2023 was 1.17.  Her last A1c was 9.1 on 10/07/2024.  Patient does admit to not following her diet as prescribed.  She does reports she no longer smokes cigarettes she  "quit 2 years ago around the time that she had her stroke.  Initial vital signs /71 pulse 87 respirations 18 temperature 98° F O2 saturation 92% on room air.  Patient did receive some IV hydration which her blood pressure did trend up and was in the hypertensive side at the time of rounds.  She reports feeling much better.  Her O2 saturations improved without the requirement of supplemental oxygen therapy.  No family was present at the bedside at the time of rounds however patient is able to provide the historical information to the questions asked.  She did still endorse chest pain at the time of examination which was reproducible. Pt is ask for her cardiologist Dr. Estrella.  Patient is admitted to hospital medicine services for further management.  Cardiology evaluated and recommending DAPT with ASA and Plavix. Will follow up in clinic.  She continued to work with PT/OT who are recommending moderate intensity therapy.  She was accepted to SNF on 11/5/24       Vitals:  Blood pressure 133/75, pulse 86, temperature 97.4 °F (36.3 °C), temperature source Oral, resp. rate 19, height 5' 4" (1.626 m), weight 64.4 kg (142 lb), SpO2 99%..    Physical Exam:  Awake, Alert, Oriented x 3, No new focal Neurologic deficit, Normal Affect  NC/AT, PERRLA, EOMI  Supple neck, no JVD, No cervical lymphadenopathy  Symmetrical chest, Good air entry bilaterally. No rhonchi, wheezes, crackles appreciated  RRR, No gallop, rub or murmur  +ve Bowel sounds x4, Abd soft and non tender, no rebound, guarding or rigidity  No Cyanosis, cludding or edema, No new rash or bruises    Procedures Performed: No admission procedures for hospital encounter.     Significant Diagnostic Studies: See Full reports for all details  No results displayed because visit has over 200 results.           Microbiology Results (last 7 days)       ** No results found for the last 168 hours. **             CT Head Without Contrast    Result Date: 10/30/2024  EXAMINATION: " CT HEAD WITHOUT CONTRAST CLINICAL HISTORY: Dizziness, persistent/recurrent, cardiac or vascular cause suspected; TECHNIQUE: CT imaging of the head performed from the skull base to the vertex without intravenous contrast.   mGycm. Automatic exposure control, adjustment of mA/kV or iterative reconstruction technique was used to reduce radiation. COMPARISON: 24 October 2024 FINDINGS: There is no acute cortical infarct, hemorrhage or mass lesion.  No new parenchymal attenuation abnormality.  Ventricular size is stable.  There are vascular calcifications. Visualized paranasal sinuses and mastoid air cells are clear.     No acute intracranial findings or significant interval change compared to last week. Electronically signed by: Akbar Mcneill Date:    10/30/2024 Time:    09:30    Echo    Result Date: 10/25/2024    Left Ventricle: The left ventricle is normal in size. Normal wall thickness. There is normal systolic function with a visually estimated ejection fraction of 55 - 60%.   Right Ventricle: Normal right ventricular cavity size. Systolic function is normal.   IVC/SVC: Normal venous pressure at 3 mmHg.     CT Head Without Contrast    Result Date: 10/25/2024  EXAMINATION: CT HEAD WITHOUT CONTRAST CLINICAL HISTORY: Dizziness, persistent/recurrent, cardiac or vascular cause suspected; TECHNIQUE: CT imaging of the head performed from the skull base to the vertex without intravenous contrast.  DLP 1025 mGycm. Automatic exposure control, adjustment of mA/kV or iterative reconstruction technique was used to reduce radiation. COMPARISON: 25 June 2022 FINDINGS: There is no acute cortical infarct, hemorrhage or mass lesion.  There is left cerebellar encephalomalacia.  No new parenchymal attenuation abnormality.  Ventricular size is stable.  There are vascular calcifications. Visualized paranasal sinuses and mastoid air cells are clear.     No acute intracranial findings or significant interval change compared to June  2022. No significant discrepancy with the preliminary report. Electronically signed by: Akbar Mcneill Date:    10/25/2024 Time:    08:03    X-Ray Chest 1 View    Result Date: 10/24/2024  EXAMINATION: XR CHEST 1 VIEW CLINICAL HISTORY: Weakness TECHNIQUE: Single frontal view of the chest was performed. COMPARISON: 09/26/2022 FINDINGS: The lungs are clear.  The heart is normal appearance.  The pulmonary vascularity is unremarkable.  Aorta appears grossly unremarkable.  No pleural effusions are seen.  Bones and joints show no acute abnormality.     No abnormality seen Electronically signed by: Fabiano Parmar Date:    10/24/2024 Time:    18:37  - pulls last radiology orders        Medication List        START taking these medications      amLODIPine 10 MG tablet  Commonly known as: NORVASC  Take 1 tablet (10 mg total) by mouth once daily.     docusate sodium 50 MG capsule  Commonly known as: COLACE  Take 1 capsule (50 mg total) by mouth once daily.     insulin glargine U-100 (Lantus) 100 unit/mL injection  Inject 30 Units into the skin every evening.     meclizine 25 mg tablet  Commonly known as: ANTIVERT  Take 1 tablet (25 mg total) by mouth 3 (three) times daily as needed for Dizziness.     nitroGLYCERIN 0.4 MG SL tablet  Commonly known as: NITROSTAT  Place 1 tablet (0.4 mg total) under the tongue every 5 (five) minutes as needed for Chest pain.            CONTINUE taking these medications      aspirin 81 MG EC tablet  Commonly known as: ECOTRIN     atorvastatin 40 MG tablet  Commonly known as: LIPITOR     clopidogreL 75 mg tablet  Commonly known as: PLAVIX  Take 1 tablet (75 mg total) by mouth once daily.     donepeziL 5 MG tablet  Commonly known as: ARICEPT  Take 1 tablet (5 mg total) by mouth every evening.     DULoxetine 60 MG capsule  Commonly known as: CYMBALTA  Take 1 capsule (60 mg total) by mouth every morning.     JARDIANCE 25 mg tablet  Generic drug: empagliflozin     magnesium oxide 400 mg (241.3 mg  magnesium) tablet  Commonly known as: MAG-OX  Take 1 tablet (400 mg total) by mouth 2 (two) times daily.     metFORMIN 1000 MG tablet  Commonly known as: GLUCOPHAGE  Take 1 tablet (1,000 mg total) by mouth 2 (two) times daily with meals.     pantoprazole 40 MG tablet  Commonly known as: PROTONIX            STOP taking these medications      carvediloL 3.125 MG tablet  Commonly known as: COREG     TRULICITY 1.5 mg/0.5 mL pen injector  Generic drug: dulaglutide               Where to Get Your Medications        [facility]  [address] [address]      Phone: [phone]   amLODIPine 10 MG tablet  docusate sodium 50 MG capsule  donepeziL 5 MG tablet  DULoxetine 60 MG capsule  insulin glargine U-100 (Lantus) 100 unit/mL injection  meclizine 25 mg tablet  nitroGLYCERIN 0.4 MG SL tablet          Explained in detail to the patient about the discharge plan, medications, and follow-up visits. Pt understands and agrees with the treatment plan  Discharged Condition: stable  Diet: cardiac  Disposition: [facility]    Medications Per DC med rec  Activities as tolerated  Follow up with your PCP in 2 wks   For further questions contact hospitalist office    Discharge time 33 minutes    For worsening symptoms, chest pain, shortness of breath, increased abdominal pain, high grade fever, stroke or stroke like symptoms, immediately go to the nearest Emergency Room or call 911 as soon as possible.        Jordy Shah M.D, on 11/5/24 at 6:02 AM

## 2024-11-04 NOTE — PROGRESS NOTES
[facility] Hospital Medicine Progress Note        Chief Complaint: Inpatient Follow-up     HPI:   74 y.o. female who PMH includes CVA no deficits, CAD, chronic arthritis, HTN, HLD, DM type II, dementia; presents to the ED at [facility] on 10/24/2024 with a primary complaint of dizziness with associated blurred vision. Pt reports she had visit from her home health nurse  which reported her blood glucose was in the 400's, it was reported that the patient had a low blood pressure as well and had a witnessed fall where she landed on her buttocks while ambulating with a walker.  No reports of head injury.  No reports of loss of consciousness or seizures.  Patient denies any alcohol, tobacco, or illicit drug use.  She reports she quit smoking 2 years ago.  The daughter  daughter called EMS for ED transport.  Patient does report feeling weak and not herself over the past few days.  No reports of shortness a breath, fever, chills or any sick contacts.  She denies any new foods or medications.  Lab work reviewed demonstrated sodium 135, creatinine 1.22, glucose 254, other indices unremarkable.  Chest x-ray impression reviewed demonstrated no abnormality seen.  CT head without contrast impression reviewed demonstrated no acute intracranial findings or significant interval change compared to June 20, 2022.  In reviewing patient's lab trends her creatinine in July 20, 2023 was 1.17.  Her last A1c was 9.1 on 10/07/2024.  Patient does admit to not following her diet as prescribed.  She does reports she no longer smokes cigarettes she quit 2 years ago around the time that she had her stroke.  Initial vital signs /71 pulse 87 respirations 18 temperature 98° F O2 saturation 92% on room air.  Patient did receive some IV hydration which her blood pressure did trend up and was in the hypertensive side at the time of rounds.  She reports feeling much better.  Her O2 saturations improved without the  requirement of supplemental oxygen therapy.  No family was present at the bedside at the time of rounds however patient is able to provide the historical information to the questions asked.  She did still endorse chest pain at the time of examination which was reproducible. Pt is ask for her cardiologist Dr. Estrella.  Patient is admitted to hospital medicine services for further management.    Interval Hx:  No significant changes overnight.  Resting comfortably in bed.  No new complaints    Objective/physical exam:  General: In no acute distress, afebrile  Chest: Clear to auscultation bilaterally  Heart: RRR, +S1, S2, no appreciable murmur  Abdomen: Soft, nontender, BS +  MSK: Warm, no lower extremity edema, no clubbing or cyanosis  Neurologic: Alert and oriented x4, Cranial nerve II-XII intact, Strength 5/5 in all 4 extremities    VITAL SIGNS: 24 HRS MIN & MAX LAST   Temp  Min: 97.4 °F (36.3 °C)  Max: 98.6 °F (37 °C) 97.4 °F (36.3 °C)   BP  Min: 97/57  Max: 135/69 126/75   Pulse  Min: 85  Max: 98  86   Resp  Min: 19  Max: 20 19   SpO2  Min: 94 %  Max: 99 % (!) 94 %       Recent Labs   Lab 11/01/24  0429   WBC 4.72   RBC 3.35*   HGB 9.8*   HCT 31.1*   MCV 92.8   MCH 29.3   MCHC 31.5*   RDW 14.5      MPV 9.5       Recent Labs   Lab 11/01/24  0429      K 4.9      CO2 25   BUN 12.8   CREATININE 0.91   CALCIUM 8.8          Microbiology Results (last 7 days)       ** No results found for the last 168 hours. **             Radiology:  CT Head Without Contrast  Narrative: EXAMINATION:  CT HEAD WITHOUT CONTRAST    CLINICAL HISTORY:  Dizziness, persistent/recurrent, cardiac or vascular cause suspected;    TECHNIQUE:  CT imaging of the head performed from the skull base to the vertex without intravenous contrast.   mGycm. Automatic exposure control, adjustment of mA/kV or iterative reconstruction technique was used to reduce radiation.    COMPARISON:  24 October 2024    FINDINGS:  There is no acute  cortical infarct, hemorrhage or mass lesion.  No new parenchymal attenuation abnormality.  Ventricular size is stable.  There are vascular calcifications.    Visualized paranasal sinuses and mastoid air cells are clear.  Impression: No acute intracranial findings or significant interval change compared to last week.    Electronically signed by: Akbar Mcneill  Date:    10/30/2024  Time:    09:30        Medications:  Scheduled Meds:   amLODIPine  10 mg Oral Daily    aspirin  81 mg Oral Daily    atorvastatin  40 mg Oral QHS    clopidogreL  75 mg Oral Daily    docusate sodium  50 mg Oral Daily    donepeziL  5 mg Oral QHS    DULoxetine  60 mg Oral QAM    empagliflozin  25 mg Oral QAM    enoxparin  40 mg Subcutaneous Daily    insulin aspart U-100  12 Units Subcutaneous TIDWM    insulin glargine U-100  30 Units Subcutaneous QHS    losartan  25 mg Oral Daily    magnesium oxide  400 mg Oral BID    metFORMIN  1,000 mg Oral BID WM    pantoprazole  40 mg Oral Daily    zinc oxide-cod liver oil   Topical (Top) BID     Continuous Infusions:  PRN Meds:.  Current Facility-Administered Medications:     acetaminophen, 650 mg, Oral, Q6H PRN    aluminum-magnesium hydroxide-simethicone, 30 mL, Oral, QID PRN    dextrose 10%, 12.5 g, Intravenous, PRN    dextrose 10%, 25 g, Intravenous, PRN    glucagon (human recombinant), 1 mg, Intramuscular, PRN    glucose, 16 g, Oral, PRN    glucose, 24 g, Oral, PRN    hydrALAZINE, 10 mg, Intravenous, Q4H PRN    insulin aspart U-100, 0-10 Units, Subcutaneous, QID (AC + HS) PRN    meclizine, 25 mg, Oral, TID PRN    melatonin, 6 mg, Oral, Nightly PRN    naloxone, 0.02 mg, Intravenous, PRN    nitroGLYCERIN, 0.4 mg, Sublingual, Q5 Min PRN    ondansetron, 4 mg, Intravenous, Q4H PRN    polyethylene glycol, 17 g, Oral, BID PRN    prochlorperazine, 5 mg, Intravenous, Q6H PRN    simethicone, 1 tablet, Oral, QID PRN        Assessment/Plan:     Noncardiac chest pain, musculoskeletal   Hypertensive urgency ,  resolved  Dizziness likely orthostatic  Witnessed fall  History of type 2 diabetes, CKD, hypertension, CVA    Continue dual antiplatelet therapy   Blood sugars stable and improving but still labile.    PT/OT: Moderate intensity therapy   Case management working on placement   Started on Colace.  Will monitor her bowels.  Labs pmaico.      Jordy Young MD   11/04/2024     All diagnosis and differential diagnosis have been reviewed; assessment and plan has been documented; I have personally reviewed the labs and test results that are presently available; I have reviewed the patients medication list; I have reviewed the consulting providers response and recommendations. I have reviewed or attempted to review medical records based upon their availability    All of the patient's questions have been  addressed and answered. Patient's is agreeable to the above stated plan. I will continue to monitor closely and make adjustments to medical management as needed.  _____________________________________________________________________

## 2024-11-04 NOTE — PROGRESS NOTES
[facility] [address]  Wound Care    Patient Name:  Mayra Parker   MRN:  [id number]  Date: 11/4/2024  Diagnosis: Dizziness    History:     Past Medical History:   Diagnosis Date    Arthritis     Coronary artery disease     CVA (cerebral vascular accident)     affected right side of body    Depression     Diabetes mellitus     Hyperlipidemia     Hypertension     Unspecified dementia without behavioral disturbance        Social History     Socioeconomic History    Marital status:    Tobacco Use    Smoking status: Former     Current packs/day: 0.50     Types: Cigarettes    Smokeless tobacco: Never    Tobacco comments:     Started smoking in late 20s   Substance and Sexual Activity    Alcohol use: Never    Drug use: Never    Sexual activity: Not Currently     Social Drivers of Health     Financial Resource Strain: Low Risk  (10/25/2024)    Overall Financial Resource Strain (CARDIA)     Difficulty of Paying Living Expenses: Not hard at all   Recent Concern: Financial Resource Strain - Medium Risk (10/8/2024)    Received from [facility]    Overall Financial Resource Strain (CARDIA)     Difficulty of Paying Living Expenses: Somewhat hard   Food Insecurity: No Food Insecurity (10/25/2024)    Hunger Vital Sign     Worried About Running Out of Food in the Last Year: Never true     Ran Out of Food in the Last Year: Never true   Transportation Needs: No Transportation Needs (10/25/2024)    TRANSPORTATION NEEDS     Transportation : No   Physical Activity: Inactive (10/25/2024)    Exercise Vital Sign     Days of Exercise per Week: 0 days     Minutes of Exercise per Session: 0 min   Stress: No Stress Concern Present (10/25/2024)    [employer] [place] [employer] - Occupational Stress Questionnaire     Feeling of Stress : Not at all   Housing Stability: Low Risk  (10/25/2024)    Housing Stability Vital  Sign     Unable to Pay for Housing in the Last Year: No     Homeless in the Last Year: No       Precautions:     Allergies as of 10/24/2024 - Reviewed 07/06/2023   Allergen Reaction Noted    Glipizide Swelling 03/15/2022    Influenza vaccine tr-s 11 (pf) Hives 06/25/2022    Pregabalin Other (See Comments) 06/09/2022       WO Assessment Details/Treatment      11/04/24 0852   WOCN Assessment   Visit Date 11/04/24   Visit Time 0852   Consult Type Follow Up   [facility] Speciality Wound   Intervention assessed;applied   Teaching on-going        Wound 10/26/24 Moisture associated dermatitis Coccyx   Date First Assessed: 10/26/24   Present on Original Admission: Yes  Primary Wound Type: Moisture associated dermatitis  Location: Coccyx   Wound Image    Dressing Appearance Clean;Dry   Drainage Amount None   Appearance Moist   Tissue loss description Partial thickness   Black (%), Wound Tissue Color 0 %   Red (%), Wound Tissue Color 100 %   Yellow (%), Wound Tissue Color 0 %   Periwound Area Indurated;Dry;Intact   Wound Edges Irregular   Wound Length (cm) 0.7 cm   Wound Width (cm) 0.4 cm   Wound Depth (cm) 0.1 cm   Wound Volume (cm^3) 0.028 cm^3   Wound Surface Area (cm^2) 0.28 cm^2   Care Cleansed with:;Wound cleanser   Dressing Applied;Other (comment)  (desitin, abd pad, tape)     [facility] follow up for re-evaluation of buttocks and present treatment orders in place. Pt alone at time of visit. She notified nurse her appetite is good; eating approximate 90 % breakfast. Pt is continent of bowels and bladder. Notified nurse she has been ambulating to bathroom with assistive device. Removed dressing to buttocks. Cleansed well. Measurements obtained, photos taken for EMR. Treatment recommendations to be continued at this time. Will follow up.   11/04/2024

## 2024-11-04 NOTE — PLAN OF CARE
SSC spoke with Michaelle Beckham @ [facility], who states pt can admit tomorrow. MD and nursing staff updated of the above.

## 2024-11-05 VITALS
HEIGHT: 64 IN | HEART RATE: 80 BPM | WEIGHT: 142 LBS | OXYGEN SATURATION: 99 % | RESPIRATION RATE: 20 BRPM | SYSTOLIC BLOOD PRESSURE: 137 MMHG | DIASTOLIC BLOOD PRESSURE: 75 MMHG | BODY MASS INDEX: 24.24 KG/M2 | TEMPERATURE: 98 F

## 2024-11-05 LAB
POCT GLUCOSE: 123 MG/DL (ref 70–110)
POCT GLUCOSE: 152 MG/DL (ref 70–110)
POCT GLUCOSE: 169 MG/DL (ref 70–110)

## 2024-11-05 PROCEDURE — 25000003 PHARM REV CODE 250: Performed by: INTERNAL MEDICINE

## 2024-11-05 PROCEDURE — 25000003 PHARM REV CODE 250: Performed by: HOSPITALIST

## 2024-11-05 PROCEDURE — 25000003 PHARM REV CODE 250: Performed by: STUDENT IN AN ORGANIZED HEALTH CARE EDUCATION/TRAINING PROGRAM

## 2024-11-05 PROCEDURE — 63600175 PHARM REV CODE 636 W HCPCS: Performed by: STUDENT IN AN ORGANIZED HEALTH CARE EDUCATION/TRAINING PROGRAM

## 2024-11-05 RX ORDER — MECLIZINE HYDROCHLORIDE 25 MG/1
25 TABLET ORAL 3 TIMES DAILY PRN
Qty: 30 TABLET | Refills: 0 | Status: SHIPPED | OUTPATIENT
Start: 2024-11-05 | End: 2024-12-05

## 2024-11-05 RX ORDER — INSULIN GLARGINE 100 [IU]/ML
30 INJECTION, SOLUTION SUBCUTANEOUS NIGHTLY
Qty: 9 ML | Refills: 11 | Status: SHIPPED | OUTPATIENT
Start: 2024-11-05 | End: 2025-11-05

## 2024-11-05 RX ORDER — DULOXETIN HYDROCHLORIDE 60 MG/1
60 CAPSULE, DELAYED RELEASE ORAL EVERY MORNING
Qty: 30 CAPSULE | Refills: 0 | Status: SHIPPED | OUTPATIENT
Start: 2024-11-05 | End: 2024-12-05

## 2024-11-05 RX ORDER — DONEPEZIL HYDROCHLORIDE 5 MG/1
5 TABLET, FILM COATED ORAL NIGHTLY
Qty: 30 TABLET | Refills: 0 | Status: SHIPPED | OUTPATIENT
Start: 2024-11-05

## 2024-11-05 RX ORDER — NITROGLYCERIN 0.4 MG/1
0.4 TABLET SUBLINGUAL EVERY 5 MIN PRN
Qty: 100 TABLET | Refills: 0 | Status: SHIPPED | OUTPATIENT
Start: 2024-11-05 | End: 2025-11-05

## 2024-11-05 RX ORDER — AMLODIPINE BESYLATE 10 MG/1
10 TABLET ORAL DAILY
Qty: 90 TABLET | Refills: 3 | Status: SHIPPED | OUTPATIENT
Start: 2024-11-05 | End: 2025-11-05

## 2024-11-05 RX ADMIN — INSULIN ASPART 12 UNITS: 100 INJECTION, SOLUTION INTRAVENOUS; SUBCUTANEOUS at 12:11

## 2024-11-05 RX ADMIN — DOCUSATE SODIUM 50 MG: 50 CAPSULE, LIQUID FILLED ORAL at 08:11

## 2024-11-05 RX ADMIN — CLOPIDOGREL BISULFATE 75 MG: 75 TABLET ORAL at 08:11

## 2024-11-05 RX ADMIN — PANTOPRAZOLE SODIUM 40 MG: 40 TABLET, DELAYED RELEASE ORAL at 08:11

## 2024-11-05 RX ADMIN — Medication 400 MG: at 08:11

## 2024-11-05 RX ADMIN — LOSARTAN POTASSIUM 25 MG: 25 TABLET, FILM COATED ORAL at 08:11

## 2024-11-05 RX ADMIN — ASPIRIN 81 MG: 81 TABLET, COATED ORAL at 08:11

## 2024-11-05 RX ADMIN — INSULIN ASPART 12 UNITS: 100 INJECTION, SOLUTION INTRAVENOUS; SUBCUTANEOUS at 08:11

## 2024-11-05 RX ADMIN — METFORMIN HYDROCHLORIDE 1000 MG: 500 TABLET, FILM COATED ORAL at 08:11

## 2024-11-05 RX ADMIN — DULOXETINE HYDROCHLORIDE 60 MG: 30 CAPSULE, DELAYED RELEASE ORAL at 08:11

## 2024-11-05 RX ADMIN — Medication: at 09:11

## 2024-11-05 RX ADMIN — AMLODIPINE BESYLATE 10 MG: 5 TABLET ORAL at 08:11

## 2024-11-05 NOTE — NURSING
Called report to nurse Elise at [facility]. Did not have any questions at this time. Pt in stable condition at time of report. NH van is to  at 1130.

## 2024-11-05 NOTE — PLAN OF CARE
11/05/24 0842   Final Note   Assessment Type Final Discharge Note   Anticipated Discharge Disposition SNF  (FOC: The patient was accepted by [facility] ([place], [place]). She will be discharged from [facility] to [facility] today. Transportation has been arranged by their facility van.)   Hospital Resources/Appts/Education Provided Appointments scheduled and added to AVS   Post-Acute Status   Post-Acute Authorization Placement   Post-Acute Placement Status Set-up Complete/Auth obtained   Coverage Payor: MEDICARE - MEDICARE PART A & B -   Patient choice form signed by patient/caregiver List with quality metrics by geographic area provided   Discharge Delays None known at this time     The patient will be discharged from [facility] to [facility] for (SNF) services. Clinical notes/updates and AVS & D/C Summary will be sent by: BESSY Bradshaw via [employer]. The patient and her daughter (Cynthia Pratt: [phone]) were informed of the above discharge plans and both are in agreement. Transportation will be arranged by facility van for 11:30 AM.

## 2024-11-05 NOTE — PLAN OF CARE
[facility] sent all DC information to Carrol Conde via "[employer]. [facility] will provide transportation for pt via NH van scheduled for  at 1130. DC packet given to nurse for report.

## 2024-11-06 ENCOUNTER — LAB REQUISITION (OUTPATIENT)
Dept: LAB | Facility: HOSPITAL | Age: 74
End: 2024-11-06
Payer: MEDICARE

## 2024-11-06 DIAGNOSIS — E78.5 HYPERLIPIDEMIA, UNSPECIFIED: ICD-10-CM

## 2024-11-06 DIAGNOSIS — I10 ESSENTIAL (PRIMARY) HYPERTENSION: ICD-10-CM

## 2024-11-06 DIAGNOSIS — E11.9 TYPE 2 DIABETES MELLITUS WITHOUT COMPLICATIONS: ICD-10-CM

## 2024-11-06 LAB
ALBUMIN SERPL-MCNC: 2.9 G/DL (ref 3.4–4.8)
ALBUMIN/GLOB SERPL: 0.9 RATIO (ref 1.1–2)
ALP SERPL-CCNC: 119 UNIT/L (ref 40–150)
ALT SERPL-CCNC: 46 UNIT/L (ref 0–55)
ANION GAP SERPL CALC-SCNC: 9 MEQ/L
AST SERPL-CCNC: 73 UNIT/L (ref 5–34)
BASOPHILS # BLD AUTO: 0.02 X10(3)/MCL
BASOPHILS NFR BLD AUTO: 0.4 %
BILIRUB SERPL-MCNC: 0.6 MG/DL
BUN SERPL-MCNC: 17.1 MG/DL (ref 9.8–20.1)
CALCIUM SERPL-MCNC: 9.1 MG/DL (ref 8.4–10.2)
CHLORIDE SERPL-SCNC: 102 MMOL/L (ref 98–107)
CHOLEST SERPL-MCNC: 155 MG/DL
CHOLEST/HDLC SERPL: 3 {RATIO} (ref 0–5)
CO2 SERPL-SCNC: 27 MMOL/L (ref 23–31)
CREAT SERPL-MCNC: 1.13 MG/DL (ref 0.55–1.02)
CREAT/UREA NIT SERPL: 15
EOSINOPHIL # BLD AUTO: 0.08 X10(3)/MCL (ref 0–0.9)
EOSINOPHIL NFR BLD AUTO: 1.7 %
ERYTHROCYTE [DISTWIDTH] IN BLOOD BY AUTOMATED COUNT: 14.5 % (ref 11.5–17)
EST. AVERAGE GLUCOSE BLD GHB EST-MCNC: 197.3 MG/DL
GFR SERPLBLD CREATININE-BSD FMLA CKD-EPI: 51 ML/MIN/1.73/M2
GLOBULIN SER-MCNC: 3.1 GM/DL (ref 2.4–3.5)
GLUCOSE SERPL-MCNC: 138 MG/DL (ref 82–115)
HBA1C MFR BLD: 8.5 %
HCT VFR BLD AUTO: 31.9 % (ref 37–47)
HDLC SERPL-MCNC: 53 MG/DL (ref 35–60)
HGB BLD-MCNC: 9.8 G/DL (ref 12–16)
IMM GRANULOCYTES # BLD AUTO: 0 X10(3)/MCL (ref 0–0.04)
IMM GRANULOCYTES NFR BLD AUTO: 0 %
LDLC SERPL CALC-MCNC: 81 MG/DL (ref 50–140)
LYMPHOCYTES # BLD AUTO: 1.58 X10(3)/MCL (ref 0.6–4.6)
LYMPHOCYTES NFR BLD AUTO: 33.7 %
MCH RBC QN AUTO: 28.6 PG (ref 27–31)
MCHC RBC AUTO-ENTMCNC: 30.7 G/DL (ref 33–36)
MCV RBC AUTO: 93 FL (ref 80–94)
MONOCYTES # BLD AUTO: 0.61 X10(3)/MCL (ref 0.1–1.3)
MONOCYTES NFR BLD AUTO: 13 %
NEUTROPHILS # BLD AUTO: 2.4 X10(3)/MCL (ref 2.1–9.2)
NEUTROPHILS NFR BLD AUTO: 51.2 %
NRBC BLD AUTO-RTO: 0 %
PLATELET # BLD AUTO: 297 X10(3)/MCL (ref 130–400)
PMV BLD AUTO: 9.7 FL (ref 7.4–10.4)
POTASSIUM SERPL-SCNC: 5.5 MMOL/L (ref 3.5–5.1)
PROT SERPL-MCNC: 6 GM/DL (ref 5.8–7.6)
RBC # BLD AUTO: 3.43 X10(6)/MCL (ref 4.2–5.4)
SODIUM SERPL-SCNC: 138 MMOL/L (ref 136–145)
TRIGL SERPL-MCNC: 104 MG/DL (ref 37–140)
TSH SERPL-ACNC: 5.72 UIU/ML (ref 0.35–4.94)
VLDLC SERPL CALC-MCNC: 21 MG/DL
WBC # BLD AUTO: 4.69 X10(3)/MCL (ref 4.5–11.5)

## 2024-11-06 PROCEDURE — 80053 COMPREHEN METABOLIC PANEL: CPT | Performed by: FAMILY MEDICINE

## 2024-11-06 PROCEDURE — 83036 HEMOGLOBIN GLYCOSYLATED A1C: CPT | Performed by: FAMILY MEDICINE

## 2024-11-06 PROCEDURE — 84443 ASSAY THYROID STIM HORMONE: CPT | Performed by: FAMILY MEDICINE

## 2024-11-06 PROCEDURE — 85025 COMPLETE CBC W/AUTO DIFF WBC: CPT | Performed by: FAMILY MEDICINE

## 2024-11-06 PROCEDURE — 80061 LIPID PANEL: CPT | Performed by: FAMILY MEDICINE

## 2024-11-14 NOTE — PHYSICIAN QUERY
Please document your best medical opinion regarding the etiology of diagnosis:  Orthostatic Hypotension

## 2025-01-17 ENCOUNTER — HOSPITAL ENCOUNTER (EMERGENCY)
Facility: HOSPITAL | Age: 75
Discharge: HOME | End: 2025-01-18
Payer: MEDICARE

## 2025-01-17 VITALS
WEIGHT: 150 LBS | TEMPERATURE: 98 F | RESPIRATION RATE: 16 BRPM | SYSTOLIC BLOOD PRESSURE: 127 MMHG | BODY MASS INDEX: 25.61 KG/M2 | HEART RATE: 85 BPM | DIASTOLIC BLOOD PRESSURE: 65 MMHG | OXYGEN SATURATION: 100 % | HEIGHT: 64 IN

## 2025-01-17 DIAGNOSIS — Z86.79 HX OF HYPOTENSION: Primary | ICD-10-CM

## 2025-01-17 DIAGNOSIS — R53.1 WEAKNESS: ICD-10-CM

## 2025-01-17 LAB
ALBUMIN SERPL-MCNC: 3.6 G/DL (ref 3.4–4.8)
ALBUMIN/GLOB SERPL: 1 RATIO (ref 1.1–2)
ALP SERPL-CCNC: 52 UNIT/L (ref 40–150)
ALT SERPL-CCNC: 12 UNIT/L (ref 0–55)
ANION GAP SERPL CALC-SCNC: 15 MEQ/L
AST SERPL-CCNC: 17 UNIT/L (ref 5–34)
BACTERIA #/AREA URNS AUTO: ABNORMAL /HPF
BASOPHILS # BLD AUTO: 0.04 X10(3)/MCL
BASOPHILS NFR BLD AUTO: 0.7 %
BILIRUB SERPL-MCNC: 0.8 MG/DL
BILIRUB UR QL STRIP.AUTO: NEGATIVE
BUN SERPL-MCNC: 16.2 MG/DL (ref 9.8–20.1)
CALCIUM SERPL-MCNC: 9.3 MG/DL (ref 8.4–10.2)
CHLORIDE SERPL-SCNC: 99 MMOL/L (ref 98–107)
CLARITY UR: CLEAR
CO2 SERPL-SCNC: 21 MMOL/L (ref 23–31)
COLOR UR AUTO: YELLOW
CREAT SERPL-MCNC: 1.48 MG/DL (ref 0.55–1.02)
CREAT/UREA NIT SERPL: 11
EOSINOPHIL # BLD AUTO: 0.4 X10(3)/MCL (ref 0–0.9)
EOSINOPHIL NFR BLD AUTO: 6.9 %
ERYTHROCYTE [DISTWIDTH] IN BLOOD BY AUTOMATED COUNT: 13.9 % (ref 11.5–17)
GFR SERPLBLD CREATININE-BSD FMLA CKD-EPI: 37 ML/MIN/1.73/M2
GLOBULIN SER-MCNC: 3.7 GM/DL (ref 2.4–3.5)
GLUCOSE SERPL-MCNC: 163 MG/DL (ref 82–115)
GLUCOSE UR QL STRIP: ABNORMAL
HCT VFR BLD AUTO: 38.7 % (ref 37–47)
HGB BLD-MCNC: 12 G/DL (ref 12–16)
HGB UR QL STRIP: NEGATIVE
IMM GRANULOCYTES # BLD AUTO: 0.02 X10(3)/MCL (ref 0–0.04)
IMM GRANULOCYTES NFR BLD AUTO: 0.3 %
KETONES UR QL STRIP: NEGATIVE
LEUKOCYTE ESTERASE UR QL STRIP: NEGATIVE
LYMPHOCYTES # BLD AUTO: 2.02 X10(3)/MCL (ref 0.6–4.6)
LYMPHOCYTES NFR BLD AUTO: 34.9 %
MCH RBC QN AUTO: 27.1 PG (ref 27–31)
MCHC RBC AUTO-ENTMCNC: 31 G/DL (ref 33–36)
MCV RBC AUTO: 87.4 FL (ref 80–94)
MONOCYTES # BLD AUTO: 0.52 X10(3)/MCL (ref 0.1–1.3)
MONOCYTES NFR BLD AUTO: 9 %
NEUTROPHILS # BLD AUTO: 2.78 X10(3)/MCL (ref 2.1–9.2)
NEUTROPHILS NFR BLD AUTO: 48.2 %
NITRITE UR QL STRIP: NEGATIVE
NRBC BLD AUTO-RTO: 0 %
OHS QRS DURATION: 82 MS
OHS QTC CALCULATION: 460 MS
PH UR STRIP: 5 [PH]
PLATELET # BLD AUTO: 356 X10(3)/MCL (ref 130–400)
PMV BLD AUTO: 9.2 FL (ref 7.4–10.4)
POTASSIUM SERPL-SCNC: 4.9 MMOL/L (ref 3.5–5.1)
PROT SERPL-MCNC: 7.3 GM/DL (ref 5.8–7.6)
PROT UR QL STRIP: NEGATIVE
RBC # BLD AUTO: 4.43 X10(6)/MCL (ref 4.2–5.4)
RBC #/AREA URNS AUTO: ABNORMAL /HPF
SODIUM SERPL-SCNC: 135 MMOL/L (ref 136–145)
SP GR UR STRIP.AUTO: 1.02 (ref 1–1.03)
SQUAMOUS #/AREA URNS LPF: ABNORMAL /HPF
TROPONIN I SERPL-MCNC: <0.01 NG/ML (ref 0–0.04)
UROBILINOGEN UR STRIP-ACNC: NORMAL
WBC # BLD AUTO: 5.78 X10(3)/MCL (ref 4.5–11.5)
WBC #/AREA URNS AUTO: ABNORMAL /HPF

## 2025-01-17 PROCEDURE — 85025 COMPLETE CBC W/AUTO DIFF WBC: CPT

## 2025-01-17 PROCEDURE — 84484 ASSAY OF TROPONIN QUANT: CPT

## 2025-01-17 PROCEDURE — 93005 ELECTROCARDIOGRAM TRACING: CPT

## 2025-01-17 PROCEDURE — 81001 URINALYSIS AUTO W/SCOPE: CPT

## 2025-01-17 PROCEDURE — 80053 COMPREHEN METABOLIC PANEL: CPT

## 2025-01-17 PROCEDURE — 99284 EMERGENCY DEPT VISIT MOD MDM: CPT | Mod: 25

## 2025-01-17 PROCEDURE — 93010 ELECTROCARDIOGRAM REPORT: CPT | Mod: ,,, | Performed by: STUDENT IN AN ORGANIZED HEALTH CARE EDUCATION/TRAINING PROGRAM

## 2025-01-17 PROCEDURE — 63600175 PHARM REV CODE 636 W HCPCS: Performed by: PHYSICIAN ASSISTANT

## 2025-01-17 PROCEDURE — 96360 HYDRATION IV INFUSION INIT: CPT

## 2025-01-17 RX ADMIN — SODIUM CHLORIDE, POTASSIUM CHLORIDE, SODIUM LACTATE AND CALCIUM CHLORIDE 1000 ML: 600; 310; 30; 20 INJECTION, SOLUTION INTRAVENOUS at 10:01

## 2025-01-17 NOTE — FIRST PROVIDER EVALUATION
"Medical screening examination initiated.  I have conducted a focused provider triage encounter, findings are as follows:    Brief history of present illness:  arrived to ED due to weakness. Was sent from  due to low BP. Also c/o chronic back pain. Denies recent injury/trauma.     Vitals:    01/17/25 1655 01/17/25 1656   BP: (!) 148/76    Pulse: 80    Resp: 16    Temp:  97.8 °F (36.6 °C)   SpO2: 98%    Weight: 68 kg (150 lb)    Height: 5' 4" (1.626 m)        Pertinent physical exam:  awake, alert, has non-labored breathing, in sort chair    Brief workup plan:  labs, EKG     Preliminary workup initiated; this workup will be continued and followed by the physician or advanced practice provider that is assigned to the patient when roomed.  "

## 2025-01-18 NOTE — ED PROVIDER NOTES
Encounter Date: 1/17/2025       History     Chief Complaint   Patient presents with    Hypotension     Sent from [facility] urgent care for hypotension in the 70s. Normotensive with ems whole time. C/o left breast pain only.      74-year-old female presents to the ED with her daughter.  As per patient she went to her primary physician earlier today and she felt lightheaded and her blood pressure was 90/60.  Primary physician directed her to [facility] urgent care and her blood pressure was in the 70s.  She was normotensive with EMS to whole time.  She is currently not experiencing any cardiac complaints.  She denies chest pain shortness of breath nausea vomiting.          Review of patient's allergies indicates:   Allergen Reactions    Glipizide Swelling     Swelling of the lips      Influenza vaccine tr-s 11 (pf) Hives    Pregabalin Other (See Comments)     nightmares     Past Medical History:   Diagnosis Date    Arthritis     Coronary artery disease     CVA (cerebral vascular accident)     affected right side of body    Depression     Diabetes mellitus     Hyperlipidemia     Hypertension     Unspecified dementia without behavioral disturbance      Past Surgical History:   Procedure Laterality Date    ANGIOPLASTY, PERIPHERAL BLOOD VESSEL N/A 7/11/2023    Procedure: Angioplasty-peripheral;  Surgeon: Jonathan Alanis MD;  Location: [facility] CATH LAB;  Service: Cardiology;  Laterality: N/A;    ATHERECTOMY, LOWER ARTERIAL  7/11/2023    Procedure: Atherectomy, Lower Arterial;  Surgeon: Jonathan Alanis MD;  Location: [facility] CATH LAB;  Service: Cardiology;;    BACK SURGERY      CAROTID ENDARTERECTOMY Left     CAROTID ENDARTERECTOMY Right     LEFT HEART CATHETERIZATION  05/20/2022    Dr. Estrella; Stent placement    LEFT HEART CATHETERIZATION Left 5/20/2022    Procedure: CATHETERIZATION, HEART, LEFT;  Surgeon: Jone Estrella MD;  Location: [facility] CATH LAB;  Service: Cardiology;  Laterality: Left;  C VIA R GROIN    PERIPHERAL ANGIOGRAPHY N/A  7/11/2023    Procedure: Peripheral angiography;  Surgeon: Jonathan Alanis MD;  Location: [facility] CATH LAB;  Service: Cardiology;  Laterality: N/A;     Family History   Problem Relation Name Age of Onset    Heart attack Son       Social History     Tobacco Use    Smoking status: Former     Current packs/day: 0.50     Types: Cigarettes    Smokeless tobacco: Never    Tobacco comments:     Started smoking in late 20s   Substance Use Topics    Alcohol use: Never    Drug use: Never     Review of Systems   Constitutional:  Negative for fever.   HENT:  Negative for sore throat.    Respiratory:  Negative for shortness of breath.    Cardiovascular:  Negative for chest pain.   Gastrointestinal:  Negative for nausea.   Genitourinary:  Negative for dysuria.   Musculoskeletal:  Negative for back pain.   Skin:  Negative for rash.   Neurological:  Negative for weakness.   Hematological:  Does not bruise/bleed easily.       Physical Exam     Initial Vitals   BP Pulse Resp Temp SpO2   01/17/25 1655 01/17/25 1655 01/17/25 1655 01/17/25 1656 01/17/25 1655   (!) 148/76 80 16 97.8 °F (36.6 °C) 98 %      MAP       --                Physical Exam    Constitutional: She appears well-developed and well-nourished.   Neck:   Normal range of motion.  Cardiovascular:  Normal rate and regular rhythm.           Pulmonary/Chest: Breath sounds normal.   Abdominal: Abdomen is soft. Bowel sounds are normal.   Musculoskeletal:         General: Normal range of motion.      Cervical back: Normal range of motion.     Neurological: She is alert and oriented to person, place, and time. She has normal strength.   Skin: Skin is warm.   Psychiatric: She has a normal mood and affect. Her behavior is normal. Thought content normal.         ED Course   Procedures  Labs Reviewed   COMPREHENSIVE METABOLIC PANEL - Abnormal       Result Value    Sodium 135 (*)     Potassium 4.9      Chloride 99      CO2 21 (*)     Glucose 163 (*)     Blood Urea Nitrogen 16.2       Creatinine 1.48 (*)     Calcium 9.3      Protein Total 7.3      Albumin 3.6      Globulin 3.7 (*)     Albumin/Globulin Ratio 1.0 (*)     Bilirubin Total 0.8      ALP 52      ALT 12      AST 17      eGFR 37      Anion Gap 15.0      BUN/Creatinine Ratio 11     URINALYSIS, REFLEX TO URINE CULTURE - Abnormal    Color, UA Yellow      Appearance, UA Clear      Specific Gravity, UA 1.024      pH, UA 5.0      Protein, UA Negative      Glucose, UA 4+ (*)     Ketones, UA Negative      Blood, UA Negative      Bilirubin, UA Negative      Urobilinogen, UA Normal      Nitrites, UA Negative      Leukocyte Esterase, UA Negative      RBC, UA 0-5      WBC, UA 0-5      Bacteria, UA Trace      Squamous Epithelial Cells, UA Many (*)    CBC WITH DIFFERENTIAL - Abnormal    WBC 5.78      RBC 4.43      Hgb 12.0      Hct 38.7      MCV 87.4      MCH 27.1      MCHC 31.0 (*)     RDW 13.9      Platelet 356      MPV 9.2      Neut % 48.2      Lymph % 34.9      Mono % 9.0      Eos % 6.9      Basophil % 0.7      Imm Grans % 0.3      Neut # 2.78      Lymph # 2.02      Mono # 0.52      Eos # 0.40      Baso # 0.04      Imm Gran # 0.02      NRBC% 0.0     TROPONIN I - Normal    Troponin-I <0.010     CBC W/ AUTO DIFFERENTIAL    Narrative:     The following orders were created for panel order CBC auto differential.  Procedure                               Abnormality         Status                     ---------                               -----------         ------                     CBC with Differential[[id number]]       Abnormal            Final result                 Please view results for these tests on the individual orders.     EKG Readings: (Independently Interpreted)   Rhythm: Normal Sinus Rhythm. Heart Rate: 92. ST Segments: Normal ST Segments. T Waves: Normal.     ECG Results              EKG 12-lead (Final result)        Collection Time Result Time QRS Duration OHS QTC Calculation    01/17/25 17:08:26 01/17/25 17:52:31 82 460                      Final result by Interface, Lab In [facility] (01/17/25 17:52:37)                   Narrative:    Test Reason : R53.1,    Vent. Rate :  92 BPM     Atrial Rate :  92 BPM     P-R Int : 120 ms          QRS Dur :  82 ms      QT Int : 372 ms       P-R-T Axes :  69   9  85 degrees    QTcB Int : 460 ms    Normal sinus rhythm  Abnormal R wave progression in the precordial leads  Abnormal ECG    Confirmed by Sonia Wasserman ([id number]) on 1/17/2025 5:52:30 PM    Referred By:            Confirmed By: Sonia Wasserman                                  Imaging Results    None          Medications   lactated ringers bolus 1,000 mL (1,000 mLs Intravenous New Bag 1/17/25 6385)     Medical Decision Making  Patient presents with:  Hypotension: Sent from [facility] urgent care for hypotension in the 70s. Normotensive with ems whole time. C/o left breast pain only.         Amount and/or Complexity of Data Reviewed  Labs: ordered. Decision-making details documented in ED Course.     Details: Sodium 135 creatinine 1.48  Radiology: ordered.     Details: Chest x-ray is normal  Discussion of management or test interpretation with external provider(s): I discussed with patient and patient's daughter of the rising creatinine number at this point is 1.48 we discussed water intake and NSAIDs.  Patient admits she has not drinking enough water.  She denies any symptoms of dysuria hematuria.  Advised patient to discontinue naproxen.  Recheck creatinine levels with primary physician      Risk  OTC drugs.  Prescription drug management.               ED Course as of 01/17/25 2344   Fri Jan 17, 2025 2125 Troponin I: <0.010 [YG]   2125 Sodium(!): 135 [YG]   2125 CO2(!): 21 [YG]   2125 Glucose(!): 163 [YG]   2125 Creatinine(!): 1.48 [YG]   2125 Globulin, Total(!): 3.7 [YG]   2125 Albumin/Globulin Ratio(!): 1.0 [YG]   2235 Patient blood pressure is 148/76, she is resting comfortable she denies chest pain shortness of breath nausea vomiting.  She is  currently asymptomatic.   [YG]   2337 Patient feels better after IV fluids   [YG]      ED Course User Index  [YG] Bonita Velasquez PA        Judging by the patient's chief complaint and pertinent history, the patient has the following possible differential diagnoses, including but not limited to the following.  Some of these are deemed to be lower likelihood and some more likely based on my physical exam and history combined with possible lab work and/or imaging studies.   Please see the pertinent studies, and refer to the HPI.  Some of these diagnoses will take further evaluation to fully rule out, perhaps as an outpatient and the patient was encouraged to follow up when discharged for more comprehensive evaluation.    Generalized weakness: anemia, dehydration, electrolyte derangement, hypoglycemia, infection, intoxication,hypotension         The patient is resting comfortably in no acute distress.  She is hemodynamically stable and is without objective evidence for acute process requiring urgent intervention or hospitalization. I provided counseling to patient with regard to condition, the treatment plan, specific conditions for return, and the importance of follow up. Detailed written and verbal instructions provided to patient and she expressed a verbal understanding of the discharge instructions and overall management plan. Reiterated the importance of medication administration and safety and advised patient to follow up with primary care provider in 3-5 days or sooner if needed.  Answered questions at this time. The patient is stable for discharge.                       Clinical Impression:  Final diagnoses:  [R53.1] Weakness  [Z86.79] Hx of hypotension (Primary)                 Bonita Velasquez PA  01/17/25 2344       Bonita Velasquez PA  01/18/25 0027

## 2025-01-18 NOTE — DISCHARGE INSTRUCTIONS
Advised patient to discontinue naproxen, and to increase water intake  Follow up with primary physician, repeat creatinine levels

## 2025-02-20 ENCOUNTER — LAB REQUISITION (OUTPATIENT)
Dept: LAB | Facility: HOSPITAL | Age: 75
End: 2025-02-20
Payer: MEDICARE

## 2025-02-20 DIAGNOSIS — N18.31 CHRONIC KIDNEY DISEASE, STAGE 3A: ICD-10-CM

## 2025-02-20 DIAGNOSIS — I50.22 CHRONIC SYSTOLIC (CONGESTIVE) HEART FAILURE: ICD-10-CM

## 2025-02-20 DIAGNOSIS — E11.42 TYPE 2 DIABETES MELLITUS WITH DIABETIC POLYNEUROPATHY: ICD-10-CM

## 2025-02-20 DIAGNOSIS — E11.51 TYPE 2 DIABETES MELLITUS WITH DIABETIC PERIPHERAL ANGIOPATHY WITHOUT GANGRENE: ICD-10-CM

## 2025-02-20 DIAGNOSIS — I15.2 HYPERTENSION SECONDARY TO ENDOCRINE DISORDERS: ICD-10-CM

## 2025-02-20 LAB
ALBUMIN SERPL-MCNC: 3.4 G/DL (ref 3.4–4.8)
ALBUMIN/GLOB SERPL: 0.9 RATIO (ref 1.1–2)
ALP SERPL-CCNC: 44 UNIT/L (ref 40–150)
ALT SERPL-CCNC: 11 UNIT/L (ref 0–55)
ANION GAP SERPL CALC-SCNC: 11 MEQ/L
AST SERPL-CCNC: 16 UNIT/L (ref 5–34)
BASOPHILS # BLD AUTO: 0.04 X10(3)/MCL
BASOPHILS NFR BLD AUTO: 0.7 %
BILIRUB SERPL-MCNC: 0.9 MG/DL
BUN SERPL-MCNC: 11.9 MG/DL (ref 9.8–20.1)
CALCIUM SERPL-MCNC: 9.3 MG/DL (ref 8.4–10.2)
CHLORIDE SERPL-SCNC: 104 MMOL/L (ref 98–107)
CHOLEST SERPL-MCNC: 144 MG/DL
CHOLEST/HDLC SERPL: 3 {RATIO} (ref 0–5)
CO2 SERPL-SCNC: 23 MMOL/L (ref 23–31)
CREAT SERPL-MCNC: 1.09 MG/DL (ref 0.55–1.02)
CREAT/UREA NIT SERPL: 11
EOSINOPHIL # BLD AUTO: 0.17 X10(3)/MCL (ref 0–0.9)
EOSINOPHIL NFR BLD AUTO: 3.1 %
ERYTHROCYTE [DISTWIDTH] IN BLOOD BY AUTOMATED COUNT: 15.1 % (ref 11.5–17)
GFR SERPLBLD CREATININE-BSD FMLA CKD-EPI: 53 ML/MIN/1.73/M2
GLOBULIN SER-MCNC: 3.8 GM/DL (ref 2.4–3.5)
GLUCOSE SERPL-MCNC: 111 MG/DL (ref 82–115)
HCT VFR BLD AUTO: 38.8 % (ref 37–47)
HDLC SERPL-MCNC: 44 MG/DL (ref 35–60)
HGB BLD-MCNC: 12.1 G/DL (ref 12–16)
IMM GRANULOCYTES # BLD AUTO: 0.02 X10(3)/MCL (ref 0–0.04)
IMM GRANULOCYTES NFR BLD AUTO: 0.4 %
LDLC SERPL CALC-MCNC: 71 MG/DL (ref 50–140)
LYMPHOCYTES # BLD AUTO: 2.31 X10(3)/MCL (ref 0.6–4.6)
LYMPHOCYTES NFR BLD AUTO: 42.3 %
MCH RBC QN AUTO: 27.1 PG (ref 27–31)
MCHC RBC AUTO-ENTMCNC: 31.2 G/DL (ref 33–36)
MCV RBC AUTO: 86.8 FL (ref 80–94)
MONOCYTES # BLD AUTO: 0.54 X10(3)/MCL (ref 0.1–1.3)
MONOCYTES NFR BLD AUTO: 9.9 %
NEUTROPHILS # BLD AUTO: 2.38 X10(3)/MCL (ref 2.1–9.2)
NEUTROPHILS NFR BLD AUTO: 43.6 %
NRBC BLD AUTO-RTO: 0 %
PLATELET # BLD AUTO: 327 X10(3)/MCL (ref 130–400)
PMV BLD AUTO: 9.4 FL (ref 7.4–10.4)
POTASSIUM SERPL-SCNC: 5.4 MMOL/L (ref 3.5–5.1)
PROT SERPL-MCNC: 7.2 GM/DL (ref 5.8–7.6)
RBC # BLD AUTO: 4.47 X10(6)/MCL (ref 4.2–5.4)
SODIUM SERPL-SCNC: 138 MMOL/L (ref 136–145)
TRIGL SERPL-MCNC: 144 MG/DL (ref 37–140)
TSH SERPL-ACNC: 4.83 UIU/ML (ref 0.35–4.94)
VLDLC SERPL CALC-MCNC: 29 MG/DL
WBC # BLD AUTO: 5.46 X10(3)/MCL (ref 4.5–11.5)

## 2025-02-20 PROCEDURE — 80061 LIPID PANEL: CPT | Performed by: INTERNAL MEDICINE

## 2025-02-20 PROCEDURE — 85025 COMPLETE CBC W/AUTO DIFF WBC: CPT | Performed by: INTERNAL MEDICINE

## 2025-02-20 PROCEDURE — 80053 COMPREHEN METABOLIC PANEL: CPT | Performed by: INTERNAL MEDICINE

## 2025-02-20 PROCEDURE — 84443 ASSAY THYROID STIM HORMONE: CPT | Performed by: INTERNAL MEDICINE

## 2025-02-21 ENCOUNTER — LAB REQUISITION (OUTPATIENT)
Dept: LAB | Facility: HOSPITAL | Age: 75
End: 2025-02-21
Payer: MEDICARE

## 2025-02-21 DIAGNOSIS — E11.42 TYPE 2 DIABETES MELLITUS WITH DIABETIC POLYNEUROPATHY: ICD-10-CM

## 2025-02-21 DIAGNOSIS — E11.51 TYPE 2 DIABETES MELLITUS WITH DIABETIC PERIPHERAL ANGIOPATHY WITHOUT GANGRENE: ICD-10-CM

## 2025-02-21 DIAGNOSIS — I50.22 CHRONIC SYSTOLIC (CONGESTIVE) HEART FAILURE: ICD-10-CM

## 2025-02-21 DIAGNOSIS — N18.31 CHRONIC KIDNEY DISEASE, STAGE 3A: ICD-10-CM

## 2025-02-21 DIAGNOSIS — I52 OTHER HEART DISORDERS IN DISEASES CLASSIFIED ELSEWHERE: ICD-10-CM

## 2025-02-21 LAB
ALBUMIN SERPL-MCNC: 3.6 G/DL (ref 3.4–4.8)
ALBUMIN/GLOB SERPL: 1 RATIO (ref 1.1–2)
ALP SERPL-CCNC: 46 UNIT/L (ref 40–150)
ALT SERPL-CCNC: 11 UNIT/L (ref 0–55)
ANION GAP SERPL CALC-SCNC: 8 MEQ/L
AST SERPL-CCNC: 15 UNIT/L (ref 5–34)
BILIRUB SERPL-MCNC: 1 MG/DL
BUN SERPL-MCNC: 16.1 MG/DL (ref 9.8–20.1)
CALCIUM SERPL-MCNC: 9.3 MG/DL (ref 8.4–10.2)
CHLORIDE SERPL-SCNC: 104 MMOL/L (ref 98–107)
CO2 SERPL-SCNC: 25 MMOL/L (ref 23–31)
CREAT SERPL-MCNC: 1.1 MG/DL (ref 0.55–1.02)
CREAT/UREA NIT SERPL: 15
GFR SERPLBLD CREATININE-BSD FMLA CKD-EPI: 53 ML/MIN/1.73/M2
GLOBULIN SER-MCNC: 3.6 GM/DL (ref 2.4–3.5)
GLUCOSE SERPL-MCNC: 196 MG/DL (ref 82–115)
POTASSIUM SERPL-SCNC: 5 MMOL/L (ref 3.5–5.1)
PROT SERPL-MCNC: 7.2 GM/DL (ref 5.8–7.6)
SODIUM SERPL-SCNC: 137 MMOL/L (ref 136–145)

## 2025-02-21 PROCEDURE — 80053 COMPREHEN METABOLIC PANEL: CPT | Performed by: INTERNAL MEDICINE

## 2025-06-11 ENCOUNTER — LAB REQUISITION (OUTPATIENT)
Dept: LAB | Facility: HOSPITAL | Age: 75
End: 2025-06-11
Payer: MEDICARE

## 2025-06-11 DIAGNOSIS — F03.93 UNSPECIFIED DEMENTIA, UNSPECIFIED SEVERITY, WITH MOOD DISTURBANCE: ICD-10-CM

## 2025-06-11 DIAGNOSIS — F03.B3: ICD-10-CM

## 2025-06-11 DIAGNOSIS — Z86.73 PERSONAL HISTORY OF TRANSIENT ISCHEMIC ATTACK (TIA), AND CEREBRAL INFARCTION WITHOUT RESIDUAL DEFICITS: ICD-10-CM

## 2025-06-11 PROCEDURE — 83519 RIA NONANTIBODY: CPT | Performed by: PSYCHIATRY & NEUROLOGY

## 2025-06-11 PROCEDURE — 84393 TAU PHOSPHORYLATED EA: CPT | Performed by: PSYCHIATRY & NEUROLOGY

## 2025-06-12 LAB
IMMUNOLOGIST REVIEW: ABNORMAL
M PHOSPHO-TAU 217: 0.24 PG/ML

## 2025-06-25 LAB — AL BETA40 PLAS-MCNC: NORMAL PG/ML

## 2025-07-17 ENCOUNTER — HOSPITAL ENCOUNTER (EMERGENCY)
Facility: HOSPITAL | Age: 75
Discharge: HOME | End: 2025-07-17
Attending: EMERGENCY MEDICINE
Payer: MEDICARE

## 2025-07-17 VITALS
OXYGEN SATURATION: 98 % | HEIGHT: 64 IN | TEMPERATURE: 98 F | RESPIRATION RATE: 19 BRPM | HEART RATE: 85 BPM | WEIGHT: 150 LBS | DIASTOLIC BLOOD PRESSURE: 78 MMHG | SYSTOLIC BLOOD PRESSURE: 157 MMHG | BODY MASS INDEX: 25.61 KG/M2

## 2025-07-17 DIAGNOSIS — S39.012A STRAIN OF LUMBAR REGION, INITIAL ENCOUNTER: Primary | ICD-10-CM

## 2025-07-17 DIAGNOSIS — F03.90 DEMENTIA, UNSPECIFIED DEMENTIA SEVERITY, UNSPECIFIED DEMENTIA TYPE, UNSPECIFIED WHETHER BEHAVIORAL, PSYCHOTIC, OR MOOD DISTURBANCE OR ANXIETY: ICD-10-CM

## 2025-07-17 LAB
ALBUMIN SERPL-MCNC: 3.3 G/DL (ref 3.4–4.8)
ALBUMIN/GLOB SERPL: 0.8 RATIO (ref 1.1–2)
ALP SERPL-CCNC: 39 UNIT/L (ref 40–150)
ALT SERPL-CCNC: 9 UNIT/L (ref 0–55)
ANION GAP SERPL CALC-SCNC: 8 MEQ/L
AST SERPL-CCNC: 17 UNIT/L (ref 11–45)
BASOPHILS # BLD AUTO: 0.05 X10(3)/MCL
BASOPHILS NFR BLD AUTO: 0.8 %
BILIRUB SERPL-MCNC: 0.8 MG/DL
BUN SERPL-MCNC: 13.8 MG/DL (ref 9.8–20.1)
CALCIUM SERPL-MCNC: 9.3 MG/DL (ref 8.4–10.2)
CHLORIDE SERPL-SCNC: 106 MMOL/L (ref 98–107)
CO2 SERPL-SCNC: 24 MMOL/L (ref 23–31)
CREAT SERPL-MCNC: 1.19 MG/DL (ref 0.55–1.02)
CREAT/UREA NIT SERPL: 12
EOSINOPHIL # BLD AUTO: 0.08 X10(3)/MCL (ref 0–0.9)
EOSINOPHIL NFR BLD AUTO: 1.3 %
ERYTHROCYTE [DISTWIDTH] IN BLOOD BY AUTOMATED COUNT: 13.7 % (ref 11.5–17)
ETHANOL SERPL-MCNC: <10 MG/DL
GFR SERPLBLD CREATININE-BSD FMLA CKD-EPI: 48 ML/MIN/1.73/M2
GLOBULIN SER-MCNC: 4 GM/DL (ref 2.4–3.5)
GLUCOSE SERPL-MCNC: 123 MG/DL (ref 82–115)
HCT VFR BLD AUTO: 35.5 % (ref 37–47)
HGB BLD-MCNC: 11 G/DL (ref 12–16)
IMM GRANULOCYTES # BLD AUTO: 0.01 X10(3)/MCL (ref 0–0.04)
IMM GRANULOCYTES NFR BLD AUTO: 0.2 %
LYMPHOCYTES # BLD AUTO: 1.82 X10(3)/MCL (ref 0.6–4.6)
LYMPHOCYTES NFR BLD AUTO: 28.8 %
MCH RBC QN AUTO: 28.9 PG (ref 27–31)
MCHC RBC AUTO-ENTMCNC: 31 G/DL (ref 33–36)
MCV RBC AUTO: 93.2 FL (ref 80–94)
MONOCYTES # BLD AUTO: 0.74 X10(3)/MCL (ref 0.1–1.3)
MONOCYTES NFR BLD AUTO: 11.7 %
NEUTROPHILS # BLD AUTO: 3.62 X10(3)/MCL (ref 2.1–9.2)
NEUTROPHILS NFR BLD AUTO: 57.2 %
NRBC BLD AUTO-RTO: 0 %
PLATELET # BLD AUTO: 339 X10(3)/MCL (ref 130–400)
PMV BLD AUTO: 9.3 FL (ref 7.4–10.4)
POTASSIUM SERPL-SCNC: 4.8 MMOL/L (ref 3.5–5.1)
PROT SERPL-MCNC: 7.3 GM/DL (ref 5.8–7.6)
RBC # BLD AUTO: 3.81 X10(6)/MCL (ref 4.2–5.4)
SODIUM SERPL-SCNC: 138 MMOL/L (ref 136–145)
TSH SERPL-ACNC: 2.98 UIU/ML (ref 0.35–4.94)
WBC # BLD AUTO: 6.32 X10(3)/MCL (ref 4.5–11.5)

## 2025-07-17 PROCEDURE — 99285 EMERGENCY DEPT VISIT HI MDM: CPT | Mod: 25

## 2025-07-17 PROCEDURE — 82077 ASSAY SPEC XCP UR&BREATH IA: CPT | Performed by: EMERGENCY MEDICINE

## 2025-07-17 PROCEDURE — 84443 ASSAY THYROID STIM HORMONE: CPT | Performed by: EMERGENCY MEDICINE

## 2025-07-17 PROCEDURE — 85025 COMPLETE CBC W/AUTO DIFF WBC: CPT | Performed by: EMERGENCY MEDICINE

## 2025-07-17 PROCEDURE — 80053 COMPREHEN METABOLIC PANEL: CPT | Performed by: EMERGENCY MEDICINE

## 2025-07-17 NOTE — DISCHARGE INSTRUCTIONS
Patient should not be left alone please talk to your doctor about further options and/or nursing home placement if you get to a point where you think of pain she had this a danger to hurt herself or others in you can not take care of her please come back to the emergency room for help.

## 2025-07-17 NOTE — ED PROVIDER NOTES
Encounter Date: 7/17/2025    SCRIBE #1 NOTE: I, Chance Manning, am scribing for, and in the presence of,  Zhen Gore III, MD. I have scribed the entire note.       History     Chief Complaint   Patient presents with    Back Pain     Lower back pain since 2002. EMS reports family wanted pt to be checked out because she was wandering outside this morning. Also reports they saw pt fall on camera last night. No head trauma. Hx dementia.      75 year old female with a hx of dementia, CAD, CVA, DM, HTN, and depression presents to the ED via EMS for confusion. Family reports that the pt has been increasingly confused since Tuesday. Family reports the pt takes 60 mg Duloxetine in the morning, but was told to begin taking it at night last week. Pt has been increasingly confused since then, and the family stopped the medication. Pt was screaming outside of her house for help at 0600. Pt was reportedly screaming that her  is trying to kill her. Daughter states the pt's  has been dead for over 30 years. Pt also reportedly fell trying to walk with her walker. Pt chronically experiences back pain and is continuing to experience back pain post fall.         Review of patient's allergies indicates:   Allergen Reactions    Glipizide Swelling     Swelling of the lips      Influenza vaccine tr-s 11 (pf) Hives    Pregabalin Other (See Comments)     nightmares     Past Medical History:   Diagnosis Date    Arthritis     Coronary artery disease     CVA (cerebral vascular accident)     affected right side of body    Depression     Diabetes mellitus     Hyperlipidemia     Hypertension     Unspecified dementia without behavioral disturbance      Past Surgical History:   Procedure Laterality Date    ANGIOPLASTY, PERIPHERAL BLOOD VESSEL N/A 7/11/2023    Procedure: Angioplasty-peripheral;  Surgeon: Jonathan Alanis MD;  Location: [facility] CATH LAB;  Service: Cardiology;  Laterality: N/A;    ATHERECTOMY, LOWER ARTERIAL  7/11/2023     Procedure: Atherectomy, Lower Arterial;  Surgeon: Jonathan Alanis MD;  Location: [facility] CATH LAB;  Service: Cardiology;;    BACK SURGERY      CAROTID ENDARTERECTOMY Left     CAROTID ENDARTERECTOMY Right     LEFT HEART CATHETERIZATION  05/20/2022    Dr. Estrella; Stent placement    LEFT HEART CATHETERIZATION Left 5/20/2022    Procedure: CATHETERIZATION, HEART, LEFT;  Surgeon: Jone Estrella MD;  Location: [facility] CATH LAB;  Service: Cardiology;  Laterality: Left;  LHC VIA R GROIN    PERIPHERAL ANGIOGRAPHY N/A 7/11/2023    Procedure: Peripheral angiography;  Surgeon: Jonathan Alanis MD;  Location: [facility] CATH LAB;  Service: Cardiology;  Laterality: N/A;     Family History   Problem Relation Name Age of Onset    Heart attack Son       Social History[1]  Review of Systems   Constitutional:  Negative for fatigue, fever and unexpected weight change.   HENT:  Negative for congestion and rhinorrhea.    Eyes:  Negative for pain.   Respiratory:  Negative for chest tightness, shortness of breath and wheezing.    Cardiovascular:  Negative for chest pain.   Gastrointestinal:  Negative for abdominal pain, constipation, diarrhea, nausea and vomiting.   Genitourinary:  Negative for dysuria.   Musculoskeletal:  Positive for back pain. Negative for neck pain.   Skin:  Negative for rash.   Allergic/Immunologic: Negative for environmental allergies, food allergies and immunocompromised state.   Neurological:  Negative for dizziness and speech difficulty.   Hematological:  Does not bruise/bleed easily.   Psychiatric/Behavioral:  Positive for confusion. Negative for sleep disturbance and suicidal ideas.        Physical Exam     Initial Vitals [07/17/25 0807]   BP Pulse Resp Temp SpO2   95/62 88 18 97.6 °F (36.4 °C) 99 %      MAP       --         Physical Exam    ED Course   Procedures  Labs Reviewed   COMPREHENSIVE METABOLIC PANEL - Abnormal       Result Value    Sodium 138      Potassium 4.8      Chloride 106      CO2 24      Glucose 123 (*)     Blood  Urea Nitrogen 13.8      Creatinine 1.19 (*)     Calcium 9.3      Protein Total 7.3      Albumin 3.3 (*)     Globulin 4.0 (*)     Albumin/Globulin Ratio 0.8 (*)     Bilirubin Total 0.8      ALP 39 (*)     ALT 9      AST 17      eGFR 48      Anion Gap 8.0      BUN/Creatinine Ratio 12     CBC WITH DIFFERENTIAL - Abnormal    WBC 6.32      RBC 3.81 (*)     Hgb 11.0 (*)     Hct 35.5 (*)     MCV 93.2      MCH 28.9      MCHC 31.0 (*)     RDW 13.7      Platelet 339      MPV 9.3      Neut % 57.2      Lymph % 28.8      Mono % 11.7      Eos % 1.3      Basophil % 0.8      Imm Grans % 0.2      Neut # 3.62      Lymph # 1.82      Mono # 0.74      Eos # 0.08      Baso # 0.05      Imm Gran # 0.01      NRBC% 0.0     TSH - Normal    TSH 2.984     ALCOHOL,MEDICAL (ETHANOL) - Normal    Ethanol Level <10.0     CBC W/ AUTO DIFFERENTIAL    Narrative:     The following orders were created for panel order CBC Auto Differential.  Procedure                               Abnormality         Status                     ---------                               -----------         ------                     CBC with Differential[[id number]]       Abnormal            Final result                 Please view results for these tests on the individual orders.          Imaging Results              X-Ray Lumbar Spine Ap And Lateral (Final result)  Result time 07/17/25 11:09:41      Final result by Campos Philippe MD (07/17/25 11:09:41)                   Impression:      Postoperative changes of posterior fusion and laminectomy defects from L4-S1 hardware appears to be intact.  No    Degenerative changes.    No fractures      Electronically signed by: Campos Philippe  Date:    07/17/2025  Time:    11:09               Narrative:    EXAMINATION:  XR LUMBAR SPINE AP AND LATERAL    CPT: 51935    CLINICAL HISTORY:  fall;    FINDINGS:  Examination reveals 5 non rib-bearing vertebral bodies there is evidence of laminectomy defects and changes of posterior  fusion extending from L4-S1 with bars and pedicle screws hardware appears to be intact.    There is slight anterolisthesis of L3 on L4.    Some degenerative changes seen specially of the posterior elements at L3-L4.    No acute fractures or dislocations identified no other bony pathology seen.                                       X-Ray Pelvis Routine AP (Final result)  Result time 07/17/25 11:18:56      Final result by Campos Philippe MD (07/17/25 11:18:56)                   Impression:      Degenerative changes.      Electronically signed by: Campos Philippe  Date:    07/17/2025  Time:    11:18               Narrative:    EXAMINATION:  XR PELVIS ROUTINE AP    CLINICAL HISTORY:  fall;    COMPARISON:  None.    FINDINGS:  No acute displaced fractures or dislocations.    There is narrowing of the inferior medial aspect of both hip joints with degenerative changes of the right sacroiliac joint and lumbosacral spine postsurgical changes with left min ectomy defects and hardware for of fusion identified in the lower lumbar spine    No blastic or lytic lesions.    Soft tissues within normal limits.                                       Medications - No data to display  Medical Decision Making  The differential diagnosis includes, but is not limited to, UTI, dementia, dementia with psychosis, pelvic fracture, lumbar fracture, or electrolyte disturbance.     X-rays without abnormality patient resting comfortably, offered admission for placement but daughter states they are going to try to get more help at home.    Problems Addressed:  Dementia, unspecified dementia severity, unspecified dementia type, unspecified whether behavioral, psychotic, or mood disturbance or anxiety: complicated acute illness or injury with systemic symptoms that poses a threat to life or bodily functions  Strain of lumbar region, initial encounter: complicated acute illness or injury with systemic symptoms that poses a threat to life or  bodily functions    Amount and/or Complexity of Data Reviewed  Labs: ordered.  Radiology: ordered.            Scribe Attestation:   Scribe #1: I performed the above scribed service and the documentation accurately describes the services I performed. I attest to the accuracy of the note.    Attending Attestation:           Physician Attestation for Scribe:  Physician Attestation Statement for Scribe #1: I, Zhen Gore III, MD, reviewed documentation, as scribed by Chance Manning in my presence, and it is both accurate and complete.                                        Clinical Impression:  Final diagnoses:  [S39.012A] Strain of lumbar region, initial encounter (Primary)  [F03.90] Dementia, unspecified dementia severity, unspecified dementia type, unspecified whether behavioral, psychotic, or mood disturbance or anxiety          ED Disposition Condition    Discharge Stable          ED Prescriptions    None       Follow-up Information       Follow up With Specialties Details Why Contact Info    Carol Banks MD Family Medicine In 3 days  [address] [facility] [address]  [phone]                     [1]   Social History  Tobacco Use    Smoking status: Former     Current packs/day: 0.50     Types: Cigarettes    Smokeless tobacco: Never    Tobacco comments:     Started smoking in late 20s   Substance Use Topics    Alcohol use: Never    Drug use: Never        Zhen Gore III, MD  07/17/25 3389

## 2025-07-30 ENCOUNTER — HOSPITAL ENCOUNTER (EMERGENCY)
Facility: HOSPITAL | Age: 75
Discharge: HOME | End: 2025-07-30
Attending: INTERNAL MEDICINE
Payer: MEDICARE

## 2025-07-30 VITALS
RESPIRATION RATE: 17 BRPM | DIASTOLIC BLOOD PRESSURE: 99 MMHG | HEIGHT: 64 IN | OXYGEN SATURATION: 100 % | SYSTOLIC BLOOD PRESSURE: 181 MMHG | BODY MASS INDEX: 25.61 KG/M2 | WEIGHT: 150 LBS | HEART RATE: 92 BPM | TEMPERATURE: 98 F

## 2025-07-30 DIAGNOSIS — E83.42 HYPOMAGNESEMIA: ICD-10-CM

## 2025-07-30 DIAGNOSIS — R42 DIZZINESS: Primary | ICD-10-CM

## 2025-07-30 LAB
ALBUMIN SERPL-MCNC: 3.5 G/DL (ref 3.4–4.8)
ALBUMIN/GLOB SERPL: 0.8 RATIO (ref 1.1–2)
ALP SERPL-CCNC: 45 UNIT/L (ref 40–150)
ALT SERPL-CCNC: 11 UNIT/L (ref 0–55)
ANION GAP SERPL CALC-SCNC: 10 MEQ/L
AST SERPL-CCNC: 19 UNIT/L (ref 11–45)
BACTERIA #/AREA URNS AUTO: ABNORMAL /HPF
BASOPHILS # BLD AUTO: 0.02 X10(3)/MCL
BASOPHILS NFR BLD AUTO: 0.4 %
BILIRUB SERPL-MCNC: 0.8 MG/DL
BILIRUB UR QL STRIP.AUTO: NEGATIVE
BUN SERPL-MCNC: 12.9 MG/DL (ref 9.8–20.1)
CALCIUM SERPL-MCNC: 9.3 MG/DL (ref 8.4–10.2)
CHLORIDE SERPL-SCNC: 104 MMOL/L (ref 98–107)
CLARITY UR: CLEAR
CO2 SERPL-SCNC: 25 MMOL/L (ref 23–31)
COLOR UR AUTO: ABNORMAL
CREAT SERPL-MCNC: 1.01 MG/DL (ref 0.55–1.02)
CREAT/UREA NIT SERPL: 13
EOSINOPHIL # BLD AUTO: 0.06 X10(3)/MCL (ref 0–0.9)
EOSINOPHIL NFR BLD AUTO: 1.3 %
ERYTHROCYTE [DISTWIDTH] IN BLOOD BY AUTOMATED COUNT: 13.2 % (ref 11.5–17)
GFR SERPLBLD CREATININE-BSD FMLA CKD-EPI: 59 ML/MIN/1.73/M2
GLOBULIN SER-MCNC: 4.6 GM/DL (ref 2.4–3.5)
GLUCOSE SERPL-MCNC: 91 MG/DL (ref 82–115)
GLUCOSE UR QL STRIP: ABNORMAL
HCT VFR BLD AUTO: 39.8 % (ref 37–47)
HGB BLD-MCNC: 12.3 G/DL (ref 12–16)
HGB UR QL STRIP: NEGATIVE
IMM GRANULOCYTES # BLD AUTO: 0.01 X10(3)/MCL (ref 0–0.04)
IMM GRANULOCYTES NFR BLD AUTO: 0.2 %
KETONES UR QL STRIP: NEGATIVE
LEUKOCYTE ESTERASE UR QL STRIP: NEGATIVE
LYMPHOCYTES # BLD AUTO: 1.62 X10(3)/MCL (ref 0.6–4.6)
LYMPHOCYTES NFR BLD AUTO: 34.9 %
MAGNESIUM SERPL-MCNC: 1.3 MG/DL (ref 1.6–2.6)
MCH RBC QN AUTO: 29 PG (ref 27–31)
MCHC RBC AUTO-ENTMCNC: 30.9 G/DL (ref 33–36)
MCV RBC AUTO: 93.9 FL (ref 80–94)
MONOCYTES # BLD AUTO: 0.4 X10(3)/MCL (ref 0.1–1.3)
MONOCYTES NFR BLD AUTO: 8.6 %
NEUTROPHILS # BLD AUTO: 2.53 X10(3)/MCL (ref 2.1–9.2)
NEUTROPHILS NFR BLD AUTO: 54.6 %
NITRITE UR QL STRIP: NEGATIVE
NRBC BLD AUTO-RTO: 0 %
OHS QRS DURATION: 82 MS
OHS QTC CALCULATION: 460 MS
PH UR STRIP: 5.5 [PH]
PLATELET # BLD AUTO: 356 X10(3)/MCL (ref 130–400)
PMV BLD AUTO: 9.6 FL (ref 7.4–10.4)
POTASSIUM SERPL-SCNC: 4.4 MMOL/L (ref 3.5–5.1)
PROT SERPL-MCNC: 8.1 GM/DL (ref 5.8–7.6)
PROT UR QL STRIP: NEGATIVE
RBC # BLD AUTO: 4.24 X10(6)/MCL (ref 4.2–5.4)
RBC #/AREA URNS AUTO: ABNORMAL /HPF
SODIUM SERPL-SCNC: 139 MMOL/L (ref 136–145)
SP GR UR STRIP.AUTO: 1.02 (ref 1–1.03)
SQUAMOUS #/AREA URNS LPF: ABNORMAL /HPF
TROPONIN I SERPL HS-MCNC: 6 NG/L
UROBILINOGEN UR STRIP-ACNC: NORMAL
WBC # BLD AUTO: 4.64 X10(3)/MCL (ref 4.5–11.5)
WBC #/AREA URNS AUTO: ABNORMAL /HPF

## 2025-07-30 PROCEDURE — 25000003 PHARM REV CODE 250: Performed by: INTERNAL MEDICINE

## 2025-07-30 PROCEDURE — 25500020 PHARM REV CODE 255: Performed by: INTERNAL MEDICINE

## 2025-07-30 PROCEDURE — 83735 ASSAY OF MAGNESIUM: CPT | Performed by: PHYSICIAN ASSISTANT

## 2025-07-30 PROCEDURE — 93005 ELECTROCARDIOGRAM TRACING: CPT

## 2025-07-30 PROCEDURE — 63600175 PHARM REV CODE 636 W HCPCS: Performed by: INTERNAL MEDICINE

## 2025-07-30 PROCEDURE — 85025 COMPLETE CBC W/AUTO DIFF WBC: CPT | Performed by: PHYSICIAN ASSISTANT

## 2025-07-30 PROCEDURE — 96366 THER/PROPH/DIAG IV INF ADDON: CPT

## 2025-07-30 PROCEDURE — 80053 COMPREHEN METABOLIC PANEL: CPT | Performed by: PHYSICIAN ASSISTANT

## 2025-07-30 PROCEDURE — 93010 ELECTROCARDIOGRAM REPORT: CPT | Mod: ,,, | Performed by: INTERNAL MEDICINE

## 2025-07-30 PROCEDURE — 84484 ASSAY OF TROPONIN QUANT: CPT | Performed by: PHYSICIAN ASSISTANT

## 2025-07-30 PROCEDURE — 96365 THER/PROPH/DIAG IV INF INIT: CPT

## 2025-07-30 PROCEDURE — 81001 URINALYSIS AUTO W/SCOPE: CPT | Performed by: PHYSICIAN ASSISTANT

## 2025-07-30 PROCEDURE — 99285 EMERGENCY DEPT VISIT HI MDM: CPT | Mod: 25

## 2025-07-30 RX ORDER — ACETAMINOPHEN 500 MG
1000 TABLET ORAL
Status: COMPLETED | OUTPATIENT
Start: 2025-07-30 | End: 2025-07-30

## 2025-07-30 RX ORDER — LIDOCAINE 50 MG/G
1 PATCH TOPICAL
Status: DISCONTINUED | OUTPATIENT
Start: 2025-07-30 | End: 2025-07-30 | Stop reason: HOSPADM

## 2025-07-30 RX ORDER — MAGNESIUM SULFATE HEPTAHYDRATE 40 MG/ML
2 INJECTION, SOLUTION INTRAVENOUS ONCE
Status: COMPLETED | OUTPATIENT
Start: 2025-07-30 | End: 2025-07-30

## 2025-07-30 RX ADMIN — MAGNESIUM SULFATE HEPTAHYDRATE 2 G: 40 INJECTION, SOLUTION INTRAVENOUS at 04:07

## 2025-07-30 RX ADMIN — SODIUM CHLORIDE 500 ML: 9 INJECTION, SOLUTION INTRAVENOUS at 04:07

## 2025-07-30 RX ADMIN — IOHEXOL 100 ML: 350 INJECTION, SOLUTION INTRAVENOUS at 04:07

## 2025-07-30 RX ADMIN — LIDOCAINE 1 PATCH: 50 PATCH TOPICAL at 04:07

## 2025-07-30 RX ADMIN — ACETAMINOPHEN 1000 MG: 500 TABLET ORAL at 04:07

## 2025-07-30 NOTE — ED PROVIDER NOTES
Encounter Date: 7/30/2025    SCRIBE #1 NOTE: I, Carmenza Hall, am scribing for, and in the presence of,  Brandyn Berrios DO. I have scribed the following portions of the note - the EKG reading. Other sections scribed: HPI, ROS, PE.       History     Chief Complaint   Patient presents with    Hypotension     Pt to ED via [facility]. Pt sent for clinic for hypotension, reported BP 77/51. Pt reports dizziness.      Patient is a 75 year old female with a past medical history of dementia, CAD, CVA, diabetes mellitus, hyperlipidemia, and hypertension presenting to the ED via EMS for evaluation of hypotension and shortness of breath onset prior to arrival. Patient was at her PCP's office for a COVID test because she is being placed in a nursing home. Her vitals at her PCP office showed low SPO2 sats and a blood pressure of 70/50. Patient endorses shortness of breath and dizziness. Patient was seen here on 7/17 for evaluation following a fall. Patient reports she is still experiencing left-sided flank pain. She has been taking tylenol for pain relief.    The history is provided by the patient, a relative and medical records. No  was used.     Review of patient's allergies indicates:   Allergen Reactions    Glipizide Swelling     Swelling of the lips      Influenza vaccine tr-s 11 (pf) Hives    Pregabalin Other (See Comments)     nightmares     Past Medical History:   Diagnosis Date    Arthritis     Coronary artery disease     CVA (cerebral vascular accident)     affected right side of body    Depression     Diabetes mellitus     Hyperlipidemia     Hypertension     Unspecified dementia without behavioral disturbance      Past Surgical History:   Procedure Laterality Date    ANGIOPLASTY, PERIPHERAL BLOOD VESSEL N/A 7/11/2023    Procedure: Angioplasty-peripheral;  Surgeon: Jonathan Alanis MD;  Location: [facility] CATH LAB;  Service: Cardiology;  Laterality: N/A;    ATHERECTOMY, LOWER ARTERIAL  7/11/2023     Procedure: Atherectomy, Lower Arterial;  Surgeon: Jonathan Alanis MD;  Location: [facility] CATH LAB;  Service: Cardiology;;    BACK SURGERY      CAROTID ENDARTERECTOMY Left     CAROTID ENDARTERECTOMY Right     LEFT HEART CATHETERIZATION  05/20/2022    Dr. Estrella; Stent placement    LEFT HEART CATHETERIZATION Left 5/20/2022    Procedure: CATHETERIZATION, HEART, LEFT;  Surgeon: Jone Estrella MD;  Location: [facility] CATH LAB;  Service: Cardiology;  Laterality: Left;  LHC VIA R GROIN    PERIPHERAL ANGIOGRAPHY N/A 7/11/2023    Procedure: Peripheral angiography;  Surgeon: Jonathan Alanis MD;  Location: [facility] CATH LAB;  Service: Cardiology;  Laterality: N/A;     Family History   Problem Relation Name Age of Onset    Heart attack Son       Social History[1]  Review of Systems   Unable to perform ROS: Dementia       Physical Exam     Initial Vitals [07/30/25 1321]   BP Pulse Resp Temp SpO2   118/65 84 17 97.6 °F (36.4 °C) 98 %      MAP       --         Physical Exam    Constitutional: She appears well-nourished. She is not diaphoretic. She is cooperative.  Non-toxic appearance. She appears ill (Chronically).   HENT:   Head: Normocephalic and atraumatic. Mouth/Throat: Uvula is midline, oropharynx is clear and moist and mucous membranes are normal.   Eyes: Conjunctivae, EOM and lids are normal. Pupils are equal, round, and reactive to light.   Neck: Trachea normal and phonation normal. Neck supple. Carotid bruit is not present. No JVD present.    Full passive range of motion without pain.     Cardiovascular:  Normal rate, regular rhythm, normal heart sounds, intact distal pulses and normal pulses.           No murmur heard.  Pulmonary/Chest: No accessory muscle usage. No tachypnea. No respiratory distress. She has no decreased breath sounds. She has no wheezes. She has no rhonchi. She has no rales. She exhibits tenderness (Left lateral lower ribs). She exhibits no mass, no laceration, no crepitus, no edema, no deformity and no swelling.    Abdominal: Abdomen is soft. Bowel sounds are normal. She exhibits no distension. There is no abdominal tenderness. No hernia. There is no rebound and no guarding.   Musculoskeletal:      Cervical back: Full passive range of motion without pain and neck supple. No spinous process tenderness or muscular tenderness.      Comments: Left flank pain  No obvious bruising or crepitus. No obvious deformities      Neurological: She is alert and oriented to person, place, and time. GCS eye subscore is 4. GCS verbal subscore is 5. GCS motor subscore is 6.   Skin: Skin is warm, dry and intact. Capillary refill takes less than 2 seconds. No abrasion, no burn, no ecchymosis, no laceration, no lesion, no rash and no abscess noted. No erythema.   Psychiatric: She has a normal mood and affect. Her speech is normal and behavior is normal. Thought content normal.         ED Course   Procedures  Labs Reviewed   COMPREHENSIVE METABOLIC PANEL - Abnormal       Result Value    Sodium 139      Potassium 4.4      Chloride 104      CO2 25      Glucose 91      Blood Urea Nitrogen 12.9      Creatinine 1.01      Calcium 9.3      Protein Total 8.1 (*)     Albumin 3.5      Globulin 4.6 (*)     Albumin/Globulin Ratio 0.8 (*)     Bilirubin Total 0.8      ALP 45      ALT 11      AST 19      eGFR 59      Anion Gap 10.0      BUN/Creatinine Ratio 13     MAGNESIUM - Abnormal    Magnesium Level 1.30 (*)    URINALYSIS, REFLEX TO URINE CULTURE - Abnormal    Color, UA Light-Yellow      Appearance, UA Clear      Specific Gravity, UA 1.022      pH, UA 5.5      Protein, UA Negative      Glucose, UA 4+ (*)     Ketones, UA Negative      Blood, UA Negative      Bilirubin, UA Negative      Urobilinogen, UA Normal      Nitrites, UA Negative      Leukocyte Esterase, UA Negative      RBC, UA 0-5      WBC, UA 0-5      Bacteria, UA Trace      Squamous Epithelial Cells, UA Trace     CBC WITH DIFFERENTIAL - Abnormal    WBC 4.64      RBC 4.24      Hgb 12.3      Hct 39.8       MCV 93.9      MCH 29.0      MCHC 30.9 (*)     RDW 13.2      Platelet 356      MPV 9.6      Neut % 54.6      Lymph % 34.9      Mono % 8.6      Eos % 1.3      Basophil % 0.4      Imm Grans % 0.2      Neut # 2.53      Lymph # 1.62      Mono # 0.40      Eos # 0.06      Baso # 0.02      Imm Gran # 0.01      NRBC% 0.0     TROPONIN I HIGH SENSITIVITY - Normal    Troponin High Sensitive 6     CBC W/ AUTO DIFFERENTIAL    Narrative:     The following orders were created for panel order CBC auto differential.  Procedure                               Abnormality         Status                     ---------                               -----------         ------                     CBC with Differential[[id number]]       Abnormal            Final result                 Please view results for these tests on the individual orders.     EKG Readings: (Independently Interpreted)   Rhythm: Normal Sinus Rhythm. Ectopy: No Ectopy. Conduction: Normal. ST Segments: Normal ST Segments. T Waves: Normal. Axis: Normal. Clinical Impression: Normal Sinus Rhythm   Performed at 1325     ECG Results              EKG 12-lead (Final result)        Collection Time Result Time QRS Duration OHS QTC Calculation    07/30/25 13:25:45 07/30/25 19:11:03 82 460                     Final result by Interface, Lab In [facility] (07/30/25 19:11:12)                   Narrative:    Test Reason : I95.9,    Vent. Rate :  91 BPM     Atrial Rate :  91 BPM     P-R Int : 122 ms          QRS Dur :  82 ms      QT Int : 374 ms       P-R-T Axes :  57  -1  84 degrees    QTcB Int : 460 ms    Normal sinus rhythm  Anterior infarct (cited on or before 17-Jan-2025)  Abnormal ECG  When compared with ECG of 17-Jan-2025 17:08,  No significant change was found  Confirmed by Sacha Mosley ([id number]) on 7/30/2025 7:11:01 PM    Referred By:            Confirmed By: Sacha Mosley                                  Imaging Results              CT Chest Abdomen Pelvis With IV Contrast (XPD)  NO Oral Contrast (Final result)  Result time 07/30/25 17:02:11      Final result by Marcelo Briones MD (07/30/25 17:02:11)                   Impression:      No traumatic injury of the thorax, abdomen or pelvis identified.  Details of the findings above.      Electronically signed by: Marcelo Briones  Date:    07/30/2025  Time:    17:02               Narrative:    EXAMINATION:  CT CHEST ABDOMEN PELVIS WITH IV CONTRAST (XPD)    CLINICAL HISTORY:  fall, left lateral rib and flank pain. eval for rib fx vs intra abd injury;    TECHNIQUE:  Multidetector axial images were obtained from the thoracic inlet through the greater trochanters following the administration of IV contrast.    Dose length product of 725 mGycm. Automated exposure control was utilized to minimize radiation dose.    COMPARISON:  CT abdomen and pelvis June 30, 2022.    CHEST FINDINGS:    The lungs are unremarkable without suspicious soft tissue pulmonary nodule, parenchyma consolidation, interstitial disease, pleural effusion or pneumothorax.  Bilateral Bochdalek hernias.    Bilateral thyroid glands are enlarged in size.  There is left thyroid gland lower pole 8 mm nodule.  Please further assess with ultrasound exam.    Images are partially degraded by respiratory misregistration.  Thoracic aorta is remarkable for calcified plaques no traumatic finding of the thoracic great vessels identified and there are no dominant mediastinal hematomas. Coronary arteries calcified plaques.  Thoracic spine alignment is preserved.  Multiple thoracic vertebrae Schmorl node defects.  No consistent findings reflective of a displaced fracture.    ABDOMINAL FINDINGS:    There is no abdominal solid parenchymal organs traumatic damage with unremarkable attenuation of the liver, atrophic pancreas and spleen.  Gallbladder is surgically absent    The adrenal glands size and configuration is within normal limits. Kidneys are symmetric in size and exhibit symmetric contrast  enhancement. No renal contusion or laceration identified. There is no hydronephrosis or perinephric fluid collection.  Abdominal aorta is remarkable for calcified plaques cause some stenosis without aneurysmal dilatation.  No retroperitoneal hematoma. There is no extra luminal air.  No free fluid identified.    There is trace of anterolisthesis of L3 on L4.  There are posterior fusions and decompression electric Amber lower lumbar spine..    PELVIC FINDINGS:    There is no free fluid.  There is a some urinary bladder thickened wall without significant interval difference..  No evidence for bladder rupture. Femoral heads are well situated within their respective acetabula. Pubic symphysis and SI joints are intact.  There are pronounced degenerative changes of the hip joints, right worse compared to the left.  No pelvic fracture identified.                                       X-Ray Chest 1 View (Final result)  Result time 07/30/25 13:58:30      Final result by Campos Philippe MD (07/30/25 13:58:30)                   Impression:      No acute chest disease is identified.      Electronically signed by: Campso Philippe  Date:    07/30/2025  Time:    13:58               Narrative:    EXAMINATION:  XR CHEST 1 VIEW    CLINICAL HISTORY:  , Hypotension, unspecified.    FINDINGS:  No alveolar consolidation, effusion, or pneumothorax is seen.   The thoracic aorta is normal  cardiac silhouette, central pulmonary vessels and mediastinum are normal in size and are grossly unremarkable.   visualized osseous structures are grossly unremarkable.                                       Medications   LIDOcaine 5 % patch 1 patch (1 patch Transdermal Patch Applied 7/30/25 1641)   acetaminophen tablet 1,000 mg (1,000 mg Oral Given 7/30/25 1642)   magnesium sulfate 2g in water 50mL IVPB (premix) (0 g Intravenous Stopped 7/30/25 1840)   sodium chloride 0.9% bolus 500 mL 500 mL (0 mLs Intravenous Stopped 7/30/25 1741)   iohexoL  (OMNIPAQUE 350) injection 100 mL (100 mLs Intravenous Given 7/30/25 1150)     Medical Decision Making  [place] 5-year-old female presenting with low blood pressure and dizziness with left flank pain    Differential Diagnosis:   Judging by the patient's chief complaint and pertinent history, the patient has the following possible differential diagnoses, including but not limited to the following.  Some of these are deemed to be lower likelihood and some more likely based on my physical exam and history combined with possible lab work and/or imaging studies.   Please see the pertinent studies, and refer to the HPI.  Some of these diagnoses will take further evaluation to fully rule out, perhaps as an outpatient and the patient was encouraged to follow up when discharged for more comprehensive evaluation    ACS, dehydration, sepsis, infectious, vasovagal, orthostatic hypotension, electrolyte derangement    Problems Addressed:  Dizziness: undiagnosed new problem with uncertain prognosis  Hypomagnesemia: undiagnosed new problem with uncertain prognosis    Amount and/or Complexity of Data Reviewed  Independent Historian: caregiver  External Data Reviewed: labs, radiology, ECG and notes.     Details: Patient was seen here on 7/17 for evaluation following a fall.  Labs: ordered. Decision-making details documented in ED Course.  Radiology: ordered and independent interpretation performed. Decision-making details documented in ED Course.  ECG/medicine tests: ordered and independent interpretation performed. Decision-making details documented in ED Course.     Details: Rhythm: Normal Sinus Rhythm. Ectopy: No Ectopy. Conduction: Normal. ST Segments: Normal ST Segments. T Waves: Normal. Axis: Normal. Clinical Impression: Normal Sinus Rhythm   Performed at 1325    Risk  OTC drugs.  Prescription drug management.            Scribe Attestation:   Scribe #1: I performed the above scribed service and the documentation accurately  describes the services I performed. I attest to the accuracy of the note.    Attending Attestation:           Physician Attestation for Scribe:  Physician Attestation Statement for Scribe #1: I, Brandyn Berrios DO, reviewed documentation, as scribed by Carmenza Hall in my presence, and it is both accurate and complete.             ED Course as of 07/30/25 1945 Wed Jul 30, 2025   1615 Magnesium (!): 1.30  replacing [MM]   1616 Troponin I High Sensitivity: 6 [MM]   1616 WBC: 4.64 [MM]   1616 Hemoglobin: 12.3 [MM]   1616 Platelet Count: 356 [MM]   1616 Sodium: 139 [MM]   1616 Potassium: 4.4 [MM]   1616 Chloride: 104 [MM]   1616 CO2: 25 [MM]   1616 Glucose: 91 [MM]   1616 BUN: 12.9 [MM]   1616 Creatinine: 1.01 [MM]   1616 Calcium: 9.3 [MM]   1616 BILIRUBIN TOTAL: 0.8 [MM]   1616 ALP: 45 [MM]   1616 ALT: 11 [MM]   1616 AST: 19 [MM]   1616 Anion Gap: 10.0 [MM]   1616 On my independent interpretation, chest x-ray is without obvious pneumonia, pneumothorax, effusions, pneumomediastinum, wide mediastinum, pneumoperitoneum, cardiomegaly, edema.   [MM]   1829 Orthostatics are negative.  Results discussed with patient and family at bedside.  She is feeling improved, etiology of her symptoms is largely unknown, possibly dehydration versus vasovagal.  Patient is ambulatory, no hypotension with stable blood pressures.  Magnesium replaced.  Discussed close follow up with PCP, strict return precautions given.  Patient and family verbalized an understanding of the plan and agreed. [MM]   1829 Leukocyte Esterase, UA: Negative [MM]   1829 RBC, UA: 0-5 [MM]   1829 WBC, UA: 0-5 [MM]   1829 NITRITE UA: Negative [MM]   1829 Bacteria, UA: Trace [MM]   1829 CT of the chest/abdomen/pelvis is without intrathoracic or intra-abdominal injury. [MM]   1830 Patient had improvement of pain with Tylenol and lidocaine patch [MM]      ED Course User Index  [MM] Brandyn Berrios DO                               Clinical Impression:  Final  diagnoses:  [R42] Dizziness (Primary)  [E83.42] Hypomagnesemia          ED Disposition Condition    Discharge Stable          ED Prescriptions    None       Follow-up Information       Follow up With Specialties Details Why Contact Info    Carol Banks MD Family Medicine Schedule an appointment as soon as possible for a visit  For emergency department follow up [address] [facility] [id number]  [phone]                     [1]   Social History  Tobacco Use    Smoking status: Former     Current packs/day: 0.50     Types: Cigarettes    Smokeless tobacco: Never    Tobacco comments:     Started smoking in late 20s   Substance Use Topics    Alcohol use: Never    Drug use: Never        Brandyn Berrios DO  07/30/25 1945

## 2025-07-31 NOTE — DISCHARGE INSTRUCTIONS

## 2025-08-22 ENCOUNTER — LAB REQUISITION (OUTPATIENT)
Dept: LAB | Facility: HOSPITAL | Age: 75
End: 2025-08-22
Payer: MEDICARE

## 2025-08-22 DIAGNOSIS — F33.9 MAJOR DEPRESSIVE DISORDER, RECURRENT, UNSPECIFIED: ICD-10-CM

## 2025-08-22 DIAGNOSIS — E11.22 TYPE 2 DIABETES MELLITUS WITH DIABETIC CHRONIC KIDNEY DISEASE: ICD-10-CM

## 2025-08-22 DIAGNOSIS — E78.5 HYPERLIPIDEMIA, UNSPECIFIED: ICD-10-CM

## 2025-08-22 LAB
ALBUMIN SERPL-MCNC: 3.3 G/DL (ref 3.4–4.8)
ALBUMIN/GLOB SERPL: 0.9 RATIO (ref 1.1–2)
ALP SERPL-CCNC: 47 UNIT/L (ref 40–150)
ALT SERPL-CCNC: 9 UNIT/L (ref 0–55)
ANION GAP SERPL CALC-SCNC: 10 MEQ/L
AST SERPL-CCNC: 15 UNIT/L (ref 11–45)
BASOPHILS # BLD AUTO: 0.04 X10(3)/MCL
BASOPHILS NFR BLD AUTO: 0.7 %
BILIRUB SERPL-MCNC: 0.7 MG/DL
BUN SERPL-MCNC: 13.5 MG/DL (ref 9.8–20.1)
CALCIUM SERPL-MCNC: 9.3 MG/DL (ref 8.4–10.2)
CHLORIDE SERPL-SCNC: 107 MMOL/L (ref 98–107)
CHOLEST SERPL-MCNC: 166 MG/DL
CHOLEST/HDLC SERPL: 3 {RATIO} (ref 0–5)
CO2 SERPL-SCNC: 27 MMOL/L (ref 23–31)
CREAT SERPL-MCNC: 0.9 MG/DL (ref 0.55–1.02)
CREAT/UREA NIT SERPL: 15
EOSINOPHIL # BLD AUTO: 0.12 X10(3)/MCL (ref 0–0.9)
EOSINOPHIL NFR BLD AUTO: 2.2 %
ERYTHROCYTE [DISTWIDTH] IN BLOOD BY AUTOMATED COUNT: 12.9 % (ref 11.5–17)
EST. AVERAGE GLUCOSE BLD GHB EST-MCNC: 134.1 MG/DL
GFR SERPLBLD CREATININE-BSD FMLA CKD-EPI: >60 ML/MIN/1.73/M2
GLOBULIN SER-MCNC: 3.8 GM/DL (ref 2.4–3.5)
GLUCOSE SERPL-MCNC: 114 MG/DL (ref 82–115)
HBA1C MFR BLD: 6.3 %
HCT VFR BLD AUTO: 34.8 % (ref 37–47)
HDLC SERPL-MCNC: 54 MG/DL (ref 35–60)
HGB BLD-MCNC: 10.9 G/DL (ref 12–16)
IMM GRANULOCYTES # BLD AUTO: 0.01 X10(3)/MCL (ref 0–0.04)
IMM GRANULOCYTES NFR BLD AUTO: 0.2 %
LDLC SERPL CALC-MCNC: 91 MG/DL (ref 50–140)
LYMPHOCYTES # BLD AUTO: 2.29 X10(3)/MCL (ref 0.6–4.6)
LYMPHOCYTES NFR BLD AUTO: 42.4 %
MCH RBC QN AUTO: 28.7 PG (ref 27–31)
MCHC RBC AUTO-ENTMCNC: 31.3 G/DL (ref 33–36)
MCV RBC AUTO: 91.6 FL (ref 80–94)
MONOCYTES # BLD AUTO: 0.62 X10(3)/MCL (ref 0.1–1.3)
MONOCYTES NFR BLD AUTO: 11.5 %
NEUTROPHILS # BLD AUTO: 2.32 X10(3)/MCL (ref 2.1–9.2)
NEUTROPHILS NFR BLD AUTO: 43 %
NRBC BLD AUTO-RTO: 0 %
PLATELET # BLD AUTO: 325 X10(3)/MCL (ref 130–400)
PMV BLD AUTO: 9.4 FL (ref 7.4–10.4)
POTASSIUM SERPL-SCNC: 5.2 MMOL/L (ref 3.5–5.1)
PROT SERPL-MCNC: 7.1 GM/DL (ref 5.8–7.6)
RBC # BLD AUTO: 3.8 X10(6)/MCL (ref 4.2–5.4)
SODIUM SERPL-SCNC: 144 MMOL/L (ref 136–145)
TRIGL SERPL-MCNC: 103 MG/DL (ref 37–140)
TSH SERPL-ACNC: 5.72 UIU/ML (ref 0.35–4.94)
VLDLC SERPL CALC-MCNC: 21 MG/DL
WBC # BLD AUTO: 5.4 X10(3)/MCL (ref 4.5–11.5)

## 2025-08-22 PROCEDURE — 80053 COMPREHEN METABOLIC PANEL: CPT

## 2025-08-22 PROCEDURE — 83036 HEMOGLOBIN GLYCOSYLATED A1C: CPT

## 2025-08-22 PROCEDURE — 84443 ASSAY THYROID STIM HORMONE: CPT

## 2025-08-22 PROCEDURE — 85025 COMPLETE CBC W/AUTO DIFF WBC: CPT

## 2025-08-22 PROCEDURE — 80061 LIPID PANEL: CPT

## 2025-08-26 ENCOUNTER — LAB REQUISITION (OUTPATIENT)
Dept: LAB | Facility: HOSPITAL | Age: 75
End: 2025-08-26
Payer: MEDICARE

## 2025-08-26 DIAGNOSIS — E87.5 HYPERKALEMIA: ICD-10-CM

## 2025-08-26 LAB — POTASSIUM SERPL-SCNC: 5 MMOL/L (ref 3.5–5.1)

## 2025-08-26 PROCEDURE — 84132 ASSAY OF SERUM POTASSIUM: CPT

## (undated) DEVICE — SHEATH GUIDE R2P 6FR 119CM STR

## (undated) DEVICE — CATH CROSSTELLA RX .018 5X200

## (undated) DEVICE — GUIDEWIRE STF .035X260CM ANG

## (undated) DEVICE — CATH QUICK-CROSS .035X15

## (undated) DEVICE — Device

## (undated) DEVICE — SET ANGIO ACIST CVI ANGIOTOUCH

## (undated) DEVICE — CATH STERLING OTW 4X220X150

## (undated) DEVICE — CATH BLLN FG APEX MR 2.00X12MM

## (undated) DEVICE — CATH BLLN RANG 5X200MM 150CM

## (undated) DEVICE — VALVE CONTROL COPILOT

## (undated) DEVICE — CATH BLLN FG APEX MR 2.00X40MM

## (undated) DEVICE — GUIDEWIRE GLADIUS STR 300CM

## (undated) DEVICE — GUIDEWIRE SAFE T .035IN 180CM

## (undated) DEVICE — DRAPE RADIAL BRACHIAL 29X42IN

## (undated) DEVICE — GUIDEWIRE GLIDEWIRE J 400X1.5

## (undated) DEVICE — SHEATH GLIDE SLENDER 6FR

## (undated) DEVICE — DRESSING TRANS 4X4 TEGADERM

## (undated) DEVICE — BAND TR COMP DEVICE REG 24CM

## (undated) DEVICE — SHEATH INTRODUCER 5FR 10CM

## (undated) DEVICE — CATH MULTIPACK EXPO 6FR

## (undated) DEVICE — CATH BLLN FG APEX MR 2.50X20MM

## (undated) DEVICE — CANNULA ADULT NASAL 7FT

## (undated) DEVICE — GUIDE LAUNCHER 6FR EBU 3.5

## (undated) DEVICE — SHEATH INTRODUCER 6FR 11CM

## (undated) DEVICE — GUIDEWIRE RUNTHROUGH NS 180CM

## (undated) DEVICE — CATH GLIDECATH PGTL 4FR 150CM

## (undated) DEVICE — GUIDE LAUNCHER 6FR 100CM IMA

## (undated) DEVICE — CATH TURBO ELIT 2.0